# Patient Record
Sex: MALE | Race: WHITE | Employment: OTHER | URBAN - NONMETROPOLITAN AREA
[De-identification: names, ages, dates, MRNs, and addresses within clinical notes are randomized per-mention and may not be internally consistent; named-entity substitution may affect disease eponyms.]

---

## 2017-01-31 ENCOUNTER — OFFICE VISIT (OUTPATIENT)
Dept: FAMILY MEDICINE CLINIC | Age: 82
End: 2017-01-31

## 2017-01-31 VITALS
SYSTOLIC BLOOD PRESSURE: 128 MMHG | DIASTOLIC BLOOD PRESSURE: 66 MMHG | RESPIRATION RATE: 16 BRPM | BODY MASS INDEX: 22.01 KG/M2 | WEIGHT: 155.6 LBS | OXYGEN SATURATION: 97 % | HEART RATE: 84 BPM

## 2017-01-31 DIAGNOSIS — R60.0 BILATERAL EDEMA OF LOWER EXTREMITY: ICD-10-CM

## 2017-01-31 DIAGNOSIS — K59.03 DRUG-INDUCED CONSTIPATION: ICD-10-CM

## 2017-01-31 DIAGNOSIS — L85.3 DRY SKIN DERMATITIS: Primary | ICD-10-CM

## 2017-01-31 PROCEDURE — 1123F ACP DISCUSS/DSCN MKR DOCD: CPT | Performed by: FAMILY MEDICINE

## 2017-01-31 PROCEDURE — G8427 DOCREV CUR MEDS BY ELIG CLIN: HCPCS | Performed by: FAMILY MEDICINE

## 2017-01-31 PROCEDURE — G8484 FLU IMMUNIZE NO ADMIN: HCPCS | Performed by: FAMILY MEDICINE

## 2017-01-31 PROCEDURE — 99214 OFFICE O/P EST MOD 30 MIN: CPT | Performed by: FAMILY MEDICINE

## 2017-01-31 PROCEDURE — 1036F TOBACCO NON-USER: CPT | Performed by: FAMILY MEDICINE

## 2017-01-31 PROCEDURE — 4040F PNEUMOC VAC/ADMIN/RCVD: CPT | Performed by: FAMILY MEDICINE

## 2017-01-31 PROCEDURE — G8419 CALC BMI OUT NRM PARAM NOF/U: HCPCS | Performed by: FAMILY MEDICINE

## 2017-01-31 RX ORDER — DOCUSATE SODIUM 100 MG/1
100 CAPSULE, LIQUID FILLED ORAL DAILY PRN
Qty: 60 CAPSULE | Refills: 2 | Status: SHIPPED | OUTPATIENT
Start: 2017-01-31 | End: 2018-03-09 | Stop reason: CLARIF

## 2017-01-31 ASSESSMENT — ENCOUNTER SYMPTOMS
SHORTNESS OF BREATH: 0
NAUSEA: 0
COUGH: 0
WHEEZING: 0
ABDOMINAL PAIN: 0
SORE THROAT: 0
RHINORRHEA: 0
EYE DISCHARGE: 0
DIARRHEA: 0
CONSTIPATION: 1

## 2017-03-21 ENCOUNTER — OFFICE VISIT (OUTPATIENT)
Dept: FAMILY MEDICINE CLINIC | Age: 82
End: 2017-03-21

## 2017-03-21 VITALS
RESPIRATION RATE: 20 BRPM | BODY MASS INDEX: 21.56 KG/M2 | WEIGHT: 154 LBS | HEIGHT: 71 IN | HEART RATE: 100 BPM | OXYGEN SATURATION: 97 % | SYSTOLIC BLOOD PRESSURE: 130 MMHG | DIASTOLIC BLOOD PRESSURE: 72 MMHG

## 2017-03-21 DIAGNOSIS — G89.29 CHRONIC LEFT-SIDED LOW BACK PAIN WITH LEFT-SIDED SCIATICA: ICD-10-CM

## 2017-03-21 DIAGNOSIS — I10 ESSENTIAL HYPERTENSION: Primary | ICD-10-CM

## 2017-03-21 DIAGNOSIS — T40.2X5A CONSTIPATION DUE TO OPIOID THERAPY: ICD-10-CM

## 2017-03-21 DIAGNOSIS — M54.42 CHRONIC LEFT-SIDED LOW BACK PAIN WITH LEFT-SIDED SCIATICA: ICD-10-CM

## 2017-03-21 DIAGNOSIS — K59.03 CONSTIPATION DUE TO OPIOID THERAPY: ICD-10-CM

## 2017-03-21 DIAGNOSIS — I83.90 VARICOSE VEIN OF LEG: ICD-10-CM

## 2017-03-21 PROCEDURE — G8427 DOCREV CUR MEDS BY ELIG CLIN: HCPCS | Performed by: FAMILY MEDICINE

## 2017-03-21 PROCEDURE — 1036F TOBACCO NON-USER: CPT | Performed by: FAMILY MEDICINE

## 2017-03-21 PROCEDURE — G8484 FLU IMMUNIZE NO ADMIN: HCPCS | Performed by: FAMILY MEDICINE

## 2017-03-21 PROCEDURE — 4040F PNEUMOC VAC/ADMIN/RCVD: CPT | Performed by: FAMILY MEDICINE

## 2017-03-21 PROCEDURE — G8419 CALC BMI OUT NRM PARAM NOF/U: HCPCS | Performed by: FAMILY MEDICINE

## 2017-03-21 PROCEDURE — 99214 OFFICE O/P EST MOD 30 MIN: CPT | Performed by: FAMILY MEDICINE

## 2017-03-21 PROCEDURE — 1123F ACP DISCUSS/DSCN MKR DOCD: CPT | Performed by: FAMILY MEDICINE

## 2017-03-21 ASSESSMENT — ENCOUNTER SYMPTOMS
BACK PAIN: 1
COUGH: 0
DIARRHEA: 0
SHORTNESS OF BREATH: 0
CONSTIPATION: 1
EYE DISCHARGE: 0
WHEEZING: 0
NAUSEA: 0
ABDOMINAL PAIN: 0
RHINORRHEA: 0
SORE THROAT: 0

## 2017-03-21 ASSESSMENT — PATIENT HEALTH QUESTIONNAIRE - PHQ9
2. FEELING DOWN, DEPRESSED OR HOPELESS: 0
1. LITTLE INTEREST OR PLEASURE IN DOING THINGS: 0
SUM OF ALL RESPONSES TO PHQ QUESTIONS 1-9: 0
SUM OF ALL RESPONSES TO PHQ9 QUESTIONS 1 & 2: 0

## 2017-04-18 ENCOUNTER — OFFICE VISIT (OUTPATIENT)
Dept: UROLOGY | Age: 82
End: 2017-04-18

## 2017-04-18 VITALS — HEIGHT: 70 IN | BODY MASS INDEX: 21.47 KG/M2 | WEIGHT: 150 LBS

## 2017-04-18 DIAGNOSIS — C61 PROSTATE CANCER (HCC): Primary | ICD-10-CM

## 2017-04-18 LAB
BILIRUBIN, POC: NORMAL
BLOOD URINE, POC: NORMAL
CLARITY, POC: NORMAL
COLOR, POC: NORMAL
GLUCOSE URINE, POC: NORMAL
KETONES, POC: NORMAL
LEUKOCYTE EST, POC: NORMAL
NITRITE, POC: NORMAL
PH, POC: NORMAL
PROTEIN, POC: NORMAL
SPECIFIC GRAVITY, POC: NORMAL
UROBILINOGEN, POC: NORMAL

## 2017-04-18 PROCEDURE — G8419 CALC BMI OUT NRM PARAM NOF/U: HCPCS | Performed by: NURSE PRACTITIONER

## 2017-04-18 PROCEDURE — 99213 OFFICE O/P EST LOW 20 MIN: CPT | Performed by: NURSE PRACTITIONER

## 2017-04-18 PROCEDURE — 4040F PNEUMOC VAC/ADMIN/RCVD: CPT | Performed by: NURSE PRACTITIONER

## 2017-04-18 PROCEDURE — 1036F TOBACCO NON-USER: CPT | Performed by: NURSE PRACTITIONER

## 2017-04-18 PROCEDURE — G8427 DOCREV CUR MEDS BY ELIG CLIN: HCPCS | Performed by: NURSE PRACTITIONER

## 2017-04-18 PROCEDURE — 81002 URINALYSIS NONAUTO W/O SCOPE: CPT | Performed by: NURSE PRACTITIONER

## 2017-04-18 PROCEDURE — 1123F ACP DISCUSS/DSCN MKR DOCD: CPT | Performed by: NURSE PRACTITIONER

## 2017-05-13 LAB
CHOLESTEROL, TOTAL: 182 MG/DL
CHOLESTEROL/HDL RATIO: 2.84
HDLC SERPL-MCNC: 64 MG/DL (ref 35–70)
LDL CHOLESTEROL CALCULATED: 104 MG/DL (ref 0–160)
TRIGL SERPL-MCNC: 72 MG/DL
VLDLC SERPL CALC-MCNC: 14 MG/DL

## 2017-06-05 ENCOUNTER — CARE COORDINATION (OUTPATIENT)
Dept: FAMILY MEDICINE CLINIC | Age: 82
End: 2017-06-05

## 2017-07-20 ENCOUNTER — OFFICE VISIT (OUTPATIENT)
Dept: FAMILY MEDICINE CLINIC | Age: 82
End: 2017-07-20
Payer: MEDICARE

## 2017-07-20 VITALS
SYSTOLIC BLOOD PRESSURE: 124 MMHG | DIASTOLIC BLOOD PRESSURE: 80 MMHG | RESPIRATION RATE: 16 BRPM | WEIGHT: 149 LBS | BODY MASS INDEX: 21.08 KG/M2 | OXYGEN SATURATION: 98 % | HEART RATE: 83 BPM

## 2017-07-20 DIAGNOSIS — G89.29 CHRONIC BILATERAL LOW BACK PAIN WITH LEFT-SIDED SCIATICA: Primary | ICD-10-CM

## 2017-07-20 DIAGNOSIS — M54.42 CHRONIC BILATERAL LOW BACK PAIN WITH LEFT-SIDED SCIATICA: Primary | ICD-10-CM

## 2017-07-20 PROCEDURE — 4040F PNEUMOC VAC/ADMIN/RCVD: CPT | Performed by: NURSE PRACTITIONER

## 2017-07-20 PROCEDURE — G8427 DOCREV CUR MEDS BY ELIG CLIN: HCPCS | Performed by: NURSE PRACTITIONER

## 2017-07-20 PROCEDURE — 1123F ACP DISCUSS/DSCN MKR DOCD: CPT | Performed by: NURSE PRACTITIONER

## 2017-07-20 PROCEDURE — G8420 CALC BMI NORM PARAMETERS: HCPCS | Performed by: NURSE PRACTITIONER

## 2017-07-20 PROCEDURE — 99213 OFFICE O/P EST LOW 20 MIN: CPT | Performed by: NURSE PRACTITIONER

## 2017-07-20 PROCEDURE — 1036F TOBACCO NON-USER: CPT | Performed by: NURSE PRACTITIONER

## 2017-07-20 RX ORDER — HYDROCODONE BITARTRATE AND ACETAMINOPHEN 5; 325 MG/1; MG/1
1 TABLET ORAL EVERY 6 HOURS PRN
Status: ON HOLD | COMMUNITY
End: 2018-09-23 | Stop reason: HOSPADM

## 2017-07-22 ASSESSMENT — ENCOUNTER SYMPTOMS
ABDOMINAL PAIN: 0
SHORTNESS OF BREATH: 0
COLOR CHANGE: 0
BACK PAIN: 1
DIARRHEA: 0
VOMITING: 0
NAUSEA: 0

## 2017-08-04 ENCOUNTER — OFFICE VISIT (OUTPATIENT)
Dept: PHYSICAL MEDICINE AND REHAB | Age: 82
End: 2017-08-04
Payer: MEDICARE

## 2017-08-04 ENCOUNTER — HOSPITAL ENCOUNTER (OUTPATIENT)
Age: 82
Discharge: HOME | End: 2017-08-04
Payer: MEDICARE

## 2017-08-04 ENCOUNTER — HOSPITAL ENCOUNTER (OUTPATIENT)
Dept: GENERAL RADIOLOGY | Age: 82
Discharge: HOME | End: 2017-08-04
Payer: MEDICARE

## 2017-08-04 VITALS
WEIGHT: 150 LBS | HEIGHT: 70 IN | HEART RATE: 92 BPM | BODY MASS INDEX: 21.47 KG/M2 | DIASTOLIC BLOOD PRESSURE: 88 MMHG | SYSTOLIC BLOOD PRESSURE: 138 MMHG

## 2017-08-04 DIAGNOSIS — M47.816 SPONDYLOSIS OF LUMBAR REGION WITHOUT MYELOPATHY OR RADICULOPATHY: Primary | ICD-10-CM

## 2017-08-04 DIAGNOSIS — G89.4 CHRONIC PAIN SYNDROME: ICD-10-CM

## 2017-08-04 DIAGNOSIS — M16.12 PRIMARY OSTEOARTHRITIS OF LEFT HIP: ICD-10-CM

## 2017-08-04 DIAGNOSIS — M48.061 LUMBAR SPINAL STENOSIS: ICD-10-CM

## 2017-08-04 PROCEDURE — G8427 DOCREV CUR MEDS BY ELIG CLIN: HCPCS | Performed by: PAIN MEDICINE

## 2017-08-04 PROCEDURE — 1036F TOBACCO NON-USER: CPT | Performed by: PAIN MEDICINE

## 2017-08-04 PROCEDURE — G8420 CALC BMI NORM PARAMETERS: HCPCS | Performed by: PAIN MEDICINE

## 2017-08-04 PROCEDURE — 73502 X-RAY EXAM HIP UNI 2-3 VIEWS: CPT

## 2017-08-04 PROCEDURE — 99205 OFFICE O/P NEW HI 60 MIN: CPT | Performed by: PAIN MEDICINE

## 2017-08-04 PROCEDURE — 1123F ACP DISCUSS/DSCN MKR DOCD: CPT | Performed by: PAIN MEDICINE

## 2017-08-04 PROCEDURE — 4040F PNEUMOC VAC/ADMIN/RCVD: CPT | Performed by: PAIN MEDICINE

## 2017-08-04 ASSESSMENT — ENCOUNTER SYMPTOMS
DIARRHEA: 0
SORE THROAT: 0
PHOTOPHOBIA: 0
CHEST TIGHTNESS: 0
NAUSEA: 0
SINUS PRESSURE: 0
SHORTNESS OF BREATH: 0
VOMITING: 0
RHINORRHEA: 0
WHEEZING: 0
COUGH: 0
BOWEL INCONTINENCE: 0
BACK PAIN: 1
COLOR CHANGE: 0
CONSTIPATION: 0
ABDOMINAL PAIN: 0
EYE PAIN: 0

## 2017-09-05 ENCOUNTER — APPOINTMENT (OUTPATIENT)
Dept: GENERAL RADIOLOGY | Age: 82
End: 2017-09-05
Attending: PAIN MEDICINE
Payer: MEDICARE

## 2017-09-05 ENCOUNTER — ANESTHESIA EVENT (OUTPATIENT)
Dept: OPERATING ROOM | Age: 82
End: 2017-09-05
Payer: MEDICARE

## 2017-09-05 ENCOUNTER — ANESTHESIA (OUTPATIENT)
Dept: OPERATING ROOM | Age: 82
End: 2017-09-05
Payer: MEDICARE

## 2017-09-05 ENCOUNTER — HOSPITAL ENCOUNTER (OUTPATIENT)
Age: 82
Setting detail: OUTPATIENT SURGERY
Discharge: HOME | End: 2017-09-05
Attending: PAIN MEDICINE | Admitting: PAIN MEDICINE
Payer: MEDICARE

## 2017-09-05 VITALS
OXYGEN SATURATION: 97 % | HEART RATE: 96 BPM | RESPIRATION RATE: 16 BRPM | TEMPERATURE: 98.8 F | SYSTOLIC BLOOD PRESSURE: 126 MMHG | DIASTOLIC BLOOD PRESSURE: 74 MMHG | WEIGHT: 146.4 LBS | BODY MASS INDEX: 20.5 KG/M2 | HEIGHT: 71 IN

## 2017-09-05 VITALS — DIASTOLIC BLOOD PRESSURE: 77 MMHG | OXYGEN SATURATION: 98 % | SYSTOLIC BLOOD PRESSURE: 127 MMHG

## 2017-09-05 PROCEDURE — 7100000010 HC PHASE II RECOVERY - FIRST 15 MIN: Performed by: PAIN MEDICINE

## 2017-09-05 PROCEDURE — 3600000058 HC PAIN LEVEL 5 BASE: Performed by: PAIN MEDICINE

## 2017-09-05 PROCEDURE — 7100000011 HC PHASE II RECOVERY - ADDTL 15 MIN: Performed by: PAIN MEDICINE

## 2017-09-05 PROCEDURE — 2580000003 HC RX 258: Performed by: REGISTERED NURSE

## 2017-09-05 PROCEDURE — 64495 INJ PARAVERT F JNT L/S 3 LEV: CPT | Performed by: PAIN MEDICINE

## 2017-09-05 PROCEDURE — 6360000002 HC RX W HCPCS: Performed by: REGISTERED NURSE

## 2017-09-05 PROCEDURE — 64494 INJ PARAVERT F JNT L/S 2 LEV: CPT | Performed by: PAIN MEDICINE

## 2017-09-05 PROCEDURE — 64493 INJ PARAVERT F JNT L/S 1 LEV: CPT | Performed by: PAIN MEDICINE

## 2017-09-05 PROCEDURE — 2500000003 HC RX 250 WO HCPCS: Performed by: PAIN MEDICINE

## 2017-09-05 PROCEDURE — 3209999900 FLUORO FOR SURGICAL PROCEDURES

## 2017-09-05 PROCEDURE — 6360000002 HC RX W HCPCS: Performed by: PAIN MEDICINE

## 2017-09-05 PROCEDURE — 3700000000 HC ANESTHESIA ATTENDED CARE: Performed by: PAIN MEDICINE

## 2017-09-05 RX ORDER — SODIUM CHLORIDE 9 MG/ML
INJECTION, SOLUTION INTRAVENOUS CONTINUOUS PRN
Status: DISCONTINUED | OUTPATIENT
Start: 2017-09-05 | End: 2017-09-05 | Stop reason: SDUPTHER

## 2017-09-05 RX ORDER — LIDOCAINE HYDROCHLORIDE 10 MG/ML
INJECTION, SOLUTION INFILTRATION; PERINEURAL PRN
Status: DISCONTINUED | OUTPATIENT
Start: 2017-09-05 | End: 2017-09-05 | Stop reason: HOSPADM

## 2017-09-05 RX ORDER — BUPIVACAINE HYDROCHLORIDE 2.5 MG/ML
INJECTION, SOLUTION EPIDURAL; INFILTRATION; INTRACAUDAL PRN
Status: DISCONTINUED | OUTPATIENT
Start: 2017-09-05 | End: 2017-09-05 | Stop reason: HOSPADM

## 2017-09-05 RX ORDER — PROPOFOL 10 MG/ML
INJECTION, EMULSION INTRAVENOUS PRN
Status: DISCONTINUED | OUTPATIENT
Start: 2017-09-05 | End: 2017-09-05 | Stop reason: SDUPTHER

## 2017-09-05 RX ORDER — BETAMETHASONE SODIUM PHOSPHATE AND BETAMETHASONE ACETATE 3; 3 MG/ML; MG/ML
INJECTION, SUSPENSION INTRA-ARTICULAR; INTRALESIONAL; INTRAMUSCULAR; SOFT TISSUE PRN
Status: DISCONTINUED | OUTPATIENT
Start: 2017-09-05 | End: 2017-09-05 | Stop reason: HOSPADM

## 2017-09-05 RX ADMIN — PROPOFOL 50 MG: 10 INJECTION, EMULSION INTRAVENOUS at 09:58

## 2017-09-05 RX ADMIN — SODIUM CHLORIDE: 9 INJECTION, SOLUTION INTRAVENOUS at 09:53

## 2017-09-05 ASSESSMENT — PULMONARY FUNCTION TESTS
PIF_VALUE: 0

## 2017-09-05 ASSESSMENT — PAIN DESCRIPTION - DESCRIPTORS: DESCRIPTORS: CONSTANT;DULL;SHOOTING

## 2017-09-05 ASSESSMENT — PAIN SCALES - GENERAL: PAINLEVEL_OUTOF10: 0

## 2017-09-05 ASSESSMENT — PAIN - FUNCTIONAL ASSESSMENT: PAIN_FUNCTIONAL_ASSESSMENT: 0-10

## 2017-09-19 ENCOUNTER — OFFICE VISIT (OUTPATIENT)
Dept: FAMILY MEDICINE CLINIC | Age: 82
End: 2017-09-19
Payer: MEDICARE

## 2017-09-19 VITALS
SYSTOLIC BLOOD PRESSURE: 120 MMHG | TEMPERATURE: 97.7 F | BODY MASS INDEX: 21 KG/M2 | RESPIRATION RATE: 16 BRPM | HEART RATE: 97 BPM | OXYGEN SATURATION: 97 % | DIASTOLIC BLOOD PRESSURE: 72 MMHG | WEIGHT: 150 LBS | HEIGHT: 71 IN

## 2017-09-19 DIAGNOSIS — T40.2X5A CONSTIPATION DUE TO OPIOID THERAPY: Primary | ICD-10-CM

## 2017-09-19 DIAGNOSIS — K40.90 LEFT INGUINAL HERNIA: ICD-10-CM

## 2017-09-19 DIAGNOSIS — M81.0 AGE-RELATED OSTEOPOROSIS WITHOUT CURRENT PATHOLOGICAL FRACTURE: ICD-10-CM

## 2017-09-19 DIAGNOSIS — K59.03 CONSTIPATION DUE TO OPIOID THERAPY: Primary | ICD-10-CM

## 2017-09-19 PROCEDURE — 99214 OFFICE O/P EST MOD 30 MIN: CPT | Performed by: FAMILY MEDICINE

## 2017-09-19 PROCEDURE — G8420 CALC BMI NORM PARAMETERS: HCPCS | Performed by: FAMILY MEDICINE

## 2017-09-19 PROCEDURE — 1036F TOBACCO NON-USER: CPT | Performed by: FAMILY MEDICINE

## 2017-09-19 PROCEDURE — G8427 DOCREV CUR MEDS BY ELIG CLIN: HCPCS | Performed by: FAMILY MEDICINE

## 2017-09-19 PROCEDURE — 1123F ACP DISCUSS/DSCN MKR DOCD: CPT | Performed by: FAMILY MEDICINE

## 2017-09-19 PROCEDURE — 4040F PNEUMOC VAC/ADMIN/RCVD: CPT | Performed by: FAMILY MEDICINE

## 2017-09-19 PROCEDURE — 4005F PHARM THX FOR OP RXD: CPT | Performed by: FAMILY MEDICINE

## 2017-09-19 ASSESSMENT — ENCOUNTER SYMPTOMS
SORE THROAT: 0
NAUSEA: 0
BACK PAIN: 1
WHEEZING: 0
ABDOMINAL PAIN: 0
EYE DISCHARGE: 0
RHINORRHEA: 0
SHORTNESS OF BREATH: 0
CONSTIPATION: 1
DIARRHEA: 0
COUGH: 0

## 2017-09-20 ENCOUNTER — TELEPHONE (OUTPATIENT)
Dept: SURGERY | Age: 82
End: 2017-09-20

## 2017-09-26 ENCOUNTER — OFFICE VISIT (OUTPATIENT)
Dept: PHYSICAL MEDICINE AND REHAB | Age: 82
End: 2017-09-26
Payer: MEDICARE

## 2017-09-26 VITALS
HEART RATE: 86 BPM | BODY MASS INDEX: 20.99 KG/M2 | DIASTOLIC BLOOD PRESSURE: 82 MMHG | WEIGHT: 149.91 LBS | SYSTOLIC BLOOD PRESSURE: 142 MMHG | HEIGHT: 71 IN

## 2017-09-26 DIAGNOSIS — G89.4 CHRONIC PAIN SYNDROME: ICD-10-CM

## 2017-09-26 DIAGNOSIS — M46.1 SI (SACROILIAC) JOINT INFLAMMATION (HCC): Primary | ICD-10-CM

## 2017-09-26 DIAGNOSIS — M47.816 SPONDYLOSIS OF LUMBAR REGION WITHOUT MYELOPATHY OR RADICULOPATHY: ICD-10-CM

## 2017-09-26 DIAGNOSIS — M48.061 LUMBAR SPINAL STENOSIS: ICD-10-CM

## 2017-09-26 DIAGNOSIS — M16.12 PRIMARY OSTEOARTHRITIS OF LEFT HIP: ICD-10-CM

## 2017-09-26 PROCEDURE — 4040F PNEUMOC VAC/ADMIN/RCVD: CPT | Performed by: NURSE PRACTITIONER

## 2017-09-26 PROCEDURE — 1123F ACP DISCUSS/DSCN MKR DOCD: CPT | Performed by: NURSE PRACTITIONER

## 2017-09-26 PROCEDURE — G8420 CALC BMI NORM PARAMETERS: HCPCS | Performed by: NURSE PRACTITIONER

## 2017-09-26 PROCEDURE — G8427 DOCREV CUR MEDS BY ELIG CLIN: HCPCS | Performed by: NURSE PRACTITIONER

## 2017-09-26 PROCEDURE — 1036F TOBACCO NON-USER: CPT | Performed by: NURSE PRACTITIONER

## 2017-09-26 PROCEDURE — 99213 OFFICE O/P EST LOW 20 MIN: CPT | Performed by: NURSE PRACTITIONER

## 2017-09-26 ASSESSMENT — ENCOUNTER SYMPTOMS
GASTROINTESTINAL NEGATIVE: 1
BACK PAIN: 1
EYES NEGATIVE: 1
ALLERGIC/IMMUNOLOGIC NEGATIVE: 1
RESPIRATORY NEGATIVE: 1

## 2017-10-05 ENCOUNTER — HOSPITAL ENCOUNTER (OUTPATIENT)
Age: 82
Discharge: HOME | End: 2017-10-05
Payer: MEDICARE

## 2017-10-05 DIAGNOSIS — C61 PROSTATE CANCER (HCC): ICD-10-CM

## 2017-10-05 LAB — PROSTATE SPECIFIC ANTIGEN: < 0.02 NG/ML (ref 0–1)

## 2017-10-05 PROCEDURE — 84153 ASSAY OF PSA TOTAL: CPT

## 2017-10-05 PROCEDURE — 36415 COLL VENOUS BLD VENIPUNCTURE: CPT

## 2017-10-11 ENCOUNTER — OFFICE VISIT (OUTPATIENT)
Dept: SURGERY | Age: 82
End: 2017-10-11
Payer: MEDICARE

## 2017-10-11 VITALS
HEIGHT: 70 IN | OXYGEN SATURATION: 98 % | SYSTOLIC BLOOD PRESSURE: 136 MMHG | BODY MASS INDEX: 20.96 KG/M2 | TEMPERATURE: 96.5 F | DIASTOLIC BLOOD PRESSURE: 78 MMHG | HEART RATE: 115 BPM | RESPIRATION RATE: 18 BRPM | WEIGHT: 146.4 LBS

## 2017-10-11 DIAGNOSIS — Z01.818 PRE-OP TESTING: ICD-10-CM

## 2017-10-11 DIAGNOSIS — K40.91 RECURRENT LEFT INGUINAL HERNIA: Primary | ICD-10-CM

## 2017-10-11 DIAGNOSIS — I10 ESSENTIAL HYPERTENSION: ICD-10-CM

## 2017-10-11 PROCEDURE — 4040F PNEUMOC VAC/ADMIN/RCVD: CPT | Performed by: SURGERY

## 2017-10-11 PROCEDURE — G8484 FLU IMMUNIZE NO ADMIN: HCPCS | Performed by: SURGERY

## 2017-10-11 PROCEDURE — G8427 DOCREV CUR MEDS BY ELIG CLIN: HCPCS | Performed by: SURGERY

## 2017-10-11 PROCEDURE — G8420 CALC BMI NORM PARAMETERS: HCPCS | Performed by: SURGERY

## 2017-10-11 PROCEDURE — 1036F TOBACCO NON-USER: CPT | Performed by: SURGERY

## 2017-10-11 PROCEDURE — 99203 OFFICE O/P NEW LOW 30 MIN: CPT | Performed by: SURGERY

## 2017-10-11 ASSESSMENT — ENCOUNTER SYMPTOMS
CONSTIPATION: 1
EYE REDNESS: 0
RECTAL PAIN: 0
EYE ITCHING: 0
EYE DISCHARGE: 0
PHOTOPHOBIA: 0
CHEST TIGHTNESS: 0
VOICE CHANGE: 0
VOMITING: 0
TROUBLE SWALLOWING: 0
NAUSEA: 0
SHORTNESS OF BREATH: 0
CHOKING: 0
ABDOMINAL DISTENTION: 1
FACIAL SWELLING: 0
BLOOD IN STOOL: 0
EYE PAIN: 0
COLOR CHANGE: 0
SINUS PAIN: 0
COUGH: 0
WHEEZING: 0
SINUS PRESSURE: 0
ANAL BLEEDING: 0
RHINORRHEA: 0
APNEA: 0
ABDOMINAL PAIN: 0
BACK PAIN: 1
STRIDOR: 0
SORE THROAT: 0
DIARRHEA: 0

## 2017-10-11 NOTE — PROGRESS NOTES
Negative for abdominal pain, anal bleeding, blood in stool, diarrhea, nausea, rectal pain and vomiting. Endocrine: Negative for cold intolerance, heat intolerance, polydipsia, polyphagia and polyuria. Genitourinary: Negative for decreased urine volume, difficulty urinating, discharge, dysuria, enuresis, flank pain, frequency, genital sores, hematuria, penile pain, penile swelling, scrotal swelling, testicular pain and urgency. Musculoskeletal: Positive for back pain and myalgias. Negative for arthralgias, gait problem, joint swelling, neck pain and neck stiffness. Skin: Negative for color change, pallor, rash and wound. Allergic/Immunologic: Negative for environmental allergies, food allergies and immunocompromised state. Neurological: Positive for weakness. Negative for dizziness, tremors, seizures, syncope, facial asymmetry, speech difficulty, light-headedness, numbness and headaches. Hematological: Negative for adenopathy. Bruises/bleeds easily. Psychiatric/Behavioral: Positive for sleep disturbance. Negative for agitation, behavioral problems, confusion, decreased concentration, dysphoric mood, hallucinations, self-injury and suicidal ideas. The patient is not nervous/anxious and is not hyperactive. Past Medical History:   Diagnosis Date    Back pain     Hernia     Hyperlipidemia     Hypertension     Osteoarthritis     Prostate cancer ([facility] [address] 2015       Past Surgical History:   Procedure Laterality Date    APP[facility]  01/1961    [facility]    BACK SURGERY  [id number]    X stop    CATARACT REMOVAL Right 06/2001    Dr Rodriguez Ast Left 11/2014    Dr Yesenia Ferreira, 2009    Dr Fariba Fontenot  0787'M?     Dr Elvis Lawton of date    NERVE BLOCK LUMB FACET LEVEL 1 BILATERAL Bilateral 9/5/2017    LUMBAR FACET MBB L3-4, L4-5, L5-S1 BILATERAL performed by Ritesh Fleming MD at [address] [place] [address] no tenderness. Left breast exhibits no skin change and no tenderness. Breasts are symmetrical.   Abdominal: Soft. Bowel sounds are normal. He exhibits no distension, no fluid wave, no abdominal bruit, no pulsatile midline mass and no mass. There is no hepatosplenomegaly. There is no tenderness. There is no rigidity, no rebound and no guarding. A hernia is present. Hernia confirmed positive in the left inguinal area (recurrent). Hernia confirmed negative in the ventral area and confirmed negative in the right inguinal area. Genitourinary: Rectum normal, testes normal and penis normal. Rectal exam shows no fissure, no mass, no tenderness and anal tone normal.   Musculoskeletal: Normal range of motion. He exhibits no edema or tenderness. Lymphadenopathy:     He has no cervical adenopathy. He has no axillary adenopathy. Right: No inguinal and no supraclavicular adenopathy present. Left: No inguinal and no supraclavicular adenopathy present. Neurological: He is alert and oriented to person, place, and time. He has normal strength. No cranial nerve deficit or sensory deficit. Gait normal.   Skin: Skin is warm, dry and intact. No rash noted. He is not diaphoretic. No cyanosis or erythema. No pallor. Nails show no clubbing. Psychiatric: He has a normal mood and affect. His speech is normal and behavior is normal. Judgment and thought content normal. Cognition and memory are normal.   Vitals reviewed.     Lab Results   Component Value Date     01/31/2017    K 4.3 01/31/2017     01/31/2017    CO2 27 01/31/2017     Lab Results   Component Value Date    CREATININE 1.3 (H) 01/31/2017     Lab Results   Component Value Date    WBC 9.6 01/31/2017    HGB 14.6 01/31/2017    HCT 44.2 01/31/2017    MCV 95.8 (H) 01/31/2017     01/31/2017     Lab Results   Component Value Date    ALT 23 01/31/2017    AST 22 01/31/2017    ALKPHOS 58 01/31/2017    BILITOT 0.4 01/31/2017     Imaging - none    Patient Active Problem List   Diagnosis    Prostate cancer    Generalized anxiety disorder    Essential hypertension    Back pain    Hyperlipidemia with target LDL less than 130    Back pain, chronic s/p surgery    Prostate disorder    Hematuria    Ileus ([address]    Postoperative ileus ([address]    Malignant neoplasm of prostate ([address]    PE (pulmonary thromboembolism) ([address]    Pulmonary embolism without acute cor pulmonale ([address]    Osteoporosis    Spondylosis of lumbar region without myelopathy or radiculopathy     Assessment:      1. Recurrent left inguinal hernia      Plan:      1. Schedule Ashley Campos for Robotic repair recurrent left inguinal hernia with mesh. 2. He will undergo pre-operative clearance per anesthesia guidelines with risk factors listed under the past medical history diagnosis & problem list.  3. The risks, benefits and alternatives were discussed with Ashley Campos including non-operative management. The pros and cons of robotic, laparoscopic and open techniques were discussed. The pros and cons of mesh insertion were discussed. All questions answered. He understands and wishes to proceed with surgical intervention. 4. Restrictions discussed with Ashley Campos and he expresses understanding. 5. He is advised to call back directly if there are further questions/concerns, or if his symptoms worsen prior to surgery.

## 2017-10-12 ENCOUNTER — HOSPITAL ENCOUNTER (OUTPATIENT)
Age: 82
Discharge: HOME | End: 2017-10-12
Payer: MEDICARE

## 2017-10-12 LAB
ANION GAP SERPL CALCULATED.3IONS-SCNC: 11 MEQ/L (ref 8–16)
BUN BLDV-MCNC: 22 MG/DL (ref 7–22)
CALCIUM SERPL-MCNC: 9 MG/DL (ref 8.5–10.5)
CHLORIDE BLD-SCNC: 102 MEQ/L (ref 98–111)
CO2: 28 MEQ/L (ref 23–33)
CREAT SERPL-MCNC: 1 MG/DL (ref 0.4–1.2)
EKG ATRIAL RATE: 64 BPM
EKG Q-T INTERVAL: 356 MS
EKG QRS DURATION: 84 MS
EKG QTC CALCULATION (BAZETT): 420 MS
EKG R AXIS: 12 DEGREES
EKG T AXIS: 14 DEGREES
EKG VENTRICULAR RATE: 84 BPM
GFR SERPL CREATININE-BSD FRML MDRD: 71 ML/MIN/1.73M2
GLUCOSE BLD-MCNC: 102 MG/DL (ref 70–108)
HCT VFR BLD CALC: 46.4 % (ref 42–52)
HEMOGLOBIN: 15.4 GM/DL (ref 14–18)
POTASSIUM SERPL-SCNC: 3.8 MEQ/L (ref 3.5–5.2)
SODIUM BLD-SCNC: 141 MEQ/L (ref 135–145)

## 2017-10-12 PROCEDURE — 36415 COLL VENOUS BLD VENIPUNCTURE: CPT

## 2017-10-12 PROCEDURE — 85018 HEMOGLOBIN: CPT

## 2017-10-12 PROCEDURE — 93005 ELECTROCARDIOGRAM TRACING: CPT | Performed by: SURGERY

## 2017-10-12 PROCEDURE — 85014 HEMATOCRIT: CPT

## 2017-10-12 PROCEDURE — 80048 BASIC METABOLIC PNL TOTAL CA: CPT

## 2017-10-13 ENCOUNTER — TELEPHONE (OUTPATIENT)
Dept: SURGERY | Age: 82
End: 2017-10-13

## 2017-10-13 NOTE — TELEPHONE ENCOUNTER
[facility] able to schedule pt with Dr. Cash All Monday at 10 am. Pt notified, instructed to bring photo id, insurance information, and pill bottles to appointment. Explained to pt that if he has any chest pain, shortness of breath, or feels like his heart is racing he is to report directly to ED. [address] verbalized understanding. Also notified Dr. Kip Merlos office as he was to have procedure done Monday morning. They are cancelling that procedure pending cardiac clearance as well. Pt notified of this. No questions or concerns voiced at this time.

## 2017-10-13 NOTE — TELEPHONE ENCOUNTER
We would have to see pt, and i have no openings with any dr Carrie Pereira.  Will dr. Kirstie Pulliam office clear pt?

## 2017-10-16 ENCOUNTER — OFFICE VISIT (OUTPATIENT)
Dept: CARDIOLOGY CLINIC | Age: 82
End: 2017-10-16
Payer: MEDICARE

## 2017-10-16 VITALS
BODY MASS INDEX: 20.64 KG/M2 | SYSTOLIC BLOOD PRESSURE: 128 MMHG | DIASTOLIC BLOOD PRESSURE: 66 MMHG | WEIGHT: 147.4 LBS | HEART RATE: 62 BPM | HEIGHT: 71 IN

## 2017-10-16 DIAGNOSIS — R42 DIZZINESS: ICD-10-CM

## 2017-10-16 DIAGNOSIS — I10 ESSENTIAL HYPERTENSION: ICD-10-CM

## 2017-10-16 DIAGNOSIS — Z01.818 PRE-OP EVALUATION: Primary | ICD-10-CM

## 2017-10-16 DIAGNOSIS — I48.19 PERSISTENT ATRIAL FIBRILLATION (HCC): ICD-10-CM

## 2017-10-16 DIAGNOSIS — R06.02 SOB (SHORTNESS OF BREATH) ON EXERTION: ICD-10-CM

## 2017-10-16 DIAGNOSIS — R94.31 ABNORMAL EKG: ICD-10-CM

## 2017-10-16 PROCEDURE — G8420 CALC BMI NORM PARAMETERS: HCPCS | Performed by: INTERNAL MEDICINE

## 2017-10-16 PROCEDURE — 99204 OFFICE O/P NEW MOD 45 MIN: CPT | Performed by: INTERNAL MEDICINE

## 2017-10-16 PROCEDURE — G8427 DOCREV CUR MEDS BY ELIG CLIN: HCPCS | Performed by: INTERNAL MEDICINE

## 2017-10-16 PROCEDURE — 4040F PNEUMOC VAC/ADMIN/RCVD: CPT | Performed by: INTERNAL MEDICINE

## 2017-10-16 PROCEDURE — G8484 FLU IMMUNIZE NO ADMIN: HCPCS | Performed by: INTERNAL MEDICINE

## 2017-10-16 PROCEDURE — 1123F ACP DISCUSS/DSCN MKR DOCD: CPT | Performed by: INTERNAL MEDICINE

## 2017-10-16 PROCEDURE — 1036F TOBACCO NON-USER: CPT | Performed by: INTERNAL MEDICINE

## 2017-10-16 NOTE — PROGRESS NOTES
2003    TURP    PROSTATE SURGERY  2009    Biopsy -Hyperplasia    SHOULDER ARTHROPLASTY  2005    right       Allergies   Allergen Reactions    Nitrofuran Derivatives Nausea Only     Severe stomach cramps    Nsaids Nausea Only and Other (See Comments)     Pain in stomach        Family History   Problem Relation Age of Onset    Heart Disease Father     Prostate Cancer Brother     Cancer Brother     Prostate Cancer Brother     Cancer Brother     Stroke Mother     Cancer Sister     Prostate Cancer Brother     Prostate Cancer Brother         Social History     Social History    Marital status:      Spouse name: N/A    Number of children: N/A    Years of education: N/A     Occupational History    Not on file. Social History Main Topics    Smoking status: Former Smoker     Packs/day: 1.00     Years: 10.00     Types: Cigarettes     Quit date: 7/3/1965    Smokeless tobacco: Never Used    Alcohol use 0.0 oz/week      Comment: SOCIALLY wine a few times a year     Drug use: No    Sexual activity: Not Currently     Other Topics Concern    Not on file     Social History Narrative    No narrative on file       Current Outpatient Prescriptions   Medication Sig Dispense Refill    apixaban (ELIQUIS) 5 MG TABS tablet Take 1 tablet by mouth 2 times daily 180 tablet 2    Multiple Vitamins-Minerals (PRESERVISION/LUTEIN PO) Take 1 tablet by mouth 2 times daily       UNABLE TO FIND 1 tablet daily For kidney health      HYDROcodone-acetaminophen (NORCO) 5-325 MG per tablet Take 1 tablet by mouth every 6 hours as needed for Pain  .  docusate sodium (COLACE) 100 MG capsule Take 1 capsule by mouth daily as needed for Constipation 60 capsule 2    clotrimazole-betamethasone (LOTRISONE) 1-0.05 % cream Apply topically 2 times daily. (Patient taking differently: Apply topically 2 times daily.  PRN) 45 g 1    aspirin 81 MG tablet Take 81 mg by mouth daily      Calcium Carbonate-Vitamin D (CALTRATE 600+D) 600-400 MG-UNIT CHEW 1 tablet PO BID 60 tablet 0    amLODIPine (NORVASC) 5 MG tablet Take 10 mg by mouth daily       clonazePAM (KLONOPIN) 0.5 MG tablet Take 0.5 mg by mouth every evening. No current facility-administered medications for this visit. Review of Systems -     General ROS: negative  Psychological ROS: negative  Hematological and Lymphatic ROS: No history of blood clots or bleeding disorder. Respiratory ROS: no cough, shortness of breath, or wheezing  Cardiovascular ROS: no chest pain or dyspnea on exertion  Gastrointestinal ROS: negative  Genito-Urinary ROS: no dysuria, trouble voiding, or hematuria  Musculoskeletal ROS: negative  Neurological ROS: no TIA or stroke symptoms  Dermatological ROS: negative      Blood pressure 128/66, pulse 62, height 5' 10.51\" (1.791 m), weight 147 lb 6.4 oz (66.9 kg). Physical Examination:    General appearance - alert, well appearing, and in no distress  Mental status - alert, oriented to person, place, and time  Neck - supple, no significant adenopathy, no JVD, or carotid bruits  Chest - clear to auscultation, no wheezes, rales or rhonchi, symmetric air entry  Heart - normal rate, regular rhythm, normal S1, S2, no murmurs, rubs, clicks or gallops  Abdomen - soft, nontender, nondistended, no masses or organomegaly  Neurological - alert, oriented, normal speech, no focal findings or movement disorder noted  Musculoskeletal - no joint tenderness, deformity or swelling  Extremities - peripheral pulses normal, no pedal edema, no clubbing or cyanosis  Skin - normal coloration and turgor, no rashes, no suspicious skin lesions noted    Lab  No results for input(s): CKTOTAL, CKMB, CKMBINDEX, TROPONINI in the last 72 hours.   CBC:   Lab Results   Component Value Date    WBC 9.6 01/31/2017    RBC 4.61 01/31/2017     08/26/2011    HGB 15.4 10/12/2017    HCT 46.4 10/12/2017    MCV 95.8 01/31/2017    MCH 31.7 01/31/2017    MCHC 33.1 01/31/2017    RDW

## 2017-10-25 ENCOUNTER — TELEPHONE (OUTPATIENT)
Dept: UROLOGY | Age: 82
End: 2017-10-25

## 2017-10-26 ENCOUNTER — HOSPITAL ENCOUNTER (OUTPATIENT)
Dept: NON INVASIVE DIAGNOSTICS | Age: 82
Discharge: HOME | End: 2017-10-26
Payer: MEDICARE

## 2017-10-26 DIAGNOSIS — I10 ESSENTIAL HYPERTENSION: ICD-10-CM

## 2017-10-26 DIAGNOSIS — Z01.818 PRE-OP EVALUATION: ICD-10-CM

## 2017-10-26 DIAGNOSIS — R94.31 ABNORMAL EKG: ICD-10-CM

## 2017-10-26 DIAGNOSIS — R06.02 SOB (SHORTNESS OF BREATH) ON EXERTION: ICD-10-CM

## 2017-10-26 DIAGNOSIS — I48.19 PERSISTENT ATRIAL FIBRILLATION (HCC): ICD-10-CM

## 2017-10-26 DIAGNOSIS — R42 DIZZINESS: ICD-10-CM

## 2017-10-26 LAB
LV EF: 63 %
LVEF MODALITY: NORMAL

## 2017-10-26 PROCEDURE — 93306 TTE W/DOPPLER COMPLETE: CPT

## 2017-10-26 PROCEDURE — 78452 HT MUSCLE IMAGE SPECT MULT: CPT

## 2017-10-26 PROCEDURE — 3430000000 HC RX DIAGNOSTIC RADIOPHARMACEUTICAL: Performed by: INTERNAL MEDICINE

## 2017-10-26 PROCEDURE — 93017 CV STRESS TEST TRACING ONLY: CPT | Performed by: INTERNAL MEDICINE

## 2017-10-26 PROCEDURE — 93226 XTRNL ECG REC<48 HR SCAN A/R: CPT

## 2017-10-26 PROCEDURE — A9500 TC99M SESTAMIBI: HCPCS | Performed by: INTERNAL MEDICINE

## 2017-10-26 PROCEDURE — 93225 XTRNL ECG REC<48 HRS REC: CPT

## 2017-10-26 PROCEDURE — 6360000002 HC RX W HCPCS

## 2017-10-26 RX ADMIN — Medication 10.3 MILLICURIE: at 09:42

## 2017-10-26 RX ADMIN — Medication 34.3 MILLICURIE: at 10:50

## 2017-10-27 ENCOUNTER — TELEPHONE (OUTPATIENT)
Dept: SURGERY | Age: 82
End: 2017-10-27

## 2017-10-27 ENCOUNTER — TELEPHONE (OUTPATIENT)
Dept: PHYSICAL MEDICINE AND REHAB | Age: 82
End: 2017-10-27

## 2017-10-27 NOTE — TELEPHONE ENCOUNTER
Returned patients call. He is going to follow up with Dr. Adrienne Lock on 11.03 to check on clearance before we can proceed with his SI MBB. I cancelled his 11.06 appt with Jess which was to follow up after injection.

## 2017-11-01 ENCOUNTER — TELEPHONE (OUTPATIENT)
Dept: SURGERY | Age: 82
End: 2017-11-01

## 2017-11-01 NOTE — TELEPHONE ENCOUNTER
Pt now cleared for surgery. Scheduled for Nov. 21st. Arrive at 9am. Can pt stop eliquis 2-3 days prior?

## 2017-11-01 NOTE — TELEPHONE ENCOUNTER
I do not think pat started yet.  B/C he was informed not to till after surgery  But he started -yes he may stop for 3 days prior to surgery- no bridging needed

## 2017-11-02 ENCOUNTER — TELEPHONE (OUTPATIENT)
Dept: SURGERY | Age: 82
End: 2017-11-02

## 2017-11-02 NOTE — PROCEDURES
[address] [place] [address]                                HOLTER MONITOR    PATIENT NAME: Jaqueline Reyez               :             1934  MED REC NO:   [id number]                            ROOM:  ACCOUNT NO:   [de-identified]                            ADMISSION DATE:  10/26/2017  PROVIDER:     Rakan Sandoval. KIRSTIN Love STUDY:  10/26/2017    QUALITY:  Reasonable. INDICATION:  Cardiac arrhythmia. FINDINGS:  The patient is in a complete atrial fibrillation. Average  heart rate of 91 beats per minute ranging from 47 to 150 beats per  minute. Maximum R to R interval is 2.3 second at 5:30 a.m. There is  277 ventricular ectopic beat of which 251 isolated, 13 couplet and 10  ventricular bigeminy pattern. No supraventricular ectopic beats. Diary: This is a diary. No significant entry noted. CONCLUSION:  This is an complete atrial fibrillation with average  heart rate of 91 beats per minute ranging from 47 to 150 beats per  minute. No significant pause of more than 2.3 second noted. Rare  ventricular ectopic beat total of 277, predominantly isolated, few  couplets, few ventricular bigeminy. No supraventricular tachycardia. No other form of arrhythmia noted. The AFib seems to be rate well  controlled. The patient at times  gets AFib with rapid ventricular  response. Average heart rate is 91 beats per minute, so the patient  may benefit from increasing the dose of AV jason blocking agent. Thank you. Anand De La Fuente.  Denita Salazar M.D.    D: 2017 18:04:27       T: 2017 19:32:31     [email or web address]  Job#: [id number]     Doc#: [id number]

## 2017-11-03 ENCOUNTER — OFFICE VISIT (OUTPATIENT)
Dept: CARDIOLOGY CLINIC | Age: 82
End: 2017-11-03
Payer: MEDICARE

## 2017-11-03 VITALS
HEIGHT: 71 IN | DIASTOLIC BLOOD PRESSURE: 92 MMHG | SYSTOLIC BLOOD PRESSURE: 152 MMHG | HEART RATE: 86 BPM | WEIGHT: 150 LBS | BODY MASS INDEX: 21 KG/M2

## 2017-11-03 DIAGNOSIS — R06.02 SOB (SHORTNESS OF BREATH) ON EXERTION: ICD-10-CM

## 2017-11-03 DIAGNOSIS — I34.0 NON-RHEUMATIC MITRAL REGURGITATION: ICD-10-CM

## 2017-11-03 DIAGNOSIS — Z01.818 PRE-OP EVALUATION: Primary | ICD-10-CM

## 2017-11-03 DIAGNOSIS — I48.19 PERSISTENT ATRIAL FIBRILLATION (HCC): ICD-10-CM

## 2017-11-03 DIAGNOSIS — E78.5 HYPERLIPIDEMIA WITH TARGET LDL LESS THAN 130: ICD-10-CM

## 2017-11-03 DIAGNOSIS — I10 ESSENTIAL HYPERTENSION: ICD-10-CM

## 2017-11-03 DIAGNOSIS — R94.31 ABNORMAL EKG: ICD-10-CM

## 2017-11-03 DIAGNOSIS — I35.1 MODERATE AORTIC REGURGITATION: ICD-10-CM

## 2017-11-03 PROCEDURE — G8484 FLU IMMUNIZE NO ADMIN: HCPCS | Performed by: INTERNAL MEDICINE

## 2017-11-03 PROCEDURE — G8420 CALC BMI NORM PARAMETERS: HCPCS | Performed by: INTERNAL MEDICINE

## 2017-11-03 PROCEDURE — 1123F ACP DISCUSS/DSCN MKR DOCD: CPT | Performed by: INTERNAL MEDICINE

## 2017-11-03 PROCEDURE — 1036F TOBACCO NON-USER: CPT | Performed by: INTERNAL MEDICINE

## 2017-11-03 PROCEDURE — 99214 OFFICE O/P EST MOD 30 MIN: CPT | Performed by: INTERNAL MEDICINE

## 2017-11-03 PROCEDURE — G8427 DOCREV CUR MEDS BY ELIG CLIN: HCPCS | Performed by: INTERNAL MEDICINE

## 2017-11-03 PROCEDURE — 4040F PNEUMOC VAC/ADMIN/RCVD: CPT | Performed by: INTERNAL MEDICINE

## 2017-11-03 NOTE — PROGRESS NOTES
Bilateral 09/05/2017    lumbar facet block L3-S1    PROSTATE BIOPSY  7-    Dr Vaishnavi Mazariegos BIOPSY  8/21/2015    transrectal ultrasound with biopsy(+)    PROSTATE SURGERY  2003    TURP    PROSTATE SURGERY  2009    Biopsy -Hyperplasia    SHOULDER ARTHROPLASTY  2005    right       Allergies   Allergen Reactions    Nitrofuran Derivatives Nausea Only     Severe stomach cramps    Nsaids Nausea Only and Other (See Comments)     Pain in stomach        Family History   Problem Relation Age of Onset    Heart Disease Father     Prostate Cancer Brother     Cancer Brother     Prostate Cancer Brother     Cancer Brother     Stroke Mother     Cancer Sister     Prostate Cancer Brother     Prostate Cancer Brother         Social History     Social History    Marital status:      Spouse name: N/A    Number of children: N/A    Years of education: N/A     Occupational History    Not on file. Social History Main Topics    Smoking status: Former Smoker     Packs/day: 1.00     Years: 10.00     Types: Cigarettes     Quit date: 7/3/1965    Smokeless tobacco: Never Used    Alcohol use 0.0 oz/week      Comment: SOCIALLY wine a few times a year     Drug use: No    Sexual activity: Not Currently     Other Topics Concern    Not on file     Social History Narrative    No narrative on file       Current Outpatient Prescriptions   Medication Sig Dispense Refill    metoprolol tartrate (LOPRESSOR) 25 MG tablet Take 0.5 tablets by mouth daily 45 tablet 1    apixaban (ELIQUIS) 5 MG TABS tablet Take 1 tablet by mouth 2 times daily 56 tablet 0    Multiple Vitamins-Minerals (PRESERVISION/LUTEIN PO) Take 1 tablet by mouth 2 times daily       UNABLE TO FIND 1 tablet daily For kidney health      HYDROcodone-acetaminophen (NORCO) 5-325 MG per tablet Take 1 tablet by mouth every 6 hours as needed for Pain  .       docusate sodium (COLACE) 100 MG capsule Take 1 capsule by mouth daily as needed for Constipation 60 capsule 2    clotrimazole-betamethasone (LOTRISONE) 1-0.05 % cream Apply topically 2 times daily. (Patient taking differently: Apply topically 2 times daily. PRN) 45 g 1    aspirin 81 MG tablet Take 81 mg by mouth daily      Calcium Carbonate-Vitamin D (CALTRATE 600+D) 600-400 MG-UNIT CHEW 1 tablet PO BID 60 tablet 0    amLODIPine (NORVASC) 5 MG tablet Take 10 mg by mouth daily       clonazePAM (KLONOPIN) 0.5 MG tablet Take 0.5 mg by mouth every evening. No current facility-administered medications for this visit. Review of Systems -     General ROS: negative  Psychological ROS: negative  Hematological and Lymphatic ROS: No history of blood clots or bleeding disorder. Respiratory ROS: no cough, shortness of breath, or wheezing  Cardiovascular ROS: no chest pain or dyspnea on exertion  Gastrointestinal ROS: negative  Genito-Urinary ROS: no dysuria, trouble voiding, or hematuria  Musculoskeletal ROS: negative  Neurological ROS: no TIA or stroke symptoms  Dermatological ROS: negative      Blood pressure (!) 149/93, pulse 86, height 5' 10.5\" (1.791 m), weight 150 lb (68 kg).         Physical Examination:    General appearance - alert, well appearing, and in no distress  Mental status - alert, oriented to person, place, and time  Neck - supple, no significant adenopathy, no JVD, or carotid bruits  Chest - clear to auscultation, no wheezes, rales or rhonchi, symmetric air entry  Heart - normal rate, regular rhythm, normal S1, S2, , rubs, clicks or gallops but +ve murmurs  Abdomen - soft, nontender, nondistended, no masses or organomegaly  Neurological - alert, oriented, normal speech, no focal findings or movement disorder noted  Musculoskeletal - no joint tenderness, deformity or swelling  Extremities - peripheral pulses normal, no pedal edema, no clubbing or cyanosis  Skin - normal coloration and turgor, no rashes, no suspicious skin lesions noted    Lab  No results for input(s): CKTOTAL, CKMB, CKMBINDEX, TROPONINI in the last 72 hours. CBC:   Lab Results   Component Value Date    WBC 9.6 01/31/2017    RBC 4.61 01/31/2017     08/26/2011    HGB 15.4 10/12/2017    HCT 46.4 10/12/2017    MCV 95.8 01/31/2017    MCH 31.7 01/31/2017    MCHC 33.1 01/31/2017    RDW 13.9 01/31/2017     01/31/2017    MPV 11.1 01/31/2017     BMP:    Lab Results   Component Value Date     10/12/2017    K 3.8 10/12/2017     10/12/2017    CO2 28 10/12/2017    BUN 22 10/12/2017    LABALBU 4.1 01/31/2017    CREATININE 1.0 10/12/2017    CALCIUM 9.0 10/12/2017    LABGLOM 71 10/12/2017    GLUCOSE 102 10/12/2017    GLUCOSE 89 05/14/2016     Hepatic Function Panel:    Lab Results   Component Value Date    ALKPHOS 58 01/31/2017    ALT 23 01/31/2017    AST 22 01/31/2017    PROT 7.2 01/31/2017    BILITOT 0.4 01/31/2017    BILIDIR 0.2 05/14/2016    LABALBU 4.1 01/31/2017     Magnesium:  No results found for: MG  Warfarin PT/INR:  No components found for: PTPATWAR, PTINRWAR  HgBA1c:  No results found for: LABA1C  FLP:    Lab Results   Component Value Date    TRIG 72 05/13/2017    HDL 64 05/13/2017    LDLCALC 104 05/13/2017     TSH:  No results found for: TSH    EKG 10/12/17  Atrial fibrillation  Septal infarct , age undetermined  Abnormal ECG  When compared with ECG of 06-NOV-2015 22:29,  Atrial fibrillation has replaced Sinus rhythm  Nonspecific T wave abnormality now evident in Inferior leads  Confirmed by Goran Rader MD ([id number]) on 10/12/2017 8:03:16 PM      Conclusions      Summary   Normal left ventricle size and systolic function. Ejection fraction was   estimated at 60 to 65 %.  There were no regional left ventricular wall   motion abnormalities and wall thickness was within normal limits.   The left atrium is Moderately dilated.   Moderate to severly enlarged right atrium size.   Moderate-to-severe mitral regurgitation with centrally directed jet.   Moderate aortic regurgitation is noted.      Signature      ----------------------------------------------------------------   Electronically signed by Hyacinth Greene MD (Interpreting   physician) on 10/26/2017 at 12:55 PM   ----------------------------------------------------------------    Nuc  Stress neg      CONCLUSION:  This is an complete atrial fibrillation with average  heart rate of 91 beats per minute ranging from 47 to 150 beats per  minute. No significant pause of more than 2.3 second noted. Rare  ventricular ectopic beat total of 277, predominantly isolated, few  couplets, few ventricular bigeminy. No supraventricular tachycardia. No other form of arrhythmia noted. The AFib seems to be rate well  controlled. The patient at times  gets AFib with rapid ventricular  response. Average heart rate is 91 beats per minute, so the patient  may benefit from increasing the dose of AV jason blocking agent.     Thank you.           BENJAMIN Hagan M.D.     D: 11/02/2017 18:04:27           Assessment    1. Pre-op evaluation for abdominal hernia surgery     2. Persistent atrial fibrillation ([address]- newely DXed 10/12/17- CVR     3. Non-rheumatic mitral regurgitation- mod to severe     4. Moderate aortic regurgitation     5. Essential hypertension     6. Hyperlipidemia with target LDL less than 130     7. Abnormal EKG     8. SOB (shortness of breath) on exertion           Plan   Continue the current treatment and with constant vigilance to changes in symptoms and also any potential side effects. Return for care or seek medical attention immediately if symptoms got worse and/or develop new symptoms. Persistent atr FIB-CVR  chads vasc 3  Need OA  Holter 48 hrs   average heart rate of 91 beats per minute ranging from 47 to 150 beats per  Minute.   Need a little better rate control  Start lopresor 12.5 po qd am  Cont  apixaban 5 po bid  The risk and benefit of OA well explained including ICH and pat agreed to be on OA    Pre op eval  MET> 4  Sob on exertion and new atr fib with cvr  Need echo and lexiscan nuc result reviewed and d/w the pat  Brady Aceves may proceed with mod risk    Mod ro sever MR  Mor AR  Need f/u echo in 1 yrs    Hypertension, on medical treatment. Seems to be under good control. Patient is compliant with medical treatment.    .  D/w the pat at length    RTC in 4 months    [facility]

## 2017-11-07 ENCOUNTER — OFFICE VISIT (OUTPATIENT)
Dept: UROLOGY | Age: 82
End: 2017-11-07
Payer: MEDICARE

## 2017-11-07 VITALS — HEIGHT: 70 IN | WEIGHT: 150 LBS | BODY MASS INDEX: 21.47 KG/M2

## 2017-11-07 DIAGNOSIS — C61 MALIGNANT NEOPLASM OF PROSTATE (HCC): Primary | ICD-10-CM

## 2017-11-07 LAB
BILIRUBIN, POC: NORMAL
BLOOD URINE, POC: NORMAL
CLARITY, POC: CLEAR
COLOR, POC: YELLOW
GLUCOSE URINE, POC: NORMAL
KETONES, POC: NORMAL
LEUKOCYTE EST, POC: NORMAL
NITRITE, POC: NORMAL
PH, POC: NORMAL
PROTEIN, POC: NORMAL
SPECIFIC GRAVITY, POC: NORMAL
UROBILINOGEN, POC: NORMAL

## 2017-11-07 PROCEDURE — 4040F PNEUMOC VAC/ADMIN/RCVD: CPT | Performed by: NURSE PRACTITIONER

## 2017-11-07 PROCEDURE — G8420 CALC BMI NORM PARAMETERS: HCPCS | Performed by: NURSE PRACTITIONER

## 2017-11-07 PROCEDURE — 1123F ACP DISCUSS/DSCN MKR DOCD: CPT | Performed by: NURSE PRACTITIONER

## 2017-11-07 PROCEDURE — G8484 FLU IMMUNIZE NO ADMIN: HCPCS | Performed by: NURSE PRACTITIONER

## 2017-11-07 PROCEDURE — 99213 OFFICE O/P EST LOW 20 MIN: CPT | Performed by: NURSE PRACTITIONER

## 2017-11-07 PROCEDURE — 1036F TOBACCO NON-USER: CPT | Performed by: NURSE PRACTITIONER

## 2017-11-07 PROCEDURE — 81002 URINALYSIS NONAUTO W/O SCOPE: CPT | Performed by: NURSE PRACTITIONER

## 2017-11-07 PROCEDURE — G8427 DOCREV CUR MEDS BY ELIG CLIN: HCPCS | Performed by: NURSE PRACTITIONER

## 2017-11-07 NOTE — PROGRESS NOTES
SHOULDER ARTHROPLASTY  2005    right       Current Outpatient Prescriptions on File Prior to Visit   Medication Sig Dispense Refill    metoprolol tartrate (LOPRESSOR) 25 MG tablet Take 0.5 tablets by mouth daily 45 tablet 1    apixaban (ELIQUIS) 5 MG TABS tablet Take 1 tablet by mouth 2 times daily 56 tablet 0    Multiple Vitamins-Minerals (PRESERVISION/LUTEIN PO) Take 1 tablet by mouth 2 times daily       UNABLE TO FIND 1 tablet daily For kidney health      HYDROcodone-acetaminophen (NORCO) 5-325 MG per tablet Take 1 tablet by mouth every 6 hours as needed for Pain  .  docusate sodium (COLACE) 100 MG capsule Take 1 capsule by mouth daily as needed for Constipation 60 capsule 2    clotrimazole-betamethasone (LOTRISONE) 1-0.05 % cream Apply topically 2 times daily. (Patient taking differently: Apply topically 2 times daily. PRN) 45 g 1    aspirin 81 MG tablet Take 81 mg by mouth daily      Calcium Carbonate-Vitamin D (CALTRATE 600+D) 600-400 MG-UNIT CHEW 1 tablet PO BID 60 tablet 0    amLODIPine (NORVASC) 5 MG tablet Take 10 mg by mouth daily       clonazePAM (KLONOPIN) 0.5 MG tablet Take 0.5 mg by mouth every evening. No current facility-administered medications on file prior to visit.         Allergies   Allergen Reactions    Nitrofuran Derivatives Nausea Only     Severe stomach cramps    Nsaids Nausea Only and Other (See Comments)     Pain in stomach       Social History   Substance Use Topics    Smoking status: Former Smoker     Packs/day: 1.00     Years: 10.00     Types: Cigarettes     Quit date: 7/3/1965    Smokeless tobacco: Never Used    Alcohol use 0.0 oz/week      Comment: SOCIALLY wine a few times a year        Family History   Problem Relation Age of Onset    Heart Disease Father     Prostate Cancer Brother     Cancer Brother     Prostate Cancer Brother     Cancer Brother     Stroke Mother     Cancer Sister     Prostate Cancer Brother     Prostate Cancer Brother Value Ref Range    Color, UA Yellow     Clarity, UA Clear     Glucose, UA POC      Bilirubin, UA      Ketones, UA      Spec Grav, UA      Blood, UA POC neg     pH, UA      Protein, UA POC      Urobilinogen, UA      Leukocytes, UA small     Nitrite, UA neg        PSA  <0.02  10/2017  <0.02  4/2017  <0.02  10/2016  <0.02  7/2016  <0.02  4/2016  <0.1  12/2015    Assessment & Plan  Prostate Cancer  Urinary Incontinence-resolved  History of PE    He is doing well. He is showing excellent cancer control at this time with PSA being undetectable. Will continue to monitor PSA's closely.      Follow-up 12 months with PSA every 6 months

## 2017-11-13 ENCOUNTER — OFFICE VISIT (OUTPATIENT)
Dept: FAMILY MEDICINE CLINIC | Age: 82
End: 2017-11-13
Payer: MEDICARE

## 2017-11-13 VITALS
SYSTOLIC BLOOD PRESSURE: 148 MMHG | TEMPERATURE: 97.7 F | BODY MASS INDEX: 20.6 KG/M2 | WEIGHT: 143.6 LBS | OXYGEN SATURATION: 98 % | DIASTOLIC BLOOD PRESSURE: 80 MMHG | HEART RATE: 73 BPM | RESPIRATION RATE: 20 BRPM

## 2017-11-13 DIAGNOSIS — J01.10 ACUTE NON-RECURRENT FRONTAL SINUSITIS: Primary | ICD-10-CM

## 2017-11-13 PROCEDURE — 99213 OFFICE O/P EST LOW 20 MIN: CPT | Performed by: FAMILY MEDICINE

## 2017-11-13 PROCEDURE — G8427 DOCREV CUR MEDS BY ELIG CLIN: HCPCS | Performed by: FAMILY MEDICINE

## 2017-11-13 PROCEDURE — G8484 FLU IMMUNIZE NO ADMIN: HCPCS | Performed by: FAMILY MEDICINE

## 2017-11-13 PROCEDURE — G8420 CALC BMI NORM PARAMETERS: HCPCS | Performed by: FAMILY MEDICINE

## 2017-11-13 PROCEDURE — 1036F TOBACCO NON-USER: CPT | Performed by: FAMILY MEDICINE

## 2017-11-13 PROCEDURE — 4040F PNEUMOC VAC/ADMIN/RCVD: CPT | Performed by: FAMILY MEDICINE

## 2017-11-13 PROCEDURE — 1123F ACP DISCUSS/DSCN MKR DOCD: CPT | Performed by: FAMILY MEDICINE

## 2017-11-13 RX ORDER — AMOXICILLIN 500 MG/1
500 CAPSULE ORAL 2 TIMES DAILY
Qty: 20 CAPSULE | Refills: 0 | Status: SHIPPED | OUTPATIENT
Start: 2017-11-13 | End: 2017-11-23

## 2017-11-13 ASSESSMENT — ENCOUNTER SYMPTOMS
SORE THROAT: 1
SINUS PAIN: 1
WHEEZING: 0
RHINORRHEA: 0
SWOLLEN GLANDS: 1
BACK PAIN: 1
ABDOMINAL PAIN: 0
DIARRHEA: 0
COUGH: 1
SHORTNESS OF BREATH: 0
SINUS PRESSURE: 1
NAUSEA: 0
HOARSE VOICE: 1
CONSTIPATION: 0
EYE DISCHARGE: 0

## 2017-11-13 NOTE — PROGRESS NOTES
[place]   [address] [place] [address]  Dept: [phone]  Dept Fax: [phone]  Loc: [phone]    Maria D Martinez is a 80 y.o. male who presents today for his medical conditions/complaints as noted below. Chief Complaint   Patient presents with    Sinusitis     head congestion, headache, alot of drainage, gums even sore. no fever, some ear pain. x3 weeks and getting worse           HPI:     Sinusitis   This is a new problem. The current episode started 1 to 4 weeks ago. The problem has been gradually worsening since onset. There has been no fever. His pain is at a severity of 4/10. The pain is moderate. Associated symptoms include chills, congestion, coughing, ear pain, headaches, a hoarse voice, sinus pressure, a sore throat and swollen glands. Pertinent negatives include no shortness of breath or sneezing. Past treatments include nothing. The treatment provided no relief. Current Outpatient Prescriptions   Medication Sig Dispense Refill    amoxicillin (AMOXIL) 500 MG capsule Take 1 capsule by mouth 2 times daily for 10 days 20 capsule 0    metoprolol tartrate (LOPRESSOR) 25 MG tablet Take 0.5 tablets by mouth daily 45 tablet 1    apixaban (ELIQUIS) 5 MG TABS tablet Take 1 tablet by mouth 2 times daily 56 tablet 0    Multiple Vitamins-Minerals (PRESERVISION/LUTEIN PO) Take 1 tablet by mouth 2 times daily       UNABLE TO FIND 1 tablet daily For kidney health      HYDROcodone-acetaminophen (NORCO) 5-325 MG per tablet Take 1 tablet by mouth every 6 hours as needed for Pain  .  docusate sodium (COLACE) 100 MG capsule Take 1 capsule by mouth daily as needed for Constipation 60 capsule 2    clotrimazole-betamethasone (LOTRISONE) 1-0.05 % cream Apply topically 2 times daily. (Patient taking differently: Apply topically 2 times daily.  PRN) 45 g 1    aspirin 81 MG tablet Take 81 mg by mouth daily      Calcium Carbonate-Vitamin D (CALTRATE 600+D) 600-400 MG-UNIT CHEW 1 tablet PO BID 60 tablet 0    amLODIPine (NORVASC) 5 MG tablet Take 10 mg by mouth daily       clonazePAM (KLONOPIN) 0.5 MG tablet Take 0.5 mg by mouth every evening. No current facility-administered medications for this visit. Allergies   Allergen Reactions    Nitrofuran Derivatives Nausea Only     Severe stomach cramps    Nsaids Nausea Only and Other (See Comments)     Pain in stomach       Subjective:      Review of Systems   Constitutional: Positive for activity change and chills. Negative for fatigue and fever. HENT: Positive for congestion, dental problem, ear pain, hoarse voice, postnasal drip, sinus pain, sinus pressure and sore throat. Negative for rhinorrhea and sneezing. Eyes: Negative for discharge and visual disturbance. Respiratory: Positive for cough. Negative for shortness of breath and wheezing. Cardiovascular: Negative for chest pain and palpitations. Gastrointestinal: Negative for abdominal pain, constipation, diarrhea and nausea. Genitourinary: Negative for dysuria and hematuria. Musculoskeletal: Positive for arthralgias and back pain. Negative for myalgias. Neurological: Positive for headaches. Negative for dizziness. Psychiatric/Behavioral: Negative for sleep disturbance. The patient is not nervous/anxious. Objective:     BP (!) 148/80   Pulse 73   Temp 97.7 °F (36.5 °C) (Oral)   Resp 20   Wt 143 lb 9.6 oz (65.1 kg)   SpO2 98%   BMI 20.60 kg/m²     Physical Exam   Constitutional: He is oriented to person, place, and time. He appears well-developed and well-nourished. HENT:   Head: Normocephalic and atraumatic. Right Ear: Hearing, tympanic membrane, external ear and ear canal normal.   Left Ear: Hearing, tympanic membrane, external ear and ear canal normal.   Nose: Right sinus exhibits frontal sinus tenderness. Right sinus exhibits no maxillary sinus tenderness.  Left sinus exhibits frontal sinus tenderness. Left sinus exhibits no maxillary sinus tenderness. Mouth/Throat: Mucous membranes are normal. No posterior oropharyngeal edema or posterior oropharyngeal erythema. Postnasal drip   Eyes: Conjunctivae and EOM are normal. Right eye exhibits no discharge. Left eye exhibits no discharge. No scleral icterus. Neck: Normal range of motion. Cardiovascular: Normal rate, regular rhythm and normal heart sounds. Pulmonary/Chest: Effort normal and breath sounds normal. He has no wheezes. Abdominal: Soft. Bowel sounds are normal. He exhibits no distension. There is no tenderness. Musculoskeletal: He exhibits no edema or tenderness. Lymphadenopathy:     He has no cervical adenopathy. Neurological: He is alert and oriented to person, place, and time. Coordination normal.   Skin: Skin is warm and dry. Psychiatric: He has a normal mood and affect. His behavior is normal. Judgment and thought content normal.   Nursing note and vitals reviewed. Assessment:      1. Acute non-recurrent frontal sinusitis  amoxicillin (AMOXIL) 500 MG capsule       Plan:     amox for sinusitis. Follow up prn  Discussed use, benefit, and side effects of prescribed medications. Barriers to medication compliance addressed. All patient questions answered. Pt voiced understanding. No Follow-up on file. No orders of the defined types were placed in this encounter.     Orders Placed This Encounter   Medications    amoxicillin (AMOXIL) 500 MG capsule     Sig: Take 1 capsule by mouth 2 times daily for 10 days     Dispense:  20 capsule     Refill:  0           Electronically signed by Vani Eldridge MD on 11/13/2017 at 12:17 PM

## 2017-11-15 NOTE — PROGRESS NOTES
Call done 10/12 surgery was rescheduled. Updated medications and reviewed instructions.   NPO after midnight  [employer] and drivers license  Wear comfortable clean clothing  Do not bring jewelry   Shower night before and morning of surgery with a liquid antibacterial soap  Bring medications in original bottles  Follow all instructions given by your physician   needed at discharge

## 2017-11-22 ENCOUNTER — ANESTHESIA EVENT (OUTPATIENT)
Dept: OPERATING ROOM | Age: 82
End: 2017-11-22
Payer: MEDICARE

## 2017-11-22 ENCOUNTER — HOSPITAL ENCOUNTER (OUTPATIENT)
Age: 82
Setting detail: OUTPATIENT SURGERY
Discharge: HOME | End: 2017-11-22
Attending: SURGERY | Admitting: SURGERY
Payer: MEDICARE

## 2017-11-22 ENCOUNTER — ANESTHESIA (OUTPATIENT)
Dept: OPERATING ROOM | Age: 82
End: 2017-11-22
Payer: MEDICARE

## 2017-11-22 VITALS
SYSTOLIC BLOOD PRESSURE: 145 MMHG | OXYGEN SATURATION: 95 % | BODY MASS INDEX: 21.02 KG/M2 | HEIGHT: 71 IN | HEART RATE: 89 BPM | TEMPERATURE: 97.1 F | DIASTOLIC BLOOD PRESSURE: 82 MMHG | RESPIRATION RATE: 16 BRPM | WEIGHT: 150.13 LBS

## 2017-11-22 VITALS — DIASTOLIC BLOOD PRESSURE: 104 MMHG | TEMPERATURE: 98.6 F | SYSTOLIC BLOOD PRESSURE: 129 MMHG | OXYGEN SATURATION: 97 %

## 2017-11-22 LAB — POTASSIUM SERPL-SCNC: 4 MEQ/L (ref 3.5–5.2)

## 2017-11-22 PROCEDURE — 6360000002 HC RX W HCPCS

## 2017-11-22 PROCEDURE — 6360000002 HC RX W HCPCS: Performed by: NURSE ANESTHETIST, CERTIFIED REGISTERED

## 2017-11-22 PROCEDURE — A6446 CONFORM BAND S W>=3" <5"/YD: HCPCS | Performed by: SURGERY

## 2017-11-22 PROCEDURE — 7100000011 HC PHASE II RECOVERY - ADDTL 15 MIN: Performed by: SURGERY

## 2017-11-22 PROCEDURE — 7100000010 HC PHASE II RECOVERY - FIRST 15 MIN: Performed by: SURGERY

## 2017-11-22 PROCEDURE — 36415 COLL VENOUS BLD VENIPUNCTURE: CPT

## 2017-11-22 PROCEDURE — 2580000003 HC RX 258: Performed by: NURSE ANESTHETIST, CERTIFIED REGISTERED

## 2017-11-22 PROCEDURE — 3700000001 HC ADD 15 MINUTES (ANESTHESIA): Performed by: SURGERY

## 2017-11-22 PROCEDURE — 2500000003 HC RX 250 WO HCPCS: Performed by: NURSE ANESTHETIST, CERTIFIED REGISTERED

## 2017-11-22 PROCEDURE — 7100000001 HC PACU RECOVERY - ADDTL 15 MIN: Performed by: SURGERY

## 2017-11-22 PROCEDURE — 3700000000 HC ANESTHESIA ATTENDED CARE: Performed by: SURGERY

## 2017-11-22 PROCEDURE — 2580000003 HC RX 258

## 2017-11-22 PROCEDURE — C1781 MESH (IMPLANTABLE): HCPCS | Performed by: SURGERY

## 2017-11-22 PROCEDURE — 2500000003 HC RX 250 WO HCPCS: Performed by: SURGERY

## 2017-11-22 PROCEDURE — 49651 LAP ING HERNIA REPAIR RECUR: CPT | Performed by: SURGERY

## 2017-11-22 PROCEDURE — 7100000000 HC PACU RECOVERY - FIRST 15 MIN: Performed by: SURGERY

## 2017-11-22 PROCEDURE — 3600000009 HC SURGERY ROBOT BASE: Performed by: SURGERY

## 2017-11-22 PROCEDURE — 6370000000 HC RX 637 (ALT 250 FOR IP)

## 2017-11-22 PROCEDURE — S2900 ROBOTIC SURGICAL SYSTEM: HCPCS | Performed by: SURGERY

## 2017-11-22 PROCEDURE — A6413 ADHESIVE BANDAGE, FIRST-AID: HCPCS | Performed by: SURGERY

## 2017-11-22 PROCEDURE — 3600000019 HC SURGERY ROBOT ADDTL 15MIN: Performed by: SURGERY

## 2017-11-22 PROCEDURE — 84132 ASSAY OF SERUM POTASSIUM: CPT

## 2017-11-22 DEVICE — MESH HERN L W10.8XL16CM L INGUINAL WHT POLYPR MFIL: Type: IMPLANTABLE DEVICE | Status: FUNCTIONAL

## 2017-11-22 RX ORDER — MORPHINE SULFATE 2 MG/ML
4 INJECTION, SOLUTION INTRAMUSCULAR; INTRAVENOUS
Status: DISCONTINUED | OUTPATIENT
Start: 2017-11-22 | End: 2017-11-22 | Stop reason: HOSPADM

## 2017-11-22 RX ORDER — GLYCOPYRROLATE 0.2 MG/ML
INJECTION INTRAMUSCULAR; INTRAVENOUS PRN
Status: DISCONTINUED | OUTPATIENT
Start: 2017-11-22 | End: 2017-11-22 | Stop reason: SDUPTHER

## 2017-11-22 RX ORDER — NEOSTIGMINE METHYLSULFATE 1 MG/ML
INJECTION, SOLUTION INTRAVENOUS PRN
Status: DISCONTINUED | OUTPATIENT
Start: 2017-11-22 | End: 2017-11-22 | Stop reason: SDUPTHER

## 2017-11-22 RX ORDER — SODIUM CHLORIDE 0.9 % (FLUSH) 0.9 %
10 SYRINGE (ML) INJECTION PRN
Status: DISCONTINUED | OUTPATIENT
Start: 2017-11-22 | End: 2017-11-22 | Stop reason: HOSPADM

## 2017-11-22 RX ORDER — HYDROCODONE BITARTRATE AND ACETAMINOPHEN 5; 325 MG/1; MG/1
1 TABLET ORAL EVERY 4 HOURS PRN
Status: DISCONTINUED | OUTPATIENT
Start: 2017-11-22 | End: 2017-11-22 | Stop reason: HOSPADM

## 2017-11-22 RX ORDER — FENTANYL CITRATE 50 UG/ML
INJECTION, SOLUTION INTRAMUSCULAR; INTRAVENOUS PRN
Status: DISCONTINUED | OUTPATIENT
Start: 2017-11-22 | End: 2017-11-22 | Stop reason: SDUPTHER

## 2017-11-22 RX ORDER — SODIUM CHLORIDE 9 MG/ML
INJECTION, SOLUTION INTRAVENOUS CONTINUOUS PRN
Status: DISCONTINUED | OUTPATIENT
Start: 2017-11-22 | End: 2017-11-22 | Stop reason: SDUPTHER

## 2017-11-22 RX ORDER — LIDOCAINE HYDROCHLORIDE 20 MG/ML
INJECTION, SOLUTION INFILTRATION; PERINEURAL PRN
Status: DISCONTINUED | OUTPATIENT
Start: 2017-11-22 | End: 2017-11-22 | Stop reason: SDUPTHER

## 2017-11-22 RX ORDER — DEXAMETHASONE SODIUM PHOSPHATE 4 MG/ML
INJECTION, SOLUTION INTRA-ARTICULAR; INTRALESIONAL; INTRAMUSCULAR; INTRAVENOUS; SOFT TISSUE PRN
Status: DISCONTINUED | OUTPATIENT
Start: 2017-11-22 | End: 2017-11-22 | Stop reason: SDUPTHER

## 2017-11-22 RX ORDER — SODIUM CHLORIDE 0.9 % (FLUSH) 0.9 %
10 SYRINGE (ML) INJECTION EVERY 12 HOURS SCHEDULED
Status: DISCONTINUED | OUTPATIENT
Start: 2017-11-22 | End: 2017-11-22 | Stop reason: HOSPADM

## 2017-11-22 RX ORDER — PROPOFOL 10 MG/ML
INJECTION, EMULSION INTRAVENOUS PRN
Status: DISCONTINUED | OUTPATIENT
Start: 2017-11-22 | End: 2017-11-22 | Stop reason: SDUPTHER

## 2017-11-22 RX ORDER — SODIUM CHLORIDE 9 MG/ML
INJECTION, SOLUTION INTRAVENOUS CONTINUOUS
Status: DISCONTINUED | OUTPATIENT
Start: 2017-11-22 | End: 2017-11-22 | Stop reason: HOSPADM

## 2017-11-22 RX ORDER — HYDROCODONE BITARTRATE AND ACETAMINOPHEN 5; 325 MG/1; MG/1
2 TABLET ORAL EVERY 4 HOURS PRN
Status: DISCONTINUED | OUTPATIENT
Start: 2017-11-22 | End: 2017-11-22 | Stop reason: HOSPADM

## 2017-11-22 RX ORDER — HYDROCODONE BITARTRATE AND ACETAMINOPHEN 5; 325 MG/1; MG/1
TABLET ORAL
Status: COMPLETED
Start: 2017-11-22 | End: 2017-11-22

## 2017-11-22 RX ORDER — BUPIVACAINE HYDROCHLORIDE AND EPINEPHRINE 5; 5 MG/ML; UG/ML
INJECTION, SOLUTION EPIDURAL; INTRACAUDAL; PERINEURAL PRN
Status: DISCONTINUED | OUTPATIENT
Start: 2017-11-22 | End: 2017-11-22 | Stop reason: HOSPADM

## 2017-11-22 RX ORDER — MORPHINE SULFATE 2 MG/ML
2 INJECTION, SOLUTION INTRAMUSCULAR; INTRAVENOUS
Status: DISCONTINUED | OUTPATIENT
Start: 2017-11-22 | End: 2017-11-22 | Stop reason: HOSPADM

## 2017-11-22 RX ORDER — ONDANSETRON 2 MG/ML
INJECTION INTRAMUSCULAR; INTRAVENOUS PRN
Status: DISCONTINUED | OUTPATIENT
Start: 2017-11-22 | End: 2017-11-22 | Stop reason: SDUPTHER

## 2017-11-22 RX ORDER — ROCURONIUM BROMIDE 10 MG/ML
INJECTION, SOLUTION INTRAVENOUS PRN
Status: DISCONTINUED | OUTPATIENT
Start: 2017-11-22 | End: 2017-11-22 | Stop reason: SDUPTHER

## 2017-11-22 RX ORDER — ACETAMINOPHEN 325 MG/1
650 TABLET ORAL EVERY 4 HOURS PRN
Status: DISCONTINUED | OUTPATIENT
Start: 2017-11-22 | End: 2017-11-22 | Stop reason: HOSPADM

## 2017-11-22 RX ORDER — HYDROCODONE BITARTRATE AND ACETAMINOPHEN 5; 325 MG/1; MG/1
1 TABLET ORAL EVERY 4 HOURS PRN
Qty: 30 TABLET | Refills: 0 | Status: SHIPPED | OUTPATIENT
Start: 2017-11-22 | End: 2018-01-17 | Stop reason: DRUGHIGH

## 2017-11-22 RX ORDER — ONDANSETRON 2 MG/ML
4 INJECTION INTRAMUSCULAR; INTRAVENOUS EVERY 6 HOURS PRN
Status: DISCONTINUED | OUTPATIENT
Start: 2017-11-22 | End: 2017-11-22 | Stop reason: HOSPADM

## 2017-11-22 RX ADMIN — SODIUM CHLORIDE: 9 INJECTION, SOLUTION INTRAVENOUS at 07:01

## 2017-11-22 RX ADMIN — ONDANSETRON 4 MG: 2 INJECTION INTRAMUSCULAR; INTRAVENOUS at 08:40

## 2017-11-22 RX ADMIN — LIDOCAINE HYDROCHLORIDE 60 MG: 20 INJECTION, SOLUTION INFILTRATION; PERINEURAL at 07:45

## 2017-11-22 RX ADMIN — DEXAMETHASONE SODIUM PHOSPHATE 4 MG: 4 INJECTION, SOLUTION INTRAMUSCULAR; INTRAVENOUS at 07:50

## 2017-11-22 RX ADMIN — PROPOFOL 150 MG: 10 INJECTION, EMULSION INTRAVENOUS at 07:45

## 2017-11-22 RX ADMIN — WATER 1 G: 1 INJECTION INTRAMUSCULAR; INTRAVENOUS; SUBCUTANEOUS at 07:54

## 2017-11-22 RX ADMIN — FENTANYL CITRATE 100 MCG: 50 INJECTION INTRAMUSCULAR; INTRAVENOUS at 07:45

## 2017-11-22 RX ADMIN — FENTANYL CITRATE 50 MCG: 50 INJECTION INTRAMUSCULAR; INTRAVENOUS at 08:05

## 2017-11-22 RX ADMIN — FENTANYL CITRATE 25 MCG: 50 INJECTION INTRAMUSCULAR; INTRAVENOUS at 08:45

## 2017-11-22 RX ADMIN — NEOSTIGMINE METHYLSULFATE 3 MG: 1 INJECTION, SOLUTION INTRAVENOUS at 08:45

## 2017-11-22 RX ADMIN — SODIUM CHLORIDE: 9 INJECTION, SOLUTION INTRAVENOUS at 07:40

## 2017-11-22 RX ADMIN — Medication 40 MG: at 07:45

## 2017-11-22 RX ADMIN — GLYCOPYRROLATE 0.6 MG: 0.2 INJECTION, SOLUTION INTRAMUSCULAR; INTRAVENOUS at 08:45

## 2017-11-22 RX ADMIN — HYDROCODONE BITARTRATE AND ACETAMINOPHEN 1 TABLET: 5; 325 TABLET ORAL at 10:06

## 2017-11-22 ASSESSMENT — PULMONARY FUNCTION TESTS
PIF_VALUE: 25
PIF_VALUE: 23
PIF_VALUE: 25
PIF_VALUE: 11
PIF_VALUE: 25
PIF_VALUE: 25
PIF_VALUE: 12
PIF_VALUE: 1
PIF_VALUE: 22
PIF_VALUE: 19
PIF_VALUE: 20
PIF_VALUE: 2
PIF_VALUE: 24
PIF_VALUE: 24
PIF_VALUE: 6
PIF_VALUE: 25
PIF_VALUE: 18
PIF_VALUE: 2
PIF_VALUE: 25
PIF_VALUE: 0
PIF_VALUE: 15
PIF_VALUE: 11
PIF_VALUE: 25
PIF_VALUE: 22
PIF_VALUE: 1
PIF_VALUE: 24
PIF_VALUE: 12
PIF_VALUE: 12
PIF_VALUE: 23
PIF_VALUE: 25
PIF_VALUE: 11
PIF_VALUE: 25
PIF_VALUE: 3
PIF_VALUE: 1
PIF_VALUE: 25
PIF_VALUE: 12
PIF_VALUE: 23
PIF_VALUE: 1
PIF_VALUE: 13
PIF_VALUE: 25
PIF_VALUE: 25
PIF_VALUE: 12
PIF_VALUE: 11
PIF_VALUE: 25
PIF_VALUE: 25
PIF_VALUE: 12
PIF_VALUE: 19
PIF_VALUE: 11
PIF_VALUE: 22
PIF_VALUE: 25
PIF_VALUE: 25
PIF_VALUE: 11
PIF_VALUE: 3
PIF_VALUE: 25
PIF_VALUE: 23
PIF_VALUE: 26
PIF_VALUE: 23
PIF_VALUE: 12
PIF_VALUE: 25
PIF_VALUE: 25
PIF_VALUE: 10
PIF_VALUE: 23
PIF_VALUE: 20
PIF_VALUE: 23
PIF_VALUE: 11
PIF_VALUE: 11
PIF_VALUE: 25
PIF_VALUE: 12
PIF_VALUE: 19
PIF_VALUE: 2
PIF_VALUE: 25
PIF_VALUE: 25
PIF_VALUE: 17
PIF_VALUE: 23
PIF_VALUE: 4
PIF_VALUE: 25
PIF_VALUE: 20
PIF_VALUE: 0
PIF_VALUE: 13
PIF_VALUE: 11

## 2017-11-22 ASSESSMENT — PAIN SCALES - GENERAL
PAINLEVEL_OUTOF10: 4

## 2017-11-22 ASSESSMENT — ENCOUNTER SYMPTOMS: SHORTNESS OF BREATH: 1

## 2017-11-22 ASSESSMENT — PAIN DESCRIPTION - DESCRIPTORS: DESCRIPTORS: CONSTANT

## 2017-11-22 ASSESSMENT — PAIN - FUNCTIONAL ASSESSMENT: PAIN_FUNCTIONAL_ASSESSMENT: 0-10

## 2017-11-22 NOTE — PROGRESS NOTES
Pt asking for pain medication. No drainage from incision sites. Pt asking for coffee. Pt is eating and drinking without issues.

## 2017-11-22 NOTE — BRIEF OP NOTE
Brief Postoperative Note    Lauren Barroso  YOB: 1934  [id number]    Pre-operative Diagnosis: 1. Recurrent left inguinal hernia    Post-operative Diagnosis: Same    Procedure: 1. Robotic repair recurrent left indirect and direct inguinal hernia with mesh (large 3-D Max mesh)    Anesthesia: General and Local    Surgeons/Assistants:  Jules/Jorge    Estimated Blood Loss: 10 ml    Complications: None    Specimens: Was Not Obtained    Findings: as above    Electronically signed by Christine King MD on 11/22/2017 at 6:19 AM

## 2017-11-22 NOTE — H&P
Brother      Cancer Brother      Prostate Cancer Brother      Cancer Brother      Stroke Mother      Cancer Sister      Prostate Cancer Brother      Prostate Cancer Brother              Social History   Social History            Social History    Marital status:        Spouse name: N/A    Number of children: N/A    Years of education: N/A          Occupational History    Not on file.             Social History Main Topics    Smoking status: Former Smoker       Packs/day: 1.00       Years: 10.00       Types: Cigarettes       Quit date: 7/3/1965    Smokeless tobacco: Never Used    Alcohol use 0.0 oz/week         Comment: SOCIALLY    Drug use: No    Sexual activity: Not Currently           Other Topics Concern    Not on file          Social History Narrative    No narrative on file         /78 (Site: Left Arm, Position: Sitting, Cuff Size: Medium Adult)   Pulse 115   Temp 96.5 °F (35.8 °C) (Tympanic)   Resp 18   Ht 5' 10\" (1.778 m)   Wt 146 lb 6.4 oz (66.4 kg)   SpO2 98%   BMI 21.01 kg/m²      Objective:   Physical Exam   Constitutional: He is oriented to person, place, and time. He appears well-developed and well-nourished. No distress. HENT:   Head: Normocephalic and atraumatic. Right Ear: External ear normal.   Left Ear: External ear normal.   Nose: Nose normal.   Mouth/Throat: Oropharynx is clear and moist and mucous membranes are normal. No oropharyngeal exudate. Eyes: Conjunctivae and EOM are normal. Pupils are equal, round, and reactive to light. Right eye exhibits no discharge. Left eye exhibits no discharge. No scleral icterus. Neck: Trachea normal, normal range of motion, full passive range of motion without pain and phonation normal. Neck supple. No JVD present. No tracheal tenderness present. No tracheal deviation present. No thyroid mass and no thyromegaly present.    Cardiovascular: Normal rate, regular rhythm, S1 normal, S2 normal, normal heart sounds and intact distal pulses. Exam reveals no gallop. No murmur heard. Pulmonary/Chest: Effort normal and breath sounds normal. No stridor. No respiratory distress. He has no wheezes. He has no rhonchi. He has no rales. He exhibits no tenderness and no deformity. Right breast exhibits no skin change and no tenderness. Left breast exhibits no skin change and no tenderness. Breasts are symmetrical.   Abdominal: Soft. Bowel sounds are normal. He exhibits no distension, no fluid wave, no abdominal bruit, no pulsatile midline mass and no mass. There is no hepatosplenomegaly. There is no tenderness. There is no rigidity, no rebound and no guarding. A hernia is present. Hernia confirmed positive in the left inguinal area (recurrent). Hernia confirmed negative in the ventral area and confirmed negative in the right inguinal area. Genitourinary: Rectum normal, testes normal and penis normal. Rectal exam shows no fissure, no mass, no tenderness and anal tone normal.   Musculoskeletal: Normal range of motion. He exhibits no edema or tenderness. Lymphadenopathy:     He has no cervical adenopathy. He has no axillary adenopathy. Right: No inguinal and no supraclavicular adenopathy present. Left: No inguinal and no supraclavicular adenopathy present. Neurological: He is alert and oriented to person, place, and time. He has normal strength. No cranial nerve deficit or sensory deficit. Gait normal.   Skin: Skin is warm, dry and intact. No rash noted. He is not diaphoretic. No cyanosis or erythema. No pallor. Nails show no clubbing. Psychiatric: He has a normal mood and affect.  His speech is normal and behavior is normal. Judgment and thought content normal. Cognition and memory are normal.   Vitals reviewed.           Lab Results   Component Value Date      01/31/2017     K 4.3 01/31/2017      01/31/2017     CO2 27 01/31/2017            Lab Results   Component Value Date     CREATININE 1.3 (H) 01/31/2017            Lab Results   Component Value Date     WBC 9.6 01/31/2017     HGB 14.6 01/31/2017     HCT 44.2 01/31/2017     MCV 95.8 (H) 01/31/2017      01/31/2017            Lab Results   Component Value Date     ALT 23 01/31/2017     AST 22 01/31/2017     ALKPHOS 58 01/31/2017     BILITOT 0.4 01/31/2017      Imaging - none         Patient Active Problem List   Diagnosis    Prostate cancer    Generalized anxiety disorder    Essential hypertension    Back pain    Hyperlipidemia with target LDL less than 130    Back pain, chronic s/p surgery    Prostate disorder    Hematuria    Ileus ([address]    Postoperative ileus ([address]    Malignant neoplasm of prostate ([address]    PE (pulmonary thromboembolism) ([address]    Pulmonary embolism without acute cor pulmonale ([address]    Osteoporosis    Spondylosis of lumbar region without myelopathy or radiculopathy      Assessment:      1. Recurrent left inguinal hernia                Plan:      1. Schedule Mitzi Infante for Robotic repair recurrent left inguinal hernia with mesh. 2. He will undergo pre-operative clearance per anesthesia guidelines with risk factors listed under the past medical history diagnosis & problem list.  3. The risks, benefits and alternatives were discussed with Mitzi Infante including non-operative management. The pros and cons of robotic, laparoscopic and open techniques were discussed. The pros and cons of mesh insertion were discussed. All questions answered. He understands and wishes to proceed with surgical intervention. 4. Restrictions discussed with Mitzi Infatne and he expresses understanding.   5. He is advised to call back directly if there are further questions/concerns, or if his symptoms worsen prior to surgery.

## 2017-11-22 NOTE — ANESTHESIA PRE PROCEDURE
Department of Anesthesiology  Preprocedure Note       Name:  Valdo Burleson   Age:  80 y.o.  :  1934                                          MRN:  [id number]         Date:  2017      Surgeon: Thelma Jane):  Ashely Hartman MD    Procedure: Procedure(s):  ROBOT RECURRENT LEFT INGUINAL HERNIA REPAIR, POSS RIGHT, POSS OPEN    Medications prior to admission:   Prior to Admission medications    Medication Sig Start Date End Date Taking? Authorizing Provider   amoxicillin (AMOXIL) 500 MG capsule Take 1 capsule by mouth 2 times daily for 10 days 17 Yes Imer Gil MD   metoprolol tartrate (LOPRESSOR) 25 MG tablet Take 0.5 tablets by mouth daily 11/3/17  Yes Carlo Bazzi MD   apixaban (ELIQUIS) 5 MG TABS tablet Take 1 tablet by mouth 2 times daily 10/16/17  Yes Carlo Bazzi MD   Multiple Vitamins-Minerals (PRESERVISION/LUTEIN PO) Take 1 tablet by mouth 2 times daily    Yes Historical Provider, MD   UNABLE TO FIND 1 tablet daily For kidney health   Yes Historical Provider, MD   HYDROcodone-acetaminophen (NORCO) 5-325 MG per tablet Take 1 tablet by mouth every 6 hours as needed for Pain  . Yes Historical Provider, MD   docusate sodium (COLACE) 100 MG capsule Take 1 capsule by mouth daily as needed for Constipation 17  Yes Imer Gil MD   clotrimazole-betamethasone (LOTRISONE) 1-0.05 % cream Apply topically 2 times daily. Patient taking differently: Apply topically 2 times daily. PRN 16  Yes Imer Gil MD   aspirin 81 MG tablet Take 81 mg by mouth daily   Yes Historical Provider, MD   Calcium Carbonate-Vitamin D (CALTRATE 600+D) 600-400 MG-UNIT CHEW 1 tablet PO BID 16  Yes Imer Gil MD   amLODIPine (NORVASC) 5 MG tablet Take 10 mg by mouth daily    Yes Historical Provider, MD   clonazePAM (KLONOPIN) 0.5 MG tablet Take 0.5 mg by mouth every evening.    Yes Historical Provider, MD       Current medications:    Current Facility-Administered Medications HCT 46.4 10/12/2017    MCV 95.8 01/31/2017    RDW 13.9 01/31/2017     01/31/2017       CMP:   Lab Results   Component Value Date     10/12/2017    K 3.8 10/12/2017     10/12/2017    CO2 28 10/12/2017    BUN 22 10/12/2017    CREATININE 1.0 10/12/2017    AGRATIO 1.5 05/14/2016    LABGLOM 71 10/12/2017    GLUCOSE 102 10/12/2017    GLUCOSE 89 05/14/2016    PROT 7.2 01/31/2017    CALCIUM 9.0 10/12/2017    BILITOT 0.4 01/31/2017    ALKPHOS 58 01/31/2017    AST 22 01/31/2017    ALT 23 01/31/2017       POC Tests: No results for input(s): POCGLU, POCNA, POCK, POCCL, POCBUN, POCHEMO, POCHCT in the last 72 hours. Coags:   Lab Results   Component Value Date    INR 1.06 10/23/2015    APTT 82.3 10/23/2015       HCG (If Applicable): No results found for: PREGTESTUR, PREGSERUM, HCG, HCGQUANT     ABGs: No results found for: PHART, PO2ART, PDD4ESH, IQB1RCC, BEART, U6LUHBAP     Type & Screen (If Applicable):  Lab Results   Component Value Date    [address] POS 10/07/2015       Anesthesia Evaluation  Patient summary reviewed  Airway: Mallampati: I  TM distance: >3 FB   Neck ROM: limited  Mouth opening: > = 3 FB Dental:          Pulmonary: breath sounds clear to auscultation  (+) shortness of breath:                             Cardiovascular:  Exercise tolerance: poor (<4 METS),   (+) hypertension:, valvular problems/murmurs: MR, CABRAL:,       ECG reviewed  Rhythm: irregular    Echocardiogram reviewed  Stress test reviewed  Cleared by cardiology     Beta Blocker:  Dose within 24 Hrs         Neuro/Psych:   (+) psychiatric history:            GI/Hepatic/Renal:             Endo/Other:    (+) : arthritis:., .                 Abdominal:           Vascular:                                        Anesthesia Plan      general     ASA 3       Induction: intravenous. MIPS: Postoperative opioids intended and Prophylactic antiemetics administered.   Anesthetic plan and risks discussed with patient, spouse and

## 2017-11-22 NOTE — ANESTHESIA POSTPROCEDURE EVALUATION
Department of Anesthesiology  Postprocedure Note    Patient: Valdo Burleson  MRN: [id number]  YOB: 1934  Date of evaluation: 11/22/2017  Time:  10:31 AM     Procedure Summary     Date:  11/22/17 Room / Location:  [address] [place]    Anesthesia Start:  0740 Anesthesia Stop:  9180    Procedure:  ROBOT RECURRENT LEFT INGUINAL HERNIA REPAIR (Left Abdomen) Diagnosis:  (LEFT INGUINAL HERNIA)    Surgeon:  Ashely Hartman MD Responsible Provider:  Maddi Escamilla MD    Anesthesia Type:  general ASA Status:  3          Anesthesia Type: general    Kelsey Phase I: Kelsey Score: 10    Kelsey Phase II: Kelsey Score: 10    Last vitals: Reviewed and per EMR flowsheets.        Anesthesia Post Evaluation    Patient location during evaluation: PACU  Patient participation: complete - patient participated  Level of consciousness: awake  Airway patency: patent  Nausea & Vomiting: no vomiting and no nausea  Complications: no  Cardiovascular status: hemodynamically stable  Respiratory status: acceptable  Hydration status: stable

## 2017-11-22 NOTE — OP NOTE
[address] [id number]                                 OPERATIVE REPORT    PATIENT NAME: Shameka Galeana              :        1934  MED REC NO:   [id number]                           ROOM:  ACCOUNT NO:   [de-identified]                           ADMIT DATE: 2017  PROVIDER:     KIRSTIN Carolinaie:  2017    PREOPERATIVE DIAGNOSIS:  Recurrent left inguinal hernia. POSTOPERATIVE DIAGNOSIS:  Recurrent left inguinal hernia. OPERATION PERFORMED:  Robotic repair of recurrent left indirect and direct  inguinal hernia with mesh (large 3DMax mesh). SURGEON:  Barry Moore MD    ASSISTANT:  Karl Madrigal. LUCI Patel    ANESTHESIA:  General/local.    ESTIMATED BLOOD LOSS:  10 mL. DRAINS:  None. COMPLICATIONS:  None. DISPOSITION:  Stable to the recovery room. INDICATIONS:  The patient is an 42-year-old gentleman who I had seen in the  office secondary to a left groin bulge with discomfort. He was found to  have a recurrent left inguinal hernia. Both operative and nonoperative  intervention plans were discussed. He understood and wished to proceed  with surgical intervention. Risks of surgery were then further discussed. Some of the risks including but were not limited to bleeding, infection,  the need for reoperation, severe chronic postoperative pain or numbness,  major vascular nerve injury, cardiopulmonary complications, anesthetic  complications, seroma or hematoma formation, wound breakdown, trocar site  herniation, mesh infection requiring removal of the mesh, ischemic orchitis  resulting in loss of testicle, recurrence of the hernia, chronic groin  pain, and death. After all of the questions were answered in their  entirety and the patient was completely aware of the current situation, he  elected to proceed with the procedure.     DESCRIPTION OF PROCEDURE:  After informed consent was signed and placed on  the chart, the patient was taken back to the operating room, placed supine  on the operating room table. General anesthesia was induced. He tolerated  this well throughout the case. All pressure points were padded. He was on  preoperative antibiotics. Bilateral lower extremity sequential compression  devices were placed prior to incision. His abdomen and pelvis was prepped  and draped in the usual sterile standard fashion. A time-out occurred  prior to the operation, which not only identified the patient, but also the  planned procedure to be performed. At the end of the time-out, there were  no questions or concerns. I began the operation first by making a small  transverse supraumbilical skin incision with an 11-blade scalpel. Fascia  was elevated. Veress needle was attempted but not successfully able to be  inserted. At that point, a small skin nick was made at [facility]. Veress needle was placed without difficulty. Intraabdominal cavity was  insufflated to a pressure of approximately 15 mmHg of carbondioxide gas. The patient tolerated the insufflation well. A 8-mm trocar was then placed  at the supraumbilical incision site. Laparoscope inserted. Upon initial  evaluation there was no hollow viscus, solid organ, or major vascular  injury with the Veress needle insertion or the first trocar placement. A  11-mm trocar was then placed on the far left lateral abdominal region under  direct vision. An 8-mm was on the right side. The patient was placed in  Trendelenburg. Robot was brought in and docked. Once everything was  aligned and in order, I then unscrubbed and went back to the console. I  began the operation first by evaluating the groin region which the right  side overall looked pretty good. There is an old piece of mesh seen from a  previous hernia repair. On the left side, there was actually a direct and  an indirect hernia.   This was

## 2017-11-22 NOTE — INTERVAL H&P NOTE
[facility]  History and Physical Update    Pt Name: Oscar Camarillo  MRN: [id number]  YOB: 1934  Date of evaluation: 11/22/2017    [x] I have examined the patient and reviewed the H&P/Consult and there are no changes to the patient or plans.     [] I have examined the patient and reviewed the H&P/Consult and have noted the following changes:        Chata Romero  Electronically signed 11/22/2017 at 8:53 AM

## 2017-11-22 NOTE — PROGRESS NOTES
Admit to sds. Fall risk and allergy band applied to the patient. Wife, Rossy Latham and DAughter, Sue Ross and son, Braxton Oviedo with the patient.

## 2017-11-22 NOTE — PROGRESS NOTES
Pt in bed with family by side. Pt asking for muffing and coffee.   No drainage from incision sitesx 4

## 2017-11-27 ENCOUNTER — TELEPHONE (OUTPATIENT)
Dept: SURGERY | Age: 82
End: 2017-11-27

## 2017-11-27 RX ORDER — ONDANSETRON 4 MG/1
4 TABLET, ORALLY DISINTEGRATING ORAL EVERY 8 HOURS PRN
Qty: 30 TABLET | Refills: 0 | Status: SHIPPED | OUTPATIENT
Start: 2017-11-27 | End: 2018-02-20 | Stop reason: ALTCHOICE

## 2017-11-27 NOTE — TELEPHONE ENCOUNTER
Zofran sent to pharmacy. Continue to keep feet elevated as able and call if it increases or does not improve over the next few days. Continue to increase activity and ambulate, just no lifting over 10-15 lbs.

## 2017-12-06 ENCOUNTER — OFFICE VISIT (OUTPATIENT)
Dept: SURGERY | Age: 82
End: 2017-12-06

## 2017-12-06 VITALS
WEIGHT: 150.2 LBS | BODY MASS INDEX: 21.5 KG/M2 | OXYGEN SATURATION: 96 % | SYSTOLIC BLOOD PRESSURE: 134 MMHG | HEIGHT: 70 IN | TEMPERATURE: 96 F | HEART RATE: 90 BPM | DIASTOLIC BLOOD PRESSURE: 80 MMHG | RESPIRATION RATE: 20 BRPM

## 2017-12-06 DIAGNOSIS — Z87.19 S/P LEFT INGUINAL HERNIA REPAIR: Primary | ICD-10-CM

## 2017-12-06 DIAGNOSIS — Z98.890 S/P LEFT INGUINAL HERNIA REPAIR: Primary | ICD-10-CM

## 2017-12-06 PROCEDURE — 99024 POSTOP FOLLOW-UP VISIT: CPT | Performed by: NURSE PRACTITIONER

## 2017-12-06 ASSESSMENT — ENCOUNTER SYMPTOMS
WHEEZING: 0
VOMITING: 0
ABDOMINAL DISTENTION: 0
EYE ITCHING: 0
CHEST TIGHTNESS: 0
ANAL BLEEDING: 0
EYE REDNESS: 0
CHOKING: 0
EYE PAIN: 0
NAUSEA: 0
SORE THROAT: 0
PHOTOPHOBIA: 0
SINUS PRESSURE: 0
ABDOMINAL PAIN: 0
TROUBLE SWALLOWING: 0
BLOOD IN STOOL: 0
RECTAL PAIN: 0
VOICE CHANGE: 0
FACIAL SWELLING: 0
STRIDOR: 0
CONSTIPATION: 0
RHINORRHEA: 0
EYE DISCHARGE: 0
DIARRHEA: 0
COLOR CHANGE: 0
APNEA: 0
SHORTNESS OF BREATH: 0
COUGH: 0

## 2017-12-06 NOTE — PROGRESS NOTES
MD Melissa at [facility] OTHER SURGICAL HISTORY  10/7/2015    XI ROBOTIC PROSTATECTOMY    OTHER SURGICAL HISTORY Bilateral 09/05/2017    lumbar facet block L3-S1    PROSTATE BIOPSY  7-    Dr Vaishnavi Mazariegos BIOPSY  8/21/2015    transrectal ultrasound with biopsy(+)    PROSTATE SURGERY  2003    TURP    PROSTATE SURGERY  2009    Biopsy -Hyperplasia    SHOULDER ARTHROPLASTY  2005    right       Family History   Problem Relation Age of Onset    Heart Disease Father     Prostate Cancer Brother     Cancer Brother     Prostate Cancer Brother     Cancer Brother     Stroke Mother     Cancer Sister     Prostate Cancer Brother     Prostate Cancer Brother        Social History   Substance Use Topics    Smoking status: Former Smoker     Packs/day: 1.00     Years: 10.00     Types: Cigarettes     Quit date: 7/3/1965    Smokeless tobacco: Never Used    Alcohol use 0.0 oz/week      Comment: SOCIALLY wine a few times a year       Current Outpatient Prescriptions   Medication Sig Dispense Refill    ondansetron (ZOFRAN ODT) 4 MG disintegrating tablet Take 1 tablet by mouth every 8 hours as needed for Nausea or Vomiting 30 tablet 0    HYDROcodone-acetaminophen (NORCO) 5-325 MG per tablet Take 1 tablet by mouth every 4 hours as needed for Pain . 30 tablet 0    metoprolol tartrate (LOPRESSOR) 25 MG tablet Take 0.5 tablets by mouth daily 45 tablet 1    apixaban (ELIQUIS) 5 MG TABS tablet Take 1 tablet by mouth 2 times daily 56 tablet 0    Multiple Vitamins-Minerals (PRESERVISION/LUTEIN PO) Take 1 tablet by mouth 2 times daily       UNABLE TO FIND 1 tablet daily For kidney health      HYDROcodone-acetaminophen (NORCO) 5-325 MG per tablet Take 1 tablet by mouth every 6 hours as needed for Pain  .       docusate sodium (COLACE) 100 MG capsule Take 1 capsule by mouth daily as needed for Constipation 60 capsule 2    clotrimazole-betamethasone (LOTRISONE) 1-0.05 % cream Apply topically 2 and wound. Allergic/Immunologic: Negative for environmental allergies, food allergies and immunocompromised state. Neurological: Negative for dizziness, tremors, seizures, syncope, facial asymmetry, speech difficulty, weakness, light-headedness, numbness and headaches. Hematological: Negative for adenopathy. Does not bruise/bleed easily. Psychiatric/Behavioral: Negative for agitation, behavioral problems, confusion, decreased concentration, dysphoric mood, hallucinations, self-injury, sleep disturbance and suicidal ideas. The patient is not nervous/anxious and is not hyperactive. Objective:   /80 (Site: Right Arm, Position: Sitting, Cuff Size: Medium Adult)   Pulse 90   Temp 96 °F (35.6 °C) (Tympanic)   Resp 20   Ht 5' 10\" (1.778 m)   Wt 150 lb 3.2 oz (68.1 kg)   SpO2 96%   BMI 21.55 kg/m²     Physical Exam   Constitutional: He is oriented to person, place, and time. He appears well-developed and well-nourished. Non-toxic appearance. He does not have a sickly appearance. He does not appear ill. No distress. HENT:   Head: Normocephalic and atraumatic. Head is without abrasion, without contusion and without laceration. Right Ear: Hearing and external ear normal.   Left Ear: Hearing and external ear normal.   Nose: Nose normal.   Mouth/Throat: Oropharynx is clear and moist and mucous membranes are normal. Mucous membranes are not pale, not dry and not cyanotic. Eyes: Lids are normal.   Neck: Trachea normal, normal range of motion and phonation normal. Neck supple. Cardiovascular: Normal rate, regular rhythm, S1 normal, S2 normal, normal heart sounds, intact distal pulses and normal pulses. Exam reveals no S3, no S4, no distant heart sounds and no friction rub. Pulmonary/Chest: Effort normal and breath sounds normal. No accessory muscle usage. No apnea, no tachypnea and no bradypnea. No respiratory distress. He has no decreased breath sounds. He has no wheezes. He has no rales. induced coagulopathy from Eliquis    Plan:     1. Abdomen benign. Appetite returned. No nausea. 2. All incisions healing. No redness, swelling, or drainage. There is bruising noted - continue to monitor. Continue wound care as directed. 3. Weaned off narcotics. Continue tylenol and ibuprofen as needed. 4. Continue lifting restrictions for the full 4-6 weeks after surgery. Continue to increase activity as tolerated. 5. Bowel function returned. Continue stool softeners as needed. 6. Follow up as needed. Signs and symptoms reviewed with patient that would be concerning and need him to return to office for re-evaluation. Patient states he will call if he has questions or concerns.       Electronically signed by Roshan Graham CNP on 12/7/2017 at 1:47 PM

## 2017-12-07 ASSESSMENT — ENCOUNTER SYMPTOMS: BACK PAIN: 1

## 2018-01-05 ENCOUNTER — TELEPHONE (OUTPATIENT)
Dept: PHYSICAL MEDICINE AND REHAB | Age: 83
End: 2018-01-05

## 2018-01-11 ENCOUNTER — TELEPHONE (OUTPATIENT)
Dept: PHYSICAL MEDICINE AND REHAB | Age: 83
End: 2018-01-11

## 2018-01-17 ENCOUNTER — OFFICE VISIT (OUTPATIENT)
Dept: PHYSICAL MEDICINE AND REHAB | Age: 83
End: 2018-01-17
Payer: MEDICARE

## 2018-01-17 VITALS
DIASTOLIC BLOOD PRESSURE: 86 MMHG | HEIGHT: 67 IN | WEIGHT: 150 LBS | SYSTOLIC BLOOD PRESSURE: 163 MMHG | HEART RATE: 94 BPM | BODY MASS INDEX: 23.54 KG/M2

## 2018-01-17 DIAGNOSIS — M16.12 PRIMARY OSTEOARTHRITIS OF LEFT HIP: ICD-10-CM

## 2018-01-17 DIAGNOSIS — M47.816 SPONDYLOSIS OF LUMBAR REGION WITHOUT MYELOPATHY OR RADICULOPATHY: ICD-10-CM

## 2018-01-17 DIAGNOSIS — M48.061 SPINAL STENOSIS OF LUMBAR REGION WITHOUT NEUROGENIC CLAUDICATION: ICD-10-CM

## 2018-01-17 DIAGNOSIS — G89.4 CHRONIC PAIN SYNDROME: ICD-10-CM

## 2018-01-17 DIAGNOSIS — M46.1 SI (SACROILIAC) JOINT INFLAMMATION (HCC): Primary | ICD-10-CM

## 2018-01-17 PROCEDURE — G8484 FLU IMMUNIZE NO ADMIN: HCPCS | Performed by: NURSE PRACTITIONER

## 2018-01-17 PROCEDURE — 99214 OFFICE O/P EST MOD 30 MIN: CPT | Performed by: NURSE PRACTITIONER

## 2018-01-17 PROCEDURE — G8420 CALC BMI NORM PARAMETERS: HCPCS | Performed by: NURSE PRACTITIONER

## 2018-01-17 PROCEDURE — G8427 DOCREV CUR MEDS BY ELIG CLIN: HCPCS | Performed by: NURSE PRACTITIONER

## 2018-01-17 PROCEDURE — 4040F PNEUMOC VAC/ADMIN/RCVD: CPT | Performed by: NURSE PRACTITIONER

## 2018-01-17 PROCEDURE — 1123F ACP DISCUSS/DSCN MKR DOCD: CPT | Performed by: NURSE PRACTITIONER

## 2018-01-17 PROCEDURE — 1036F TOBACCO NON-USER: CPT | Performed by: NURSE PRACTITIONER

## 2018-01-17 ASSESSMENT — ENCOUNTER SYMPTOMS
NAUSEA: 0
ALLERGIC/IMMUNOLOGIC NEGATIVE: 1
BACK PAIN: 1
DIARRHEA: 0
COUGH: 0
GASTROINTESTINAL NEGATIVE: 1
RESPIRATORY NEGATIVE: 1
RHINORRHEA: 0
SHORTNESS OF BREATH: 0
PHOTOPHOBIA: 0
VOMITING: 0
WHEEZING: 0
SINUS PRESSURE: 0
CONSTIPATION: 0
EYE PAIN: 0
CHEST TIGHTNESS: 0
COLOR CHANGE: 0
EYES NEGATIVE: 1
SORE THROAT: 0
ABDOMINAL PAIN: 0

## 2018-01-17 NOTE — PROGRESS NOTES
6 hours as needed for Pain  ., Disp: , Rfl:     docusate sodium (COLACE) 100 MG capsule, Take 1 capsule by mouth daily as needed for Constipation, Disp: 60 capsule, Rfl: 2    clotrimazole-betamethasone (LOTRISONE) 1-0.05 % cream, Apply topically 2 times daily. (Patient taking differently: Apply topically 2 times daily. PRN), Disp: 45 g, Rfl: 1    aspirin 81 MG tablet, Take 81 mg by mouth daily, Disp: , Rfl:     Calcium Carbonate-Vitamin D (CALTRATE 600+D) 600-400 MG-UNIT CHEW, 1 tablet PO BID, Disp: 60 tablet, Rfl: 0    amLODIPine (NORVASC) 5 MG tablet, Take 10 mg by mouth daily , Disp: , Rfl:     clonazePAM (KLONOPIN) 0.5 MG tablet, Take 0.5 mg by mouth every evening., Disp: , Rfl:     ondansetron (ZOFRAN ODT) 4 MG disintegrating tablet, Take 1 tablet by mouth every 8 hours as needed for Nausea or Vomiting, Disp: 30 tablet, Rfl: 0    Subjective:      Review of Systems   Constitutional: Positive for activity change. Negative for appetite change, chills, diaphoresis, fatigue, fever and unexpected weight change. HENT: Negative. Negative for congestion, ear pain, hearing loss, mouth sores, nosebleeds, rhinorrhea, sinus pressure and sore throat. Eyes: Negative. Negative for photophobia, pain and visual disturbance. Respiratory: Negative. Negative for cough, chest tightness, shortness of breath and wheezing. Cardiovascular: Negative. Negative for chest pain and palpitations. Gastrointestinal: Negative. Negative for abdominal pain, constipation, diarrhea, nausea and vomiting. Endocrine: Negative. Negative for cold intolerance, heat intolerance, polydipsia, polyphagia and polyuria. Genitourinary: Negative. Negative for decreased urine volume, difficulty urinating, frequency and hematuria. Musculoskeletal: Positive for arthralgias, back pain, gait problem and myalgias. Negative for joint swelling, neck pain and neck stiffness. Skin: Negative. Negative for color change and rash. Allergic/Immunologic: Negative. Negative for food allergies and immunocompromised state. Neurological: Positive for weakness and numbness. Negative for dizziness, tremors, seizures, syncope, facial asymmetry, speech difficulty, light-headedness and headaches. Hematological: Negative. Does not bruise/bleed easily. Psychiatric/Behavioral: Negative. Negative for agitation, behavioral problems, confusion, decreased concentration, dysphoric mood, hallucinations, self-injury, sleep disturbance and suicidal ideas. The patient is not nervous/anxious and is not hyperactive. Objective:     Vitals:    01/17/18 1134 01/17/18 1138   BP: (!) 165/101 (!) 163/86   Site: Right Arm Left Arm   Position: Sitting Sitting   Cuff Size: Large Adult Large Adult   Pulse: 94    Weight: 150 lb (68 kg)    Height: 5' 6.93\" (1.7 m)        Physical Exam   Constitutional: He is oriented to person, place, and time. He appears well-developed and well-nourished. No distress. HENT:   Head: Normocephalic and atraumatic. Right Ear: External ear normal.   Left Ear: External ear normal.   Nose: Nose normal.   Mouth/Throat: Oropharynx is clear and moist. No oropharyngeal exudate. Eyes: Conjunctivae and EOM are normal. Pupils are equal, round, and reactive to light. Right eye exhibits no discharge. Left eye exhibits no discharge. No scleral icterus. Neck: Normal range of motion and full passive range of motion without pain. Neck supple. No muscular tenderness present. No neck rigidity. No edema, no erythema and normal range of motion present. No thyromegaly present. Cardiovascular: Normal rate, regular rhythm, normal heart sounds and intact distal pulses. Exam reveals no gallop and no friction rub. No murmur heard. Pulmonary/Chest: Effort normal and breath sounds normal. No respiratory distress. He has no wheezes. He has no rales. He exhibits no tenderness. Abdominal: Soft.  Bowel sounds are normal. He exhibits no

## 2018-01-24 ENCOUNTER — TELEPHONE (OUTPATIENT)
Dept: CARDIOLOGY CLINIC | Age: 83
End: 2018-01-24

## 2018-01-24 NOTE — TELEPHONE ENCOUNTER
Pre op Risk Assessment    Procedure Left SI MBB  Physician [address]  Date of surgery/procedure TBD    Last OV 11/3/2017  Last Stress  10/26/2017   Last Echo 10/26/2017  Last Cath Nothing in EPIC  Last Stent Nothing in EPIC  Is patient on blood thinners Eliquis  Hold Meds/how many days 5

## 2018-01-30 ENCOUNTER — ANESTHESIA EVENT (OUTPATIENT)
Dept: OPERATING ROOM | Age: 83
End: 2018-01-30
Payer: MEDICARE

## 2018-01-30 ENCOUNTER — ANESTHESIA (OUTPATIENT)
Dept: OPERATING ROOM | Age: 83
End: 2018-01-30
Payer: MEDICARE

## 2018-01-30 ENCOUNTER — APPOINTMENT (OUTPATIENT)
Dept: GENERAL RADIOLOGY | Age: 83
End: 2018-01-30
Attending: PAIN MEDICINE
Payer: MEDICARE

## 2018-01-30 ENCOUNTER — HOSPITAL ENCOUNTER (OUTPATIENT)
Age: 83
Setting detail: OUTPATIENT SURGERY
Discharge: HOME | End: 2018-01-30
Attending: PAIN MEDICINE | Admitting: PAIN MEDICINE
Payer: MEDICARE

## 2018-01-30 VITALS — SYSTOLIC BLOOD PRESSURE: 167 MMHG | OXYGEN SATURATION: 100 % | DIASTOLIC BLOOD PRESSURE: 93 MMHG

## 2018-01-30 VITALS
RESPIRATION RATE: 16 BRPM | WEIGHT: 148.2 LBS | BODY MASS INDEX: 21.22 KG/M2 | DIASTOLIC BLOOD PRESSURE: 93 MMHG | SYSTOLIC BLOOD PRESSURE: 142 MMHG | HEART RATE: 93 BPM | TEMPERATURE: 98.2 F | HEIGHT: 70 IN | OXYGEN SATURATION: 98 %

## 2018-01-30 PROCEDURE — 3600000054 HC PAIN LEVEL 3 BASE: Performed by: PAIN MEDICINE

## 2018-01-30 PROCEDURE — 6360000002 HC RX W HCPCS: Performed by: NURSE ANESTHETIST, CERTIFIED REGISTERED

## 2018-01-30 PROCEDURE — 7100000010 HC PHASE II RECOVERY - FIRST 15 MIN: Performed by: PAIN MEDICINE

## 2018-01-30 PROCEDURE — 3700000000 HC ANESTHESIA ATTENDED CARE: Performed by: PAIN MEDICINE

## 2018-01-30 PROCEDURE — 3209999900 FLUORO FOR SURGICAL PROCEDURES

## 2018-01-30 PROCEDURE — 6360000004 HC RX CONTRAST MEDICATION: Performed by: PAIN MEDICINE

## 2018-01-30 PROCEDURE — 27096 INJECT SACROILIAC JOINT: CPT | Performed by: PAIN MEDICINE

## 2018-01-30 PROCEDURE — 7100000011 HC PHASE II RECOVERY - ADDTL 15 MIN: Performed by: PAIN MEDICINE

## 2018-01-30 PROCEDURE — 6360000002 HC RX W HCPCS: Performed by: PAIN MEDICINE

## 2018-01-30 PROCEDURE — 2500000003 HC RX 250 WO HCPCS: Performed by: PAIN MEDICINE

## 2018-01-30 RX ORDER — FENTANYL CITRATE 50 UG/ML
INJECTION, SOLUTION INTRAMUSCULAR; INTRAVENOUS PRN
Status: DISCONTINUED | OUTPATIENT
Start: 2018-01-30 | End: 2018-01-30 | Stop reason: SDUPTHER

## 2018-01-30 RX ORDER — BUPIVACAINE HYDROCHLORIDE 2.5 MG/ML
INJECTION, SOLUTION EPIDURAL; INFILTRATION; INTRACAUDAL PRN
Status: DISCONTINUED | OUTPATIENT
Start: 2018-01-30 | End: 2018-01-30 | Stop reason: HOSPADM

## 2018-01-30 RX ORDER — METHYLPREDNISOLONE ACETATE 80 MG/ML
INJECTION, SUSPENSION INTRA-ARTICULAR; INTRALESIONAL; INTRAMUSCULAR; SOFT TISSUE PRN
Status: DISCONTINUED | OUTPATIENT
Start: 2018-01-30 | End: 2018-01-30 | Stop reason: HOSPADM

## 2018-01-30 RX ORDER — LIDOCAINE HYDROCHLORIDE 10 MG/ML
INJECTION, SOLUTION INFILTRATION; PERINEURAL PRN
Status: DISCONTINUED | OUTPATIENT
Start: 2018-01-30 | End: 2018-01-30 | Stop reason: HOSPADM

## 2018-01-30 RX ADMIN — FENTANYL CITRATE 500 MCG: 50 INJECTION INTRAMUSCULAR; INTRAVENOUS at 11:25

## 2018-01-30 RX ADMIN — FENTANYL CITRATE 50 MCG: 50 INJECTION INTRAMUSCULAR; INTRAVENOUS at 11:24

## 2018-01-30 ASSESSMENT — ENCOUNTER SYMPTOMS
DYSPNEA ACTIVITY LEVEL: AFTER AMBULATING 1 FLIGHT OF STAIRS
SHORTNESS OF BREATH: 1

## 2018-01-30 ASSESSMENT — PULMONARY FUNCTION TESTS
PIF_VALUE: 0

## 2018-01-30 ASSESSMENT — PAIN DESCRIPTION - LOCATION: LOCATION: BACK

## 2018-01-30 ASSESSMENT — PAIN SCALES - GENERAL: PAINLEVEL_OUTOF10: 2

## 2018-01-30 ASSESSMENT — PAIN DESCRIPTION - DESCRIPTORS: DESCRIPTORS: ACHING

## 2018-01-30 ASSESSMENT — PAIN - FUNCTIONAL ASSESSMENT: PAIN_FUNCTIONAL_ASSESSMENT: 0-10

## 2018-01-30 ASSESSMENT — PAIN DESCRIPTION - PAIN TYPE: TYPE: CHRONIC PAIN

## 2018-01-30 NOTE — PROGRESS NOTES
Resting quietly in recliner with feet elevated, call light in reach and family in room. Pt last dose of elequis 1-26-18. Progress note from Dr. Krishna Reasoner office in notes regarding holding of Blood thinner. ASA & vitamins held 5 days.

## 2018-01-30 NOTE — PROGRESS NOTES
1131: Patient to phase 2 recovery room via cart. Patient is awake and alert. Report received from surgical RN, Rosemarie Galeano. Patient's vitals obtained, see charting. 1132: Patient is denying nausea and stating pain is minimal.   1136: Offered snack and drink provided to the patient. Bed is in the lowest position, call light is within reach and patient's family is at the bedside. 1157: Discharge instructions given and explained to the patient and the patient's wife and son, all verbalized understanding. Patient's blood pressure rechecked, see charting. 1159: IV removed at this time, no complications. Patient is getting dressed at this time, wife remains at the bedside. 1218: Patient discharged home in stable condition with his wife and son.

## 2018-01-30 NOTE — H&P
Cancer in his brother, brother, brother, and brother; Stroke in his mother.     Social History  Efraín Grande  reports that he quit smoking about 52 years ago. His smoking use included Cigarettes. He has a 10.00 pack-year smoking history. He has never used smokeless tobacco. He reports that he drinks alcohol. He reports that he does not use drugs.     Medications    Current Medication      Current Outpatient Prescriptions:     metoprolol tartrate (LOPRESSOR) 25 MG tablet, Take 0.5 tablets by mouth daily, Disp: 45 tablet, Rfl: 1    apixaban (ELIQUIS) 5 MG TABS tablet, Take 1 tablet by mouth 2 times daily, Disp: 56 tablet, Rfl: 0    Multiple Vitamins-Minerals (PRESERVISION/LUTEIN PO), Take 1 tablet by mouth 2 times daily , Disp: , Rfl:     UNABLE TO FIND, 1 tablet daily For kidney health, Disp: , Rfl:     HYDROcodone-acetaminophen (NORCO) 5-325 MG per tablet, Take 1 tablet by mouth every 6 hours as needed for Pain  ., Disp: , Rfl:     docusate sodium (COLACE) 100 MG capsule, Take 1 capsule by mouth daily as needed for Constipation, Disp: 60 capsule, Rfl: 2    clotrimazole-betamethasone (LOTRISONE) 1-0.05 % cream, Apply topically 2 times daily. (Patient taking differently: Apply topically 2 times daily. PRN), Disp: 45 g, Rfl: 1    aspirin 81 MG tablet, Take 81 mg by mouth daily, Disp: , Rfl:     Calcium Carbonate-Vitamin D (CALTRATE 600+D) 600-400 MG-UNIT CHEW, 1 tablet PO BID, Disp: 60 tablet, Rfl: 0    amLODIPine (NORVASC) 5 MG tablet, Take 10 mg by mouth daily , Disp: , Rfl:     clonazePAM (KLONOPIN) 0.5 MG tablet, Take 0.5 mg by mouth every evening., Disp: , Rfl:     ondansetron (ZOFRAN ODT) 4 MG disintegrating tablet, Take 1 tablet by mouth every 8 hours as needed for Nausea or Vomiting, Disp: 30 tablet, Rfl: 0        Subjective:      Review of Systems   Constitutional: Positive for activity change. Negative for appetite change, chills, diaphoresis, fatigue, fever and unexpected weight change.    HENT: Ear: External ear normal.   Left Ear: External ear normal.   Nose: Nose normal.   Mouth/Throat: Oropharynx is clear and moist. No oropharyngeal exudate. Eyes: Conjunctivae and EOM are normal. Pupils are equal, round, and reactive to light. Right eye exhibits no discharge. Left eye exhibits no discharge. No scleral icterus. Neck: Normal range of motion and full passive range of motion without pain. Neck supple. No muscular tenderness present. No neck rigidity. No edema, no erythema and normal range of motion present. No thyromegaly present. Cardiovascular: Normal rate, regular rhythm, normal heart sounds and intact distal pulses. Exam reveals no gallop and no friction rub. No murmur heard. Pulmonary/Chest: Effort normal and breath sounds normal. No respiratory distress. He has no wheezes. He has no rales. He exhibits no tenderness. Abdominal: Soft. Bowel sounds are normal. He exhibits no distension. There is no tenderness. There is no rebound and no guarding. Musculoskeletal: He exhibits tenderness. Right shoulder: Normal.        Left shoulder: Normal.        Right hip: Normal.        Left hip: He exhibits decreased range of motion, decreased strength and tenderness. Right knee: Normal.        Left knee: Normal.        Cervical back: Normal.        Lumbar back: He exhibits tenderness and pain. Back:    Neurological: He is alert and oriented to person, place, and time. He has normal strength and normal reflexes. He is not disoriented. He displays no atrophy. No cranial nerve deficit or sensory deficit. He exhibits normal muscle tone. He displays a negative Romberg sign. Coordination and gait normal. He displays no Babinski's sign on the right side. SLR-  Motor 5/5 BUE BLE   Skin: Skin is warm. No rash noted. He is not diaphoretic. No erythema. No pallor. Psychiatric: He has a normal mood and affect.  His speech is normal and behavior is normal. Judgment and thought content

## 2018-01-30 NOTE — ANESTHESIA POSTPROCEDURE EVALUATION
Department of Anesthesiology  Postprocedure Note    Patient: Crispin Berry  MRN: [id number]  YOB: 1934  Date of evaluation: 1/30/2018  Time:  4:08 PM     Procedure Summary     Date:  01/30/18 Room / Location:  [facility] OR 03 / [address] [facility]    Anesthesia Start:  1124 Anesthesia Stop:  1128    Procedure:  LEFT SI MBB (Left ) Diagnosis:  (SI JOINT INFLAMMATION)    Surgeon:  Anitha Loya MD Responsible Provider:  Collin Loya DO    Anesthesia Type:  MAC ASA Status:  3          Anesthesia Type: MAC    Kelsey Phase I:      Kelsey Phase II: Kelsey Score: 10    Last vitals: Reviewed and per EMR flowsheets.        Anesthesia Post Evaluation    Patient location during evaluation: bedside  Patient participation: complete - patient participated  Level of consciousness: awake and alert  Pain score: 0  Airway patency: patent  Nausea & Vomiting: no nausea and no vomiting  Complications: no  Cardiovascular status: hemodynamically stable and blood pressure returned to baseline  Respiratory status: spontaneous ventilation, room air and acceptable  Hydration status: stable

## 2018-01-30 NOTE — ANESTHESIA PRE PROCEDURE
BILITOT 0.4 01/31/2017    ALKPHOS 58 01/31/2017    AST 22 01/31/2017    ALT 23 01/31/2017       POC Tests: No results for input(s): POCGLU, POCNA, POCK, POCCL, POCBUN, POCHEMO, POCHCT in the last 72 hours. Coags:   Lab Results   Component Value Date    INR 1.06 10/23/2015    APTT 82.3 10/23/2015       HCG (If Applicable): No results found for: PREGTESTUR, PREGSERUM, HCG, HCGQUANT     ABGs: No results found for: PHART, PO2ART, KDE6KRE, ARU2BSU, BEART, U2SCPAER     Type & Screen (If Applicable):  Lab Results   Component Value Date    [address] POS 10/07/2015       Anesthesia Evaluation  Patient summary reviewed and Nursing notes reviewed no history of anesthetic complications:   Airway: Mallampati: II  TM distance: >3 FB   Neck ROM: limited  Mouth opening: > = 3 FB Dental:          Pulmonary:normal exam  breath sounds clear to auscultation  (+) shortness of breath: chronic,                             Cardiovascular:  Exercise tolerance: poor (<4 METS),   (+) hypertension:, valvular problems/murmurs: AI, CABRAL: after ambulating 1 flight of stairs, murmur,       ECG reviewed  Rhythm: regular  Rate: normal      Cleared by cardiology              Neuro/Psych:   (+) psychiatric history:            GI/Hepatic/Renal: Neg GI/Hepatic/Renal ROS            Endo/Other:              Pt had no PAT visit        ROS comment: Prostate cancer Abdominal:           Vascular:   + PE. Anesthesia Plan      MAC     ASA 3       Induction: intravenous. Anesthetic plan and risks discussed with patient. Plan discussed with CRNA.                   Radha Powers DO   1/30/2018

## 2018-02-20 ENCOUNTER — OFFICE VISIT (OUTPATIENT)
Dept: PHYSICAL MEDICINE AND REHAB | Age: 83
End: 2018-02-20
Payer: MEDICARE

## 2018-02-20 ENCOUNTER — TELEPHONE (OUTPATIENT)
Dept: CARDIOLOGY CLINIC | Age: 83
End: 2018-02-20

## 2018-02-20 VITALS
SYSTOLIC BLOOD PRESSURE: 128 MMHG | HEART RATE: 93 BPM | WEIGHT: 151.2 LBS | HEIGHT: 70 IN | DIASTOLIC BLOOD PRESSURE: 72 MMHG | BODY MASS INDEX: 21.64 KG/M2

## 2018-02-20 DIAGNOSIS — M48.061 SPINAL STENOSIS OF LUMBAR REGION WITHOUT NEUROGENIC CLAUDICATION: ICD-10-CM

## 2018-02-20 DIAGNOSIS — M46.1 SI (SACROILIAC) JOINT INFLAMMATION (HCC): Primary | ICD-10-CM

## 2018-02-20 DIAGNOSIS — G89.4 CHRONIC PAIN SYNDROME: ICD-10-CM

## 2018-02-20 DIAGNOSIS — M47.816 SPONDYLOSIS OF LUMBAR REGION WITHOUT MYELOPATHY OR RADICULOPATHY: ICD-10-CM

## 2018-02-20 DIAGNOSIS — M16.12 PRIMARY OSTEOARTHRITIS OF LEFT HIP: ICD-10-CM

## 2018-02-20 PROCEDURE — 4040F PNEUMOC VAC/ADMIN/RCVD: CPT | Performed by: NURSE PRACTITIONER

## 2018-02-20 PROCEDURE — 1036F TOBACCO NON-USER: CPT | Performed by: NURSE PRACTITIONER

## 2018-02-20 PROCEDURE — 1123F ACP DISCUSS/DSCN MKR DOCD: CPT | Performed by: NURSE PRACTITIONER

## 2018-02-20 PROCEDURE — G8420 CALC BMI NORM PARAMETERS: HCPCS | Performed by: NURSE PRACTITIONER

## 2018-02-20 PROCEDURE — G8427 DOCREV CUR MEDS BY ELIG CLIN: HCPCS | Performed by: NURSE PRACTITIONER

## 2018-02-20 PROCEDURE — 99213 OFFICE O/P EST LOW 20 MIN: CPT | Performed by: NURSE PRACTITIONER

## 2018-02-20 PROCEDURE — G8484 FLU IMMUNIZE NO ADMIN: HCPCS | Performed by: NURSE PRACTITIONER

## 2018-02-20 ASSESSMENT — ENCOUNTER SYMPTOMS
RHINORRHEA: 0
RESPIRATORY NEGATIVE: 1
EYES NEGATIVE: 1
VOMITING: 0
BACK PAIN: 1
ALLERGIC/IMMUNOLOGIC NEGATIVE: 1
WHEEZING: 0
SINUS PRESSURE: 0
CHEST TIGHTNESS: 0
NAUSEA: 0
SHORTNESS OF BREATH: 0
COLOR CHANGE: 0
ABDOMINAL PAIN: 0
COUGH: 0
GASTROINTESTINAL NEGATIVE: 1
EYE PAIN: 0
DIARRHEA: 0
PHOTOPHOBIA: 0
CONSTIPATION: 0
SORE THROAT: 0

## 2018-02-20 NOTE — PROGRESS NOTES
nsaids. Past Medical History  Piedad Santos  has a past medical history of Atrial fibrillation ([facility] [address] Back pain; Hernia; Hx of blood clots; Hyperlipidemia; Hypertension; Osteoarthritis; Prostate cancer ([address] Pulmonary emboli ([facility] [address] and Wears glasses. Past Surgical History  The patient  has a past surgical history that includes back surgery (1397,0624); Total shoulder arthroplasty (2005); ostate surgery (2003); ostate surgery (2009); ostate Biopsy (7-); Cataract removal with implant (Left, 11/2014); ostate Biopsy (8/21/2015); hernia repair (1995, 2009); other surgical history (10/7/2015); other surgical history (Bilateral, 09/05/2017); Nerve Block Lumb Facet Level 1 Bilateral (Bilateral, 9/5/2017); hernia repair (9195'P?); Cataract removal (Right, 06/2001); Appendectomy (01/1961); hernia repair (Left, 11/22/2017); other surgical history (Right, 1980's); other surgical history (01/30/2018); and inject si joint arthrgrphy&/anes/steroid w/image (Left, 1/30/2018). Family History  This patient's family history includes Cancer in his brother, brother, and sister; Heart Disease in his father; Prostate Cancer in his brother, brother, brother, and brother; Stroke in his mother. Social History  Piedad Santos  reports that he quit smoking about 52 years ago. His smoking use included Cigarettes. He has a 10.00 pack-year smoking history. He has never used smokeless tobacco. He reports that he drinks alcohol. He reports that he does not use drugs.     Medications    Current Outpatient Prescriptions:     metoprolol tartrate (LOPRESSOR) 25 MG tablet, Take 0.5 tablets by mouth daily, Disp: 45 tablet, Rfl: 1    apixaban (ELIQUIS) 5 MG TABS tablet, Take 1 tablet by mouth 2 times daily, Disp: 56 tablet, Rfl: 0    Multiple Vitamins-Minerals (PRESERVISION/LUTEIN PO), Take 1 tablet by mouth 2 times daily , Disp: , Rfl:     HYDROcodone-acetaminophen (NORCO) 5-325 MG per tablet, Take 1 tablet by mouth every 6 hours as needed for for weakness and numbness. Negative for dizziness, tremors, seizures, syncope, facial asymmetry, speech difficulty, light-headedness and headaches. Hematological: Negative. Does not bruise/bleed easily. Psychiatric/Behavioral: Negative. Negative for agitation, behavioral problems, confusion, decreased concentration, dysphoric mood, hallucinations, self-injury, sleep disturbance and suicidal ideas. The patient is not nervous/anxious and is not hyperactive. Objective:     Vitals:    02/20/18 0905 02/20/18 0907   BP: (!) 141/80 128/72   Site: Left Arm Left Arm   Position: Sitting Sitting   Cuff Size: Small Adult Small Adult   Pulse: 93    Weight: 151 lb 3.2 oz (68.6 kg)    Height: 5' 10\" (1.778 m)        Physical Exam   Constitutional: He is oriented to person, place, and time. He appears well-developed and well-nourished. No distress. HENT:   Head: Normocephalic and atraumatic. Right Ear: External ear normal.   Left Ear: External ear normal.   Nose: Nose normal.   Mouth/Throat: Oropharynx is clear and moist. No oropharyngeal exudate. Eyes: Conjunctivae and EOM are normal. Pupils are equal, round, and reactive to light. Right eye exhibits no discharge. Left eye exhibits no discharge. No scleral icterus. Neck: Normal range of motion and full passive range of motion without pain. Neck supple. No muscular tenderness present. No neck rigidity. No edema, no erythema and normal range of motion present. No thyromegaly present. Cardiovascular: Normal rate, regular rhythm, normal heart sounds and intact distal pulses. Exam reveals no gallop and no friction rub. No murmur heard. Pulmonary/Chest: Effort normal and breath sounds normal. No respiratory distress. He has no wheezes. He has no rales. He exhibits no tenderness. Abdominal: Soft. Bowel sounds are normal. He exhibits no distension. There is no tenderness. There is no rebound and no guarding. Musculoskeletal: He exhibits tenderness. Right shoulder: Normal.        Left shoulder: Normal.        Right hip: Normal.        Left hip: He exhibits decreased range of motion, decreased strength and tenderness. Right knee: Normal.        Left knee: Normal.        Cervical back: Normal.        Lumbar back: He exhibits tenderness and pain. Back:    Neurological: He is alert and oriented to person, place, and time. He has normal strength and normal reflexes. He is not disoriented. He displays no atrophy. No cranial nerve deficit or sensory deficit. He exhibits normal muscle tone. He displays a negative Romberg sign. Coordination and gait normal. He displays no Babinski's sign on the right side. SLR-  Motor 5/5 BUE BLE   Skin: Skin is warm. No rash noted. He is not diaphoretic. No erythema. No pallor. Psychiatric: He has a normal mood and affect. His speech is normal and behavior is normal. Judgment and thought content normal. His mood appears not anxious. His affect is not angry, not blunt, not labile and not inappropriate. He is not agitated, not aggressive, not hyperactive, not slowed, not withdrawn, not actively hallucinating and not combative. Thought content is not paranoid and not delusional. Cognition and memory are normal. Cognition and memory are not impaired. He does not express impulsivity or inappropriate judgment. He does not exhibit a depressed mood. He expresses no homicidal and no suicidal ideation. He expresses no suicidal plans and no homicidal plans. He exhibits normal recent memory and normal remote memory. He is attentive. Nursing note and vitals reviewed. BALJINDER test: positive left  Yeomans test: positive left     Assessment:     1. SI (sacroiliac) joint inflammation (HCC)    2. Spondylosis of lumbar region without myelopathy or radiculopathy    3. Spinal stenosis of lumbar region without neurogenic claudication    4. Primary osteoarthritis of left hip    5.  Chronic pain syndrome            Plan:      · OARRS

## 2018-02-21 ENCOUNTER — TELEPHONE (OUTPATIENT)
Dept: PHYSICAL MEDICINE AND REHAB | Age: 83
End: 2018-02-21

## 2018-02-27 ENCOUNTER — ANESTHESIA EVENT (OUTPATIENT)
Dept: OPERATING ROOM | Age: 83
End: 2018-02-27
Payer: MEDICARE

## 2018-02-27 ENCOUNTER — ANESTHESIA (OUTPATIENT)
Dept: OPERATING ROOM | Age: 83
End: 2018-02-27
Payer: MEDICARE

## 2018-02-27 ENCOUNTER — HOSPITAL ENCOUNTER (OUTPATIENT)
Age: 83
Setting detail: OUTPATIENT SURGERY
Discharge: HOME | End: 2018-02-27
Attending: PAIN MEDICINE | Admitting: PAIN MEDICINE
Payer: MEDICARE

## 2018-02-27 ENCOUNTER — APPOINTMENT (OUTPATIENT)
Dept: GENERAL RADIOLOGY | Age: 83
End: 2018-02-27
Attending: PAIN MEDICINE
Payer: MEDICARE

## 2018-02-27 VITALS — DIASTOLIC BLOOD PRESSURE: 94 MMHG | OXYGEN SATURATION: 99 % | TEMPERATURE: 97.2 F | SYSTOLIC BLOOD PRESSURE: 158 MMHG

## 2018-02-27 VITALS
WEIGHT: 146 LBS | RESPIRATION RATE: 16 BRPM | HEART RATE: 81 BPM | DIASTOLIC BLOOD PRESSURE: 70 MMHG | TEMPERATURE: 97.9 F | OXYGEN SATURATION: 97 % | SYSTOLIC BLOOD PRESSURE: 120 MMHG | HEIGHT: 71 IN | BODY MASS INDEX: 20.44 KG/M2

## 2018-02-27 PROCEDURE — 3600000054 HC PAIN LEVEL 3 BASE: Performed by: PAIN MEDICINE

## 2018-02-27 PROCEDURE — 6360000002 HC RX W HCPCS: Performed by: NURSE ANESTHETIST, CERTIFIED REGISTERED

## 2018-02-27 PROCEDURE — 7100000010 HC PHASE II RECOVERY - FIRST 15 MIN: Performed by: PAIN MEDICINE

## 2018-02-27 PROCEDURE — 2500000003 HC RX 250 WO HCPCS: Performed by: PAIN MEDICINE

## 2018-02-27 PROCEDURE — 27096 INJECT SACROILIAC JOINT: CPT | Performed by: PAIN MEDICINE

## 2018-02-27 PROCEDURE — 3209999900 FLUORO FOR SURGICAL PROCEDURES

## 2018-02-27 PROCEDURE — 6360000004 HC RX CONTRAST MEDICATION: Performed by: PAIN MEDICINE

## 2018-02-27 PROCEDURE — 6360000002 HC RX W HCPCS: Performed by: PAIN MEDICINE

## 2018-02-27 PROCEDURE — 7100000011 HC PHASE II RECOVERY - ADDTL 15 MIN: Performed by: PAIN MEDICINE

## 2018-02-27 PROCEDURE — 3700000000 HC ANESTHESIA ATTENDED CARE: Performed by: PAIN MEDICINE

## 2018-02-27 RX ORDER — BUPIVACAINE HYDROCHLORIDE 2.5 MG/ML
INJECTION, SOLUTION EPIDURAL; INFILTRATION; INTRACAUDAL PRN
Status: DISCONTINUED | OUTPATIENT
Start: 2018-02-27 | End: 2018-02-27 | Stop reason: HOSPADM

## 2018-02-27 RX ORDER — PROPOFOL 10 MG/ML
INJECTION, EMULSION INTRAVENOUS PRN
Status: DISCONTINUED | OUTPATIENT
Start: 2018-02-27 | End: 2018-02-27 | Stop reason: SDUPTHER

## 2018-02-27 RX ORDER — LIDOCAINE HYDROCHLORIDE 10 MG/ML
INJECTION, SOLUTION INFILTRATION; PERINEURAL PRN
Status: DISCONTINUED | OUTPATIENT
Start: 2018-02-27 | End: 2018-02-27 | Stop reason: HOSPADM

## 2018-02-27 RX ORDER — METHYLPREDNISOLONE ACETATE 80 MG/ML
INJECTION, SUSPENSION INTRA-ARTICULAR; INTRALESIONAL; INTRAMUSCULAR; SOFT TISSUE PRN
Status: DISCONTINUED | OUTPATIENT
Start: 2018-02-27 | End: 2018-02-27 | Stop reason: HOSPADM

## 2018-02-27 RX ADMIN — PROPOFOL 30 MG: 10 INJECTION, EMULSION INTRAVENOUS at 08:23

## 2018-02-27 ASSESSMENT — PULMONARY FUNCTION TESTS
PIF_VALUE: 0

## 2018-02-27 ASSESSMENT — PAIN - FUNCTIONAL ASSESSMENT: PAIN_FUNCTIONAL_ASSESSMENT: 0-10

## 2018-02-27 NOTE — ANESTHESIA POSTPROCEDURE EVALUATION
Department of Anesthesiology  Postprocedure Note    Patient: Gerry Beltrán  MRN: [id number]  YOB: 1934  Date of evaluation: 2/27/2018  Time:  11:58 AM     Procedure Summary     Date:  02/27/18 Room / Location:  [facility] OR 03 / [address] [facility]    Anesthesia Start:  5959 Anesthesia Stop:  6642    Procedure:  LEFT SI MBB #2 (Left ) Diagnosis:  (SI JOINT INFLAMMATION)    Surgeon:  Murtaza Draper MD Responsible Provider:  Jameson Augustine MD    Anesthesia Type:  MAC ASA Status:  3          Anesthesia Type: MAC    Kelsey Phase I:      Kelsey Phase II: Kelsey Score: 10    Last vitals: Reviewed and per EMR flowsheets.        Anesthesia Post Evaluation    Patient location during evaluation: bedside  Patient participation: complete - patient participated  Level of consciousness: awake and alert  Airway patency: patent  Nausea & Vomiting: no vomiting and no nausea  Complications: no  Cardiovascular status: hemodynamically stable  Respiratory status: acceptable  Hydration status: stable

## 2018-02-27 NOTE — OP NOTE
Pre-Procedure Note    Patient Name: Tamie Barroso   YOB: 1934  Medical Record Number: [id number]  Date: 2/20/2018     Indication:  Left SI pain  Consent: On file. Vital Signs:   Vitals:    02/27/18 0723   BP: (!) 150/92   Pulse: 82   Resp: 16   Temp: 97.3 °F (36.3 °C)   SpO2: 97%       Past Medical History:   has a past medical history of Atrial fibrillation ([address] Back pain; Hernia; Hx of blood clots; Hyperlipidemia; Hypertension; Osteoarthritis; Prostate cancer ([address] Pulmonary emboli ([facility] [address] and Wears glasses. Past Surgical History:   has a past surgical history that includes back surgery (2505,6427); Total shoulder arthroplasty (2005); Prostate surgery (2003); Prostate surgery (2009); Prostate Biopsy (7-); Cataract removal with implant (Left, 11/2014); Prostate Biopsy (8/21/2015); hernia repair (1995, 2009); other surgical history (10/7/2015); other surgical history (Bilateral, 09/05/2017); Nerve Block Lumb Facet Level 1 Bilateral (Bilateral, 9/5/2017); hernia repair (7359'P?); Cataract removal (Right, 06/2001); Appendectomy (01/1961); hernia repair (Left, 11/22/2017); other surgical history (Right, 1980's); other surgical history (01/30/2018); and pr inject si joint arthrgrphy&/anes/steroid w/image (Left, 1/30/2018). Pre-Sedation Documentation and Exam:   Vital signs have been reviewed (see flow sheet for vitals). Sedation/ Anesthesia Plan: MAC    Patient is an appropriate candidate for plan of sedation: yes    Preoperative Diagnosis:  Left sacroiliitis. Postoperative Diagnosis: Left  Sacroiliitis. Procedure Performed:  Left sacroiliac joint injection under fluoroscopy guidance # 2. Indication for the Procedure: The patient failed conservative management  for pain in low back. The patient is tender over the Left SI joint. Wesley's test is positive on the Left side.   As patient is not responding to conservative management and pain is interfering with activities of daily living we decided to proceed with SI joint injection. The procedure and risks were discussed with the patient and an informed consent was obtained. Procedure:     A meaningful communication was kept up with the patient throughout the procedure. The patient is placed in prone position. Skin over the back was prepped and draped in sterile manner. Then using fluoroscopy the Left sacroiliac joint was identified. Then the angle of the fluoroscopy was adjusted such that the view of the caudal aspect of the joint space was optimized. Then skin and deep tissues over the caudal aspect of the joint were infiltrated with 2 ml of 1% lidocaine. The #22-gauge, 3-1/2 inch spinal needle was introduced through the skin wheal and directed such that the tip of the needle lies in the joint space. This was confirmed by injecting 0.5 ml of Omnipaque-180 through the needle and observing the spread of the contrast along the joint space. Then after negative aspiration a total of 80 mg of depomedrol  with 1 ml of  0.25% Marcaine was injected through the needle. The needle is removed and a Band-Aid was placed over the needle insertion site. The patient tolerated the procedure well and vital signs remained stable. The patient was discharged home in stable condition and will be followed in the pain clinic in the next few weeks or further planning.     Electronically signed by Richard Burt MD on 2/27/2018 at 8:27 AM

## 2018-03-09 ENCOUNTER — OFFICE VISIT (OUTPATIENT)
Dept: CARDIOLOGY CLINIC | Age: 83
End: 2018-03-09
Payer: MEDICARE

## 2018-03-09 VITALS
SYSTOLIC BLOOD PRESSURE: 144 MMHG | DIASTOLIC BLOOD PRESSURE: 92 MMHG | BODY MASS INDEX: 21.19 KG/M2 | HEIGHT: 70 IN | HEART RATE: 90 BPM | WEIGHT: 148 LBS

## 2018-03-09 DIAGNOSIS — Z01.818 PRE-OP EVALUATION: Primary | ICD-10-CM

## 2018-03-09 DIAGNOSIS — I10 ESSENTIAL HYPERTENSION: ICD-10-CM

## 2018-03-09 DIAGNOSIS — R06.02 SOB (SHORTNESS OF BREATH) ON EXERTION: ICD-10-CM

## 2018-03-09 DIAGNOSIS — I48.19 PERSISTENT ATRIAL FIBRILLATION (HCC): ICD-10-CM

## 2018-03-09 DIAGNOSIS — E78.5 HYPERLIPIDEMIA WITH TARGET LDL LESS THAN 130: ICD-10-CM

## 2018-03-09 DIAGNOSIS — R94.31 ABNORMAL EKG: ICD-10-CM

## 2018-03-09 DIAGNOSIS — I34.0 NON-RHEUMATIC MITRAL REGURGITATION: ICD-10-CM

## 2018-03-09 DIAGNOSIS — I35.1 MODERATE AORTIC REGURGITATION: ICD-10-CM

## 2018-03-09 PROCEDURE — 1036F TOBACCO NON-USER: CPT | Performed by: INTERNAL MEDICINE

## 2018-03-09 PROCEDURE — G8420 CALC BMI NORM PARAMETERS: HCPCS | Performed by: INTERNAL MEDICINE

## 2018-03-09 PROCEDURE — G8427 DOCREV CUR MEDS BY ELIG CLIN: HCPCS | Performed by: INTERNAL MEDICINE

## 2018-03-09 PROCEDURE — G8484 FLU IMMUNIZE NO ADMIN: HCPCS | Performed by: INTERNAL MEDICINE

## 2018-03-09 PROCEDURE — 4040F PNEUMOC VAC/ADMIN/RCVD: CPT | Performed by: INTERNAL MEDICINE

## 2018-03-09 PROCEDURE — 1123F ACP DISCUSS/DSCN MKR DOCD: CPT | Performed by: INTERNAL MEDICINE

## 2018-03-09 PROCEDURE — 99214 OFFICE O/P EST MOD 30 MIN: CPT | Performed by: INTERNAL MEDICINE

## 2018-03-09 NOTE — PROGRESS NOTES
Examination:    General appearance - alert, well appearing, and in no distress  Mental status - alert, oriented to person, place, and time  Neck - supple, no significant adenopathy, no JVD, or carotid bruits  Chest - clear to auscultation, no wheezes, rales or rhonchi, symmetric air entry  Heart - normal rate, regular rhythm, normal S1, S2, , rubs, clicks or gallops but +ve murmurs  Abdomen - soft, nontender, nondistended, no masses or organomegaly  Neurological - alert, oriented, normal speech, no focal findings or movement disorder noted  Musculoskeletal - no joint tenderness, deformity or swelling  Extremities - peripheral pulses normal, no pedal edema, no clubbing or cyanosis  Skin - normal coloration and turgor, no rashes, no suspicious skin lesions noted    Lab  No results for input(s): CKTOTAL, CKMB, CKMBINDEX, TROPONINI in the last 72 hours.   CBC:   Lab Results   Component Value Date    WBC 9.6 01/31/2017    RBC 4.61 01/31/2017     08/26/2011    HGB 15.4 10/12/2017    HCT 46.4 10/12/2017    MCV 95.8 01/31/2017    MCH 31.7 01/31/2017    MCHC 33.1 01/31/2017    RDW 13.9 01/31/2017     01/31/2017    MPV 11.1 01/31/2017     BMP:    Lab Results   Component Value Date     10/12/2017    K 4.0 11/22/2017     10/12/2017    CO2 28 10/12/2017    BUN 22 10/12/2017    LABALBU 4.1 01/31/2017    CREATININE 1.0 10/12/2017    CALCIUM 9.0 10/12/2017    LABGLOM 71 10/12/2017    GLUCOSE 102 10/12/2017    GLUCOSE 89 05/14/2016     Hepatic Function Panel:    Lab Results   Component Value Date    ALKPHOS 58 01/31/2017    ALT 23 01/31/2017    AST 22 01/31/2017    PROT 7.2 01/31/2017    BILITOT 0.4 01/31/2017    BILIDIR 0.2 05/14/2016    LABALBU 4.1 01/31/2017     Magnesium:  No results found for: MG  Warfarin PT/INR:  No components found for: PTPATWAR, PTINRWAR  HgBA1c:  No results found for: LABA1C  FLP:    Lab Results   Component Value Date    TRIG 72 05/13/2017    HDL 64 05/13/2017    LDLCALC 104

## 2018-03-19 ENCOUNTER — OFFICE VISIT (OUTPATIENT)
Dept: FAMILY MEDICINE CLINIC | Age: 83
End: 2018-03-19
Payer: MEDICARE

## 2018-03-19 VITALS
DIASTOLIC BLOOD PRESSURE: 64 MMHG | BODY MASS INDEX: 21.92 KG/M2 | WEIGHT: 152.8 LBS | HEART RATE: 80 BPM | SYSTOLIC BLOOD PRESSURE: 118 MMHG | RESPIRATION RATE: 20 BRPM

## 2018-03-19 DIAGNOSIS — R11.0 NAUSEA: ICD-10-CM

## 2018-03-19 DIAGNOSIS — I10 ESSENTIAL HYPERTENSION: Primary | ICD-10-CM

## 2018-03-19 DIAGNOSIS — M54.42 CHRONIC LEFT-SIDED LOW BACK PAIN WITH LEFT-SIDED SCIATICA: ICD-10-CM

## 2018-03-19 DIAGNOSIS — G89.29 CHRONIC LEFT-SIDED LOW BACK PAIN WITH LEFT-SIDED SCIATICA: ICD-10-CM

## 2018-03-19 DIAGNOSIS — I48.19 PERSISTENT ATRIAL FIBRILLATION (HCC): ICD-10-CM

## 2018-03-19 PROCEDURE — G8484 FLU IMMUNIZE NO ADMIN: HCPCS | Performed by: FAMILY MEDICINE

## 2018-03-19 PROCEDURE — 99214 OFFICE O/P EST MOD 30 MIN: CPT | Performed by: FAMILY MEDICINE

## 2018-03-19 PROCEDURE — G8427 DOCREV CUR MEDS BY ELIG CLIN: HCPCS | Performed by: FAMILY MEDICINE

## 2018-03-19 PROCEDURE — G8420 CALC BMI NORM PARAMETERS: HCPCS | Performed by: FAMILY MEDICINE

## 2018-03-19 PROCEDURE — 4040F PNEUMOC VAC/ADMIN/RCVD: CPT | Performed by: FAMILY MEDICINE

## 2018-03-19 PROCEDURE — 1123F ACP DISCUSS/DSCN MKR DOCD: CPT | Performed by: FAMILY MEDICINE

## 2018-03-19 PROCEDURE — 1036F TOBACCO NON-USER: CPT | Performed by: FAMILY MEDICINE

## 2018-03-19 RX ORDER — ONDANSETRON 4 MG/1
4 TABLET, FILM COATED ORAL EVERY 8 HOURS PRN
Qty: 30 TABLET | Refills: 1 | Status: SHIPPED | OUTPATIENT
Start: 2018-03-19 | End: 2018-06-30 | Stop reason: SDUPTHER

## 2018-03-19 RX ORDER — METOPROLOL SUCCINATE 25 MG/1
25 TABLET, EXTENDED RELEASE ORAL DAILY
COMMUNITY
End: 2019-04-22 | Stop reason: SDUPTHER

## 2018-03-19 ASSESSMENT — ENCOUNTER SYMPTOMS
DIARRHEA: 0
SORE THROAT: 0
RHINORRHEA: 0
BACK PAIN: 1
WHEEZING: 0
COUGH: 0
CONSTIPATION: 0
SHORTNESS OF BREATH: 0
ABDOMINAL PAIN: 0
EYE DISCHARGE: 0
NAUSEA: 1

## 2018-03-19 NOTE — PROGRESS NOTES
25 mg by mouth daily Patient is taking half a tab in the am and half a tab in the pm      ondansetron (ZOFRAN) 4 MG tablet Take 1 tablet by mouth every 8 hours as needed for Nausea or Vomiting 30 tablet 1    apixaban (ELIQUIS) 5 MG TABS tablet Take 1 tablet by mouth 2 times daily 56 tablet 0    UNABLE TO FIND 1 tablet daily For kidney health      HYDROcodone-acetaminophen (NORCO) 5-325 MG per tablet Take 1 tablet by mouth every 6 hours as needed for Pain  .  clotrimazole-betamethasone (LOTRISONE) 1-0.05 % cream Apply topically 2 times daily. (Patient taking differently: Apply topically 2 times daily. PRN) 45 g 1    aspirin 81 MG tablet Take 81 mg by mouth daily      Calcium Carbonate-Vitamin D (CALTRATE 600+D) 600-400 MG-UNIT CHEW 1 tablet PO BID 60 tablet 0    amLODIPine (NORVASC) 5 MG tablet Take 5 mg by mouth daily       clonazePAM (KLONOPIN) 0.5 MG tablet Take 0.5 mg by mouth every evening. No current facility-administered medications for this visit. Allergies   Allergen Reactions    Nitrofuran Derivatives Nausea Only     Severe stomach cramps    Nsaids Nausea Only and Other (See Comments)     Pain in stomach       Health Maintenance   Topic Date Due    DTaP/Tdap/Td vaccine (1 - Tdap) 03/26/1953    Shingles Vaccine (1 of 2 - 2 Dose Series) 03/26/1984    Pneumococcal low/med risk (2 of 2 - PPSV23) 06/30/2017    Flu vaccine (1) 09/01/2017    Creatinine monitoring  10/12/2018    Potassium monitoring  11/22/2018       Subjective:      Review of Systems   Constitutional: Negative for chills, fatigue and fever. HENT: Negative for congestion, rhinorrhea and sore throat. Eyes: Negative for discharge and visual disturbance. Respiratory: Negative for cough, shortness of breath and wheezing. Cardiovascular: Negative for chest pain and palpitations. Gastrointestinal: Positive for nausea. Negative for abdominal pain, constipation and diarrhea.    Genitourinary: Negative for dysuria

## 2018-03-20 ENCOUNTER — TELEPHONE (OUTPATIENT)
Dept: PHYSICAL MEDICINE AND REHAB | Age: 83
End: 2018-03-20

## 2018-03-20 ENCOUNTER — OFFICE VISIT (OUTPATIENT)
Dept: PHYSICAL MEDICINE AND REHAB | Age: 83
End: 2018-03-20
Payer: MEDICARE

## 2018-03-20 VITALS
SYSTOLIC BLOOD PRESSURE: 160 MMHG | HEART RATE: 86 BPM | WEIGHT: 150.4 LBS | DIASTOLIC BLOOD PRESSURE: 87 MMHG | HEIGHT: 70 IN | BODY MASS INDEX: 21.53 KG/M2

## 2018-03-20 DIAGNOSIS — M46.1 SI (SACROILIAC) JOINT INFLAMMATION (HCC): ICD-10-CM

## 2018-03-20 DIAGNOSIS — G89.4 CHRONIC PAIN SYNDROME: ICD-10-CM

## 2018-03-20 DIAGNOSIS — M70.62 TROCHANTERIC BURSITIS OF LEFT HIP: ICD-10-CM

## 2018-03-20 DIAGNOSIS — M48.061 SPINAL STENOSIS OF LUMBAR REGION WITHOUT NEUROGENIC CLAUDICATION: ICD-10-CM

## 2018-03-20 DIAGNOSIS — M16.12 PRIMARY OSTEOARTHRITIS OF LEFT HIP: Primary | ICD-10-CM

## 2018-03-20 DIAGNOSIS — M47.816 SPONDYLOSIS OF LUMBAR REGION WITHOUT MYELOPATHY OR RADICULOPATHY: ICD-10-CM

## 2018-03-20 PROCEDURE — 4040F PNEUMOC VAC/ADMIN/RCVD: CPT | Performed by: NURSE PRACTITIONER

## 2018-03-20 PROCEDURE — G8427 DOCREV CUR MEDS BY ELIG CLIN: HCPCS | Performed by: NURSE PRACTITIONER

## 2018-03-20 PROCEDURE — G8484 FLU IMMUNIZE NO ADMIN: HCPCS | Performed by: NURSE PRACTITIONER

## 2018-03-20 PROCEDURE — 99214 OFFICE O/P EST MOD 30 MIN: CPT | Performed by: NURSE PRACTITIONER

## 2018-03-20 PROCEDURE — 1123F ACP DISCUSS/DSCN MKR DOCD: CPT | Performed by: NURSE PRACTITIONER

## 2018-03-20 PROCEDURE — 1036F TOBACCO NON-USER: CPT | Performed by: NURSE PRACTITIONER

## 2018-03-20 PROCEDURE — G8420 CALC BMI NORM PARAMETERS: HCPCS | Performed by: NURSE PRACTITIONER

## 2018-03-20 ASSESSMENT — ENCOUNTER SYMPTOMS
SORE THROAT: 0
RESPIRATORY NEGATIVE: 1
CHEST TIGHTNESS: 0
ABDOMINAL PAIN: 0
VOMITING: 0
EYES NEGATIVE: 1
DIARRHEA: 0
RHINORRHEA: 0
EYE PAIN: 0
GASTROINTESTINAL NEGATIVE: 1
NAUSEA: 0
WHEEZING: 0
BACK PAIN: 1
COLOR CHANGE: 0
ALLERGIC/IMMUNOLOGIC NEGATIVE: 1
COUGH: 0
CONSTIPATION: 0
PHOTOPHOBIA: 0
SHORTNESS OF BREATH: 0
SINUS PRESSURE: 0

## 2018-03-20 NOTE — PROGRESS NOTES
[facility]  [address]  Dept: [phone]  Dept Fax: [phone]  Loc: [phone]    Visit Date: 3/20/2018    Functionality Assessment/Goals Worksheet     On a scale of 0 (Does not Interfere) to 10 (Completely Interferes)     1. Which number describes how during the past week pain has interfered with       the following:  A. General Activity:  7  B. Mood: 3  C. Walking Ability:  7  D. Normal Work (Includes both work outside the home and housework):  7  E. Relations with Other People:   2  F. Sleep:   7  G. Enjoyment of Life:   5    2. Patient Prefers to Take their Pain Medications:     []  On a regular basis   [x]  Only when necessary    []  Does not take pain medications    3. What are the Patient's Goals/Expectations for Visiting Pain Management? []  Learn about my pain    []  Receive Medication   []  Physical Therapy     []  Treat Depression   [x]  Receive Injections    []  Treat Sleep   []  Deal with Anxiety and Stress   []  Treat Opoid Dependence/Addiction   []  Other:      HPI:   Gerry Beltrán is a 80 y.o. male is here today for    Chief Complaint:  Low back pian left SI left hip and leg pain    HPI   F/U Left SI #2 MBB on 2/27/2018 states  received about 60% pain relief for 8 hrs only. He was up all night with pain and woke up back to baseline. He has had Lumbar Facet MBB in September with minimal relief. He states he has had LESI in the past without relief and doesn't want to try again. He continues to have low back pain left SI left hip and left leg pain that stops at the knee. His pain is mostly at night. He continues to take Norco per [place]. Pain scale with out pain medications is 9/10. Pain scale with pain medications is  3/10. Last dose of Norco  Was on yesterday  Drug screen reviewed from 2/20/2018 OK  Lumbar XR reviewed  1. Mild thoracolumbar dextro scoliosis. Moderate lumbar levoscoliosis.  Multiple differently: Apply topically 2 times daily. PRN), Disp: 45 g, Rfl: 1    aspirin 81 MG tablet, Take 81 mg by mouth daily, Disp: , Rfl:     Calcium Carbonate-Vitamin D (CALTRATE 600+D) 600-400 MG-UNIT CHEW, 1 tablet PO BID, Disp: 60 tablet, Rfl: 0    amLODIPine (NORVASC) 5 MG tablet, Take 5 mg by mouth daily , Disp: , Rfl:     clonazePAM (KLONOPIN) 0.5 MG tablet, Take 0.5 mg by mouth every evening., Disp: , Rfl:     Subjective:      Review of Systems   Constitutional: Positive for activity change. Negative for appetite change, chills, diaphoresis, fatigue, fever and unexpected weight change. HENT: Negative. Negative for congestion, ear pain, hearing loss, mouth sores, nosebleeds, rhinorrhea, sinus pressure and sore throat. Eyes: Negative. Negative for photophobia, pain and visual disturbance. Respiratory: Negative. Negative for cough, chest tightness, shortness of breath and wheezing. Cardiovascular: Negative. Negative for chest pain and palpitations. Gastrointestinal: Negative. Negative for abdominal pain, constipation, diarrhea, nausea and vomiting. Endocrine: Negative. Negative for cold intolerance, heat intolerance, polydipsia, polyphagia and polyuria. Genitourinary: Negative. Negative for decreased urine volume, difficulty urinating, frequency and hematuria. Musculoskeletal: Positive for arthralgias, back pain, gait problem and myalgias. Negative for joint swelling, neck pain and neck stiffness. Skin: Negative. Negative for color change and rash. Allergic/Immunologic: Negative. Negative for food allergies and immunocompromised state. Neurological: Positive for weakness and numbness. Negative for dizziness, tremors, seizures, syncope, facial asymmetry, speech difficulty, light-headedness and headaches. Hematological: Negative. Does not bruise/bleed easily. Psychiatric/Behavioral: Negative.   Negative for agitation, behavioral problems, confusion, decreased concentration, oriented to person, place, and time. He has normal strength and normal reflexes. He is not disoriented. He displays no atrophy. No cranial nerve deficit or sensory deficit. He exhibits normal muscle tone. He displays a negative Romberg sign. Coordination and gait normal. He displays no Babinski's sign on the right side. SLR-  Motor 5/5 BUE BLE   Skin: Skin is warm. No rash noted. He is not diaphoretic. No erythema. No pallor. Psychiatric: He has a normal mood and affect. His speech is normal and behavior is normal. Judgment and thought content normal. His mood appears not anxious. His affect is not angry, not blunt, not labile and not inappropriate. He is not agitated, not aggressive, not hyperactive, not slowed, not withdrawn, not actively hallucinating and not combative. Thought content is not paranoid and not delusional. Cognition and memory are normal. Cognition and memory are not impaired. He does not express impulsivity or inappropriate judgment. He does not exhibit a depressed mood. He expresses no homicidal and no suicidal ideation. He expresses no suicidal plans and no homicidal plans. He exhibits normal recent memory and normal remote memory. He is attentive. Nursing note and vitals reviewed. BALJINDER test: left SI positive  Yeomans test: left SI positive  Gaenslen test: left SI positive     Assessment:     1. Primary osteoarthritis of left hip    2. Trochanteric bursitis of left hip    3. SI (sacroiliac) joint inflammation (HCC)    4. Spondylosis of lumbar region without myelopathy or radiculopathy    5. Spinal stenosis of lumbar region without neurogenic claudication    6. Chronic pain syndrome            Plan:      · OARRS reviewed. · Discussed long term side effects of medications. · Discussed tolerance, dependency and addiction. · Previous UDS reviewed. · UDS preformed today for compliance. · Patient told can not receive any pain medications from any other source.   · Discussed with pt.may

## 2018-03-27 ENCOUNTER — OFFICE VISIT (OUTPATIENT)
Dept: FAMILY MEDICINE CLINIC | Age: 83
End: 2018-03-27
Payer: MEDICARE

## 2018-03-27 VITALS
TEMPERATURE: 98.7 F | DIASTOLIC BLOOD PRESSURE: 84 MMHG | SYSTOLIC BLOOD PRESSURE: 136 MMHG | OXYGEN SATURATION: 97 % | HEIGHT: 70 IN | WEIGHT: 152.2 LBS | BODY MASS INDEX: 21.79 KG/M2 | HEART RATE: 89 BPM | RESPIRATION RATE: 16 BRPM

## 2018-03-27 DIAGNOSIS — J01.00 ACUTE NON-RECURRENT MAXILLARY SINUSITIS: Primary | ICD-10-CM

## 2018-03-27 PROCEDURE — 99213 OFFICE O/P EST LOW 20 MIN: CPT | Performed by: FAMILY MEDICINE

## 2018-03-27 PROCEDURE — 4040F PNEUMOC VAC/ADMIN/RCVD: CPT | Performed by: FAMILY MEDICINE

## 2018-03-27 PROCEDURE — G8420 CALC BMI NORM PARAMETERS: HCPCS | Performed by: FAMILY MEDICINE

## 2018-03-27 PROCEDURE — G8484 FLU IMMUNIZE NO ADMIN: HCPCS | Performed by: FAMILY MEDICINE

## 2018-03-27 PROCEDURE — 1036F TOBACCO NON-USER: CPT | Performed by: FAMILY MEDICINE

## 2018-03-27 PROCEDURE — G8427 DOCREV CUR MEDS BY ELIG CLIN: HCPCS | Performed by: FAMILY MEDICINE

## 2018-03-27 PROCEDURE — 1123F ACP DISCUSS/DSCN MKR DOCD: CPT | Performed by: FAMILY MEDICINE

## 2018-03-27 RX ORDER — AZITHROMYCIN 250 MG/1
TABLET, FILM COATED ORAL
Qty: 6 TABLET | Refills: 0 | Status: SHIPPED | OUTPATIENT
Start: 2018-03-27 | End: 2018-04-02 | Stop reason: ALTCHOICE

## 2018-03-27 ASSESSMENT — ENCOUNTER SYMPTOMS
EYE DISCHARGE: 0
DIARRHEA: 0
ABDOMINAL PAIN: 0
SORE THROAT: 0
SINUS PAIN: 1
SHORTNESS OF BREATH: 0
NAUSEA: 0
BACK PAIN: 1
RHINORRHEA: 1
WHEEZING: 0
SINUS PRESSURE: 1
CONSTIPATION: 0
COUGH: 1

## 2018-03-27 ASSESSMENT — PATIENT HEALTH QUESTIONNAIRE - PHQ9
1. LITTLE INTEREST OR PLEASURE IN DOING THINGS: 0
SUM OF ALL RESPONSES TO PHQ9 QUESTIONS 1 & 2: 0
SUM OF ALL RESPONSES TO PHQ QUESTIONS 1-9: 0
2. FEELING DOWN, DEPRESSED OR HOPELESS: 0

## 2018-03-27 NOTE — PROGRESS NOTES
[facility]  [address] [place] [address]  Dept: [phone]  Dept Fax: [phone]  Loc: [phone]    Sona Allan is a 80 y.o. male who presents today for his medical conditions/complaints as noted below. Chief Complaint   Patient presents with    Headache     runny nose with clear drainage, post nasal drainage, and cough bringing up some mucus started about 1 week ago           HPI:     HPI     He is having headache, runny nose, and sinus congestion for the past 1 week. Symptoms have increased in severity in the last 2 days. He also has a cough that is worse at night. He feels constant drainage in the back of his throat. No ear pain. No sore throat. No shortness of breath. No fevers. He has tried sudafed and benadryl which has helped minimally with his symptoms. Past Medical History:   Diagnosis Date    Atrial fibrillation ([address]     Back pain     Hernia     Hx of blood clots     lungs after prostate surgery    Hyperlipidemia     Hypertension     Osteoarthritis     Prostate cancer ([address] 2015    Pulmonary emboli ([address] 11/2015    after prostate surgery    Wears glasses       Past Surgical History:   Procedure Laterality Date    [facility]  01/1961    [facility]    BACK SURGERY  5830,7735    X stop    CATARACT REMOVAL Right 06/2001    Dr Aliyah Perdomo Left 11/2014    Dr Adria Kinney, 2009    Dr Glenn Ocampo  [address]?     Dr William Everett of date    HERNIA REPAIR Left 11/22/2017    ROBOT RECURRENT LEFT INGUINAL HERNIA REPAIR performed by Shawnee Corona MD at [address] BILATERAL Bilateral 9/5/2017    LUMBAR FACET MBB L3-4, L4-5, L5-S1 BILATERAL performed by Autumn Ely MD at [facility]  10/7/2015    XI ROBOTIC PROSTATECTOMY    OTHER SURGICAL HISTORY Bilateral Constitutional: He is oriented to person, place, and time. He appears well-developed and well-nourished. No distress. HENT:   Head: Normocephalic and atraumatic. Right Ear: Hearing, tympanic membrane, external ear and ear canal normal.   Left Ear: Hearing, tympanic membrane, external ear and ear canal normal.   Nose: Right sinus exhibits maxillary sinus tenderness. Right sinus exhibits no frontal sinus tenderness. Left sinus exhibits maxillary sinus tenderness. Left sinus exhibits no frontal sinus tenderness. Mouth/Throat: Oropharynx is clear and moist and mucous membranes are normal. No oropharyngeal exudate, posterior oropharyngeal edema or posterior oropharyngeal erythema. Eyes: Conjunctivae and EOM are normal. Pupils are equal, round, and reactive to light. Right eye exhibits no discharge. Left eye exhibits no discharge. No scleral icterus. Neck: Normal range of motion. Neck supple. Cardiovascular: Normal rate, regular rhythm, normal heart sounds and intact distal pulses. Pulmonary/Chest: Effort normal. No respiratory distress. He has no wheezes. He has rhonchi in the right lower field. Abdominal: Soft. Bowel sounds are normal. He exhibits no distension. There is no tenderness. Musculoskeletal: Normal range of motion. He exhibits no edema or tenderness. Lymphadenopathy:     He has cervical adenopathy. Neurological: He is alert and oriented to person, place, and time. He exhibits normal muscle tone. Coordination normal.   Skin: Skin is warm and dry. No rash noted. Psychiatric: He has a normal mood and affect. His behavior is normal. Judgment and thought content normal.   Nursing note and vitals reviewed. /84 (Site: Left Arm, Position: Sitting, Cuff Size: Medium Adult)   Pulse 89   Temp 98.7 °F (37.1 °C) (Oral)   Resp 16   Ht 5' 10\" (1.778 m)   Wt 152 lb 3.2 oz (69 kg)   SpO2 97%   BMI 21.84 kg/m²     Assessment:      1.  Acute non-recurrent maxillary sinusitis

## 2018-04-02 ENCOUNTER — TELEPHONE (OUTPATIENT)
Dept: FAMILY MEDICINE CLINIC | Age: 83
End: 2018-04-02

## 2018-04-02 ENCOUNTER — HOSPITAL ENCOUNTER (EMERGENCY)
Age: 83
Discharge: HOME | End: 2018-04-03
Attending: EMERGENCY MEDICINE
Payer: MEDICARE

## 2018-04-02 DIAGNOSIS — Z79.01 ANTICOAGULANT LONG-TERM USE: ICD-10-CM

## 2018-04-02 DIAGNOSIS — S01.512A TONGUE LACERATION, INITIAL ENCOUNTER: Primary | ICD-10-CM

## 2018-04-02 PROCEDURE — 6370000000 HC RX 637 (ALT 250 FOR IP): Performed by: EMERGENCY MEDICINE

## 2018-04-02 PROCEDURE — 2500000003 HC RX 250 WO HCPCS

## 2018-04-02 PROCEDURE — 99282 EMERGENCY DEPT VISIT SF MDM: CPT

## 2018-04-02 PROCEDURE — 41250 REPAIR TONGUE LACERATION: CPT

## 2018-04-02 RX ORDER — AMOXICILLIN 250 MG/1
500 CAPSULE ORAL ONCE
Status: COMPLETED | OUTPATIENT
Start: 2018-04-03 | End: 2018-04-03

## 2018-04-02 RX ORDER — LIDOCAINE HYDROCHLORIDE 10 MG/ML
INJECTION, SOLUTION INFILTRATION; PERINEURAL
Status: COMPLETED
Start: 2018-04-02 | End: 2018-04-02

## 2018-04-02 RX ORDER — LIDOCAINE HYDROCHLORIDE 10 MG/ML
5 INJECTION, SOLUTION INFILTRATION; PERINEURAL ONCE
Status: COMPLETED | OUTPATIENT
Start: 2018-04-02 | End: 2018-04-02

## 2018-04-02 RX ADMIN — LIDOCAINE HYDROCHLORIDE 5 ML: 10 INJECTION, SOLUTION INFILTRATION; PERINEURAL at 23:31

## 2018-04-02 RX ADMIN — Medication 3 ML: at 23:29

## 2018-04-02 ASSESSMENT — PAIN DESCRIPTION - LOCATION: LOCATION: BACK;HIP

## 2018-04-02 ASSESSMENT — PAIN SCALES - GENERAL
PAINLEVEL_OUTOF10: 6
PAINLEVEL_OUTOF10: 5

## 2018-04-03 VITALS
DIASTOLIC BLOOD PRESSURE: 87 MMHG | OXYGEN SATURATION: 95 % | HEIGHT: 71 IN | SYSTOLIC BLOOD PRESSURE: 147 MMHG | HEART RATE: 100 BPM | RESPIRATION RATE: 18 BRPM | BODY MASS INDEX: 21 KG/M2 | TEMPERATURE: 97.8 F | WEIGHT: 150 LBS

## 2018-04-03 PROCEDURE — 6370000000 HC RX 637 (ALT 250 FOR IP): Performed by: EMERGENCY MEDICINE

## 2018-04-03 RX ORDER — AMOXICILLIN 500 MG/1
500 CAPSULE ORAL 3 TIMES DAILY
Qty: 15 CAPSULE | Refills: 0 | Status: SHIPPED | OUTPATIENT
Start: 2018-04-03 | End: 2018-04-08

## 2018-04-03 RX ADMIN — AMOXICILLIN 500 MG: 250 CAPSULE ORAL at 00:29

## 2018-04-03 ASSESSMENT — ENCOUNTER SYMPTOMS
ABDOMINAL PAIN: 0
SHORTNESS OF BREATH: 0
WHEEZING: 0
VOMITING: 0
COUGH: 0

## 2018-04-03 ASSESSMENT — PAIN SCALES - GENERAL: PAINLEVEL_OUTOF10: 3

## 2018-04-05 ENCOUNTER — OFFICE VISIT (OUTPATIENT)
Dept: FAMILY MEDICINE CLINIC | Age: 83
End: 2018-04-05
Payer: MEDICARE

## 2018-04-05 VITALS
WEIGHT: 145 LBS | OXYGEN SATURATION: 98 % | BODY MASS INDEX: 20.76 KG/M2 | HEART RATE: 92 BPM | HEIGHT: 70 IN | DIASTOLIC BLOOD PRESSURE: 80 MMHG | RESPIRATION RATE: 20 BRPM | SYSTOLIC BLOOD PRESSURE: 132 MMHG

## 2018-04-05 DIAGNOSIS — S01.512A LACERATION OF TONGUE, INITIAL ENCOUNTER: Primary | ICD-10-CM

## 2018-04-05 PROCEDURE — G8427 DOCREV CUR MEDS BY ELIG CLIN: HCPCS | Performed by: NURSE PRACTITIONER

## 2018-04-05 PROCEDURE — 1123F ACP DISCUSS/DSCN MKR DOCD: CPT | Performed by: NURSE PRACTITIONER

## 2018-04-05 PROCEDURE — 99213 OFFICE O/P EST LOW 20 MIN: CPT | Performed by: NURSE PRACTITIONER

## 2018-04-05 PROCEDURE — 4040F PNEUMOC VAC/ADMIN/RCVD: CPT | Performed by: NURSE PRACTITIONER

## 2018-04-05 PROCEDURE — 1036F TOBACCO NON-USER: CPT | Performed by: NURSE PRACTITIONER

## 2018-04-05 PROCEDURE — G8420 CALC BMI NORM PARAMETERS: HCPCS | Performed by: NURSE PRACTITIONER

## 2018-04-05 ASSESSMENT — ENCOUNTER SYMPTOMS
DIARRHEA: 0
NAUSEA: 0
SHORTNESS OF BREATH: 0
COUGH: 0
WHEEZING: 0
COLOR CHANGE: 0
VOMITING: 0
ABDOMINAL PAIN: 0

## 2018-04-09 ENCOUNTER — HOSPITAL ENCOUNTER (OUTPATIENT)
Age: 83
Setting detail: OUTPATIENT SURGERY
Discharge: HOME | End: 2018-04-09
Attending: PAIN MEDICINE | Admitting: PAIN MEDICINE
Payer: MEDICARE

## 2018-04-09 ENCOUNTER — APPOINTMENT (OUTPATIENT)
Dept: GENERAL RADIOLOGY | Age: 83
End: 2018-04-09
Attending: PAIN MEDICINE
Payer: MEDICARE

## 2018-04-09 ENCOUNTER — ANESTHESIA EVENT (OUTPATIENT)
Dept: OPERATING ROOM | Age: 83
End: 2018-04-09
Payer: MEDICARE

## 2018-04-09 ENCOUNTER — ANESTHESIA (OUTPATIENT)
Dept: OPERATING ROOM | Age: 83
End: 2018-04-09
Payer: MEDICARE

## 2018-04-09 VITALS
DIASTOLIC BLOOD PRESSURE: 65 MMHG | OXYGEN SATURATION: 100 % | SYSTOLIC BLOOD PRESSURE: 108 MMHG | RESPIRATION RATE: 15 BRPM

## 2018-04-09 VITALS
WEIGHT: 143.6 LBS | BODY MASS INDEX: 20.56 KG/M2 | OXYGEN SATURATION: 100 % | TEMPERATURE: 97.2 F | SYSTOLIC BLOOD PRESSURE: 96 MMHG | RESPIRATION RATE: 18 BRPM | HEIGHT: 70 IN | HEART RATE: 76 BPM | DIASTOLIC BLOOD PRESSURE: 57 MMHG

## 2018-04-09 PROCEDURE — 6360000002 HC RX W HCPCS: Performed by: NURSE ANESTHETIST, CERTIFIED REGISTERED

## 2018-04-09 PROCEDURE — 3209999900 FLUORO FOR SURGICAL PROCEDURES

## 2018-04-09 PROCEDURE — 2500000003 HC RX 250 WO HCPCS: Performed by: PAIN MEDICINE

## 2018-04-09 PROCEDURE — 7100000011 HC PHASE II RECOVERY - ADDTL 15 MIN: Performed by: PAIN MEDICINE

## 2018-04-09 PROCEDURE — 7100000010 HC PHASE II RECOVERY - FIRST 15 MIN: Performed by: PAIN MEDICINE

## 2018-04-09 PROCEDURE — 20610 DRAIN/INJ JOINT/BURSA W/O US: CPT | Performed by: PAIN MEDICINE

## 2018-04-09 PROCEDURE — 77002 NEEDLE LOCALIZATION BY XRAY: CPT | Performed by: PAIN MEDICINE

## 2018-04-09 PROCEDURE — 3600000054 HC PAIN LEVEL 3 BASE: Performed by: PAIN MEDICINE

## 2018-04-09 PROCEDURE — 3700000000 HC ANESTHESIA ATTENDED CARE: Performed by: PAIN MEDICINE

## 2018-04-09 PROCEDURE — 6360000002 HC RX W HCPCS: Performed by: PAIN MEDICINE

## 2018-04-09 RX ORDER — LIDOCAINE HYDROCHLORIDE 10 MG/ML
INJECTION, SOLUTION INFILTRATION; PERINEURAL PRN
Status: DISCONTINUED | OUTPATIENT
Start: 2018-04-09 | End: 2018-04-09 | Stop reason: HOSPADM

## 2018-04-09 RX ORDER — METHYLPREDNISOLONE ACETATE 80 MG/ML
INJECTION, SUSPENSION INTRA-ARTICULAR; INTRALESIONAL; INTRAMUSCULAR; SOFT TISSUE PRN
Status: DISCONTINUED | OUTPATIENT
Start: 2018-04-09 | End: 2018-04-09 | Stop reason: HOSPADM

## 2018-04-09 RX ORDER — BUPIVACAINE HYDROCHLORIDE 2.5 MG/ML
INJECTION, SOLUTION EPIDURAL; INFILTRATION; INTRACAUDAL PRN
Status: DISCONTINUED | OUTPATIENT
Start: 2018-04-09 | End: 2018-04-09 | Stop reason: HOSPADM

## 2018-04-09 RX ADMIN — PROPOFOL 30 MG: 10 INJECTION, EMULSION INTRAVENOUS at 10:06

## 2018-04-09 ASSESSMENT — ENCOUNTER SYMPTOMS: SHORTNESS OF BREATH: 1

## 2018-04-09 ASSESSMENT — PULMONARY FUNCTION TESTS
PIF_VALUE: 0

## 2018-04-09 ASSESSMENT — PAIN DESCRIPTION - DESCRIPTORS: DESCRIPTORS: ACHING;SHOOTING;STABBING

## 2018-04-09 ASSESSMENT — PAIN SCALES - GENERAL: PAINLEVEL_OUTOF10: 0

## 2018-04-09 ASSESSMENT — PAIN - FUNCTIONAL ASSESSMENT: PAIN_FUNCTIONAL_ASSESSMENT: 0-10

## 2018-04-11 PROBLEM — Z01.818 PRE-OP EVALUATION: Status: RESOLVED | Noted: 2018-03-09 | Resolved: 2018-04-11

## 2018-04-12 PROBLEM — Z01.818 PRE-OP EVALUATION: Status: RESOLVED | Noted: 2017-10-16 | Resolved: 2018-04-12

## 2018-04-30 ENCOUNTER — OFFICE VISIT (OUTPATIENT)
Dept: PHYSICAL MEDICINE AND REHAB | Age: 83
End: 2018-04-30
Payer: MEDICARE

## 2018-04-30 VITALS
BODY MASS INDEX: 20.47 KG/M2 | WEIGHT: 143 LBS | DIASTOLIC BLOOD PRESSURE: 79 MMHG | SYSTOLIC BLOOD PRESSURE: 131 MMHG | HEIGHT: 70 IN | HEART RATE: 74 BPM

## 2018-04-30 DIAGNOSIS — M47.816 SPONDYLOSIS OF LUMBAR REGION WITHOUT MYELOPATHY OR RADICULOPATHY: ICD-10-CM

## 2018-04-30 DIAGNOSIS — G89.4 CHRONIC PAIN SYNDROME: ICD-10-CM

## 2018-04-30 DIAGNOSIS — M70.62 TROCHANTERIC BURSITIS OF LEFT HIP: ICD-10-CM

## 2018-04-30 DIAGNOSIS — M48.061 SPINAL STENOSIS OF LUMBAR REGION WITHOUT NEUROGENIC CLAUDICATION: ICD-10-CM

## 2018-04-30 DIAGNOSIS — M46.1 SI (SACROILIAC) JOINT INFLAMMATION (HCC): ICD-10-CM

## 2018-04-30 DIAGNOSIS — M16.12 PRIMARY OSTEOARTHRITIS OF LEFT HIP: Primary | ICD-10-CM

## 2018-04-30 PROCEDURE — 1036F TOBACCO NON-USER: CPT | Performed by: NURSE PRACTITIONER

## 2018-04-30 PROCEDURE — 99213 OFFICE O/P EST LOW 20 MIN: CPT | Performed by: NURSE PRACTITIONER

## 2018-04-30 PROCEDURE — 1123F ACP DISCUSS/DSCN MKR DOCD: CPT | Performed by: NURSE PRACTITIONER

## 2018-04-30 PROCEDURE — G8420 CALC BMI NORM PARAMETERS: HCPCS | Performed by: NURSE PRACTITIONER

## 2018-04-30 PROCEDURE — 4040F PNEUMOC VAC/ADMIN/RCVD: CPT | Performed by: NURSE PRACTITIONER

## 2018-04-30 PROCEDURE — G8427 DOCREV CUR MEDS BY ELIG CLIN: HCPCS | Performed by: NURSE PRACTITIONER

## 2018-04-30 ASSESSMENT — ENCOUNTER SYMPTOMS
COUGH: 0
DIARRHEA: 0
COLOR CHANGE: 0
CHEST TIGHTNESS: 0
NAUSEA: 0
ALLERGIC/IMMUNOLOGIC NEGATIVE: 1
EYES NEGATIVE: 1
SHORTNESS OF BREATH: 0
VOMITING: 0
WHEEZING: 0
SORE THROAT: 0
EYE PAIN: 0
ABDOMINAL PAIN: 0
SINUS PRESSURE: 0
GASTROINTESTINAL NEGATIVE: 1
CONSTIPATION: 0
RHINORRHEA: 0
RESPIRATORY NEGATIVE: 1
BACK PAIN: 1
PHOTOPHOBIA: 0

## 2018-05-10 ENCOUNTER — TELEPHONE (OUTPATIENT)
Dept: PHYSICAL MEDICINE AND REHAB | Age: 83
End: 2018-05-10

## 2018-05-10 DIAGNOSIS — M48.061 SPINAL STENOSIS OF LUMBAR REGION WITHOUT NEUROGENIC CLAUDICATION: ICD-10-CM

## 2018-05-10 DIAGNOSIS — M47.816 LUMBAR SPONDYLOSIS: Primary | ICD-10-CM

## 2018-05-10 DIAGNOSIS — M54.16 LUMBAR RADICULITIS: ICD-10-CM

## 2018-05-12 LAB
CHOLESTEROL, TOTAL: 177 MG/DL
CHOLESTEROL/HDL RATIO: 1.6
HDLC SERPL-MCNC: 65 MG/DL (ref 35–70)
LDL CHOLESTEROL CALCULATED: 101 MG/DL (ref 0–160)
TRIGL SERPL-MCNC: 54 MG/DL
VLDLC SERPL CALC-MCNC: 11 MG/DL

## 2018-06-22 ENCOUNTER — INITIAL CONSULT (OUTPATIENT)
Dept: PSYCHOLOGY | Age: 83
End: 2018-06-22
Payer: MEDICARE

## 2018-06-22 DIAGNOSIS — F43.22 ADJUSTMENT DISORDER WITH ANXIETY: Primary | ICD-10-CM

## 2018-06-22 PROCEDURE — 90791 PSYCH DIAGNOSTIC EVALUATION: CPT | Performed by: PSYCHOLOGIST

## 2018-06-30 ENCOUNTER — HOSPITAL ENCOUNTER (EMERGENCY)
Age: 83
Discharge: HOME | End: 2018-06-30
Attending: EMERGENCY MEDICINE
Payer: MEDICARE

## 2018-06-30 VITALS
RESPIRATION RATE: 16 BRPM | SYSTOLIC BLOOD PRESSURE: 168 MMHG | TEMPERATURE: 97.8 F | HEIGHT: 70 IN | WEIGHT: 145 LBS | HEART RATE: 73 BPM | DIASTOLIC BLOOD PRESSURE: 74 MMHG | OXYGEN SATURATION: 96 % | BODY MASS INDEX: 20.76 KG/M2

## 2018-06-30 DIAGNOSIS — R10.13 ABDOMINAL PAIN, EPIGASTRIC: Primary | ICD-10-CM

## 2018-06-30 LAB
ALBUMIN SERPL-MCNC: 4 GM/DL (ref 3.4–5)
ALP BLD-CCNC: 65 U/L (ref 46–116)
ALT SERPL-CCNC: 36 U/L (ref 14–63)
ANION GAP: 8 MEQ/L (ref 8–16)
APTT: 33.2 SECONDS (ref 22–38)
AST SERPL-CCNC: 28 U/L (ref 15–37)
BASOPHILS # BLD: 0.4 % (ref 0–3)
BILIRUB SERPL-MCNC: 0.7 MG/DL (ref 0.2–1)
BUN BLDV-MCNC: 21 MG/DL (ref 7–18)
CHLORIDE BLD-SCNC: 102 MEQ/L (ref 98–107)
CO2: 29 MEQ/L (ref 21–32)
CREAT SERPL-MCNC: 1.2 MG/DL (ref 0.6–1.3)
EOSINOPHILS RELATIVE PERCENT: 2.6 % (ref 0–4)
GFR, ESTIMATED: 61 ML/MIN/1.73M2
GLUCOSE BLD-MCNC: 132 MG/DL (ref 74–106)
HCT VFR BLD CALC: 48.2 % (ref 42–52)
HEMOCCULT STL QL: NEGATIVE
HEMOGLOBIN: 16 GM/DL (ref 14–18)
INR BLD: 1.07 (ref 0.85–1.13)
LYMPHOCYTES # BLD: 18.5 % (ref 15–47)
MCH RBC QN AUTO: 33.1 PG (ref 27–31)
MCHC RBC AUTO-ENTMCNC: 33.2 GM/DL (ref 33–37)
MCV RBC AUTO: 99.6 FL (ref 80–94)
MONOCYTES: 6.1 % (ref 0–12)
PDW BLD-RTO: 12.3 % (ref 11.5–14.5)
PLATELET # BLD: 127 THOU/MM3 (ref 130–400)
PMV BLD AUTO: 8.8 FL (ref 7.4–10.4)
POC CALCIUM: 9.3 MG/DL (ref 8.5–10.1)
POTASSIUM SERPL-SCNC: 4 MEQ/L (ref 3.5–5.1)
RBC # BLD: 4.84 MILL/MM3 (ref 4.7–6.1)
SEGS: 72.4 % (ref 43–75)
SODIUM BLD-SCNC: 139 MEQ/L (ref 136–145)
TOTAL PROTEIN: 7.6 GM/DL (ref 6.4–8.2)
WBC # BLD: 7.9 THOU/MM3 (ref 4.8–10.8)

## 2018-06-30 PROCEDURE — 82272 OCCULT BLD FECES 1-3 TESTS: CPT

## 2018-06-30 PROCEDURE — 85610 PROTHROMBIN TIME: CPT

## 2018-06-30 PROCEDURE — 36415 COLL VENOUS BLD VENIPUNCTURE: CPT

## 2018-06-30 PROCEDURE — 85730 THROMBOPLASTIN TIME PARTIAL: CPT

## 2018-06-30 PROCEDURE — 80053 COMPREHEN METABOLIC PANEL: CPT

## 2018-06-30 PROCEDURE — 85025 COMPLETE CBC W/AUTO DIFF WBC: CPT

## 2018-06-30 PROCEDURE — 99284 EMERGENCY DEPT VISIT MOD MDM: CPT

## 2018-06-30 RX ORDER — ONDANSETRON 4 MG/1
4 TABLET, FILM COATED ORAL EVERY 8 HOURS PRN
Qty: 12 TABLET | Refills: 0 | Status: SHIPPED | OUTPATIENT
Start: 2018-06-30 | End: 2018-07-04

## 2018-06-30 RX ORDER — PANTOPRAZOLE SODIUM 40 MG/1
40 TABLET, DELAYED RELEASE ORAL DAILY
Qty: 30 TABLET | Refills: 0 | Status: SHIPPED | OUTPATIENT
Start: 2018-06-30 | End: 2018-07-10 | Stop reason: ALTCHOICE

## 2018-06-30 ASSESSMENT — ENCOUNTER SYMPTOMS
SHORTNESS OF BREATH: 0
VOMITING: 0
WHEEZING: 0
DIARRHEA: 0
ABDOMINAL PAIN: 1
NAUSEA: 1
CONSTIPATION: 0
SORE THROAT: 0
COUGH: 0

## 2018-06-30 ASSESSMENT — PAIN DESCRIPTION - PAIN TYPE: TYPE: ACUTE PAIN

## 2018-06-30 ASSESSMENT — PAIN DESCRIPTION - LOCATION: LOCATION: ABDOMEN

## 2018-06-30 ASSESSMENT — PAIN SCALES - GENERAL: PAINLEVEL_OUTOF10: 4

## 2018-06-30 NOTE — ED NOTES
Pt presents with complaints of abd pain that started a few days ago. Pt denies fever, complains of nausea but denies vomiting. Pt states his stools have been darker than normal. Pt also complains of feeling weak this week. Pt alert, resp even and unlabored, skin pale, warm and dry, abd soft with hyperactive bowel sounds.       Alma Delia Whitfield RN  06/30/18 9719

## 2018-06-30 NOTE — ED PROVIDER NOTES
has a past surgical history that includes back surgery (9423,9518); Total shoulder arthroplasty (2005); Prostate surgery (2003); Prostate surgery (2009); Prostate Biopsy (7-); Cataract removal with implant (Left, 11/2014); Prostate Biopsy (8/21/2015); hernia repair (1995, 2009); other surgical history (10/7/2015); other surgical history (Bilateral, 09/05/2017); Nerve Block Lumb Facet Level 1 Bilateral (Bilateral, 9/5/2017); hernia repair (9124'W?); Cataract removal (Right, 06/2001); Appendectomy (01/1961); hernia repair (Left, 11/22/2017); other surgical history (Right, 1980's); other surgical history (01/30/2018); pr inject si joint arthrgrphy&/anes/steroid w/image (Left, 1/30/2018); other surgical history (Left, 02/27/2018); pr inject si joint arthrgrphy&/anes/steroid w/image (Left, 2/27/2018); Nerve Surgery (Left, 04/09/2018); and pr office/outpt visit,procedure only (Left, 4/9/2018). CURRENT MEDICATIONS    Previous Medications    AMLODIPINE (NORVASC) 5 MG TABLET    Take 5 mg by mouth daily     APIXABAN (ELIQUIS) 5 MG TABS TABLET    Take 1 tablet by mouth 2 times daily    ASPIRIN 81 MG TABLET    Take 81 mg by mouth daily    CALCIUM CARBONATE-VITAMIN D (CALTRATE 600+D) 600-400 MG-UNIT CHEW    1 tablet PO BID    CLONAZEPAM (KLONOPIN) 0.5 MG TABLET    Take 0.5 mg by mouth every evening. CLOTRIMAZOLE-BETAMETHASONE (LOTRISONE) 1-0.05 % CREAM    Apply topically 2 times daily. HYDROCODONE-ACETAMINOPHEN (NORCO) 5-325 MG PER TABLET    Take 1 tablet by mouth every 6 hours as needed for Pain  . METOPROLOL SUCCINATE (TOPROL XL) 25 MG EXTENDED RELEASE TABLET    Take 25 mg by mouth daily Patient is taking half a tab in the am and half a tab in the pm    NONFORMULARY        ONDANSETRON (ZOFRAN) 4 MG TABLET    Take 1 tablet by mouth every 8 hours as needed for Nausea or Vomiting    UNABLE TO FIND    1 tablet daily For kidney health       ALLERGIES    is allergic to nitrofuran derivatives and nsaids.     FAMILY HISTORY    indicated that his mother is . He indicated that his father is . He indicated that only one of his two sisters is alive. He indicated that only one of his four brothers is alive. family history includes Cancer in his brother, brother, and sister; Heart Disease in his father; Prostate Cancer in his brother, brother, brother, and brother; Stroke in his mother. SOCIAL HISTORY     reports that he quit smoking about 53 years ago. His smoking use included Cigarettes. He has a 10.00 pack-year smoking history. He has never used smokeless tobacco. He reports that he drinks alcohol. He reports that he does not use drugs. PHYSICAL EXAM       INITIAL VITALS: BP (!) 189/80   Pulse 64   Temp 97.8 °F (36.6 °C) (Oral)   Resp 16   Ht 5' 10\" (1.778 m)   Wt 145 lb (65.8 kg)   SpO2 98%   BMI 20.81 kg/m²      Physical Exam   Constitutional: He is oriented to person, place, and time. He appears well-developed and well-nourished. No distress. HENT:   Nose: Nose normal.   Mouth/Throat: Oropharynx is clear and moist.   Eyes: Conjunctivae are normal. Pupils are equal, round, and reactive to light. Neck: Neck supple. No JVD present. Cardiovascular: Normal rate, regular rhythm and intact distal pulses. No murmur heard. Pulmonary/Chest: Effort normal and breath sounds normal. No respiratory distress. He has no wheezes. Abdominal: Soft. Bowel sounds are normal. There is tenderness (epigastric, no rebound ,guarding, mass. ). Genitourinary: Rectal exam shows guaiac negative stool (rectal exam shows smear dark brown stool, no blood or mass noted. ). Musculoskeletal: He exhibits no edema or tenderness. Lymphadenopathy:     He has no cervical adenopathy. Neurological: He is alert and oriented to person, place, and time. He exhibits normal muscle tone. Coordination normal.   Skin: Skin is warm and dry. No rash noted. He is not diaphoretic. No erythema.    Psychiatric: His behavior is normal.   Nursing note and vitals reviewed. LABS:     Labs Reviewed   CBC WITH AUTO DIFFERENTIAL - Abnormal; Notable for the following:        Result Value    MCV 99.6 (*)     MCH 33.1 (*)     Platelets 146 (*)     All other components within normal limits   COMPREHENSIVE METABOLIC PANEL - Abnormal; Notable for the following:     Glucose 132 (*)     BUN 21 (*)     All other components within normal limits   GLOMERULAR FILTRATION RATE, ESTIMATED - Abnormal; Notable for the following:     GFR, Estimated 61 (*)     All other components within normal limits   BLOOD OCCULT STOOL SCREEN #1   ANION GAP   PROTIME-INR   APTT       Vitals:    Vitals:    06/30/18 0947 06/30/18 1112   BP: (!) 189/80 (!) 168/74   Pulse: 64 73   Resp: 16 16   Temp: 97.8 °F (36.6 °C)    TempSrc: Oral    SpO2: 98% 96%   Weight: 145 lb (65.8 kg)    Height: 5' 10\" (1.778 m)        EMERGENCY DEPARTMENT COURSE:    No orthostatic BP drop or abnormal HR increase. Test results and plan of care discussed. He was reassured that the blood count was normal, and no signs of blood in the stool. He wants to stop the ASA, and that should be OK, but he will discuss with the cardiologist. I will start a PPI and Zofran as needed for nausea, and he will follow up with his doctor. FINAL IMPRESSION      1. Abdominal pain, epigastric        DISPOSITION/PLAN    DISPOSITION        PATIENT REFERRED TO:    Jose Deras MD  [address] [place]  [phone]    Schedule an appointment as soon as possible for a visit         DISCHARGE MEDICATIONS:    Discharge Medication List as of 6/30/2018 11:31 AM      START taking these medications    Details   pantoprazole (PROTONIX) 40 MG tablet Take 1 tablet by mouth daily, Disp-30 tablet, R-0Print         Zofran 4 mg as needed.         (Please note that portions of this note were completed with a voice recognition program.  Efforts were made to edit the dictations but occasionally words are mis-transcribed.)      Florence Anna MD  06/30/18 0714

## 2018-06-30 NOTE — ED NOTES
Discharge instructions given, pt voices understanding regarding new medications.        Kary Watson RN  06/30/18 5091

## 2018-07-10 ENCOUNTER — OFFICE VISIT (OUTPATIENT)
Dept: CARDIOLOGY CLINIC | Age: 83
End: 2018-07-10
Payer: MEDICARE

## 2018-07-10 ENCOUNTER — OFFICE VISIT (OUTPATIENT)
Dept: PHYSICAL MEDICINE AND REHAB | Age: 83
End: 2018-07-10
Payer: MEDICARE

## 2018-07-10 VITALS
SYSTOLIC BLOOD PRESSURE: 156 MMHG | HEIGHT: 71 IN | DIASTOLIC BLOOD PRESSURE: 70 MMHG | BODY MASS INDEX: 19.8 KG/M2 | WEIGHT: 141.4 LBS | HEART RATE: 59 BPM

## 2018-07-10 VITALS
DIASTOLIC BLOOD PRESSURE: 74 MMHG | SYSTOLIC BLOOD PRESSURE: 138 MMHG | BODY MASS INDEX: 19.78 KG/M2 | WEIGHT: 141.31 LBS | HEART RATE: 75 BPM | HEIGHT: 71 IN

## 2018-07-10 DIAGNOSIS — M54.16 LUMBAR RADICULITIS: ICD-10-CM

## 2018-07-10 DIAGNOSIS — I10 ESSENTIAL HYPERTENSION: ICD-10-CM

## 2018-07-10 DIAGNOSIS — I48.19 PERSISTENT ATRIAL FIBRILLATION (HCC): Primary | ICD-10-CM

## 2018-07-10 DIAGNOSIS — I34.0 NON-RHEUMATIC MITRAL REGURGITATION: ICD-10-CM

## 2018-07-10 DIAGNOSIS — I35.1 MODERATE AORTIC REGURGITATION: ICD-10-CM

## 2018-07-10 DIAGNOSIS — M47.816 LUMBAR SPONDYLOSIS: ICD-10-CM

## 2018-07-10 DIAGNOSIS — G89.4 CHRONIC PAIN SYNDROME: ICD-10-CM

## 2018-07-10 DIAGNOSIS — M48.061 SPINAL STENOSIS OF LUMBAR REGION WITHOUT NEUROGENIC CLAUDICATION: Primary | ICD-10-CM

## 2018-07-10 PROCEDURE — 4040F PNEUMOC VAC/ADMIN/RCVD: CPT | Performed by: INTERNAL MEDICINE

## 2018-07-10 PROCEDURE — G8420 CALC BMI NORM PARAMETERS: HCPCS | Performed by: INTERNAL MEDICINE

## 2018-07-10 PROCEDURE — 99213 OFFICE O/P EST LOW 20 MIN: CPT | Performed by: INTERNAL MEDICINE

## 2018-07-10 PROCEDURE — G8427 DOCREV CUR MEDS BY ELIG CLIN: HCPCS | Performed by: INTERNAL MEDICINE

## 2018-07-10 PROCEDURE — 1036F TOBACCO NON-USER: CPT | Performed by: INTERNAL MEDICINE

## 2018-07-10 PROCEDURE — 1123F ACP DISCUSS/DSCN MKR DOCD: CPT | Performed by: INTERNAL MEDICINE

## 2018-07-10 PROCEDURE — 99213 OFFICE O/P EST LOW 20 MIN: CPT | Performed by: NURSE PRACTITIONER

## 2018-07-10 ASSESSMENT — ENCOUNTER SYMPTOMS
NAUSEA: 0
DIARRHEA: 0
CHEST TIGHTNESS: 0
SHORTNESS OF BREATH: 0
COLOR CHANGE: 0
CONSTIPATION: 1
BACK PAIN: 1
VOMITING: 0
ABDOMINAL PAIN: 0

## 2018-07-10 NOTE — PROGRESS NOTES
brother, brother, and brother; Stroke in his mother. Social History  Isidro Grande  reports that he quit smoking about 53 years ago. His smoking use included Cigarettes. He has a 10.00 pack-year smoking history. He has never used smokeless tobacco. He reports that he drinks alcohol. He reports that he does not use drugs. Medications    Current Outpatient Prescriptions:     NONFORMULARY, , Disp: , Rfl:     metoprolol succinate (TOPROL XL) 25 MG extended release tablet, Take 25 mg by mouth daily Patient is taking half a tab in the am and half a tab in the pm, Disp: , Rfl:     apixaban (ELIQUIS) 5 MG TABS tablet, Take 1 tablet by mouth 2 times daily, Disp: 56 tablet, Rfl: 0    UNABLE TO FIND, 1 tablet daily For kidney health, Disp: , Rfl:     HYDROcodone-acetaminophen (NORCO) 5-325 MG per tablet, Take 1 tablet by mouth every 6 hours as needed for Pain  ., Disp: , Rfl:     clotrimazole-betamethasone (LOTRISONE) 1-0.05 % cream, Apply topically 2 times daily. (Patient taking differently: Apply topically 2 times daily. PRN), Disp: 45 g, Rfl: 1    aspirin 81 MG tablet, Take 81 mg by mouth daily, Disp: , Rfl:     Calcium Carbonate-Vitamin D (CALTRATE 600+D) 600-400 MG-UNIT CHEW, 1 tablet PO BID, Disp: 60 tablet, Rfl: 0    amLODIPine (NORVASC) 5 MG tablet, Take 5 mg by mouth daily , Disp: , Rfl:     clonazePAM (KLONOPIN) 0.5 MG tablet, Take 0.5 mg by mouth every evening., Disp: , Rfl:     Subjective:      Review of Systems   Constitutional: Positive for activity change. Negative for chills, diaphoresis, fatigue and fever. Respiratory: Negative for chest tightness and shortness of breath. Cardiovascular: Negative for chest pain and palpitations. Gastrointestinal: Positive for constipation. Negative for abdominal pain, diarrhea, nausea and vomiting. Genitourinary: Negative for difficulty urinating and dysuria. Musculoskeletal: Positive for arthralgias, back pain and gait problem.    Skin: Negative for color tolerance, dependency and addiction. · Previous UDS reviewed  · UDS preformed today for compliance. · Patient told can not receive any pain medications from any other source. · Discussed with patient that they may not be pain free. · No evidence of abuse, diversion or aberrant behavior. · Medications and/or procedures to improve function and quality of life- patient understanding with this and that may not be pain free  · Testing: none further  · Procedures: Plan SCS Trial for lumbar spine  · Discussed with patient about risks with procedure including infection, reaction to medication, increased pain, or bleeding. · Medications: receives [facility] from [facility]. Prescribed:   No orders of the defined types were placed in this encounter. Return for Plan SCS Trial for lumbar spine, follow up after procedure.          Electronically signed by SONIDO Randolph CNP on 7/10/2018 at 12:17 PM

## 2018-07-10 NOTE — PROGRESS NOTES
supple, no significant adenopathy, no JVD, or carotid bruits  Chest - clear to auscultation, no wheezes, rales or rhonchi, symmetric air entry  Heart - normal rate, regular rhythm, normal S1, S2, , rubs, clicks or gallops but +ve murmurs  Abdomen - soft, nontender, nondistended, no masses or organomegaly  Neurological - alert, oriented, normal speech, no focal findings or movement disorder noted  Musculoskeletal - no joint tenderness, deformity or swelling  Extremities - peripheral pulses normal, no pedal edema, no clubbing or cyanosis  Skin - normal coloration and turgor, no rashes, no suspicious skin lesions noted    Lab  No results for input(s): CKTOTAL, CKMB, CKMBINDEX, TROPONINI in the last 72 hours.   CBC:   Lab Results   Component Value Date    WBC 7.9 06/30/2018    RBC 4.84 06/30/2018     08/26/2011    HGB 16.0 06/30/2018    HCT 48.2 06/30/2018    MCV 99.6 06/30/2018    MCH 33.1 06/30/2018    MCHC 33.2 06/30/2018    RDW 12.3 06/30/2018     06/30/2018    MPV 8.8 06/30/2018     BMP:    Lab Results   Component Value Date     06/30/2018    K 4.0 06/30/2018     06/30/2018    CO2 29 06/30/2018    BUN 21 06/30/2018    LABALBU 4.0 06/30/2018    CREATININE 1.2 06/30/2018    CALCIUM 9.0 10/12/2017    LABGLOM 71 10/12/2017    GLUCOSE 132 06/30/2018    GLUCOSE 89 05/14/2016     Hepatic Function Panel:    Lab Results   Component Value Date    ALKPHOS 65 06/30/2018    ALT 36 06/30/2018    AST 28 06/30/2018    PROT 7.6 06/30/2018    BILITOT 0.7 06/30/2018    BILIDIR 0.2 05/14/2016    LABALBU 4.0 06/30/2018     Magnesium:  No results found for: MG  Warfarin PT/INR:  No components found for: PTPATWAR, PTINRWAR  HgBA1c:  No results found for: LABA1C  FLP:    Lab Results   Component Value Date    TRIG 54 05/12/2018    HDL 65 05/12/2018    LDLCALC 101 05/12/2018     TSH:  No results found for: TSH    EKG 10/12/17  Atrial fibrillation  Septal infarct , age undetermined  Abnormal ECG  When compared with ECG of 06-NOV-2015 22:29,  Atrial fibrillation has replaced Sinus rhythm  Nonspecific T wave abnormality now evident in Inferior leads  Confirmed by Allyn Fiore MD, Darrian Toro ([id number]) on 10/12/2017 8:03:16 PM      Conclusions      Summary   Normal left ventricle size and systolic function. Ejection fraction was   estimated at 60 to 65 %. There were no regional left ventricular wall   motion abnormalities and wall thickness was within normal limits.   The left atrium is Moderately dilated.   Moderate to severly enlarged right atrium size.   Moderate-to-severe mitral regurgitation with centrally directed jet.   Moderate aortic regurgitation is noted.      Signature      ----------------------------------------------------------------   Electronically signed by Shyanne Tomlinson MD (Interpreting   physician) on 10/26/2017 at 12:55 PM   ----------------------------------------------------------------    Nuc  Stress neg      CONCLUSION:  This is an complete atrial fibrillation with average  heart rate of 91 beats per minute ranging from 47 to 150 beats per  minute. No significant pause of more than 2.3 second noted. Rare  ventricular ectopic beat total of 277, predominantly isolated, few  couplets, few ventricular bigeminy. No supraventricular tachycardia. No other form of arrhythmia noted. The AFib seems to be rate well  controlled. The patient at times  gets AFib with rapid ventricular  response. Average heart rate is 91 beats per minute, so the patient  may benefit from increasing the dose of AV jason blocking agent.     Thank you.           BENJAMIN Fiore M.D.     D: 11/02/2017 18:04:27            Conclusions      Summary   Normal left ventricle size and systolic function. Ejection fraction was   estimated at 60 to 65 %.  There were no regional left ventricular wall   motion abnormalities and wall thickness was within normal limits.   The left atrium is Moderately dilated.   Moderate to severly enlarged right atrium size.   Moderate-to-severe mitral regurgitation with centrally directed jet.   Moderate aortic regurgitation is noted.      Signature      ----------------------------------------------------------------   Electronically signed by Ana Paula Welch MD (Interpreting   physician) on 10/26/2017 at 12:55 PM     Assessment     Diagnosis Orders   1. Persistent atrial fibrillation ([address]- newely DXed 10/12/17- CVR     2. Essential hypertension     3. Moderate aortic regurgitation     4. Non-rheumatic mitral regurgitation- mod to severe           Plan   The current meds and labs reviewed    Continue the current treatment and with constant vigilance to changes in symptoms and also any potential side effects. Return for care or seek medical attention immediately if symptoms got worse and/or develop new symptoms. Persistent atr FIB-CVR  chads vasc 3  Need OA  Holter 48 hrs-Average heart rate of 91 beats per minute ranging from 47 to 150 beats per  Minute. Cont  lopresor 12.5 po qd am  Cont  apixaban 5 po bid  The risk and benefit of OA well explained including ICH and pat agreed to be on OA    Sob on exertion and new atr fib with cvr  The  echo and lexiscan nuc result reviewed and d/w the pat  No more leovnox due to intolerance    Mod ro sever MR  Shilo AR  Need f/u echo in 1 yrs    Hypertension, on medical treatment. Seems to be under good control. Patient is compliant with medical treatment. Home  mmhg  . D/w the pat at length    Discussed use, benefit, and side effects of prescribed medications. All patient questions answered. Pt voiced understanding. Instructed to continue current medications, diet and exercise. Continue risk factor modification and medical management. Patient agreed with treatment plan. Follow up as directed.       RTC in 6 months      [facility]

## 2018-07-11 ENCOUNTER — TELEPHONE (OUTPATIENT)
Dept: CARDIOLOGY CLINIC | Age: 83
End: 2018-07-11

## 2018-07-24 ENCOUNTER — OFFICE VISIT (OUTPATIENT)
Dept: FAMILY MEDICINE CLINIC | Age: 83
End: 2018-07-24
Payer: MEDICARE

## 2018-07-24 ENCOUNTER — HOSPITAL ENCOUNTER (OUTPATIENT)
Age: 83
Discharge: HOME | End: 2018-07-24
Payer: MEDICARE

## 2018-07-24 VITALS
WEIGHT: 141 LBS | HEIGHT: 70 IN | SYSTOLIC BLOOD PRESSURE: 120 MMHG | HEART RATE: 66 BPM | DIASTOLIC BLOOD PRESSURE: 68 MMHG | BODY MASS INDEX: 20.19 KG/M2 | RESPIRATION RATE: 20 BRPM | OXYGEN SATURATION: 96 %

## 2018-07-24 DIAGNOSIS — R53.83 FATIGUE, UNSPECIFIED TYPE: ICD-10-CM

## 2018-07-24 DIAGNOSIS — R63.4 WEIGHT LOSS: ICD-10-CM

## 2018-07-24 DIAGNOSIS — R42 VERTIGO: Primary | ICD-10-CM

## 2018-07-24 LAB
BASOPHILS # BLD: 0.6 %
BASOPHILS ABSOLUTE: 0.1 THOU/MM3 (ref 0–0.1)
EOSINOPHIL # BLD: 2.6 %
EOSINOPHILS ABSOLUTE: 0.2 THOU/MM3 (ref 0–0.4)
ERYTHROCYTE [DISTWIDTH] IN BLOOD BY AUTOMATED COUNT: 12.7 % (ref 11.5–14.5)
ERYTHROCYTE [DISTWIDTH] IN BLOOD BY AUTOMATED COUNT: 46.8 FL (ref 35–45)
HCT VFR BLD CALC: 44.8 % (ref 42–52)
HEMOGLOBIN: 14.6 GM/DL (ref 14–18)
IMMATURE GRANS (ABS): 0.02 THOU/MM3 (ref 0–0.07)
IMMATURE GRANULOCYTES: 0.2 %
LYMPHOCYTES # BLD: 17.7 %
LYMPHOCYTES ABSOLUTE: 1.5 THOU/MM3 (ref 1–4.8)
MCH RBC QN AUTO: 32.6 PG (ref 26–33)
MCHC RBC AUTO-ENTMCNC: 32.6 GM/DL (ref 32.2–35.5)
MCV RBC AUTO: 100 FL (ref 80–94)
MONOCYTES # BLD: 8.3 %
MONOCYTES ABSOLUTE: 0.7 THOU/MM3 (ref 0.4–1.3)
NUCLEATED RED BLOOD CELLS: 0 /100 WBC
PLATELET # BLD: 132 THOU/MM3 (ref 130–400)
PMV BLD AUTO: 12.6 FL (ref 9.4–12.4)
RBC # BLD: 4.48 MILL/MM3 (ref 4.7–6.1)
SEG NEUTROPHILS: 70.6 %
SEGMENTED NEUTROPHILS ABSOLUTE COUNT: 6.1 THOU/MM3 (ref 1.8–7.7)
TSH SERPL DL<=0.05 MIU/L-ACNC: 3.38 UIU/ML (ref 0.4–4.2)
WBC # BLD: 8.7 THOU/MM3 (ref 4.8–10.8)

## 2018-07-24 PROCEDURE — 99214 OFFICE O/P EST MOD 30 MIN: CPT | Performed by: FAMILY MEDICINE

## 2018-07-24 PROCEDURE — 1123F ACP DISCUSS/DSCN MKR DOCD: CPT | Performed by: FAMILY MEDICINE

## 2018-07-24 PROCEDURE — 36415 COLL VENOUS BLD VENIPUNCTURE: CPT

## 2018-07-24 PROCEDURE — 1036F TOBACCO NON-USER: CPT | Performed by: FAMILY MEDICINE

## 2018-07-24 PROCEDURE — 4040F PNEUMOC VAC/ADMIN/RCVD: CPT | Performed by: FAMILY MEDICINE

## 2018-07-24 PROCEDURE — 1101F PT FALLS ASSESS-DOCD LE1/YR: CPT | Performed by: FAMILY MEDICINE

## 2018-07-24 PROCEDURE — G8427 DOCREV CUR MEDS BY ELIG CLIN: HCPCS | Performed by: FAMILY MEDICINE

## 2018-07-24 PROCEDURE — 84443 ASSAY THYROID STIM HORMONE: CPT

## 2018-07-24 PROCEDURE — G8420 CALC BMI NORM PARAMETERS: HCPCS | Performed by: FAMILY MEDICINE

## 2018-07-24 PROCEDURE — 85025 COMPLETE CBC W/AUTO DIFF WBC: CPT

## 2018-07-24 RX ORDER — MECLIZINE HCL 12.5 MG/1
TABLET ORAL
Qty: 45 TABLET | Refills: 0 | Status: SHIPPED | OUTPATIENT
Start: 2018-07-24 | End: 2018-12-17 | Stop reason: SDUPTHER

## 2018-07-24 ASSESSMENT — ENCOUNTER SYMPTOMS
SHORTNESS OF BREATH: 0
RHINORRHEA: 0
EYE DISCHARGE: 0
NAUSEA: 0
WHEEZING: 0
SORE THROAT: 0
ABDOMINAL PAIN: 0
DIARRHEA: 0
COUGH: 0
CONSTIPATION: 0
BACK PAIN: 1

## 2018-07-25 ENCOUNTER — TELEPHONE (OUTPATIENT)
Dept: OPERATING ROOM | Age: 83
End: 2018-07-25

## 2018-08-15 NOTE — PROGRESS NOTES
NPO after midnight  [employer] and drivers license  Wear comfortable clean clothing  Do not bring jewelry  Shower night before and morning of surgery with a liquid antibacterial soap  Bring list of medications with dosage and how often taken  Follow all instructions given by your physician   needed at discharge  Call [phone] for any questions

## 2018-08-20 ENCOUNTER — TELEPHONE (OUTPATIENT)
Dept: PHYSICAL MEDICINE AND REHAB | Age: 83
End: 2018-08-20

## 2018-08-20 NOTE — TELEPHONE ENCOUNTER
FYI, Pt states that  Someone from this staff  asked for a copy of his living will and health care POA.  Both documents were scanned into the pt's chart

## 2018-08-21 ENCOUNTER — ANESTHESIA (OUTPATIENT)
Dept: OPERATING ROOM | Age: 83
End: 2018-08-21
Payer: MEDICARE

## 2018-08-21 ENCOUNTER — APPOINTMENT (OUTPATIENT)
Dept: GENERAL RADIOLOGY | Age: 83
End: 2018-08-21
Attending: PAIN MEDICINE
Payer: MEDICARE

## 2018-08-21 ENCOUNTER — ANESTHESIA EVENT (OUTPATIENT)
Dept: OPERATING ROOM | Age: 83
End: 2018-08-21
Payer: MEDICARE

## 2018-08-21 ENCOUNTER — HOSPITAL ENCOUNTER (OUTPATIENT)
Age: 83
Setting detail: OUTPATIENT SURGERY
Discharge: HOME | End: 2018-08-21
Attending: PAIN MEDICINE | Admitting: PAIN MEDICINE
Payer: MEDICARE

## 2018-08-21 VITALS
SYSTOLIC BLOOD PRESSURE: 161 MMHG | RESPIRATION RATE: 16 BRPM | WEIGHT: 139.2 LBS | HEIGHT: 70 IN | BODY MASS INDEX: 19.93 KG/M2 | OXYGEN SATURATION: 98 % | TEMPERATURE: 98.1 F | DIASTOLIC BLOOD PRESSURE: 77 MMHG | HEART RATE: 80 BPM

## 2018-08-21 VITALS
SYSTOLIC BLOOD PRESSURE: 151 MMHG | RESPIRATION RATE: 17 BRPM | DIASTOLIC BLOOD PRESSURE: 79 MMHG | OXYGEN SATURATION: 99 %

## 2018-08-21 PROCEDURE — 7100000011 HC PHASE II RECOVERY - ADDTL 15 MIN: Performed by: PAIN MEDICINE

## 2018-08-21 PROCEDURE — 3600000057 HC PAIN LEVEL 4 ADDL 15 MIN: Performed by: PAIN MEDICINE

## 2018-08-21 PROCEDURE — 2500000003 HC RX 250 WO HCPCS: Performed by: PAIN MEDICINE

## 2018-08-21 PROCEDURE — 63650 IMPLANT NEUROELECTRODES: CPT | Performed by: PAIN MEDICINE

## 2018-08-21 PROCEDURE — 7100000010 HC PHASE II RECOVERY - FIRST 15 MIN: Performed by: PAIN MEDICINE

## 2018-08-21 PROCEDURE — 3600000056 HC PAIN LEVEL 4 BASE: Performed by: PAIN MEDICINE

## 2018-08-21 PROCEDURE — 2720000010 HC SURG SUPPLY STERILE: Performed by: PAIN MEDICINE

## 2018-08-21 PROCEDURE — 6360000004 HC RX CONTRAST MEDICATION: Performed by: PAIN MEDICINE

## 2018-08-21 PROCEDURE — 2580000003 HC RX 258: Performed by: NURSE ANESTHETIST, CERTIFIED REGISTERED

## 2018-08-21 PROCEDURE — C1778 LEAD, NEUROSTIMULATOR: HCPCS | Performed by: PAIN MEDICINE

## 2018-08-21 PROCEDURE — 6360000002 HC RX W HCPCS: Performed by: NURSE ANESTHETIST, CERTIFIED REGISTERED

## 2018-08-21 PROCEDURE — 3700000001 HC ADD 15 MINUTES (ANESTHESIA): Performed by: PAIN MEDICINE

## 2018-08-21 PROCEDURE — 3700000000 HC ANESTHESIA ATTENDED CARE: Performed by: PAIN MEDICINE

## 2018-08-21 PROCEDURE — 95972 ALYS CPLX SP/PN NPGT W/PRGRM: CPT | Performed by: PAIN MEDICINE

## 2018-08-21 PROCEDURE — 3209999900 FLUORO FOR SURGICAL PROCEDURES

## 2018-08-21 PROCEDURE — 2709999900 HC NON-CHARGEABLE SUPPLY: Performed by: PAIN MEDICINE

## 2018-08-21 DEVICE — 50CM 16 CONTACT TRIAL LEAD KIT
Type: IMPLANTABLE DEVICE | Status: FUNCTIONAL
Brand: INFINION™  16

## 2018-08-21 RX ORDER — PROPOFOL 10 MG/ML
INJECTION, EMULSION INTRAVENOUS PRN
Status: DISCONTINUED | OUTPATIENT
Start: 2018-08-21 | End: 2018-08-21 | Stop reason: SDUPTHER

## 2018-08-21 RX ORDER — FENTANYL CITRATE 50 UG/ML
INJECTION, SOLUTION INTRAMUSCULAR; INTRAVENOUS PRN
Status: DISCONTINUED | OUTPATIENT
Start: 2018-08-21 | End: 2018-08-21 | Stop reason: SDUPTHER

## 2018-08-21 RX ORDER — SODIUM CHLORIDE 9 MG/ML
INJECTION, SOLUTION INTRAVENOUS CONTINUOUS PRN
Status: DISCONTINUED | OUTPATIENT
Start: 2018-08-21 | End: 2018-08-21 | Stop reason: SDUPTHER

## 2018-08-21 RX ORDER — CEFAZOLIN SODIUM 1 G/3ML
INJECTION, POWDER, FOR SOLUTION INTRAMUSCULAR; INTRAVENOUS PRN
Status: DISCONTINUED | OUTPATIENT
Start: 2018-08-21 | End: 2018-08-21 | Stop reason: SDUPTHER

## 2018-08-21 RX ORDER — LIDOCAINE HYDROCHLORIDE 10 MG/ML
INJECTION, SOLUTION INFILTRATION; PERINEURAL PRN
Status: DISCONTINUED | OUTPATIENT
Start: 2018-08-21 | End: 2018-08-21 | Stop reason: HOSPADM

## 2018-08-21 RX ADMIN — PROPOFOL 30 MG: 10 INJECTION, EMULSION INTRAVENOUS at 13:07

## 2018-08-21 RX ADMIN — FENTANYL CITRATE 25 MCG: 50 INJECTION INTRAMUSCULAR; INTRAVENOUS at 12:40

## 2018-08-21 RX ADMIN — PROPOFOL 25 MG: 10 INJECTION, EMULSION INTRAVENOUS at 12:50

## 2018-08-21 RX ADMIN — FENTANYL CITRATE 25 MCG: 50 INJECTION INTRAMUSCULAR; INTRAVENOUS at 12:56

## 2018-08-21 RX ADMIN — PROPOFOL 20 MG: 10 INJECTION, EMULSION INTRAVENOUS at 12:55

## 2018-08-21 RX ADMIN — CEFAZOLIN 1000 MG: 1 INJECTION, POWDER, FOR SOLUTION INTRAMUSCULAR; INTRAVENOUS; PARENTERAL at 12:41

## 2018-08-21 RX ADMIN — PROPOFOL 30 MG: 10 INJECTION, EMULSION INTRAVENOUS at 13:05

## 2018-08-21 RX ADMIN — SODIUM CHLORIDE: 9 INJECTION, SOLUTION INTRAVENOUS at 12:37

## 2018-08-21 ASSESSMENT — PULMONARY FUNCTION TESTS
PIF_VALUE: 0

## 2018-08-21 ASSESSMENT — ENCOUNTER SYMPTOMS: SHORTNESS OF BREATH: 1

## 2018-08-21 ASSESSMENT — PAIN SCALES - GENERAL: PAINLEVEL_OUTOF10: 0

## 2018-08-21 ASSESSMENT — PAIN - FUNCTIONAL ASSESSMENT: PAIN_FUNCTIONAL_ASSESSMENT: 0-10

## 2018-08-21 NOTE — ANESTHESIA PRE PROCEDURE
Code    Prostate cancer C61    Generalized anxiety disorder F41.1    Essential hypertension I10    Back pain M54.9    Hyperlipidemia with target LDL less than 130 E78.5    Back pain, chronic s/p surgery M54.9, G89.29    Prostate disorder N42.9    Hematuria R31.9    Ileus (HCC) K56.7    Postoperative ileus (HCC) K91.89, K56.7    Malignant neoplasm of prostate ([address] C61    PE (pulmonary thromboembolism) ([address] I26.99    Pulmonary embolism without acute cor pulmonale (HCC) I26.99    Osteoporosis M81.0    Spondylosis of lumbar region without myelopathy or radiculopathy M47.816    Abnormal EKG R94.31    SOB (shortness of breath) on exertion R06.02    Dizziness R42    Persistent atrial fibrillation ([address]- newely DXed 10/12/17- CVR I48.1    Non-rheumatic mitral regurgitation- mod to severe I34.0    Moderate aortic regurgitation I35.1    Recurrent left inguinal hernia K40.91    Sacroiliac inflammation (HCC) M46.1    Primary osteoarthritis of left hip M16.12       Past Medical History:        Diagnosis Date    Atrial fibrillation (HCC)     Back pain     Hernia     Hx of blood clots     lungs after prostate surgery    Hyperlipidemia     Hypertension     Osteoarthritis     Prostate cancer ([address] 2015    Pulmonary emboli ([address] 11/2015    after prostate surgery    Wears glasses        Past Surgical History:        Procedure Laterality Date    [facility]    BACK SURGERY  9044,2373    X stop    CATARACT REMOVAL Right 06/2001    Dr Oxana Blank Left 11/2014    Dr Melissa Butcher, 2009    Dr Howell Even  [address]?     Dr Darylene Burkitt of date    HERNIA REPAIR Left 11/22/2017    ROBOT RECURRENT LEFT INGUINAL HERNIA REPAIR performed by Edi Olmedo MD at [facility] FACET LEVEL 1 BILATERAL Bilateral 9/5/2017    LUMBAR FACET MBB L3-4, L4-5, L5-S1 BILATERAL performed by Hallie Phillips oz (64.1 kg)     There is no height or weight on file to calculate BMI.    CBC:   Lab Results   Component Value Date    WBC 8.7 07/24/2018    RBC 4.48 07/24/2018     08/26/2011    HGB 14.6 07/24/2018    HCT 44.8 07/24/2018    .0 07/24/2018    RDW 12.3 06/30/2018     07/24/2018       CMP:   Lab Results   Component Value Date     06/30/2018    K 4.0 06/30/2018     06/30/2018    CO2 29 06/30/2018    BUN 21 06/30/2018    CREATININE 1.2 06/30/2018    AGRATIO 1.5 05/14/2016    LABGLOM 71 10/12/2017    GLUCOSE 132 06/30/2018    GLUCOSE 89 05/14/2016    PROT 7.6 06/30/2018    CALCIUM 9.0 10/12/2017    BILITOT 0.7 06/30/2018    ALKPHOS 65 06/30/2018    AST 28 06/30/2018    ALT 36 06/30/2018       POC Tests: No results for input(s): POCGLU, POCNA, POCK, POCCL, POCBUN, POCHEMO, POCHCT in the last 72 hours.     Coags:   Lab Results   Component Value Date    INR 1.07 06/30/2018    APTT 33.2 06/30/2018       HCG (If Applicable): No results found for: PREGTESTUR, PREGSERUM, HCG, HCGQUANT     ABGs: No results found for: PHART, PO2ART, SWT1LKP, AMU3VDJ, BEART, X8DUCNBC     Type & Screen (If Applicable):  Lab Results   Component Value Date    [address] POS 10/07/2015       Anesthesia Evaluation  Patient summary reviewed and Nursing notes reviewed no history of anesthetic complications:   Airway: Mallampati: II  TM distance: >3 FB   Neck ROM: full  Mouth opening: > = 3 FB Dental: normal exam         Pulmonary: breath sounds clear to auscultation  (+) shortness of breath: chronic,                             Cardiovascular:    (+) hypertension: moderate, dysrhythmias: atrial fibrillation, CABRAL:,     (-) past MI, CAD,  angina and  CHF    ECG reviewed  Rhythm: regular  Rate: normal           Beta Blocker:  Dose within 24 Hrs         Neuro/Psych:   (+) psychiatric history: stable with treatment            GI/Hepatic/Renal: Neg GI/Hepatic/Renal ROS            Endo/Other: Negative Endo/Other ROS Abdominal:           Vascular:                                          Anesthesia Plan      MAC     ASA 3       Induction: intravenous. Anesthetic plan and risks discussed with patient. Plan discussed with CRNA.                   Hannah Trujillo MD   8/21/2018

## 2018-08-21 NOTE — ANESTHESIA POSTPROCEDURE EVALUATION
Department of Anesthesiology  Postprocedure Note    Patient: Mira Brower  MRN: [id number]  YOB: 1934  Date of evaluation: 8/21/2018  Time:  3:26 PM     Procedure Summary     Date:  08/21/18 Room / Location:  [facility] OR 03 / [address] [facility]    Anesthesia Start:  1237 Anesthesia Stop:  7989    Procedure:  SPINAL CORD STIMULATOR IMPLANT TRIAL (N/A Back) Diagnosis:  (spinal stenosis)    Surgeon:  Aime Rivas MD Responsible Provider:  Jennifer Bolanos MD    Anesthesia Type:  MAC ASA Status:  3          Anesthesia Type: MAC    Kelsey Phase I:      Kelsey Phase II: Kelsey Score: 9    Last vitals: Reviewed and per EMR flowsheets.        Anesthesia Post Evaluation    Patient location during evaluation: PACU  Patient participation: complete - patient participated  Level of consciousness: awake  Nausea & Vomiting: no nausea  Complications: no  Cardiovascular status: hemodynamically stable  Respiratory status: acceptable

## 2018-08-21 NOTE — OP NOTE
fluoroscopy guidance and interpretation  2- Placement of specialized epidural needle to the-T-12 interspace  3- Placement of  lead withSingle Chamber 16 electrodes to the T-6   4- Intra-operative  programming of lead taking 30 minutes. 5- Complex programming in recovery taking 30 minutes. Indication for the Procedure: The patient is an established patient with the above known diagnosis. The patient understands the reason for the procedure along with the risks, benefits and alternatives available. The patient has had the opportunity to ask questions prior to the procedure, and the patient has given written, informed, consent to proceed with the procedure. An informed consent was obtained from the patient for the procedure. Medical Necessity: This patient has chronic pain that has failed to respond to conservative measures as outlined in the original history and physical exam on the patients symptoms include low back pain and disability of a moderate to severe degree with intermittent leg pain. The goals of treatment are to 1) achieve optimal pain control, recognizing that a pain-free state may not be achievable; 2) minimize adverse outcomes; 3) enhance functional abilities, and physical and psychological well-being; and 4) enhance the quality of life for patients with chronic pain. Procedure:    After starting IV and applying monitors patient was given 1 g of Ancef IV for antibiotic prophylaxis. Patient's vital signs including blood pressure pulse oximetry and pulse rate were monitored throughout the procedure and a meaningful communication was kept up with the patient. The patient placed in a prone position. The back was prepped and draped in the usual sterile fashion and strict aseptic precautions were observed throughout the procedure. .  After confirmation of vertebral count through thorocolumbar films , the C-arm was used to help define the angle and access point for the epidural needle. Prior to needle placement, and after location of the access point, under fluoroscopic guidance T-12 area was identified, 5 mL of 1% xylocaine was used to anesthetize the skin . A 6 inch 14-gauge curved tip  needle was inserted through the skin under fluoroscopic guidance until it got to the epidural space with loss of resistance with air. General aspiration was performed and it was negative for CSF or any blood. This was confirmed with AP and lateral views of the fluoroscopy . After negative aspiration injected 1.5 cc of Omnipaque with adequate spread. An 16 Lead(Octrode) electrode Linear lead was channeled through the epidural needle and guided carefully so that the top electrode T6, on the midline. This placement gave appropriate stimulation. Various program settings were worked through within the operating room, to ensure appropriate coverage with the stimulation. Minor adjustments were made as needed to develop goodcoverage. Under fluoroscopic guidance the Epidural needle and the Stylus were pulled making sure that the electrodes had not moved. Lead was anchored using the tapered anchor Provided and the anchors were sutured to the skin using 2-0 silk. Again confirmed appropriate stimulation. .  The electrode and sit insertion sites were dressed  with 4 x 4 gauze and tegaderm. The patient was then taken from the procedure room via stretcher, and placed in a recovery room. Once in the recovery room, the fine tune adjustments were made to the stimulation pattern. This additional programming was needed to give the patient optimal coverage during the trial.  Post operatively the patient was monitored until stable. Following the procedure the patient's vital signs were stable. Patient was given adequate instructions regarding the use of the stimulator. Patient will be seen again in the pain clinic in about 5 days for follow-up and further planning.   Patient is asked to call us if

## 2018-08-27 ENCOUNTER — OFFICE VISIT (OUTPATIENT)
Dept: PHYSICAL MEDICINE AND REHAB | Age: 83
End: 2018-08-27

## 2018-08-27 VITALS
SYSTOLIC BLOOD PRESSURE: 178 MMHG | BODY MASS INDEX: 19.92 KG/M2 | HEART RATE: 63 BPM | DIASTOLIC BLOOD PRESSURE: 72 MMHG | HEIGHT: 70 IN | WEIGHT: 139.11 LBS

## 2018-08-27 DIAGNOSIS — M54.16 LUMBAR RADICULITIS: ICD-10-CM

## 2018-08-27 DIAGNOSIS — M48.061 SPINAL STENOSIS OF LUMBAR REGION WITHOUT NEUROGENIC CLAUDICATION: Primary | ICD-10-CM

## 2018-08-27 DIAGNOSIS — G89.4 CHRONIC PAIN SYNDROME: ICD-10-CM

## 2018-08-27 DIAGNOSIS — M47.816 LUMBAR SPONDYLOSIS: ICD-10-CM

## 2018-08-27 PROCEDURE — 99024 POSTOP FOLLOW-UP VISIT: CPT | Performed by: NURSE PRACTITIONER

## 2018-08-27 ASSESSMENT — ENCOUNTER SYMPTOMS
SHORTNESS OF BREATH: 0
DIARRHEA: 0
CHEST TIGHTNESS: 0
NAUSEA: 0
COLOR CHANGE: 0
BACK PAIN: 1
ABDOMINAL PAIN: 0
CONSTIPATION: 1
VOMITING: 0

## 2018-08-28 ENCOUNTER — TELEPHONE (OUTPATIENT)
Dept: PHYSICAL MEDICINE AND REHAB | Age: 83
End: 2018-08-28

## 2018-08-28 DIAGNOSIS — M54.16 LUMBAR RADICULITIS: Primary | ICD-10-CM

## 2018-08-28 DIAGNOSIS — M54.50 LUMBAR PAIN: ICD-10-CM

## 2018-08-28 DIAGNOSIS — M47.816 LUMBAR SPONDYLOSIS: ICD-10-CM

## 2018-08-28 DIAGNOSIS — M54.16 LUMBAR RADICULOPATHY: ICD-10-CM

## 2018-08-29 ENCOUNTER — TELEPHONE (OUTPATIENT)
Dept: PHYSICAL MEDICINE AND REHAB | Age: 83
End: 2018-08-29

## 2018-08-29 DIAGNOSIS — Z01.818 PRE-OP EVALUATION: Primary | ICD-10-CM

## 2018-08-30 DIAGNOSIS — F41.8 TEST ANXIETY: Primary | ICD-10-CM

## 2018-08-30 DIAGNOSIS — M54.16 LUMBAR RADICULOPATHY: ICD-10-CM

## 2018-08-30 DIAGNOSIS — M54.50 LUMBAR PAIN: ICD-10-CM

## 2018-08-30 RX ORDER — DIAZEPAM 2 MG/1
2 TABLET ORAL ONCE
Qty: 1 TABLET | Refills: 0 | Status: SHIPPED | OUTPATIENT
Start: 2018-08-30 | End: 2018-08-30

## 2018-08-30 NOTE — TELEPHONE ENCOUNTER
Patient will need a  for the MRI if medications given. Has patient had a certain medication in the past for this?  Thanks

## 2018-08-30 NOTE — TELEPHONE ENCOUNTER
He did take something a long time ago but does not remember what it was. He said he can have a . Please advise.

## 2018-09-05 ENCOUNTER — OFFICE VISIT (OUTPATIENT)
Dept: NEUROSURGERY | Age: 83
End: 2018-09-05
Payer: MEDICARE

## 2018-09-05 ENCOUNTER — TELEPHONE (OUTPATIENT)
Dept: CARDIOLOGY CLINIC | Age: 83
End: 2018-09-05

## 2018-09-05 VITALS
SYSTOLIC BLOOD PRESSURE: 166 MMHG | BODY MASS INDEX: 19.74 KG/M2 | DIASTOLIC BLOOD PRESSURE: 85 MMHG | WEIGHT: 141 LBS | HEIGHT: 71 IN

## 2018-09-05 DIAGNOSIS — Q76.49 SPINE DEFORMITY: ICD-10-CM

## 2018-09-05 DIAGNOSIS — M43.06 LUMBAR SPONDYLOLYSIS: ICD-10-CM

## 2018-09-05 DIAGNOSIS — G89.4 CHRONIC PAIN DISORDER: ICD-10-CM

## 2018-09-05 DIAGNOSIS — M54.5 CHRONIC LEFT-SIDED LOW BACK PAIN, WITH SCIATICA PRESENCE UNSPECIFIED: Primary | ICD-10-CM

## 2018-09-05 DIAGNOSIS — G89.29 CHRONIC LEFT-SIDED LOW BACK PAIN, WITH SCIATICA PRESENCE UNSPECIFIED: Primary | ICD-10-CM

## 2018-09-05 PROCEDURE — 99204 OFFICE O/P NEW MOD 45 MIN: CPT | Performed by: NEUROLOGICAL SURGERY

## 2018-09-05 PROCEDURE — G8420 CALC BMI NORM PARAMETERS: HCPCS | Performed by: NEUROLOGICAL SURGERY

## 2018-09-05 PROCEDURE — 1101F PT FALLS ASSESS-DOCD LE1/YR: CPT | Performed by: NEUROLOGICAL SURGERY

## 2018-09-05 PROCEDURE — G8427 DOCREV CUR MEDS BY ELIG CLIN: HCPCS | Performed by: NEUROLOGICAL SURGERY

## 2018-09-05 PROCEDURE — 4040F PNEUMOC VAC/ADMIN/RCVD: CPT | Performed by: NEUROLOGICAL SURGERY

## 2018-09-05 PROCEDURE — 1036F TOBACCO NON-USER: CPT | Performed by: NEUROLOGICAL SURGERY

## 2018-09-05 PROCEDURE — 1123F ACP DISCUSS/DSCN MKR DOCD: CPT | Performed by: NEUROLOGICAL SURGERY

## 2018-09-05 RX ORDER — CLONAZEPAM 0.5 MG/1
0.5 TABLET ORAL DAILY
COMMUNITY
End: 2018-09-06 | Stop reason: ALTCHOICE

## 2018-09-05 ASSESSMENT — ENCOUNTER SYMPTOMS
TROUBLE SWALLOWING: 0
ABDOMINAL PAIN: 0
SHORTNESS OF BREATH: 0
CHEST TIGHTNESS: 0
BACK PAIN: 1

## 2018-09-05 NOTE — LETTER
[address] [facility]  [facility]. [address] [facility]  Phone: [phone]  Fax: [phone]    Domi Riggs, *        September 5. 2018. Patient: Catalina Pacheco   MR Number: [id number]   YOB: 1934   Date of Visit: 9/5/2018       Dear Dr. Renae Harris: Thank you for the request for consultation for Catalina Pacheco to me for the evaluation of  his candidacy for permanent placement of spinal cord stimulator system. Below are the relevant portions of my assessment and plan of care. Assessment:     HPI     Patient presented today for evaluation of his candidacy for permanent placement of spinal cord stimulator system. Patient has had low back pain that shoots down the left lateral aspect of the leg to his ankle for at least the past 15 years. Describes the pain as stabbing and shooting in his left leg. He rated his pain as up to to 8-9/10. Pain worse when walking and standing  He has had 2 previous back surgeries (xstop x2), Last surgery was 8 years ago. Patient denied any significant numbness. HE stated that he feels mild weakness in left leg  He denied any significant issues in controlling his urine or bowel habits. Patient underwent  Spinal cord stimulator trail per Dr. Sulma Orosco. He had his SCS trial started on a Monday 8/21/18 and had it ended up the following Tuesday for a total of 6 days. He was happy with the results of the SCS trial, Feels he had about 60% relief in his pain   Had about 60-70% coverage of his pain, fells he was able to be more physically active. He stated that he wants to proceed with placement of permanent spinal cord stimulator cord system. Patient is on Eliquis      On physical exam:       Examination of carotid arteries (puls, amplitude, bruits) or Examination of peripheral vascular system  (swelling, varicosities and pulses, temperature, edema,tenderness) :  WNL  Patient is A/A/Ox3 : WNL

## 2018-09-05 NOTE — PROGRESS NOTES
[facility] PROFESSIONAL SERVS  [address] [place] [address] Road [address]  Dept: [phone]  Dept Fax: [phone]      Patient Name:  Ariella Barroso  Visit Date:  9/5/2018    HPI:     Mr. Jorge Cameron is a 80 y.o. male that presents today at [facility] for evaluation of the following:      Chief Complaint   Patient presents with   Elsa Pemberton Consultation     New pt referred by Dr. Dom Velarde for lumbar spinal stenosis        HPI     Patient presented today for evaluation of his candidacy for permanent placement of spinal cord stimulator system. Patient has had low back pain that shoots down the left lateral aspect of the leg to his ankle for at least the past 15 years. Describes the pain as stabbing and shooting in his left leg. He rated his pain as up to to 8-9/10. Pain worse when walking and standing  He has had 2 previous back surgeries (xstop x2), Last surgery was 8 years ago. Patient denied any significant numbness. HE stated that he feels mild weakness in left leg  He denied any significant issues in controlling his urine or bowel habits. Patient underwent  Spinal cord stimulator trail per Dr. Dom Velarde. He had his SCS trial started on a Monday 8/21/18 and had it ended up the following Tuesday for a total of 6 days. He was happy with the results of the SCS trial, Feels he had about 60% relief in his pain   Had about 60-70% coverage of his pain, fells he was able to be more physically active. He stated that he wants to proceed with placement of permanent spinal cord stimulator cord system. Patient is on Eliquis     Medications:    Current Outpatient Prescriptions:     aspirin 81 MG tablet, Take 81 mg by mouth daily, Disp: , Rfl:     clonazePAM (KLONOPIN) 0.5 MG tablet, Take 0.5 mg by mouth daily. ., Disp: , Rfl:     NONFORMULARY, , Disp: , Rfl:     metoprolol succinate (TOPROL XL) 25 MG extended release tablet, Take 25 mg by mouth daily Patient is taking half a tab in the am and half a tab in the pm, Disp: , Rfl:     apixaban (ELIQUIS) 5 MG TABS tablet, Take 1 tablet by mouth 2 times daily, Disp: 56 tablet, Rfl: 0    HYDROcodone-acetaminophen (NORCO) 5-325 MG per tablet, Take 1 tablet by mouth every 6 hours as needed for Pain  ., Disp: , Rfl:     Calcium Carbonate-Vitamin D (CALTRATE 600+D) 600-400 MG-UNIT CHEW, 1 tablet PO BID, Disp: 60 tablet, Rfl: 0    amLODIPine (NORVASC) 5 MG tablet, Take 5 mg by mouth daily , Disp: , Rfl:     meclizine (ANTIVERT) 12.5 MG tablet, 1-2 tabs PO q 8 hours PRN dizziness, Disp: 45 tablet, Rfl: 0    UNABLE TO FIND, 1 tablet daily For kidney health, Disp: , Rfl:     clotrimazole-betamethasone (LOTRISONE) 1-0.05 % cream, Apply topically 2 times daily. (Patient taking differently: Apply topically 2 times daily. PRN), Disp: 45 g, Rfl: 1    The patient is allergic to nitrofuran derivatives and nsaids. Past Medical History  Tasha Askew  has a past medical history of Atrial fibrillation ([address] Back pain; Hernia; Hx of blood clots; Hyperlipidemia; Hypertension; Osteoarthritis; Prostate cancer ([address] Pulmonary emboli ([address] and Wears glasses. Past Surgical History  The patient  has a past surgical history that includes back surgery (0917,5096); Total shoulder arthroplasty (2005); Prostate surgery (2003); Prostate surgery (2009); Prostate Biopsy (7-); Cataract removal with implant (Left, 11/2014); Prostate Biopsy (8/21/2015); hernia repair (1995, 2009); other surgical history (10/7/2015); other surgical history (Bilateral, 09/05/2017); Nerve Block Lumb Facet Level 1 Bilateral (Bilateral, 9/5/2017); hernia repair (1115'G?); Cataract removal (Right, 06/2001);  Appendectomy (01/1961); hernia repair (Left, 11/22/2017); other surgical history (Right, 1980's); other surgical history (01/30/2018); pr inject si joint arthrgrphy&/anes/steroid w/image (Left, 1/30/2018); other surgical history (Left, 02/27/2018); pr inject si joint arthrgrphy&/anes/steroid w/image (Left, 2/27/2018); Nerve Surgery (Left, 04/09/2018); pr office/outpt visit,procedure only (Left, 4/9/2018); and pr office/outpt visit,procedure only (N/A, 8/21/2018). Family History  This patient's family history includes Cancer in his brother, brother, and sister; Heart Disease in his father; Prostate Cancer in his brother, brother, brother, and brother; Stroke in his mother. Social History  Henny Kraus  reports that he quit smoking about 53 years ago. His smoking use included Cigarettes. He has a 10.00 pack-year smoking history. He has never used smokeless tobacco. He reports that he drinks alcohol. He reports that he does not use drugs. Subjective:      Review of Systems   Constitutional: Positive for activity change. Negative for fever. HENT: Negative for trouble swallowing. Eyes: Negative for visual disturbance. Respiratory: Negative for chest tightness and shortness of breath. Cardiovascular: Negative for chest pain and palpitations. Gastrointestinal: Negative for abdominal pain. Genitourinary: Negative for difficulty urinating. Musculoskeletal: Positive for arthralgias and back pain. Negative for neck pain. Neurological: Negative for dizziness, light-headedness and numbness. Psychiatric/Behavioral: The patient is not nervous/anxious. Objective:     BP (!) 166/85 (Site: Right Arm, Position: Sitting, Cuff Size: Large Adult)   Ht 5' 10.5\" (1.791 m)   Wt 141 lb (64 kg)   BMI 19.95 kg/m²      Examination of carotid arteries (puls, amplitude, bruits) or Examination of peripheral vascular system  (swelling, varicosities and pulses, temperature, edema,tenderness) : WNL  Patient is A/A/Ox3 : WNL  Recent and remote memory: WNL  Attention span and concentration: WNL  Language (naming objects;repeating phrases;spontaneous speech): WNL  Fund of knowledge: WNL  Cranial nerves: 2-12: grossly intact.   Muscle strength, tone in all 4 extremities: 5/5 through out. DTR in all 4 extremities: has hyperreflexia in his lower extremities. Babinski: down response. Gait: WNL  Cerebellar function:WNL  Sensation:Grossly intact. Straight leg raising test: Negative. Has limitation in range of motion in his L-spine. Has coronal and sagittal spinal imbalance. Reviewed MRI Type:  Film and Report    Lab Results   Component Value Date    WBC 8.7 07/24/2018    HGB 14.6 07/24/2018    HCT 44.8 07/24/2018     07/24/2018    CHOL 177 05/12/2018    TRIG 54 05/12/2018    HDL 65 05/12/2018    ALT 36 06/30/2018    AST 28 06/30/2018     06/30/2018    K 4.0 06/30/2018     06/30/2018    CREATININE 1.2 06/30/2018    BUN 21 (H) 06/30/2018    CO2 29 06/30/2018    TSH 3.380 07/24/2018    PSA <0.02 10/05/2017    INR 1.07 06/30/2018       Assessment and Plan      Diagnosis Orders   1. Chronic left-sided low back pain, with sciatica presence unspecified     2. Chronic pain disorder     3. Spine deformity       - Based on patient's pain symptoms, his clinical history and the type of his response to spinal cord stimulator trail. I believe patient is a good candidate for permanent placement of spinal cord stimulator system (paddle and battery). The spinal cord stimulator paddle is going to be paced at the same location of the leads during the trail. And as I told patient, the effect of permanent spinal cord stimulator is expected to be similar to the effect he experienced during the trail.   -I discussed this surgical treatment option with patient and his/her family in detail including the associated risks and benefits, as well as, the alternative treatment options. All questions were answered. Patient elected to proceed with this surgical treatment option ( the placement of permanent  of spinal cord stimulator paddle and battery). The plan now for Meaghan Basilio now is to schedule him  for surgery after obtaining pre op cardiac cleanness.  I told him that he needs to stop

## 2018-09-05 NOTE — TELEPHONE ENCOUNTER
Pre op Risk Assessment    Procedure Permanent Spinal Cord Stimulator  Physician Dr. Grant Pelaez  Date of surgery/procedure TBA    Last OV 7-  Last Stress 10-  Last Echo 10-  Last Cath None in Chart  Is patient on blood thinners Eliquis and ASA 81 mg  Hold Meds/how many days ? ? Can patient be cleared? Please send to Dr. J Luis Guerrero when he returns.

## 2018-09-06 ENCOUNTER — OFFICE VISIT (OUTPATIENT)
Dept: FAMILY MEDICINE CLINIC | Age: 83
End: 2018-09-06
Payer: MEDICARE

## 2018-09-06 VITALS
OXYGEN SATURATION: 98 % | HEIGHT: 71 IN | RESPIRATION RATE: 20 BRPM | SYSTOLIC BLOOD PRESSURE: 132 MMHG | DIASTOLIC BLOOD PRESSURE: 74 MMHG | WEIGHT: 142 LBS | HEART RATE: 74 BPM | BODY MASS INDEX: 19.88 KG/M2

## 2018-09-06 DIAGNOSIS — G89.29 CHRONIC LEFT-SIDED LOW BACK PAIN WITH LEFT-SIDED SCIATICA: ICD-10-CM

## 2018-09-06 DIAGNOSIS — M54.42 CHRONIC LEFT-SIDED LOW BACK PAIN WITH LEFT-SIDED SCIATICA: ICD-10-CM

## 2018-09-06 DIAGNOSIS — I10 ESSENTIAL HYPERTENSION: Primary | ICD-10-CM

## 2018-09-06 PROCEDURE — 99214 OFFICE O/P EST MOD 30 MIN: CPT | Performed by: FAMILY MEDICINE

## 2018-09-06 PROCEDURE — 1101F PT FALLS ASSESS-DOCD LE1/YR: CPT | Performed by: FAMILY MEDICINE

## 2018-09-06 PROCEDURE — 1036F TOBACCO NON-USER: CPT | Performed by: FAMILY MEDICINE

## 2018-09-06 PROCEDURE — 1123F ACP DISCUSS/DSCN MKR DOCD: CPT | Performed by: FAMILY MEDICINE

## 2018-09-06 PROCEDURE — G8420 CALC BMI NORM PARAMETERS: HCPCS | Performed by: FAMILY MEDICINE

## 2018-09-06 PROCEDURE — 4040F PNEUMOC VAC/ADMIN/RCVD: CPT | Performed by: FAMILY MEDICINE

## 2018-09-06 PROCEDURE — G8427 DOCREV CUR MEDS BY ELIG CLIN: HCPCS | Performed by: FAMILY MEDICINE

## 2018-09-06 RX ORDER — AMLODIPINE BESYLATE 5 MG/1
10 TABLET ORAL DAILY
Qty: 60 TABLET | Refills: 0 | Status: SHIPPED
Start: 2018-09-06 | End: 2018-09-25

## 2018-09-06 ASSESSMENT — ENCOUNTER SYMPTOMS
EYE DISCHARGE: 0
DIARRHEA: 0
ABDOMINAL PAIN: 0
COUGH: 0
BACK PAIN: 1
NAUSEA: 0
WHEEZING: 0
SORE THROAT: 0
CONSTIPATION: 0
RHINORRHEA: 0

## 2018-09-06 NOTE — PROGRESS NOTES
Har[facility] Busby  [address] [place] [address]  Dept: [phone]  Dept Fax: [phone]  Loc: [phone]    Robbie Lauren is a 80 y.o. male who presents today for his medical conditions/complaints as noted below. Chief Complaint   Patient presents with    Hypertension     here for BP check     Pre-op Exam     [facility]            HPI:     Patient presents for recheck of blood pressure. States that he's been checking home and typically gets between [phone] systolic. He was also elevated at his last doctor's appointment. Mentions that he does not have any chest pain or shortness of breath but he does occasionally get dizziness. Tolerates his metoprolol and amlodipine that he is taking currently. Does have a blood pressure cuff and can take it at home. Also has severe chronic low back pain. He is seeing pain management and did do a trial of the stimulator which she said gave him benefit. He is scheduled to have actual stimulator placed in the near future. In the meantime he is taking one and half to 2 Norco per day but takes them sparingly. Mentions that the pain is keeping him up at night and makes it difficult for him to do his daily activities. Looking for to stimuli replacement.         Past Medical History:   Diagnosis Date    Atrial fibrillation ([address]     Back pain     Hernia     Hx of blood clots     lungs after prostate surgery    Hyperlipidemia     Hypertension     Osteoarthritis     Prostate cancer ([address] 2015    Pulmonary emboli ([address] 11/2015    after prostate surgery    Wears glasses       Past Surgical History:   Procedure Laterality Date    APPENDECTOMY  01/1961    [facility]    BACK SURGERY  2056,4349    X stop    CATARACT REMOVAL Right 06/2001    Dr Lyudmila Quinn Left 11/2014    Dr Tyesha Sequeira Guevara Proctor, 2009    Dr Diogenes Doyle  [address]?     Dr Tong Schreiber of date    HERNIA REPAIR Left 11/22/2017    ROBOT RECURRENT LEFT INGUINAL HERNIA REPAIR performed by Shelley Stone MD at [address] BILATERAL Bilateral 9/5/2017    LUMBAR FACET MBB L3-4, L4-5, L5-S1 BILATERAL performed by Blanca Pavon MD at [place] [address] Left 04/09/2018    HIP INJECTION     OTHER SURGICAL HISTORY  10/7/2015    XI ROBOTIC PROSTATECTOMY    OTHER SURGICAL HISTORY Bilateral 09/05/2017    lumbar facet block L3-S1    OTHER SURGICAL HISTORY Right 1980's    nerve release right lower arm    OTHER SURGICAL HISTORY  01/30/2018    Sacroiliac joint MBB    OTHER SURGICAL HISTORY Left 02/27/2018    Sacroiliac joint medial branch block     [place] INJECT SI JOINT ARTHRGRPHY&/ANES/STEROID W/IMAGE Left 1/30/2018    LEFT SI MBB performed by Blanca Pavon MD at [facility] SI JOINT ARTHRGRPHY&/ANES/STEROID W/IMAGE Left 2/27/2018    LEFT SI MBB #2 performed by Blanca Pavon MD at [address] OFFICE/OUTPT VISIT,PROCEDURE ONLY Left 4/9/2018    LEFT HIP STEROID INJECTION WITH SEDATION performed by Blanca Pavon MD at [address] OFFICE/OUTPT VISIT,PROCEDURE ONLY N/A 8/21/2018    SPINAL CORD STIMULATOR IMPLANT TRIAL performed by Blanca Pavon MD at [address] [facility] BIOPSY  7-    Dr Catrachito Bernabe BIOPSY  8/21/2015    transrectal ultrasound with biopsy(+)    PROSTATE SURGERY  2003    TURP    PROSTATE SURGERY  2009    Biopsy -Hyperplasia    SHOULDER ARTHROPLASTY  2005    right       Family History   Problem Relation Age of Onset    Heart Disease Father     Prostate Cancer Brother     Cancer Brother     Prostate Cancer Brother     Cancer Brother     Stroke Mother     Cancer Sister     Prostate Cancer Brother     Prostate fever.   HENT: Negative for congestion, rhinorrhea and sore throat. Eyes: Negative for discharge and visual disturbance. Respiratory: Negative for cough and wheezing. Gastrointestinal: Negative for abdominal pain, constipation, diarrhea and nausea. Genitourinary: Negative for dysuria and hematuria. Musculoskeletal: Positive for arthralgias, back pain and gait problem. Neurological: Positive for dizziness. Psychiatric/Behavioral: Negative for sleep disturbance. The patient is not nervous/anxious. Objective:     Physical Exam   Constitutional: He is oriented to person, place, and time. He appears well-developed and well-nourished. HENT:   Head: Normocephalic and atraumatic. Eyes: Conjunctivae and EOM are normal. Right eye exhibits no discharge. Left eye exhibits no discharge. No scleral icterus. Neck: Normal range of motion. Cardiovascular: Normal rate, regular rhythm and normal heart sounds. Pulmonary/Chest: Effort normal and breath sounds normal. He has no wheezes. Abdominal: Soft. Bowel sounds are normal. He exhibits no distension. There is no tenderness. Musculoskeletal: He exhibits no edema or tenderness. Cervical back: He exhibits deformity (increased kyphosis). Lymphadenopathy:     He has no cervical adenopathy. Neurological: He is alert and oriented to person, place, and time. Coordination normal.   Skin: Skin is warm and dry. Psychiatric: He has a normal mood and affect. His behavior is normal. Judgment and thought content normal.   Nursing note and vitals reviewed. /74   Pulse 74   Resp 20   Ht 5' 10.5\" (1.791 m)   Wt 142 lb (64.4 kg)   SpO2 98%   BMI 20.09 kg/m²     Assessment:       Diagnosis Orders   1. Essential hypertension     2. Chronic left-sided low back pain with left-sided sciatica         Plan:     Blood pressure is slightly elevated today and has been elevated in the past will increase amlodipine to 10 mg daily.   Patient to start with 2 tablets of his 5 mg and keep blood pressure log for 2 weeks. Patient will drop off blood pressure log to evaluate. Patient follow-up with pain management for his back pain and proceed with stimuli or placement as scheduled. He is cleared for surgery from my standpoint. Jairon Lopez received counseling on the following healthy behaviors: medication adherence    Patient given educational materials on Hypertension    I have instructed Jairon Lopez to complete a self tracking handout on Blood Pressures  and instructed them to bring it with them to his next appointment. Discussed use, benefit, and side effects of prescribed medications. Barriers to medication compliance addressed. All patient questions answered. Pt voiced understanding. No Follow-up on file. No orders of the defined types were placed in this encounter. Orders Placed This Encounter   Medications    amLODIPine (NORVASC) 5 MG tablet     Sig: Take 2 tablets by mouth daily     Dispense:  60 tablet     Refill:  0        Reccommended tobacco cessation options including pharmacologic methods, counseled great than 3 minutes during this visit:  Yes  []  No  []     Patient given educational materials - see patient instructions. Discussed use, benefit, and side effects of prescribed medications. All patient questions answered. Pt voiced understanding. Reviewed health maintenance. Instructed to continue current medications, diet and exercise. Patient agreed with treatment plan. Follow up as directed.      Electronically signed by Otto Oneill MD on 9/6/2018 at 5:04 PM

## 2018-09-07 ENCOUNTER — HOSPITAL ENCOUNTER (OUTPATIENT)
Dept: MRI IMAGING | Age: 83
Discharge: HOME | End: 2018-09-07
Payer: MEDICARE

## 2018-09-07 DIAGNOSIS — M54.16 LUMBAR RADICULOPATHY: ICD-10-CM

## 2018-09-07 DIAGNOSIS — M47.816 LUMBAR SPONDYLOSIS: ICD-10-CM

## 2018-09-07 DIAGNOSIS — M54.16 LUMBAR RADICULITIS: ICD-10-CM

## 2018-09-07 DIAGNOSIS — M54.50 LUMBAR PAIN: ICD-10-CM

## 2018-09-07 LAB — POC CREATININE WHOLE BLOOD: 0.9 MG/DL (ref 0.5–1.2)

## 2018-09-07 PROCEDURE — 72158 MRI LUMBAR SPINE W/O & W/DYE: CPT

## 2018-09-07 PROCEDURE — A9579 GAD-BASE MR CONTRAST NOS,1ML: HCPCS | Performed by: NURSE PRACTITIONER

## 2018-09-07 PROCEDURE — 6360000004 HC RX CONTRAST MEDICATION: Performed by: NURSE PRACTITIONER

## 2018-09-07 PROCEDURE — 82565 ASSAY OF CREATININE: CPT

## 2018-09-07 RX ADMIN — GADOTERIDOL 10 ML: 279.3 INJECTION, SOLUTION INTRAVENOUS at 10:29

## 2018-09-09 NOTE — COMMUNICATION BODY
MRI Type:  Film and Report    Lab Results   Component Value Date    WBC 8.7 07/24/2018    HGB 14.6 07/24/2018    HCT 44.8 07/24/2018     07/24/2018    CHOL 177 05/12/2018    TRIG 54 05/12/2018    HDL 65 05/12/2018    ALT 36 06/30/2018    AST 28 06/30/2018     06/30/2018    K 4.0 06/30/2018     06/30/2018    CREATININE 1.2 06/30/2018    BUN 21 (H) 06/30/2018    CO2 29 06/30/2018    TSH 3.380 07/24/2018    PSA <0.02 10/05/2017    INR 1.07 06/30/2018       Assessment and Plan      Diagnosis Orders   1. Chronic left-sided low back pain, with sciatica presence unspecified     2. Chronic pain disorder     3. Spine deformity       - Based on patient's pain symptoms, his clinical history and the type of his response to spinal cord stimulator trail. I believe patient is a good candidate for permanent placement of spinal cord stimulator system (paddle and battery). The spinal cord stimulator paddle is going to be paced at the same location of the leads during the trail. And as I told patient, the effect of permanent spinal cord stimulator is expected to be similar to the effect he experienced during the trail.   -I discussed this surgical treatment option with patient and his/her family in detail including the associated risks and benefits, as well as, the alternative treatment options. All questions were answered. Patient elected to proceed with this surgical treatment option ( the placement of permanent  of spinal cord stimulator paddle and battery). The plan now for Jairon Lopez now is to schedule him  for surgery after obtaining pre op cardiac cleanness. I told him that he needs to stop all his anticoagulation medications at least 1 week before surgery.

## 2018-09-11 ENCOUNTER — TELEPHONE (OUTPATIENT)
Dept: OPERATING ROOM | Age: 83
End: 2018-09-11

## 2018-09-12 ENCOUNTER — HOSPITAL ENCOUNTER (OUTPATIENT)
Age: 83
Discharge: HOME | End: 2018-09-12
Payer: MEDICARE

## 2018-09-12 ENCOUNTER — TELEPHONE (OUTPATIENT)
Dept: NEUROSURGERY | Age: 83
End: 2018-09-12

## 2018-09-12 ENCOUNTER — HOSPITAL ENCOUNTER (OUTPATIENT)
Dept: GENERAL RADIOLOGY | Age: 83
Discharge: HOME | End: 2018-09-12
Payer: MEDICARE

## 2018-09-12 DIAGNOSIS — M54.42 CHRONIC LEFT-SIDED LOW BACK PAIN WITH LEFT-SIDED SCIATICA: ICD-10-CM

## 2018-09-12 DIAGNOSIS — Z01.818 PRE-OP TESTING: Primary | ICD-10-CM

## 2018-09-12 DIAGNOSIS — G89.29 CHRONIC LEFT-SIDED LOW BACK PAIN WITH LEFT-SIDED SCIATICA: ICD-10-CM

## 2018-09-12 DIAGNOSIS — Z01.818 PRE-OP TESTING: ICD-10-CM

## 2018-09-12 LAB
ALBUMIN SERPL-MCNC: 4.4 G/DL (ref 3.5–5.1)
ALP BLD-CCNC: 55 U/L (ref 38–126)
ALT SERPL-CCNC: 21 U/L (ref 11–66)
ANION GAP SERPL CALCULATED.3IONS-SCNC: 10 MEQ/L (ref 8–16)
APTT: 39.2 SECONDS (ref 22–38)
AST SERPL-CCNC: 21 U/L (ref 5–40)
BASOPHILS # BLD: 0.8 %
BASOPHILS ABSOLUTE: 0.1 THOU/MM3 (ref 0–0.1)
BILIRUB SERPL-MCNC: 0.4 MG/DL (ref 0.3–1.2)
BUN BLDV-MCNC: 30 MG/DL (ref 7–22)
CALCIUM SERPL-MCNC: 9.5 MG/DL (ref 8.5–10.5)
CHLORIDE BLD-SCNC: 104 MEQ/L (ref 98–111)
CO2: 26 MEQ/L (ref 23–33)
CREAT SERPL-MCNC: 1.2 MG/DL (ref 0.4–1.2)
EKG ATRIAL RATE: 73 BPM
EKG P AXIS: 85 DEGREES
EKG P-R INTERVAL: 200 MS
EKG Q-T INTERVAL: 376 MS
EKG QRS DURATION: 82 MS
EKG QTC CALCULATION (BAZETT): 414 MS
EKG R AXIS: 60 DEGREES
EKG T AXIS: 65 DEGREES
EKG VENTRICULAR RATE: 73 BPM
EOSINOPHIL # BLD: 4.8 %
EOSINOPHILS ABSOLUTE: 0.4 THOU/MM3 (ref 0–0.4)
ERYTHROCYTE [DISTWIDTH] IN BLOOD BY AUTOMATED COUNT: 13.2 % (ref 11.5–14.5)
ERYTHROCYTE [DISTWIDTH] IN BLOOD BY AUTOMATED COUNT: 49 FL (ref 35–45)
GFR SERPL CREATININE-BSD FRML MDRD: 58 ML/MIN/1.73M2
GLUCOSE BLD-MCNC: 110 MG/DL (ref 70–108)
HCT VFR BLD CALC: 44.1 % (ref 42–52)
HEMOGLOBIN: 14.2 GM/DL (ref 14–18)
IMMATURE GRANS (ABS): 0.03 THOU/MM3 (ref 0–0.07)
IMMATURE GRANULOCYTES: 0.4 %
INR BLD: 1.3 (ref 0.85–1.13)
LYMPHOCYTES # BLD: 19.7 %
LYMPHOCYTES ABSOLUTE: 1.7 THOU/MM3 (ref 1–4.8)
MCH RBC QN AUTO: 32.3 PG (ref 26–33)
MCHC RBC AUTO-ENTMCNC: 32.2 GM/DL (ref 32.2–35.5)
MCV RBC AUTO: 100.2 FL (ref 80–94)
MONOCYTES # BLD: 11.3 %
MONOCYTES ABSOLUTE: 0.9 THOU/MM3 (ref 0.4–1.3)
NUCLEATED RED BLOOD CELLS: 0 /100 WBC
PLATELET # BLD: 163 THOU/MM3 (ref 130–400)
PMV BLD AUTO: 12.6 FL (ref 9.4–12.4)
POTASSIUM SERPL-SCNC: 4.4 MEQ/L (ref 3.5–5.2)
RBC # BLD: 4.4 MILL/MM3 (ref 4.7–6.1)
SEG NEUTROPHILS: 63 %
SEGMENTED NEUTROPHILS ABSOLUTE COUNT: 5.3 THOU/MM3 (ref 1.8–7.7)
SODIUM BLD-SCNC: 140 MEQ/L (ref 135–145)
TOTAL PROTEIN: 7.3 G/DL (ref 6.1–8)
WBC # BLD: 8.4 THOU/MM3 (ref 4.8–10.8)

## 2018-09-12 PROCEDURE — 36415 COLL VENOUS BLD VENIPUNCTURE: CPT

## 2018-09-12 PROCEDURE — 85610 PROTHROMBIN TIME: CPT

## 2018-09-12 PROCEDURE — 71046 X-RAY EXAM CHEST 2 VIEWS: CPT

## 2018-09-12 PROCEDURE — 85730 THROMBOPLASTIN TIME PARTIAL: CPT

## 2018-09-12 PROCEDURE — 93005 ELECTROCARDIOGRAM TRACING: CPT | Performed by: NEUROLOGICAL SURGERY

## 2018-09-12 PROCEDURE — 85025 COMPLETE CBC W/AUTO DIFF WBC: CPT

## 2018-09-12 PROCEDURE — 80053 COMPREHEN METABOLIC PANEL: CPT

## 2018-09-12 PROCEDURE — 93010 ELECTROCARDIOGRAM REPORT: CPT | Performed by: INTERNAL MEDICINE

## 2018-09-12 NOTE — TELEPHONE ENCOUNTER
SURGERY 826  [facility] [address] [place], [address]      Phone *[phone] *[phone]   Surgical Scheduling Direct Line Phone *[phone] Fax *[phone]      Bud Schultz   1934   male    [address] [address]  [place] [address] [facility]              Home Phone: [phone]    Cell Phone:    Telephone Information:   Mobile [phone]          Surgeon: Rogelio Israel Surgery Date: 9/17/2017  Time: 11:00 am    Procedure: Thoracic laminectomy permanent spinal cord stimulator paddle plus battery placement. Diagnosis: Chronic left-sided lowe back pain, with sciatica     Important Medical History:       Special Inst/Equip: Position: Prone, Gómez table, microscope, laminectomy set, neuropsyology, high speed drill    Anesthesia:  Gen            Admission Type: Same Day       Case Location:      Main OR         Need Preop Cardiac Clearance:       Does Patient have Cardiologist/physician?          Surgery Confirmation #: __________________________________________________    : ________________________   Date: __________________________     Insurance Company Name: [employer]  CPT: [id number]

## 2018-09-17 ENCOUNTER — ANESTHESIA (OUTPATIENT)
Dept: OPERATING ROOM | Age: 83
End: 2018-09-17
Payer: MEDICARE

## 2018-09-17 ENCOUNTER — ANESTHESIA EVENT (OUTPATIENT)
Dept: OPERATING ROOM | Age: 83
End: 2018-09-17
Payer: MEDICARE

## 2018-09-17 ENCOUNTER — HOSPITAL ENCOUNTER (OUTPATIENT)
Age: 83
Discharge: HOME | End: 2018-09-18
Attending: NEUROLOGICAL SURGERY | Admitting: NEUROLOGICAL SURGERY
Payer: MEDICARE

## 2018-09-17 ENCOUNTER — PREP FOR PROCEDURE (OUTPATIENT)
Dept: NEUROSURGERY | Age: 83
End: 2018-09-17

## 2018-09-17 ENCOUNTER — APPOINTMENT (OUTPATIENT)
Dept: GENERAL RADIOLOGY | Age: 83
End: 2018-09-17
Attending: NEUROLOGICAL SURGERY
Payer: MEDICARE

## 2018-09-17 VITALS
DIASTOLIC BLOOD PRESSURE: 73 MMHG | SYSTOLIC BLOOD PRESSURE: 151 MMHG | OXYGEN SATURATION: 99 % | RESPIRATION RATE: 2 BRPM

## 2018-09-17 LAB
ABO: NORMAL
ANTIBODY SCREEN: NORMAL
RH FACTOR: NORMAL

## 2018-09-17 PROCEDURE — 2500000003 HC RX 250 WO HCPCS: Performed by: NURSE ANESTHETIST, CERTIFIED REGISTERED

## 2018-09-17 PROCEDURE — 6360000002 HC RX W HCPCS: Performed by: NURSE ANESTHETIST, CERTIFIED REGISTERED

## 2018-09-17 PROCEDURE — 63655 IMPLANT NEUROELECTRODES: CPT | Performed by: NEUROLOGICAL SURGERY

## 2018-09-17 PROCEDURE — 2500000003 HC RX 250 WO HCPCS: Performed by: NEUROLOGICAL SURGERY

## 2018-09-17 PROCEDURE — 2720000010 HC SURG SUPPLY STERILE: Performed by: NEUROLOGICAL SURGERY

## 2018-09-17 PROCEDURE — 2580000003 HC RX 258: Performed by: NEUROLOGICAL SURGERY

## 2018-09-17 PROCEDURE — C1787 PATIENT PROGR, NEUROSTIM: HCPCS | Performed by: NEUROLOGICAL SURGERY

## 2018-09-17 PROCEDURE — C1820 GENERATOR NEURO RECHG BAT SY: HCPCS | Performed by: NEUROLOGICAL SURGERY

## 2018-09-17 PROCEDURE — C1778 LEAD, NEUROSTIMULATOR: HCPCS | Performed by: NEUROLOGICAL SURGERY

## 2018-09-17 PROCEDURE — 63685 INS/RPLC SPI NPG/RCVR POCKET: CPT | Performed by: NEUROLOGICAL SURGERY

## 2018-09-17 PROCEDURE — 2720000001 HC MISC SURG SUPPLY STERILE $51-500: Performed by: NEUROLOGICAL SURGERY

## 2018-09-17 PROCEDURE — 6370000000 HC RX 637 (ALT 250 FOR IP): Performed by: NEUROLOGICAL SURGERY

## 2018-09-17 PROCEDURE — 94760 N-INVAS EAR/PLS OXIMETRY 1: CPT

## 2018-09-17 PROCEDURE — 3600000005 HC SURGERY LEVEL 5 BASE: Performed by: NEUROLOGICAL SURGERY

## 2018-09-17 PROCEDURE — 3700000001 HC ADD 15 MINUTES (ANESTHESIA): Performed by: NEUROLOGICAL SURGERY

## 2018-09-17 PROCEDURE — 72072 X-RAY EXAM THORAC SPINE 3VWS: CPT

## 2018-09-17 PROCEDURE — 6360000002 HC RX W HCPCS: Performed by: NEUROLOGICAL SURGERY

## 2018-09-17 PROCEDURE — 3209999900 FLUORO FOR SURGICAL PROCEDURES

## 2018-09-17 PROCEDURE — C1713 ANCHOR/SCREW BN/BN,TIS/BN: HCPCS | Performed by: NEUROLOGICAL SURGERY

## 2018-09-17 PROCEDURE — 7100000001 HC PACU RECOVERY - ADDTL 15 MIN: Performed by: NEUROLOGICAL SURGERY

## 2018-09-17 PROCEDURE — S0028 INJECTION, FAMOTIDINE, 20 MG: HCPCS | Performed by: NEUROLOGICAL SURGERY

## 2018-09-17 PROCEDURE — 86900 BLOOD TYPING SEROLOGIC ABO: CPT

## 2018-09-17 PROCEDURE — 95940 IONM IN OPERATNG ROOM 15 MIN: CPT | Performed by: NEUROLOGICAL SURGERY

## 2018-09-17 PROCEDURE — 3700000000 HC ANESTHESIA ATTENDED CARE: Performed by: NEUROLOGICAL SURGERY

## 2018-09-17 PROCEDURE — 86901 BLOOD TYPING SEROLOGIC RH(D): CPT

## 2018-09-17 PROCEDURE — 2780000010 HC IMPLANT OTHER: Performed by: NEUROLOGICAL SURGERY

## 2018-09-17 PROCEDURE — 2709999900 HC NON-CHARGEABLE SUPPLY: Performed by: NEUROLOGICAL SURGERY

## 2018-09-17 PROCEDURE — 3600000015 HC SURGERY LEVEL 5 ADDTL 15MIN: Performed by: NEUROLOGICAL SURGERY

## 2018-09-17 PROCEDURE — 7100000000 HC PACU RECOVERY - FIRST 15 MIN: Performed by: NEUROLOGICAL SURGERY

## 2018-09-17 PROCEDURE — 36415 COLL VENOUS BLD VENIPUNCTURE: CPT

## 2018-09-17 PROCEDURE — 6360000002 HC RX W HCPCS: Performed by: ANESTHESIOLOGY

## 2018-09-17 PROCEDURE — 86850 RBC ANTIBODY SCREEN: CPT

## 2018-09-17 DEVICE — FLOSEAL HEMOSTATIC MATRIX, 5 ML
Type: IMPLANTABLE DEVICE | Site: SPINE THORACIC | Status: FUNCTIONAL
Brand: FLOSEAL

## 2018-09-17 DEVICE — 50CM 4X8 SURGICAL LEAD KIT
Type: IMPLANTABLE DEVICE | Site: SPINE THORACIC | Status: FUNCTIONAL
Brand: COVEREDGE™ 32

## 2018-09-17 DEVICE — ANCHOR
Type: IMPLANTABLE DEVICE | Site: SPINE THORACIC | Status: FUNCTIONAL
Brand: CLICK™

## 2018-09-17 DEVICE — PADDLE BLANK FOR COVEREDGE ™ 32 SURGICAL LEAD: Type: IMPLANTABLE DEVICE | Site: SPINE THORACIC | Status: FUNCTIONAL

## 2018-09-17 DEVICE — IMPLANTABLE PULSE GENERATOR KIT
Type: IMPLANTABLE DEVICE | Site: SPINE THORACIC | Status: FUNCTIONAL
Brand: SPECTRA WAVEWRITER™

## 2018-09-17 DEVICE — Z DUP USE 2138772 BAND ANCHR FIX TISS ANULEX 201: Type: IMPLANTABLE DEVICE | Site: SPINE THORACIC | Status: FUNCTIONAL

## 2018-09-17 RX ORDER — LABETALOL HYDROCHLORIDE 5 MG/ML
5 INJECTION, SOLUTION INTRAVENOUS EVERY 10 MIN PRN
Status: DISCONTINUED | OUTPATIENT
Start: 2018-09-17 | End: 2018-09-17 | Stop reason: HOSPADM

## 2018-09-17 RX ORDER — FENTANYL CITRATE 50 UG/ML
50 INJECTION, SOLUTION INTRAMUSCULAR; INTRAVENOUS EVERY 5 MIN PRN
Status: DISCONTINUED | OUTPATIENT
Start: 2018-09-17 | End: 2018-09-17 | Stop reason: HOSPADM

## 2018-09-17 RX ORDER — MORPHINE SULFATE 4 MG/ML
4 INJECTION, SOLUTION INTRAMUSCULAR; INTRAVENOUS
Status: DISCONTINUED | OUTPATIENT
Start: 2018-09-17 | End: 2018-09-18 | Stop reason: HOSPADM

## 2018-09-17 RX ORDER — HYDROCODONE BITARTRATE AND ACETAMINOPHEN 5; 325 MG/1; MG/1
1 TABLET ORAL EVERY 6 HOURS PRN
Status: DISCONTINUED | OUTPATIENT
Start: 2018-09-17 | End: 2018-09-18 | Stop reason: HOSPADM

## 2018-09-17 RX ORDER — FENTANYL CITRATE 50 UG/ML
INJECTION, SOLUTION INTRAMUSCULAR; INTRAVENOUS PRN
Status: DISCONTINUED | OUTPATIENT
Start: 2018-09-17 | End: 2018-09-17 | Stop reason: SDUPTHER

## 2018-09-17 RX ORDER — SODIUM CHLORIDE 9 MG/ML
INJECTION, SOLUTION INTRAVENOUS CONTINUOUS
Status: DISCONTINUED | OUTPATIENT
Start: 2018-09-17 | End: 2018-09-17

## 2018-09-17 RX ORDER — GINSENG 100 MG
CAPSULE ORAL PRN
Status: DISCONTINUED | OUTPATIENT
Start: 2018-09-17 | End: 2018-09-17 | Stop reason: HOSPADM

## 2018-09-17 RX ORDER — MORPHINE SULFATE 2 MG/ML
2 INJECTION, SOLUTION INTRAMUSCULAR; INTRAVENOUS
Status: DISCONTINUED | OUTPATIENT
Start: 2018-09-17 | End: 2018-09-18 | Stop reason: HOSPADM

## 2018-09-17 RX ORDER — ROCURONIUM BROMIDE 10 MG/ML
INJECTION, SOLUTION INTRAVENOUS PRN
Status: DISCONTINUED | OUTPATIENT
Start: 2018-09-17 | End: 2018-09-17 | Stop reason: SDUPTHER

## 2018-09-17 RX ORDER — SODIUM CHLORIDE 0.9 % (FLUSH) 0.9 %
10 SYRINGE (ML) INJECTION EVERY 12 HOURS SCHEDULED
Status: DISCONTINUED | OUTPATIENT
Start: 2018-09-17 | End: 2018-09-18 | Stop reason: HOSPADM

## 2018-09-17 RX ORDER — ONDANSETRON 2 MG/ML
4 INJECTION INTRAMUSCULAR; INTRAVENOUS EVERY 6 HOURS PRN
Status: DISCONTINUED | OUTPATIENT
Start: 2018-09-17 | End: 2018-09-18 | Stop reason: HOSPADM

## 2018-09-17 RX ORDER — SODIUM CHLORIDE 0.9 % (FLUSH) 0.9 %
10 SYRINGE (ML) INJECTION PRN
Status: DISCONTINUED | OUTPATIENT
Start: 2018-09-17 | End: 2018-09-18 | Stop reason: HOSPADM

## 2018-09-17 RX ORDER — AMLODIPINE BESYLATE 10 MG/1
10 TABLET ORAL DAILY
Status: DISCONTINUED | OUTPATIENT
Start: 2018-09-18 | End: 2018-09-18 | Stop reason: HOSPADM

## 2018-09-17 RX ORDER — PROMETHAZINE HYDROCHLORIDE 25 MG/ML
6.25 INJECTION, SOLUTION INTRAMUSCULAR; INTRAVENOUS
Status: DISCONTINUED | OUTPATIENT
Start: 2018-09-17 | End: 2018-09-17 | Stop reason: HOSPADM

## 2018-09-17 RX ORDER — BACITRACIN 50000 [USP'U]/1
INJECTION, POWDER, LYOPHILIZED, FOR SOLUTION INTRAMUSCULAR PRN
Status: DISCONTINUED | OUTPATIENT
Start: 2018-09-17 | End: 2018-09-17 | Stop reason: HOSPADM

## 2018-09-17 RX ORDER — SODIUM CHLORIDE 9 MG/ML
INJECTION, SOLUTION INTRAVENOUS CONTINUOUS
Status: DISCONTINUED | OUTPATIENT
Start: 2018-09-17 | End: 2018-09-18 | Stop reason: HOSPADM

## 2018-09-17 RX ORDER — PROPOFOL 10 MG/ML
INJECTION, EMULSION INTRAVENOUS PRN
Status: DISCONTINUED | OUTPATIENT
Start: 2018-09-17 | End: 2018-09-17 | Stop reason: SDUPTHER

## 2018-09-17 RX ORDER — ACETAMINOPHEN 325 MG/1
650 TABLET ORAL EVERY 4 HOURS PRN
Status: DISCONTINUED | OUTPATIENT
Start: 2018-09-17 | End: 2018-09-18 | Stop reason: HOSPADM

## 2018-09-17 RX ORDER — FENTANYL CITRATE 50 UG/ML
25 INJECTION, SOLUTION INTRAMUSCULAR; INTRAVENOUS EVERY 5 MIN PRN
Status: DISCONTINUED | OUTPATIENT
Start: 2018-09-17 | End: 2018-09-17 | Stop reason: HOSPADM

## 2018-09-17 RX ORDER — ONDANSETRON 2 MG/ML
4 INJECTION INTRAMUSCULAR; INTRAVENOUS
Status: DISCONTINUED | OUTPATIENT
Start: 2018-09-17 | End: 2018-09-17 | Stop reason: HOSPADM

## 2018-09-17 RX ORDER — CYCLOBENZAPRINE HCL 10 MG
5 TABLET ORAL 3 TIMES DAILY PRN
Status: DISCONTINUED | OUTPATIENT
Start: 2018-09-17 | End: 2018-09-18 | Stop reason: HOSPADM

## 2018-09-17 RX ORDER — HYDRALAZINE HYDROCHLORIDE 20 MG/ML
5 INJECTION INTRAMUSCULAR; INTRAVENOUS EVERY 10 MIN PRN
Status: DISCONTINUED | OUTPATIENT
Start: 2018-09-17 | End: 2018-09-17 | Stop reason: HOSPADM

## 2018-09-17 RX ORDER — MIDAZOLAM HYDROCHLORIDE 1 MG/ML
INJECTION INTRAMUSCULAR; INTRAVENOUS PRN
Status: DISCONTINUED | OUTPATIENT
Start: 2018-09-17 | End: 2018-09-17 | Stop reason: SDUPTHER

## 2018-09-17 RX ORDER — METOPROLOL SUCCINATE 25 MG/1
25 TABLET, EXTENDED RELEASE ORAL DAILY
Status: DISCONTINUED | OUTPATIENT
Start: 2018-09-18 | End: 2018-09-18 | Stop reason: HOSPADM

## 2018-09-17 RX ADMIN — HYDROMORPHONE HYDROCHLORIDE 0.25 MG: 1 INJECTION, SOLUTION INTRAMUSCULAR; INTRAVENOUS; SUBCUTANEOUS at 14:05

## 2018-09-17 RX ADMIN — Medication 2 G: at 19:59

## 2018-09-17 RX ADMIN — HYDROCODONE BITARTRATE AND ACETAMINOPHEN 1 TABLET: 5; 325 TABLET ORAL at 15:53

## 2018-09-17 RX ADMIN — FAMOTIDINE 20 MG: 10 INJECTION, SOLUTION INTRAVENOUS at 19:59

## 2018-09-17 RX ADMIN — HYDROCODONE BITARTRATE AND ACETAMINOPHEN 1 TABLET: 5; 325 TABLET ORAL at 21:57

## 2018-09-17 RX ADMIN — Medication 2 G: at 11:13

## 2018-09-17 RX ADMIN — FENTANYL CITRATE 100 MCG: 50 INJECTION INTRAMUSCULAR; INTRAVENOUS at 11:15

## 2018-09-17 RX ADMIN — SODIUM CHLORIDE: 9 INJECTION, SOLUTION INTRAVENOUS at 11:13

## 2018-09-17 RX ADMIN — PROPOFOL 200 MG: 10 INJECTION, EMULSION INTRAVENOUS at 11:15

## 2018-09-17 RX ADMIN — SODIUM CHLORIDE: 9 INJECTION, SOLUTION INTRAVENOUS at 10:56

## 2018-09-17 RX ADMIN — SODIUM CHLORIDE: 9 INJECTION, SOLUTION INTRAVENOUS at 12:42

## 2018-09-17 RX ADMIN — HYDROMORPHONE HYDROCHLORIDE 0.25 MG: 1 INJECTION, SOLUTION INTRAMUSCULAR; INTRAVENOUS; SUBCUTANEOUS at 14:14

## 2018-09-17 RX ADMIN — FENTANYL CITRATE 100 MCG: 50 INJECTION INTRAMUSCULAR; INTRAVENOUS at 12:55

## 2018-09-17 RX ADMIN — MIDAZOLAM HYDROCHLORIDE 2 MG: 1 INJECTION, SOLUTION INTRAMUSCULAR; INTRAVENOUS at 11:15

## 2018-09-17 RX ADMIN — SODIUM CHLORIDE: 9 INJECTION, SOLUTION INTRAVENOUS at 23:50

## 2018-09-17 RX ADMIN — FENTANYL CITRATE 50 MCG: 50 INJECTION INTRAMUSCULAR; INTRAVENOUS at 13:15

## 2018-09-17 RX ADMIN — ROCURONIUM BROMIDE 50 MG: 10 INJECTION INTRAVENOUS at 11:15

## 2018-09-17 ASSESSMENT — PULMONARY FUNCTION TESTS
PIF_VALUE: 16
PIF_VALUE: 16
PIF_VALUE: 3
PIF_VALUE: 13
PIF_VALUE: 16
PIF_VALUE: 15
PIF_VALUE: 0
PIF_VALUE: 15
PIF_VALUE: 15
PIF_VALUE: 20
PIF_VALUE: 15
PIF_VALUE: 16
PIF_VALUE: 15
PIF_VALUE: 16
PIF_VALUE: 15
PIF_VALUE: 16
PIF_VALUE: 15
PIF_VALUE: 14
PIF_VALUE: 15
PIF_VALUE: 16
PIF_VALUE: 15
PIF_VALUE: 14
PIF_VALUE: 15
PIF_VALUE: 13
PIF_VALUE: 1
PIF_VALUE: 15
PIF_VALUE: 19
PIF_VALUE: 15
PIF_VALUE: 15
PIF_VALUE: 7
PIF_VALUE: 13
PIF_VALUE: 15
PIF_VALUE: 16
PIF_VALUE: 15
PIF_VALUE: 41
PIF_VALUE: 0
PIF_VALUE: 15
PIF_VALUE: 20
PIF_VALUE: 14
PIF_VALUE: 13
PIF_VALUE: 15
PIF_VALUE: 13
PIF_VALUE: 15
PIF_VALUE: 13
PIF_VALUE: 15
PIF_VALUE: 16
PIF_VALUE: 15
PIF_VALUE: 16
PIF_VALUE: 15
PIF_VALUE: 15
PIF_VALUE: 2
PIF_VALUE: 16
PIF_VALUE: 14
PIF_VALUE: 15
PIF_VALUE: 1
PIF_VALUE: 14
PIF_VALUE: 0
PIF_VALUE: 16
PIF_VALUE: 15
PIF_VALUE: 32
PIF_VALUE: 15
PIF_VALUE: 0
PIF_VALUE: 15
PIF_VALUE: 15
PIF_VALUE: 13
PIF_VALUE: 15
PIF_VALUE: 15
PIF_VALUE: 13
PIF_VALUE: 15
PIF_VALUE: 13
PIF_VALUE: 15
PIF_VALUE: 14
PIF_VALUE: 15
PIF_VALUE: 15
PIF_VALUE: 20
PIF_VALUE: 15
PIF_VALUE: 16
PIF_VALUE: 15
PIF_VALUE: 17
PIF_VALUE: 15
PIF_VALUE: 15
PIF_VALUE: 16
PIF_VALUE: 15
PIF_VALUE: 9
PIF_VALUE: 15
PIF_VALUE: 15

## 2018-09-17 ASSESSMENT — PAIN DESCRIPTION - DESCRIPTORS
DESCRIPTORS: ACHING
DESCRIPTORS: ACHING
DESCRIPTORS_2: PRESSURE
DESCRIPTORS: ACHING;DULL

## 2018-09-17 ASSESSMENT — PAIN DESCRIPTION - ORIENTATION
ORIENTATION_2: LOWER
ORIENTATION: LOWER

## 2018-09-17 ASSESSMENT — PAIN DESCRIPTION - PROGRESSION
CLINICAL_PROGRESSION: NOT CHANGED

## 2018-09-17 ASSESSMENT — PAIN DESCRIPTION - ONSET: ONSET: ON-GOING

## 2018-09-17 ASSESSMENT — PAIN - FUNCTIONAL ASSESSMENT: PAIN_FUNCTIONAL_ASSESSMENT: 0-10

## 2018-09-17 ASSESSMENT — PAIN DESCRIPTION - PAIN TYPE
TYPE_2: ACUTE PAIN
TYPE: SURGICAL PAIN
TYPE: ACUTE PAIN
TYPE: ACUTE PAIN

## 2018-09-17 ASSESSMENT — PAIN SCALES - GENERAL
PAINLEVEL_OUTOF10: 4
PAINLEVEL_OUTOF10: 3
PAINLEVEL_OUTOF10: 5
PAINLEVEL_OUTOF10: 4
PAINLEVEL_OUTOF10: 4
PAINLEVEL_OUTOF10: 5
PAINLEVEL_OUTOF10: 5

## 2018-09-17 ASSESSMENT — PAIN DESCRIPTION - LOCATION
LOCATION: BACK
LOCATION: ABDOMEN
LOCATION_2: ABDOMEN
LOCATION: BACK

## 2018-09-17 ASSESSMENT — PAIN DESCRIPTION - INTENSITY: RATING_2: 4

## 2018-09-17 ASSESSMENT — PAIN DESCRIPTION - FREQUENCY: FREQUENCY: CONTINUOUS

## 2018-09-17 NOTE — PROGRESS NOTES
1054:  Patient admitted to [facility] room 16. Family/spouse at bedside. Arm bands applied. Bilat. Socks, SCD's applied. Call light in reach. Blood consent, jewelry waiver signed. Wedding ring taped. 1102:  Consent signed. Lab here.

## 2018-09-17 NOTE — ANESTHESIA PRE PROCEDURE
HERNIA REPAIR  1960's?     Dr Kiah Elam of date    HERNIA REPAIR Left 11/22/2017    ROBOT RECURRENT LEFT INGUINAL HERNIA REPAIR performed by Renetta Corona MD at [address] BILATERAL Bilateral 9/5/2017    LUMBAR FACET MBB L3-4, L4-5, L5-S1 BILATERAL performed by Kofi Zhang MD at [place] [address] Left 04/09/2018    HIP INJECTION     OTHER SURGICAL HISTORY  10/7/2015    XI ROBOTIC PROSTATECTOMY    OTHER SURGICAL HISTORY Bilateral 09/05/2017    lumbar facet block L3-S1    OTHER SURGICAL HISTORY Right 1980's    nerve release right lower arm    OTHER SURGICAL HISTORY  01/30/2018    Sacroiliac joint MBB    OTHER SURGICAL HISTORY Left 02/27/2018    Sacroiliac joint medial branch block     [place] INJECT SI JOINT ARTHRGRPHY&/ANES/STEROID W/IMAGE Left 1/30/2018    LEFT SI MBB performed by Kofi Zhang MD at [address] INJECT SI JOINT ARTHRGRPHY&/ANES/STEROID W/IMAGE Left 2/27/2018    LEFT SI MBB #2 performed by Kofi Zhang MD at [address] OFFICE/OUTPT VISIT,PROCEDURE ONLY Left 4/9/2018    LEFT HIP STEROID INJECTION WITH SEDATION performed by Kofi Zhang MD at [address] OFFICE/OUTPT VISIT,PROCEDURE ONLY N/A 8/21/2018    SPINAL CORD STIMULATOR IMPLANT TRIAL performed by Kofi Zhang MD at [address] [facility] BIOPSY  7-    Dr Zoe Collins BIOPSY  8/21/2015    transrectal ultrasound with biopsy(+)    PROSTATE SURGERY  2003    TURP    PROSTATE SURGERY  2009    Biopsy -Hyperplasia    SHOULDER ARTHROPLASTY  2005    right       Social History:    Social History   Substance Use Topics    Smoking status: Former Smoker     Packs/day: 1.00     Years: 10.00     Types: Cigarettes     Quit date: 7/3/1965    Smokeless tobacco: Never Used    Alcohol use 0.0 oz/week      Comment: SOCIALLY wine a few times a year distance: >3 FB   Neck ROM: full  Mouth opening: > = 3 FB Dental:          Pulmonary:normal exam              Patient did not smoke on day of surgery. Cardiovascular:    (+) hypertension:, valvular problems/murmurs: MR, dysrhythmias: atrial fibrillation, CABRAL:,         Rhythm: irregular                      Neuro/Psych:   (+) psychiatric history:            GI/Hepatic/Renal: Neg GI/Hepatic/Renal ROS            Endo/Other:    (+) : arthritis:., .          Pt had no PAT visit       Abdominal:           Vascular: negative vascular ROS. Anesthesia Plan      general     ASA 3       Induction: intravenous. MIPS: Postoperative opioids intended and Prophylactic antiemetics administered. Anesthetic plan and risks discussed with patient. Plan discussed with CRNA.                   Akash Fan MD   9/17/2018

## 2018-09-17 NOTE — PROGRESS NOTES
1334- Pt to PACU  1355- Dr Mirella Douglas at bedside to assess pt, would like pt on back for 3-4 hrs head of bed may be raised. 1358- Pt complains of lower abdomen hurting slight redness noted abdomen no skin breakdown noted  1400- Pt given some ice chips. 1405- Pt medicated with 0.25 mg of dilaudid pt expiereincing soreness, abdomen, back of head is touchy states has happened before and lower back discomfort. [facility] at bedside starting programming  1415- Pt medicated with another 0.25 mg of dilaudid. 1420- Pt states he's doing well  1424- Report called to [facility]   [phone]- Family updated in waiting room. 1434- Pt meets discharge criteria.   Pt transported to floor via transport team on 02 3 L

## 2018-09-18 VITALS
RESPIRATION RATE: 16 BRPM | OXYGEN SATURATION: 92 % | HEIGHT: 70 IN | SYSTOLIC BLOOD PRESSURE: 147 MMHG | HEART RATE: 74 BPM | BODY MASS INDEX: 19.95 KG/M2 | DIASTOLIC BLOOD PRESSURE: 66 MMHG | TEMPERATURE: 98.3 F | WEIGHT: 139.33 LBS

## 2018-09-18 PROCEDURE — G8988 SELF CARE GOAL STATUS: HCPCS

## 2018-09-18 PROCEDURE — 6370000000 HC RX 637 (ALT 250 FOR IP): Performed by: NEUROLOGICAL SURGERY

## 2018-09-18 PROCEDURE — 97110 THERAPEUTIC EXERCISES: CPT

## 2018-09-18 PROCEDURE — G8987 SELF CARE CURRENT STATUS: HCPCS

## 2018-09-18 PROCEDURE — 96361 HYDRATE IV INFUSION ADD-ON: CPT

## 2018-09-18 PROCEDURE — 6360000002 HC RX W HCPCS: Performed by: NEUROLOGICAL SURGERY

## 2018-09-18 PROCEDURE — G8978 MOBILITY CURRENT STATUS: HCPCS

## 2018-09-18 PROCEDURE — 2500000003 HC RX 250 WO HCPCS: Performed by: NEUROLOGICAL SURGERY

## 2018-09-18 PROCEDURE — G8979 MOBILITY GOAL STATUS: HCPCS

## 2018-09-18 PROCEDURE — 96374 THER/PROPH/DIAG INJ IV PUSH: CPT

## 2018-09-18 PROCEDURE — 96365 THER/PROPH/DIAG IV INF INIT: CPT

## 2018-09-18 PROCEDURE — 99024 POSTOP FOLLOW-UP VISIT: CPT | Performed by: NEUROLOGICAL SURGERY

## 2018-09-18 PROCEDURE — 97166 OT EVAL MOD COMPLEX 45 MIN: CPT

## 2018-09-18 PROCEDURE — S0028 INJECTION, FAMOTIDINE, 20 MG: HCPCS | Performed by: NEUROLOGICAL SURGERY

## 2018-09-18 PROCEDURE — 96376 TX/PRO/DX INJ SAME DRUG ADON: CPT

## 2018-09-18 PROCEDURE — 97162 PT EVAL MOD COMPLEX 30 MIN: CPT

## 2018-09-18 PROCEDURE — 96368 THER/DIAG CONCURRENT INF: CPT

## 2018-09-18 PROCEDURE — 2580000003 HC RX 258: Performed by: NEUROLOGICAL SURGERY

## 2018-09-18 RX ORDER — CEPHALEXIN 500 MG/1
500 CAPSULE ORAL 2 TIMES DAILY
Qty: 21 CAPSULE | Refills: 0 | Status: SHIPPED | OUTPATIENT
Start: 2018-09-18 | End: 2018-09-21

## 2018-09-18 RX ORDER — POLYETHYLENE GLYCOL 3350 17 G/17G
17 POWDER, FOR SOLUTION ORAL DAILY
Status: DISCONTINUED | OUTPATIENT
Start: 2018-09-18 | End: 2018-09-18 | Stop reason: HOSPADM

## 2018-09-18 RX ORDER — CYCLOBENZAPRINE HCL 5 MG
5 TABLET ORAL 3 TIMES DAILY PRN
Qty: 15 TABLET | Refills: 0 | Status: SHIPPED | OUTPATIENT
Start: 2018-09-18 | End: 2018-09-23

## 2018-09-18 RX ADMIN — METOPROLOL SUCCINATE 25 MG: 25 TABLET, FILM COATED, EXTENDED RELEASE ORAL at 08:51

## 2018-09-18 RX ADMIN — HYDROCODONE BITARTRATE AND ACETAMINOPHEN 1 TABLET: 5; 325 TABLET ORAL at 04:08

## 2018-09-18 RX ADMIN — HYDROCODONE BITARTRATE AND ACETAMINOPHEN 1 TABLET: 5; 325 TABLET ORAL at 09:55

## 2018-09-18 RX ADMIN — FAMOTIDINE 20 MG: 10 INJECTION, SOLUTION INTRAVENOUS at 08:53

## 2018-09-18 RX ADMIN — POLYETHYLENE GLYCOL 3350 17 G: 17 POWDER, FOR SOLUTION ORAL at 09:53

## 2018-09-18 RX ADMIN — Medication 2 G: at 03:05

## 2018-09-18 RX ADMIN — Medication 2 G: at 11:28

## 2018-09-18 RX ADMIN — AMLODIPINE BESYLATE 10 MG: 10 TABLET ORAL at 08:52

## 2018-09-18 ASSESSMENT — PAIN DESCRIPTION - LOCATION
LOCATION: BACK
LOCATION: BACK
LOCATION: ABDOMEN
LOCATION: ABDOMEN
LOCATION_2: ABDOMEN
LOCATION: BACK
LOCATION_2: ABDOMEN

## 2018-09-18 ASSESSMENT — PAIN SCALES - GENERAL
PAINLEVEL_OUTOF10: 4
PAINLEVEL_OUTOF10: 4
PAINLEVEL_OUTOF10: 2
PAINLEVEL_OUTOF10: 4
PAINLEVEL_OUTOF10: 5
PAINLEVEL_OUTOF10: 3
PAINLEVEL_OUTOF10: 3

## 2018-09-18 ASSESSMENT — PAIN DESCRIPTION - PAIN TYPE
TYPE: SURGICAL PAIN;ACUTE PAIN
TYPE_2: ACUTE PAIN
TYPE: SURGICAL PAIN;ACUTE PAIN
TYPE: SURGICAL PAIN;ACUTE PAIN
TYPE: SURGICAL PAIN
TYPE_2: ACUTE PAIN
TYPE: SURGICAL PAIN

## 2018-09-18 ASSESSMENT — PAIN DESCRIPTION - DESCRIPTORS
DESCRIPTORS: ACHING
DESCRIPTORS_2: PRESSURE
DESCRIPTORS_2: PRESSURE
DESCRIPTORS: ACHING

## 2018-09-18 ASSESSMENT — PAIN DESCRIPTION - ORIENTATION
ORIENTATION: LOWER
ORIENTATION_2: LOWER
ORIENTATION: LOWER
ORIENTATION_2: LOWER

## 2018-09-18 ASSESSMENT — PAIN DESCRIPTION - INTENSITY
RATING_2: 3
RATING_2: 3

## 2018-09-18 NOTE — PROGRESS NOTES
Good    Clinical Presentation: Moderate - Evolving with Changing Characteristics: Pt with generalized weakness, limited endurance due to back pain. Pt needs PT while here to improve strength for safety with gait    Decision Making: Moderate Complexitybased on patient assessment and decision making process of determining plan of care and establishing reasonable expectations for measurable functional outcomes    REQUIRES PT FOLLOW UP: Yes  Discharge Recommendations: Continue to assess pending progress, Home with assist PRN    Patient Education:  Patient Education: POC,log roll, body mechniacs    Equipment Recommendations:  Equipment Needed: No  Other: will use RW he has at home    Safety:  Type of devices: Left in bed, Nurse notified, Gait belt, Bed alarm in place, All fall risk precautions in place  Restraints  Initially in place: No    Plan:  Times per week: 6 X O  Times per day: Daily  Specific instructions for Next Treatment: ther ex, mobility, gait, body mechanics  Current Treatment Recommendations: Strengthening, Gait Training, Stair training, Balance Training, Functional Mobility Training, Endurance Training, Transfer Training, Home Exercise Program    Goals:  Patient goals : Go home  Short term goals  Time Frame for Short term goals: 1 week  Short term goal 1: supine to sit and return with log roll Mod i to get in and out of bed   Short term goal 2: sit to stand with Mod I to get on and off various surfacse  Short term goal 3: ambulate with  feet with S to walk safely in the home  Short term goal 4: ascend/descend 2 steps with 2 HRs and SBA to enter home  Long term goals  Time Frame for Long term goals : NA due to short ELOS    Evaluation Complexity: Based on the findings of patient history, examination, clinical presentation, and decision making during this evaluation, the evaluation of Mandy Acosta  is of medium complexity.     PT G-Codes  Functional Limitation: Mobility: Walking and moving

## 2018-09-18 NOTE — PROGRESS NOTES
walker     Bathroom Shower/Tub: Walk-in shower  Bathroom Toilet: Handicap height  Bathroom Equipment: Grab bars in shower, Shower chair  Bathroom Accessibility: Accessible       ADL Assistance: Independent  Homemaking Assistance: Independent (wife does most cooking and cleaning, able to assist, assists with yardwork, has grandson and son that helps)       Ambulation Assistance: Independent  Transfer Assistance: Independent    Active : Yes     Additional Comments: Pt lives with wife with PD, occasionally assists her with ADLs and IADLs as needed, pt very IND at [facility] ambulated without device. Has several family members nearby who help as needed    Objective        Overall Cognitive Status: WFL         Sensation  Overall Sensation Status: WNL                           LUE AROM (degrees)  LUE AROM : WFL          RUE AROM (degrees)  RUE General AROM: shoulder flex limited to 15 degrees, abduction limited to 25 degrees, distally WFL       LUE Strength  LUE Strength Comment: NT d/t spinal precautions, anticipate generalized weakness                RUE Strength  RUE Strength Comment: NT d/t spinal precautions, anticipate gross generalized weakness, limited shoulder AROM 2/2 h/o total shoulder replacement                      ADL  LE Dressing:  Moderate assistance (anticipated)  Toileting: Stand by assistance (seated )     Bed mobility  Supine to Sit: Unable to assess (received EOB)  Sit to Supine: Unable to assess (to chair)  Scooting: Stand by assistance    Transfers  Sit to stand: Contact guard assistance  Stand to sit: Contact guard assistance  Transfer Comments: cues for hand placement and proper technique, from EOB to recliner  Toilet Transfers  Toilet - Technique: Ambulating  Equipment Used: Standard toilet  Toilet Transfer: Stand by assistance  Toilet Transfers Comments: cues for safe approach and descent    Balance  Sitting Balance: Supervision  Standing Balance: Contact guard assistance     Time: 1 Patient/Caregiver Education & Training, Safety Education & Training, Equipment Evaluation, Education, & procurement, Home Management Training, ROM, Strengthening    Goals:  Patient goals : To go home    Short term goals  Time Frame for Short term goals: two weeks  Short term goal 1: Pt to complete functional mobility at [facility] distances with no greater than Sup for inc ind with accessing environment  Short term goal 2: Pt to tolerate standing greater than 8 mins with 1-2 hand release at Sup in preparation for bathing tasks  Short term goal 3: Pt to complete various functional transfers at SUP with 0 cues for safety/technique for inc ind with toileting  Short term goal 4: Pt to complete LB ADLs using LHAE prn with no greater than SBA and 0 cues for back precautions for inc ind with self cares  Long term goals  Time Frame for Long term goals : NA d/t ELOS    Evaluation Complexity: Based on the findings of patient history, examination, clinical presentation, and decision making during this evaluation, this patient is of medium complexity. OT G-codes  Functional Assessment Tool Used: [facility]  Score: 18  Functional Limitation: Self care  Self Care Current Status (): At least 40 percent but less than 60 percent impaired, limited or restricted  Self Care Goal Status ():  At least 20 percent but less than 40 percent impaired, limited or restricted  AM-PAC Inpatient Daily Activity Raw Score: 18  AM-PAC Inpatient ADL T-Scale Score : 38.66  ADL Inpatient CMS 0-100% Score: 46.65  ADL Inpatient CMS G-Code Modifier : GEORGIE

## 2018-09-19 ENCOUNTER — TELEPHONE (OUTPATIENT)
Dept: NEUROSURGERY | Age: 83
End: 2018-09-19

## 2018-09-19 NOTE — TELEPHONE ENCOUNTER
Pt called in stating he is having some side effects from the Keflex. He is having extreme stomach cramps, upset stomach/nausea, and the outside of his skin is extremely \"touchy. \"  He states he had the same problem with Cipro before. Spoke with Dr. Zeinab Schaefer over the phone and informed him of pt's symptoms. He would like Bactrim -160mg 1 tablet bid for 7 days called into his pharmacy. Called Rx into [facility] AT [place] in [place] ([phone]). Spoke with the pharmacist, Williams Del Real. Informed pt and he verbalized understanding. Dr. Zeinab Schaefer, please sign the pended orders.   Thanks

## 2018-09-19 NOTE — OP NOTE
[facility]    Patient name: Magalis Payne  Medical Record Number: [id number]  Account Number: [de-identified]      Date of Procedure: 9/17/2018    Pre-operative Diagnosis:     1. Spine deformity   2. Spine spondylosis and generative disease. 3. Chronic pain disorder  4. Chronic low back pain  5. Post X-stop lumbar spine surgeries. Post-operative Diagnosis: The same. PROCEDURE:     - Thoracic spine laminectomy (T9)  -  Placement of permanent paddle of spinal cord stimulator at T8-T7 level   -  Placement of battery or spinal cord stimulator in left flank area. -  Using neurophysiology. Anesthesia: General endotracheal    Surgeons/Assistants: Jacqueline Us MD     Estimated Blood Loss: 20 ml    Drains: None    Blood Transfusions: None     Complications: none immediately appreciated    Specimens:  None. Indications for Procedure: This is a 80year old M who has a history of low back pain that shoots down the left lateral aspect of the leg to his ankle for at least the past 15 years. He Described  the pain as stabbing and shooting in his left leg. He rated his pain as up to to 8-9/10. Pain worse when walking and standing  He has had 2 previous back surgeries (xstop x2), Last surgery was 8 years ago. Patient denied any significant numbness. Patient underwent  Spinal cord stimulator trail per Dr. Jack Reynoso. He had his SCS trial started on a Monday 8/21/18 and had it ended up the following Tuesday for a total of 6 days. [facility] was happy with the results of the SCS trial, Feels he had about 60% relief in his pain   Had about 60-70% coverage of his pain, fells he was able to be more physically active. He stated that he wants to proceed with placement of permanent spinal cord stimulator cord system. I discussed this surgical treatment option  in detail including the associated risks and benefits, as well as, the alternative treatment options.  All questions were

## 2018-09-20 ENCOUNTER — TELEPHONE (OUTPATIENT)
Dept: NEUROSURGERY | Age: 83
End: 2018-09-20

## 2018-09-20 ASSESSMENT — ENCOUNTER SYMPTOMS
BACK PAIN: 1
COUGH: 0
EYE REDNESS: 0
VOMITING: 0
SORE THROAT: 0

## 2018-09-21 ENCOUNTER — APPOINTMENT (OUTPATIENT)
Dept: INTERVENTIONAL RADIOLOGY/VASCULAR | Age: 83
DRG: 200 | End: 2018-09-21
Payer: MEDICARE

## 2018-09-21 ENCOUNTER — HOSPITAL ENCOUNTER (INPATIENT)
Age: 83
LOS: 2 days | Discharge: HOME | DRG: 200 | End: 2018-09-23
Attending: INTERNAL MEDICINE | Admitting: INTERNAL MEDICINE
Payer: MEDICARE

## 2018-09-21 ENCOUNTER — APPOINTMENT (OUTPATIENT)
Dept: CT IMAGING | Age: 83
DRG: 200 | End: 2018-09-21
Payer: MEDICARE

## 2018-09-21 ENCOUNTER — APPOINTMENT (OUTPATIENT)
Dept: GENERAL RADIOLOGY | Age: 83
DRG: 200 | End: 2018-09-21
Payer: MEDICARE

## 2018-09-21 DIAGNOSIS — J93.9 PNEUMOTHORAX, UNSPECIFIED TYPE: Primary | ICD-10-CM

## 2018-09-21 DIAGNOSIS — L29.9 PRURITUS: ICD-10-CM

## 2018-09-21 DIAGNOSIS — R10.9 ABDOMINAL PAIN, UNSPECIFIED ABDOMINAL LOCATION: ICD-10-CM

## 2018-09-21 DIAGNOSIS — K59.00 CONSTIPATION, UNSPECIFIED CONSTIPATION TYPE: ICD-10-CM

## 2018-09-21 PROBLEM — R06.02 SOB (SHORTNESS OF BREATH) ON EXERTION: Status: RESOLVED | Noted: 2017-10-16 | Resolved: 2018-09-21

## 2018-09-21 PROBLEM — R42 DIZZINESS: Status: RESOLVED | Noted: 2017-10-16 | Resolved: 2018-09-21

## 2018-09-21 PROBLEM — R94.31 ABNORMAL EKG: Status: RESOLVED | Noted: 2017-10-16 | Resolved: 2018-09-21

## 2018-09-21 LAB
ALBUMIN SERPL-MCNC: 4.2 G/DL (ref 3.5–5.1)
ALP BLD-CCNC: 59 U/L (ref 38–126)
ALT SERPL-CCNC: 18 U/L (ref 11–66)
ANION GAP SERPL CALCULATED.3IONS-SCNC: 14 MEQ/L (ref 8–16)
APTT: 35.3 SECONDS (ref 22–38)
AST SERPL-CCNC: 30 U/L (ref 5–40)
BASOPHILS # BLD: 0.3 %
BASOPHILS ABSOLUTE: 0 THOU/MM3 (ref 0–0.1)
BILIRUB SERPL-MCNC: 0.8 MG/DL (ref 0.3–1.2)
BILIRUBIN DIRECT: < 0.2 MG/DL (ref 0–0.3)
BILIRUBIN URINE: NEGATIVE
BLOOD, URINE: NEGATIVE
BUN BLDV-MCNC: 19 MG/DL (ref 7–22)
CALCIUM SERPL-MCNC: 9.3 MG/DL (ref 8.5–10.5)
CHARACTER, URINE: CLEAR
CHLORIDE BLD-SCNC: 97 MEQ/L (ref 98–111)
CO2: 24 MEQ/L (ref 23–33)
COLOR: YELLOW
CREAT SERPL-MCNC: 1 MG/DL (ref 0.4–1.2)
EKG ATRIAL RATE: 73 BPM
EKG P AXIS: 85 DEGREES
EKG P-R INTERVAL: 180 MS
EKG Q-T INTERVAL: 380 MS
EKG QRS DURATION: 84 MS
EKG QTC CALCULATION (BAZETT): 418 MS
EKG R AXIS: 60 DEGREES
EKG T AXIS: 72 DEGREES
EKG VENTRICULAR RATE: 73 BPM
EOSINOPHIL # BLD: 2.7 %
EOSINOPHILS ABSOLUTE: 0.4 THOU/MM3 (ref 0–0.4)
ERYTHROCYTE [DISTWIDTH] IN BLOOD BY AUTOMATED COUNT: 12.8 % (ref 11.5–14.5)
ERYTHROCYTE [DISTWIDTH] IN BLOOD BY AUTOMATED COUNT: 45.7 FL (ref 35–45)
GFR SERPL CREATININE-BSD FRML MDRD: 71 ML/MIN/1.73M2
GLUCOSE BLD-MCNC: 120 MG/DL (ref 70–108)
GLUCOSE URINE: NEGATIVE MG/DL
HCT VFR BLD CALC: 40.5 % (ref 42–52)
HEMOGLOBIN: 13.9 GM/DL (ref 14–18)
IMMATURE GRANS (ABS): 0.07 THOU/MM3 (ref 0–0.07)
IMMATURE GRANULOCYTES: 0.5 %
KETONES, URINE: NEGATIVE
LACTIC ACID: 1.3 MMOL/L (ref 0.5–2.2)
LEUKOCYTE ESTERASE, URINE: NEGATIVE
LIPASE: 31.6 U/L (ref 5.6–51.3)
LYMPHOCYTES # BLD: 11.3 %
LYMPHOCYTES ABSOLUTE: 1.6 THOU/MM3 (ref 1–4.8)
MCH RBC QN AUTO: 33.4 PG (ref 26–33)
MCHC RBC AUTO-ENTMCNC: 34.3 GM/DL (ref 32.2–35.5)
MCV RBC AUTO: 97.4 FL (ref 80–94)
MONOCYTES # BLD: 8.6 %
MONOCYTES ABSOLUTE: 1.2 THOU/MM3 (ref 0.4–1.3)
MRSA SCREEN RT-PCR: NEGATIVE
NITRITE, URINE: NEGATIVE
NUCLEATED RED BLOOD CELLS: 0 /100 WBC
OSMOLALITY CALCULATION: 273.6 MOSMOL/KG (ref 275–300)
PH UA: 7
PLATELET # BLD: 170 THOU/MM3 (ref 130–400)
PMV BLD AUTO: 11.8 FL (ref 9.4–12.4)
POTASSIUM SERPL-SCNC: 4 MEQ/L (ref 3.5–5.2)
PRO-BNP: 1377 PG/ML (ref 0–1800)
PROCALCITONIN: 0.05 NG/ML (ref 0.01–0.09)
PROTEIN UA: NEGATIVE
RBC # BLD: 4.16 MILL/MM3 (ref 4.7–6.1)
SEG NEUTROPHILS: 76.6 %
SEGMENTED NEUTROPHILS ABSOLUTE COUNT: 11 THOU/MM3 (ref 1.8–7.7)
SODIUM BLD-SCNC: 135 MEQ/L (ref 135–145)
SPECIFIC GRAVITY, URINE: 1.01 (ref 1–1.03)
TOTAL PROTEIN: 7.1 G/DL (ref 6.1–8)
TROPONIN T: < 0.01 NG/ML
UROBILINOGEN, URINE: 1 EU/DL
WBC # BLD: 14.3 THOU/MM3 (ref 4.8–10.8)

## 2018-09-21 PROCEDURE — 2000000000 HC ICU R&B

## 2018-09-21 PROCEDURE — 93005 ELECTROCARDIOGRAM TRACING: CPT | Performed by: INTERNAL MEDICINE

## 2018-09-21 PROCEDURE — 93010 ELECTROCARDIOGRAM REPORT: CPT | Performed by: NUCLEAR MEDICINE

## 2018-09-21 PROCEDURE — 87147 CULTURE TYPE IMMUNOLOGIC: CPT

## 2018-09-21 PROCEDURE — 83690 ASSAY OF LIPASE: CPT

## 2018-09-21 PROCEDURE — 85730 THROMBOPLASTIN TIME PARTIAL: CPT

## 2018-09-21 PROCEDURE — 99285 EMERGENCY DEPT VISIT HI MDM: CPT

## 2018-09-21 PROCEDURE — 2580000003 HC RX 258: Performed by: NURSE PRACTITIONER

## 2018-09-21 PROCEDURE — 87801 DETECT AGNT MULT DNA AMPLI: CPT

## 2018-09-21 PROCEDURE — 96375 TX/PRO/DX INJ NEW DRUG ADDON: CPT

## 2018-09-21 PROCEDURE — 2709999900 HC NON-CHARGEABLE SUPPLY

## 2018-09-21 PROCEDURE — 84484 ASSAY OF TROPONIN QUANT: CPT

## 2018-09-21 PROCEDURE — 6370000000 HC RX 637 (ALT 250 FOR IP): Performed by: NURSE PRACTITIONER

## 2018-09-21 PROCEDURE — 74177 CT ABD & PELVIS W/CONTRAST: CPT

## 2018-09-21 PROCEDURE — 6360000002 HC RX W HCPCS: Performed by: INTERNAL MEDICINE

## 2018-09-21 PROCEDURE — 80053 COMPREHEN METABOLIC PANEL: CPT

## 2018-09-21 PROCEDURE — 94761 N-INVAS EAR/PLS OXIMETRY MLT: CPT

## 2018-09-21 PROCEDURE — 87040 BLOOD CULTURE FOR BACTERIA: CPT

## 2018-09-21 PROCEDURE — 85025 COMPLETE CBC W/AUTO DIFF WBC: CPT

## 2018-09-21 PROCEDURE — 82248 BILIRUBIN DIRECT: CPT

## 2018-09-21 PROCEDURE — 81003 URINALYSIS AUTO W/O SCOPE: CPT

## 2018-09-21 PROCEDURE — 96374 THER/PROPH/DIAG INJ IV PUSH: CPT

## 2018-09-21 PROCEDURE — 71045 X-RAY EXAM CHEST 1 VIEW: CPT

## 2018-09-21 PROCEDURE — 6360000002 HC RX W HCPCS: Performed by: NURSE PRACTITIONER

## 2018-09-21 PROCEDURE — 93970 EXTREMITY STUDY: CPT

## 2018-09-21 PROCEDURE — 83605 ASSAY OF LACTIC ACID: CPT

## 2018-09-21 PROCEDURE — 36415 COLL VENOUS BLD VENIPUNCTURE: CPT

## 2018-09-21 PROCEDURE — 87641 MR-STAPH DNA AMP PROBE: CPT

## 2018-09-21 PROCEDURE — 83880 ASSAY OF NATRIURETIC PEPTIDE: CPT

## 2018-09-21 PROCEDURE — 99223 1ST HOSP IP/OBS HIGH 75: CPT | Performed by: INTERNAL MEDICINE

## 2018-09-21 PROCEDURE — 84145 PROCALCITONIN (PCT): CPT

## 2018-09-21 PROCEDURE — 2580000003 HC RX 258: Performed by: INTERNAL MEDICINE

## 2018-09-21 PROCEDURE — 6360000004 HC RX CONTRAST MEDICATION: Performed by: INTERNAL MEDICINE

## 2018-09-21 PROCEDURE — 87081 CULTURE SCREEN ONLY: CPT

## 2018-09-21 RX ORDER — HYDROCODONE BITARTRATE AND ACETAMINOPHEN 5; 325 MG/1; MG/1
1 TABLET ORAL EVERY 6 HOURS PRN
Status: DISCONTINUED | OUTPATIENT
Start: 2018-09-21 | End: 2018-09-23 | Stop reason: HOSPADM

## 2018-09-21 RX ORDER — AMLODIPINE BESYLATE 10 MG/1
10 TABLET ORAL DAILY
Status: DISCONTINUED | OUTPATIENT
Start: 2018-09-22 | End: 2018-09-23 | Stop reason: HOSPADM

## 2018-09-21 RX ORDER — ONDANSETRON 2 MG/ML
4 INJECTION INTRAMUSCULAR; INTRAVENOUS EVERY 6 HOURS PRN
Status: DISCONTINUED | OUTPATIENT
Start: 2018-09-21 | End: 2018-09-23 | Stop reason: HOSPADM

## 2018-09-21 RX ORDER — OYSTER SHELL CALCIUM WITH VITAMIN D 500; 200 MG/1; [IU]/1
1 TABLET, FILM COATED ORAL DAILY
Status: DISCONTINUED | OUTPATIENT
Start: 2018-09-22 | End: 2018-09-23 | Stop reason: HOSPADM

## 2018-09-21 RX ORDER — METOPROLOL SUCCINATE 25 MG/1
25 TABLET, EXTENDED RELEASE ORAL DAILY
Status: DISCONTINUED | OUTPATIENT
Start: 2018-09-21 | End: 2018-09-23 | Stop reason: HOSPADM

## 2018-09-21 RX ORDER — SENNA PLUS 8.6 MG/1
1 TABLET ORAL NIGHTLY PRN
Status: DISCONTINUED | OUTPATIENT
Start: 2018-09-21 | End: 2018-09-23 | Stop reason: HOSPADM

## 2018-09-21 RX ORDER — FENTANYL CITRATE 50 UG/ML
25 INJECTION, SOLUTION INTRAMUSCULAR; INTRAVENOUS ONCE
Status: COMPLETED | OUTPATIENT
Start: 2018-09-21 | End: 2018-09-21

## 2018-09-21 RX ORDER — ONDANSETRON 2 MG/ML
4 INJECTION INTRAMUSCULAR; INTRAVENOUS ONCE
Status: COMPLETED | OUTPATIENT
Start: 2018-09-21 | End: 2018-09-21

## 2018-09-21 RX ORDER — ONDANSETRON 4 MG/1
4 TABLET, ORALLY DISINTEGRATING ORAL EVERY 8 HOURS PRN
COMMUNITY
End: 2019-08-07 | Stop reason: SDUPTHER

## 2018-09-21 RX ORDER — DOCUSATE SODIUM 100 MG/1
100 CAPSULE, LIQUID FILLED ORAL 2 TIMES DAILY PRN
Status: DISCONTINUED | OUTPATIENT
Start: 2018-09-21 | End: 2018-09-23 | Stop reason: HOSPADM

## 2018-09-21 RX ORDER — SODIUM CHLORIDE 0.9 % (FLUSH) 0.9 %
10 SYRINGE (ML) INJECTION EVERY 12 HOURS SCHEDULED
Status: DISCONTINUED | OUTPATIENT
Start: 2018-09-21 | End: 2018-09-23 | Stop reason: HOSPADM

## 2018-09-21 RX ORDER — BISACODYL 10 MG
10 SUPPOSITORY, RECTAL RECTAL DAILY PRN
Status: DISCONTINUED | OUTPATIENT
Start: 2018-09-21 | End: 2018-09-23 | Stop reason: HOSPADM

## 2018-09-21 RX ORDER — 0.9 % SODIUM CHLORIDE 0.9 %
500 INTRAVENOUS SOLUTION INTRAVENOUS ONCE
Status: COMPLETED | OUTPATIENT
Start: 2018-09-21 | End: 2018-09-21

## 2018-09-21 RX ORDER — SODIUM CHLORIDE 0.9 % (FLUSH) 0.9 %
10 SYRINGE (ML) INJECTION PRN
Status: DISCONTINUED | OUTPATIENT
Start: 2018-09-21 | End: 2018-09-23 | Stop reason: HOSPADM

## 2018-09-21 RX ORDER — ASPIRIN 81 MG/1
81 TABLET, CHEWABLE ORAL DAILY
Status: DISCONTINUED | OUTPATIENT
Start: 2018-09-22 | End: 2018-09-23 | Stop reason: HOSPADM

## 2018-09-21 RX ADMIN — HYDROCODONE BITARTRATE AND ACETAMINOPHEN 1 TABLET: 5; 325 TABLET ORAL at 18:07

## 2018-09-21 RX ADMIN — SODIUM CHLORIDE 500 ML: 9 INJECTION, SOLUTION INTRAVENOUS at 10:35

## 2018-09-21 RX ADMIN — SODIUM CHLORIDE 1.5 G: 900 INJECTION INTRAVENOUS at 23:35

## 2018-09-21 RX ADMIN — SODIUM CHLORIDE 1.5 G: 900 INJECTION INTRAVENOUS at 16:45

## 2018-09-21 RX ADMIN — IOPAMIDOL 80 ML: 755 INJECTION, SOLUTION INTRAVENOUS at 11:34

## 2018-09-21 RX ADMIN — DOCUSATE SODIUM 100 MG: 100 CAPSULE, LIQUID FILLED ORAL at 16:16

## 2018-09-21 RX ADMIN — APIXABAN 5 MG: 5 TABLET, FILM COATED ORAL at 20:12

## 2018-09-21 RX ADMIN — ONDANSETRON HYDROCHLORIDE 4 MG: 4 INJECTION, SOLUTION INTRAMUSCULAR; INTRAVENOUS at 10:35

## 2018-09-21 RX ADMIN — FENTANYL CITRATE 25 MCG: 50 INJECTION INTRAMUSCULAR; INTRAVENOUS at 10:35

## 2018-09-21 RX ADMIN — METOPROLOL SUCCINATE 25 MG: 25 TABLET, FILM COATED, EXTENDED RELEASE ORAL at 16:12

## 2018-09-21 ASSESSMENT — PAIN SCALES - GENERAL
PAINLEVEL_OUTOF10: 4
PAINLEVEL_OUTOF10: 5
PAINLEVEL_OUTOF10: 5
PAINLEVEL_OUTOF10: 4
PAINLEVEL_OUTOF10: 6

## 2018-09-21 ASSESSMENT — ENCOUNTER SYMPTOMS
ABDOMINAL PAIN: 0
COUGH: 0
EYE REDNESS: 0
WHEEZING: 0
CONSTIPATION: 1
DIARRHEA: 0
EYE DISCHARGE: 0
BACK PAIN: 0
ABDOMINAL DISTENTION: 1
VOMITING: 0
SORE THROAT: 0
SHORTNESS OF BREATH: 0
NAUSEA: 1
RHINORRHEA: 0

## 2018-09-21 ASSESSMENT — PAIN DESCRIPTION - PAIN TYPE: TYPE: ACUTE PAIN

## 2018-09-21 ASSESSMENT — PAIN DESCRIPTION - DESCRIPTORS: DESCRIPTORS: ACHING

## 2018-09-21 ASSESSMENT — PAIN DESCRIPTION - LOCATION: LOCATION: ABDOMEN

## 2018-09-21 NOTE — ED PROVIDER NOTES
CHEW    1 tablet PO BID    CLOTRIMAZOLE-BETAMETHASONE (LOTRISONE) 1-0.05 % CREAM    Apply topically 2 times daily. CYCLOBENZAPRINE (FLEXERIL) 5 MG TABLET    Take 1 tablet by mouth 3 times daily as needed for Muscle spasms    HYDROCODONE-ACETAMINOPHEN (NORCO) 5-325 MG PER TABLET    Take 1 tablet by mouth every 6 hours as needed for Pain  . MECLIZINE (ANTIVERT) 12.5 MG TABLET    1-2 tabs PO q 8 hours PRN dizziness    METOPROLOL SUCCINATE (TOPROL XL) 25 MG EXTENDED RELEASE TABLET    Take 25 mg by mouth daily Patient is taking half a tab in the am and half a tab in the pm    NONFORMULARY        UNABLE TO FIND    1 tablet daily For kidney health       ALLERGIES     is allergic to nitrofuran derivatives and nsaids. FAMILY HISTORY     indicated that his mother is . He indicated that his father is . He indicated that only one of his two sisters is alive. He indicated that only one of his four brothers is alive. family history includes Cancer in his brother, brother, and sister; Heart Disease in his father; Prostate Cancer in his brother, brother, brother, and brother; Stroke in his mother. SOCIAL HISTORY      reports that he quit smoking about 53 years ago. His smoking use included Cigarettes. He has a 10.00 pack-year smoking history. He has never used smokeless tobacco. He reports that he drinks alcohol. He reports that he does not use drugs. PHYSICAL EXAM     INITIAL VITALS:  oral temperature is 97.7 °F (36.5 °C). His blood pressure is 161/72 (abnormal) and his pulse is 73. His respiration is 14 and oxygen saturation is 95%. Physical Exam   Constitutional: He is oriented to person, place, and time. He appears well-developed and well-nourished. Non-toxic appearance. HENT:   Head: Normocephalic and atraumatic.    Right Ear: Tympanic membrane and external ear normal.   Left Ear: Tympanic membrane and external ear normal.   Nose: Nose normal.   Mouth/Throat: Oropharynx is clear and moist and mucous membranes are normal. Mucous membranes are not dry. No oropharyngeal exudate, posterior oropharyngeal edema or posterior oropharyngeal erythema. Eyes: Conjunctivae and EOM are normal.   Neck: Normal range of motion. Neck supple. No JVD present. Cardiovascular: Normal rate, regular rhythm, normal heart sounds, intact distal pulses and normal pulses. Exam reveals no gallop and no friction rub. No murmur heard. Pulmonary/Chest: Effort normal and breath sounds normal. No respiratory distress. He has no decreased breath sounds. He has no wheezes. He has no rhonchi. He has no rales. Abdominal: Soft. Bowel sounds are normal. He exhibits no distension. There is tenderness in the periumbilical area. There is no rebound, no guarding and no CVA tenderness. Musculoskeletal: Normal range of motion. He exhibits no edema. Neurological: He is alert and oriented to person, place, and time. He exhibits normal muscle tone. Coordination normal.   Skin: Skin is warm and dry. No rash noted. He is not diaphoretic. Nursing note and vitals reviewed. DIAGNOSTIC RESULTS     EKG: All EKG's are interpreted by the Emergency Department Physician who either signs or Co-signs this chart in the absence of a cardiologist.  EKG interpreted by Gertrude Shabazz MD:    Vent. Rate: 73 bpm  TX interval: 180 ms  QRS duration: 84 ms  QTc: 418 ms  P-R-T axes: 85, 60, 72  Normal sinus rhythm. No STEMI      RADIOLOGY: non-plain film images(s) such as CT, Ultrasound and MRI are read by the radiologist.    Xr Chest Standard (2 Vw)    Result Date: 9/13/2018  PROCEDURE: XR CHEST (2 VW) CLINICAL INFORMATION: Pre-op testing, Chronic left-sided low back pain with left-sided sciatica, Chronic left-sided low back pain with left-sided sciatica. COMPARISON: November 6, 2015 TECHNIQUE: PA and lateral views the chest. FINDINGS: Changes of COPD are present with hyperexpanded appearance of the lungs noted.  There is no focal infiltrate effusion

## 2018-09-21 NOTE — H&P
Assessment and Plan:        1. Left basilar pneumothorax: CT of ABD/PELVIS identified no free intraperitoneal air. Will monitor at this time. 10% likely secondary to Placement of Spinal Cord Stimulator. Repeat Chest Xray in the AM.   2. Acute on chronic constipation: CT of ABD/PELVIS-no obstruction. Patient has a history of constipation secondary to chronic opioid use. Senna, Colace and Ducolax will be offered as needed. 3. Right lower leg edema: Patient has been off Eliquis for the past 7 days. Noted history of PE and recent surgery. Venous Doppler is pending. 4. Bibasilar subsegmental atelectasis: Cough, nasal drainage and leukocytosis noted. Patient had been Keflex post operatively this was stopped 3 days prior to admission. Family tells me that they reached out to surgeon and this was changed to Bactrim 9/19/2018. PCT is pending. No fever or ill contacts. 5. Chronic Left sided Back Pain with Sciatica: Under the care of Dr. Joni Velarde patient did undergo 9/17/2018 Thoracic Laminectomy Permanent Spinal Cord Stimulator Paddle Battery Placement, Patient continues Elinore Mask as well prn.   6. Essential HPTN: stable, Toprol continues with parameter to hold. 7. Atrial Fibrillation, chronic : Eliquis  8. PE: history  9. Prostate Cancer: s/p Prostatectomy, established patient of Dr. Joseph Latif. CC:  Constipation, lower leg swelling and cough  HPI: Isaiah Class is a 80 y.o. male who presented to the Emergency Department for the evaluation of abdominal pain, nausea, and leg swelling. The patient reports that he had a spinal stimulator placed 5 days ago by Dr. Joni Velarde (neurosurgeon) for chronic back pain. He states that since the surgery he has had nausea, abdominal pain, dizziness, urinary frequency, and constipation. He rates his pain at a 5/10 in severity. He reports that he was given hydrocodone after the surgery and his last bowel movement was 4 days ago.  He denies any fever, chills, vomiting, dysuria, chest pain, or shortness of breath. Patient denies any cough, dyspnea, orthopnea or fall. Patient was discharged on Keflex which caused nausea and vomiting, this was switched to Bactrim. ROS: Positive for weight loss 8# over the past month, fatigue, subjective complaint of fever, recent surgical procedure, sinus drainage, lower leg edema,chronic back pain, and constipation, poor appetite. PMH:  Per HPI  SHX:  Right Shoulder Arthroplasty, Turp, Prostate BX, Prostatectomy, Lumbar facet block, Hernia repair, Cataract removal, Back Surgery, Appendectomy  FHX: Mother  CVA, Father  CAD  Allergies: Nitofuran, NSAIDS  Medications:  See MAR  Vital Signs: T: 97.7 P: 73 RR: 14 B/P: 143/74: FiO2: 95: O2 Sat: Room Air  General:   Frail, thin  HEENT:  normocephalic and atraumatic. No scleral icterus. Neck: supple. No JVD. Lungs: clear to auscultation. No retractions. Posterior back dressing and left hip dressing dry and intact  Cardiac: RRR  Abdomen: soft. Nontender. Extremities:  No clubbing, cyanosis, or edema x 4. Vasculature: capillary refill < 3 seconds. Skin:  warm and dry. Psych:  Alert and oriented x3. Affect appropriate  Lymph:  No supraclavicular adenopathy. Neurologic:  No focal deficit. Data: (All radiographs, tracings, PFTs, and imaging are personally viewed and interpreted unless otherwise noted).  Labs reviewed   CT Scan and Chest Xray reviewed   Plan collaborated with Dr. Felix Barahona. Tom APRN-CNP, CCRN-CMC, DNP    Case discussed with Dr. Emile Gary. CXR: basilar atelectasis with small left apical pneumothorax. CT scan abdomen shows constipation but no free air under the diaphragm. Left pneumothorax noted. Left atelectasis and consolidation noted. BUN 19, creatinine one. White blood cell count 14.3, pro-calcitonin 0.05. Telemetry shows sinus rhythm. Treat left lower lobe infiltrate with Unasyn.   Suspect pneumothorax occurred from procedure upon the spine approximately 5 days ago. Suspect pneumothorax will resolve.     Electronically signed by Raine Ordonez M.D.

## 2018-09-21 NOTE — ED NOTES
TOBY Aguilar in room assessing pt and discussing admission process. Vitals remain stable.      Navin Marquez RN  09/21/18 5060

## 2018-09-22 ENCOUNTER — APPOINTMENT (OUTPATIENT)
Dept: GENERAL RADIOLOGY | Age: 83
DRG: 200 | End: 2018-09-22
Payer: MEDICARE

## 2018-09-22 LAB
ACINETOBACTER BAUMANNII FILM ARRAY: NOT DETECTED
BOTTLE TYPE: ABNORMAL
CANDIDA ALBICANS FILM ARRAY: NOT DETECTED
CANDIDA GLABRATA FILM ARRAY: NOT DETECTED
CANDIDA KRUSEI FILM ARRAY: NOT DETECTED
CANDIDA PARAPSILOSIS FILM ARRAY: NOT DETECTED
CANDIDA TROPICALIS FILM ARRAY: NOT DETECTED
CARBAPENEM RESITANT FILM ARRAY: NOT DETECTED
ENTERBACTER CLOACAE FILM ARRAY: NOT DETECTED
ENTERBACTERIACEAE FILM ARRAY: NOT DETECTED
ENTEROCOCCUS FILM ARRAY: NOT DETECTED
ESCHERICHIA COLI FILM ARRAY: NOT DETECTED
HAEMOPHILUS INFLUENZA FILM ARRAY: NOT DETECTED
KLEBSIELLA OXYTOCA FILM ARRAY: NOT DETECTED
KLEBSIELLA PNEUMONIAE FILM ARRAY: NOT DETECTED
LISTERIA MONOCYTOGENES FILM ARRAY: NOT DETECTED
METHICILLIN RESISTANT FILM ARRAY: DETECTED
NEISSERIA MENIGITIDIS FILM ARRAY: NOT DETECTED
PROCALCITONIN: 0.05 NG/ML (ref 0.01–0.09)
PROTEUS FILM ARRAY: NOT DETECTED
PSEUDOMONAS AERUGINOSA FILM ARRAY: NOT DETECTED
SERRATIA MARCESCENS FILM ARRAY: NOT DETECTED
SOURCE OF BLOOD CULTURE: ABNORMAL
STAPH AUREUS FILM ARRAY: NOT DETECTED
STAPHYLOCOCCUS FILM ARRAY: DETECTED
STREP AGALACTIAE FILM ARRAY: NOT DETECTED
STREP PNEUMONIAE FILM ARRAY: NOT DETECTED
STREP PYOCGENES FILM ARRAY: NOT DETECTED
STREPTOCOCCUS FILM ARRAY: NOT DETECTED
VANCOMYCIN RESISTANT FILM ARRAY: NOT DETECTED

## 2018-09-22 PROCEDURE — 6370000000 HC RX 637 (ALT 250 FOR IP): Performed by: NURSE PRACTITIONER

## 2018-09-22 PROCEDURE — 2580000003 HC RX 258: Performed by: NURSE PRACTITIONER

## 2018-09-22 PROCEDURE — 36415 COLL VENOUS BLD VENIPUNCTURE: CPT

## 2018-09-22 PROCEDURE — 6360000002 HC RX W HCPCS: Performed by: NURSE PRACTITIONER

## 2018-09-22 PROCEDURE — 2580000003 HC RX 258: Performed by: HOSPITALIST

## 2018-09-22 PROCEDURE — 6360000002 HC RX W HCPCS: Performed by: HOSPITALIST

## 2018-09-22 PROCEDURE — 2709999900 HC NON-CHARGEABLE SUPPLY

## 2018-09-22 PROCEDURE — 71045 X-RAY EXAM CHEST 1 VIEW: CPT

## 2018-09-22 PROCEDURE — 84145 PROCALCITONIN (PCT): CPT

## 2018-09-22 PROCEDURE — 1200000003 HC TELEMETRY R&B

## 2018-09-22 RX ADMIN — BISACODYL 10 MG: 10 SUPPOSITORY RECTAL at 07:56

## 2018-09-22 RX ADMIN — CALCIUM CARBONATE-VITAMIN D TAB 500 MG-200 UNIT 1 TABLET: 500-200 TAB at 11:57

## 2018-09-22 RX ADMIN — APIXABAN 5 MG: 5 TABLET, FILM COATED ORAL at 20:25

## 2018-09-22 RX ADMIN — METOPROLOL SUCCINATE 25 MG: 25 TABLET, FILM COATED, EXTENDED RELEASE ORAL at 08:11

## 2018-09-22 RX ADMIN — HYDROCODONE BITARTRATE AND ACETAMINOPHEN 1 TABLET: 5; 325 TABLET ORAL at 21:45

## 2018-09-22 RX ADMIN — Medication 10 ML: at 20:25

## 2018-09-22 RX ADMIN — SODIUM CHLORIDE 1.5 G: 900 INJECTION INTRAVENOUS at 04:49

## 2018-09-22 RX ADMIN — HYDROCODONE BITARTRATE AND ACETAMINOPHEN 1 TABLET: 5; 325 TABLET ORAL at 07:56

## 2018-09-22 RX ADMIN — ASPIRIN 81 MG CHEWABLE TABLET 81 MG: 81 TABLET CHEWABLE at 08:12

## 2018-09-22 RX ADMIN — Medication 10 ML: at 09:00

## 2018-09-22 RX ADMIN — VANCOMYCIN HYDROCHLORIDE 1000 MG: 1 INJECTION, POWDER, LYOPHILIZED, FOR SOLUTION INTRAVENOUS at 22:58

## 2018-09-22 RX ADMIN — SODIUM CHLORIDE 1.5 G: 900 INJECTION INTRAVENOUS at 09:00

## 2018-09-22 RX ADMIN — AMLODIPINE BESYLATE 10 MG: 10 TABLET ORAL at 08:11

## 2018-09-22 RX ADMIN — APIXABAN 5 MG: 5 TABLET, FILM COATED ORAL at 08:11

## 2018-09-22 ASSESSMENT — PAIN DESCRIPTION - PAIN TYPE
TYPE: CHRONIC PAIN
TYPE: CHRONIC PAIN
TYPE: ACUTE PAIN
TYPE: CHRONIC PAIN

## 2018-09-22 ASSESSMENT — PAIN DESCRIPTION - ONSET
ONSET: ON-GOING
ONSET: GRADUAL

## 2018-09-22 ASSESSMENT — PAIN DESCRIPTION - LOCATION
LOCATION: HEAD
LOCATION: BACK

## 2018-09-22 ASSESSMENT — PAIN SCALES - GENERAL
PAINLEVEL_OUTOF10: 6
PAINLEVEL_OUTOF10: 0
PAINLEVEL_OUTOF10: 4
PAINLEVEL_OUTOF10: 3
PAINLEVEL_OUTOF10: 2
PAINLEVEL_OUTOF10: 0

## 2018-09-22 ASSESSMENT — PAIN DESCRIPTION - PROGRESSION
CLINICAL_PROGRESSION: GRADUALLY WORSENING
CLINICAL_PROGRESSION: NOT CHANGED
CLINICAL_PROGRESSION: GRADUALLY IMPROVING
CLINICAL_PROGRESSION: GRADUALLY WORSENING

## 2018-09-22 ASSESSMENT — PAIN DESCRIPTION - ORIENTATION
ORIENTATION: LOWER
ORIENTATION: LEFT;POSTERIOR

## 2018-09-22 ASSESSMENT — PAIN DESCRIPTION - DESCRIPTORS
DESCRIPTORS: ACHING
DESCRIPTORS: ACHING
DESCRIPTORS: ACHING;HEADACHE
DESCRIPTORS: ACHING

## 2018-09-22 ASSESSMENT — PAIN DESCRIPTION - FREQUENCY
FREQUENCY: INTERMITTENT
FREQUENCY: CONTINUOUS
FREQUENCY: INTERMITTENT
FREQUENCY: INTERMITTENT

## 2018-09-22 NOTE — FLOWSHEET NOTE
09/22/18 1120   Encounter Summary   Services provided to: Patient   Referral/Consult From: [facility]   [employer] [facility]   Continue Visiting Yes  (9/22)   Complexity of Encounter Moderate   Length of Encounter 30 minutes   Spiritual/Bahai   Type Spiritual support   Assessment Calm; Approachable   Intervention Active listening;Explored feelings, thoughts, concerns;Prayer;Nurtured hope;Discussed meaning/purpose;Discussed relationship with God;Discussed belief system/Yarsanism practices/nathan;Discussed illness/injury and it's impact   Outcome Expressed gratitude;Comfort;Engaged in conversation;Expressed feelings/needs/concerns;Coping     Patient smiling, in good spirits, and stated that he was \"pleased\" that he may go home \"in the next day or so. \"  He also states: \"I was told my lungs are doing better. \"  States his nathan is very important part of his life. Offered a prayer for strength, blessing, and healing.

## 2018-09-22 NOTE — PLAN OF CARE
Problem: Risk for Impaired Skin Integrity  Goal: Tissue integrity - skin and mucous membranes  Structural intactness and normal physiological function of skin and  mucous membranes. Outcome: Ongoing  No new problem areas noted to skin. Repositions self throughout the day    Problem: Pain:  Goal: Pain level will decrease  Pain level will decrease   Outcome: Ongoing  Patient free from pain this shift. Pain rated on 0-10 pain rating scale. Will continue to reassess. Comments: Care plan reviewed with patient.  Will review and discuss care plan with family when available

## 2018-09-23 VITALS
HEART RATE: 68 BPM | SYSTOLIC BLOOD PRESSURE: 115 MMHG | BODY MASS INDEX: 19.47 KG/M2 | OXYGEN SATURATION: 95 % | HEIGHT: 70 IN | TEMPERATURE: 97.8 F | WEIGHT: 136 LBS | DIASTOLIC BLOOD PRESSURE: 63 MMHG | RESPIRATION RATE: 18 BRPM

## 2018-09-23 LAB
ANION GAP SERPL CALCULATED.3IONS-SCNC: 12 MEQ/L (ref 8–16)
BASOPHILS # BLD: 0.6 %
BASOPHILS ABSOLUTE: 0.1 THOU/MM3 (ref 0–0.1)
BUN BLDV-MCNC: 18 MG/DL (ref 7–22)
CALCIUM SERPL-MCNC: 9.1 MG/DL (ref 8.5–10.5)
CHLORIDE BLD-SCNC: 100 MEQ/L (ref 98–111)
CO2: 25 MEQ/L (ref 23–33)
CREAT SERPL-MCNC: 1.2 MG/DL (ref 0.4–1.2)
EOSINOPHIL # BLD: 6.6 %
EOSINOPHILS ABSOLUTE: 0.8 THOU/MM3 (ref 0–0.4)
ERYTHROCYTE [DISTWIDTH] IN BLOOD BY AUTOMATED COUNT: 12.7 % (ref 11.5–14.5)
ERYTHROCYTE [DISTWIDTH] IN BLOOD BY AUTOMATED COUNT: 45.8 FL (ref 35–45)
GFR SERPL CREATININE-BSD FRML MDRD: 58 ML/MIN/1.73M2
GLUCOSE BLD-MCNC: 101 MG/DL (ref 70–108)
HCT VFR BLD CALC: 40.2 % (ref 42–52)
HEMOGLOBIN: 13.8 GM/DL (ref 14–18)
IMMATURE GRANS (ABS): 0.08 THOU/MM3 (ref 0–0.07)
IMMATURE GRANULOCYTES: 0.7 %
LYMPHOCYTES # BLD: 16.2 %
LYMPHOCYTES ABSOLUTE: 1.9 THOU/MM3 (ref 1–4.8)
MCH RBC QN AUTO: 33.2 PG (ref 26–33)
MCHC RBC AUTO-ENTMCNC: 34.3 GM/DL (ref 32.2–35.5)
MCV RBC AUTO: 96.6 FL (ref 80–94)
MONOCYTES # BLD: 10.5 %
MONOCYTES ABSOLUTE: 1.2 THOU/MM3 (ref 0.4–1.3)
MRSA SCREEN: NORMAL
NUCLEATED RED BLOOD CELLS: 0 /100 WBC
PLATELET # BLD: 175 THOU/MM3 (ref 130–400)
PMV BLD AUTO: 12.6 FL (ref 9.4–12.4)
POTASSIUM SERPL-SCNC: 4.1 MEQ/L (ref 3.5–5.2)
RBC # BLD: 4.16 MILL/MM3 (ref 4.7–6.1)
SEG NEUTROPHILS: 65.4 %
SEGMENTED NEUTROPHILS ABSOLUTE COUNT: 7.7 THOU/MM3 (ref 1.8–7.7)
SODIUM BLD-SCNC: 137 MEQ/L (ref 135–145)
VRE CULTURE: NORMAL
WBC # BLD: 11.7 THOU/MM3 (ref 4.8–10.8)

## 2018-09-23 PROCEDURE — 51798 US URINE CAPACITY MEASURE: CPT

## 2018-09-23 PROCEDURE — 2580000003 HC RX 258: Performed by: NURSE PRACTITIONER

## 2018-09-23 PROCEDURE — 36415 COLL VENOUS BLD VENIPUNCTURE: CPT

## 2018-09-23 PROCEDURE — 85025 COMPLETE CBC W/AUTO DIFF WBC: CPT

## 2018-09-23 PROCEDURE — 99239 HOSP IP/OBS DSCHRG MGMT >30: CPT | Performed by: INTERNAL MEDICINE

## 2018-09-23 PROCEDURE — 80048 BASIC METABOLIC PNL TOTAL CA: CPT

## 2018-09-23 PROCEDURE — 6370000000 HC RX 637 (ALT 250 FOR IP): Performed by: NURSE PRACTITIONER

## 2018-09-23 RX ORDER — SENNA PLUS 8.6 MG/1
1 TABLET ORAL NIGHTLY PRN
COMMUNITY
Start: 2018-09-23 | End: 2018-10-23

## 2018-09-23 RX ORDER — CLOTRIMAZOLE AND BETAMETHASONE DIPROPIONATE 10; .64 MG/G; MG/G
CREAM TOPICAL
Qty: 45 G | Refills: 1
Start: 2018-09-23 | End: 2018-12-17 | Stop reason: SDUPTHER

## 2018-09-23 RX ORDER — POLYETHYLENE GLYCOL 3350 17 G/17G
17 POWDER, FOR SOLUTION ORAL DAILY
Qty: 510 G | Refills: 0 | COMMUNITY
Start: 2018-09-23 | End: 2018-10-23

## 2018-09-23 RX ADMIN — METOPROLOL SUCCINATE 25 MG: 25 TABLET, FILM COATED, EXTENDED RELEASE ORAL at 08:10

## 2018-09-23 RX ADMIN — ASPIRIN 81 MG CHEWABLE TABLET 81 MG: 81 TABLET CHEWABLE at 08:10

## 2018-09-23 RX ADMIN — Medication 10 ML: at 08:10

## 2018-09-23 RX ADMIN — CALCIUM CARBONATE-VITAMIN D TAB 500 MG-200 UNIT 1 TABLET: 500-200 TAB at 08:10

## 2018-09-23 RX ADMIN — APIXABAN 5 MG: 5 TABLET, FILM COATED ORAL at 08:10

## 2018-09-23 RX ADMIN — AMLODIPINE BESYLATE 10 MG: 10 TABLET ORAL at 08:10

## 2018-09-23 RX ADMIN — HYDROCODONE BITARTRATE AND ACETAMINOPHEN 1 TABLET: 5; 325 TABLET ORAL at 06:00

## 2018-09-23 ASSESSMENT — PAIN DESCRIPTION - DIRECTION: RADIATING_TOWARDS: BACK

## 2018-09-23 ASSESSMENT — PAIN DESCRIPTION - PROGRESSION
CLINICAL_PROGRESSION: GRADUALLY WORSENING
CLINICAL_PROGRESSION_2: NOT CHANGED
CLINICAL_PROGRESSION: GRADUALLY IMPROVING

## 2018-09-23 ASSESSMENT — PAIN DESCRIPTION - LOCATION
LOCATION: BACK
LOCATION_2: ABDOMEN
LOCATION: ABDOMEN;RIB CAGE;BACK

## 2018-09-23 ASSESSMENT — PAIN DESCRIPTION - FREQUENCY
FREQUENCY: CONTINUOUS
FREQUENCY: INTERMITTENT

## 2018-09-23 ASSESSMENT — PAIN DESCRIPTION - DESCRIPTORS
DESCRIPTORS: ACHING
DESCRIPTORS: ACHING
DESCRIPTORS_2: CRAMPING;ACHING

## 2018-09-23 ASSESSMENT — PAIN DESCRIPTION - ONSET
ONSET: AWAKENED FROM SLEEP
ONSET: AWAKENED FROM SLEEP
ONSET_2: SUDDEN

## 2018-09-23 ASSESSMENT — PAIN DESCRIPTION - PAIN TYPE
TYPE_2: ACUTE PAIN
TYPE: ACUTE PAIN
TYPE: CHRONIC PAIN

## 2018-09-23 ASSESSMENT — PAIN DESCRIPTION - ORIENTATION
ORIENTATION: MID
ORIENTATION: LOWER
ORIENTATION_2: MID

## 2018-09-23 ASSESSMENT — PAIN SCALES - GENERAL
PAINLEVEL_OUTOF10: 0
PAINLEVEL_OUTOF10: 4
PAINLEVEL_OUTOF10: 2
PAINLEVEL_OUTOF10: 0

## 2018-09-23 ASSESSMENT — PAIN DESCRIPTION - INTENSITY: RATING_2: 4

## 2018-09-23 ASSESSMENT — PAIN DESCRIPTION - DURATION: DURATION_2: CONTINUOUS

## 2018-09-23 NOTE — PROGRESS NOTES
Pt states vanc is making his abdomen hurt & feel crampy. Pt requested to not recieve this antibiotic again.

## 2018-09-23 NOTE — PROGRESS NOTES
Pharmacy Note  BioFire Result    Cinthya Arroyo is a 80 y.o. male, with a positive blood culture result    [employer] message received from Roberto, laboratory employee on 9/22/2018 at 9:39 PM    First Gram stain result: gram positive cocci in clusters    BioFire BCID result: Staphylococcus sp. (NOT aureus), MecA gene detected    BioFire BCID and gram stain congruent? Yes    Suspected source? Likely contamination    Patient chart reviewed for signs/symptoms of infection: Yes  BP (!) 143/68   Pulse 70   Temp 98.6 °F (37 °C) (Oral)   Resp 20   Ht 5' 10\" (1.778 m)   Wt 136 lb 3.9 oz (61.8 kg)   SpO2 95%   BMI 19.55 kg/m²   Lab Results   Component Value Date    WBC 14.3 (H) 09/21/2018       Allergies reviewed  Nitrofuran derivatives and Nsaids    Renal function reviewed  Estimated Creatinine Clearance: 48 mL/min (based on SCr of 1 mg/dL).     Current antibiotic regimen: None    Intervention needed: No    Individual contacted: Nurse Willard     Recommendations: None      Anne Sanchez  9/22/2018 9:39 PM

## 2018-09-23 NOTE — DISCHARGE SUMMARY
95% 95% 97% 95%   Weight:  136 lb (61.7 kg)     Height:         Weight: Weight: 136 lb (61.7 kg)     24 hour intake/output:  Intake/Output Summary (Last 24 hours) at 09/23/18 1434  Last data filed at 09/23/18 0327   Gross per 24 hour   Intake           986.02 ml   Output                1 ml   Net           985.02 ml         General appearance:  No apparent distress, appears stated age and cooperative. HEENT:  Normal cephalic, atraumatic without obvious deformity. Pupils equal, round, and reactive to light. Extra ocular muscles intact. Conjunctivae/corneas clear. Neck: Supple, with full range of motion. No jugular venous distention. Trachea midline. Respiratory:  Normal respiratory effort. Clear to auscultation, bilaterally without Rales/Wheezes/Rhonchi. Cardiovascular:  Regular rate and rhythm with normal S1/S2 without murmurs, rubs or gallops. Abdomen: Soft, non-tender, non-distended with normal bowel sounds. Musculoskeletal:  No clubbing, cyanosis or edema bilaterally. Full range of motion without deformity. Skin: Skin color, texture, turgor normal.  No rashes or lesions. Neurologic:  Neurovascularly intact without any focal sensory/motor deficits. Cranial nerves: II-XII intact, grossly non-focal.  Psychiatric:  Alert and oriented, thought content appropriate, normal insight        Labs:  For convenience and continuity at follow-up the following most recent labs are provided:      CBC:    Lab Results   Component Value Date    WBC 11.7 09/23/2018    HGB 13.8 09/23/2018    HCT 40.2 09/23/2018     09/23/2018       Renal:  Lab Results   Component Value Date     09/23/2018    K 4.1 09/23/2018     09/23/2018    CO2 25 09/23/2018    BUN 18 09/23/2018    CREATININE 1.2 09/23/2018    CALCIUM 9.1 09/23/2018    PHOS 3.2 05/14/2016         Significant Diagnostic Studies    Radiology:          Consults:     IP CONSULT TO PHARMACY    Disposition:    [x] Home       [] TCU       [] Rehab       []

## 2018-09-23 NOTE — FLOWSHEET NOTE
09/22/18 2139   Provider Notification   Reason for Communication Critical Value (comment)  (+ [facility])   Provider Name    Provider Notification Physician   Method of Communication Secure Message   Response See orders   Notification Time 2139   Shift Event Other (comment)  (+ BC. Vanc ordered. )     2139   Messaged about positive blood culture result.  stated he would look at chart & place an order. 2143 Vanc order placed. 2157 Spoke with Angelito Ferguson from pharmacy for reason for vanc order. Pharmacy to verify vanc.

## 2018-09-24 ENCOUNTER — CARE COORDINATION (OUTPATIENT)
Dept: CASE MANAGEMENT | Age: 83
End: 2018-09-24

## 2018-09-24 DIAGNOSIS — J93.9 PNEUMOTHORAX, UNSPECIFIED TYPE: Primary | ICD-10-CM

## 2018-09-24 LAB
BLOOD CULTURE, ROUTINE: ABNORMAL
BLOOD CULTURE, ROUTINE: ABNORMAL
ORGANISM: ABNORMAL

## 2018-09-24 NOTE — CARE COORDINATION
[facility] Transitions Initial Follow Up Call    Call within 2 business days of discharge: Yes    Patient: Ilia Hernandez Patient : [id number]   MRN: [id number]  Reason for Admission: There are no discharge diagnoses documented for the most recent discharge. Discharge Date: 18 RARS: Readmission Risk Score: 14 CMRS: 5.0     Spoke with: Ilia Hernandez (patient)    [facility]    Non-face-to-face services provided:  Scheduled appointment with PCP-CTC confirmed with patient he is scheudled for TCM/HFU visit with Dr. Diana Capellan (PCP) tomorrow on 18 at 2:30 pm.   Scheduled appointment with Specialist-CTC confirmed with patient he is scheudled to follow up with Dr. Mague Rodriguez (neuro sx) on 10/3/18 at 9:45 am.   Obtained and reviewed discharge summary and/or continuity of care documents    Care Transitions 24 Hour Call    Do you have any ongoing symptoms?:  No  Do you have a copy of your discharge instructions?:  Yes  Do you have all of your prescriptions and are they filled?:  No  Have you been contacted by a [facility] Pharmacist?:  No  Have you scheduled your follow up appointment?:  Yes  How are you going to get to your appointment?:  Car - family or friend to transport  Were you discharged with any Home Care or Post Acute Services:  No  Do you feel like you have everything you need to keep you well at home?:  Yes  Care Transitions Interventions  No Identified Needs       Spoke with patient today 18 for initial TCM/hospital discharge follow up. States he initially came to hospital with complaints or nausea and vomiting which has subsided. CT of abdomen/pelvis was obtained on admission and noted to show the following below:     1. Left basilar pneumothorax. The left lower lobe atelectasis and/or consolidation. 2. Marked, constipation     Patient states he is feeling better since discharge. Denies any shortness of breath, chest pain, abdominal pain, nausea, vomiting, chills or fever.  States he
Detail Level: Detailed
Detail Level: Simple

## 2018-09-25 ENCOUNTER — TELEPHONE (OUTPATIENT)
Dept: NEUROSURGERY | Age: 83
End: 2018-09-25

## 2018-09-25 ENCOUNTER — OFFICE VISIT (OUTPATIENT)
Dept: FAMILY MEDICINE CLINIC | Age: 83
End: 2018-09-25
Payer: MEDICARE

## 2018-09-25 VITALS
WEIGHT: 143 LBS | DIASTOLIC BLOOD PRESSURE: 76 MMHG | BODY MASS INDEX: 20.52 KG/M2 | SYSTOLIC BLOOD PRESSURE: 126 MMHG | OXYGEN SATURATION: 98 % | RESPIRATION RATE: 16 BRPM | HEART RATE: 74 BPM

## 2018-09-25 DIAGNOSIS — G89.29 CHRONIC LEFT-SIDED LOW BACK PAIN WITH LEFT-SIDED SCIATICA: ICD-10-CM

## 2018-09-25 DIAGNOSIS — M54.42 CHRONIC LEFT-SIDED LOW BACK PAIN WITH LEFT-SIDED SCIATICA: ICD-10-CM

## 2018-09-25 DIAGNOSIS — I10 ESSENTIAL HYPERTENSION: ICD-10-CM

## 2018-09-25 DIAGNOSIS — J95.811 POSTPROCEDURAL PNEUMOTHORAX: Primary | ICD-10-CM

## 2018-09-25 PROCEDURE — 99495 TRANSJ CARE MGMT MOD F2F 14D: CPT | Performed by: FAMILY MEDICINE

## 2018-09-25 PROCEDURE — 1111F DSCHRG MED/CURRENT MED MERGE: CPT | Performed by: FAMILY MEDICINE

## 2018-09-25 RX ORDER — AMLODIPINE BESYLATE 10 MG/1
10 TABLET ORAL DAILY
Qty: 30 TABLET | Refills: 3 | Status: SHIPPED | OUTPATIENT
Start: 2018-09-25 | End: 2019-03-18 | Stop reason: DRUGHIGH

## 2018-09-25 ASSESSMENT — ENCOUNTER SYMPTOMS
EYE DISCHARGE: 0
DIARRHEA: 0
RHINORRHEA: 0
SHORTNESS OF BREATH: 0
CONSTIPATION: 0
WHEEZING: 0
BACK PAIN: 1

## 2018-09-25 NOTE — PROGRESS NOTES
Respiratory: Negative for shortness of breath and wheezing. Cardiovascular: Negative for palpitations. Gastrointestinal: Negative for constipation (resolved) and diarrhea. Genitourinary: Negative for dysuria and hematuria. Musculoskeletal: Positive for back pain (present but improved). Skin: Positive for wound (surgical wounds of back). Neurological: Negative for dizziness. Psychiatric/Behavioral: Negative for sleep disturbance. The patient is not nervous/anxious. Vitals:    09/25/18 1425   BP: 126/76   Site: Left Upper Arm   Position: Sitting   Cuff Size: Medium Adult   Pulse: 74   Resp: 16   SpO2: 98%   Weight: 143 lb (64.9 kg)     Body mass index is 20.52 kg/m². Wt Readings from Last 3 Encounters:   09/25/18 143 lb (64.9 kg)   09/23/18 136 lb (61.7 kg)   09/17/18 139 lb 5.3 oz (63.2 kg)     BP Readings from Last 3 Encounters:   09/25/18 126/76   09/23/18 115/63   09/18/18 (!) 147/66       Physical Exam   Constitutional: He is oriented to person, place, and time. He appears well-developed and well-nourished. HENT:   Head: Normocephalic and atraumatic. Eyes: Conjunctivae and EOM are normal. Right eye exhibits no discharge. Left eye exhibits no discharge. No scleral icterus. Neck: Normal range of motion. Cardiovascular: Normal rate, regular rhythm and normal heart sounds. Pulmonary/Chest: Effort normal and breath sounds normal. He has no wheezes. Abdominal: Soft. Bowel sounds are normal. He exhibits no distension. There is no tenderness. Musculoskeletal: He exhibits no edema or tenderness. Lymphadenopathy:     He has no cervical adenopathy. Neurological: He is alert and oriented to person, place, and time. Coordination normal.   Skin: Skin is warm and dry. Psychiatric: He has a normal mood and affect. His behavior is normal. Judgment and thought content normal.   Nursing note and vitals reviewed. Assessment/Plan:  1.  Postprocedural pneumothorax  Improving from previous CXR, asymptomatic.  monitor  - [place] DISCHARGE MEDS RECONCILED W/ CURRENT OUTPATIENT MED LIST    2. Essential hypertension  Controlled on 10 mg, will cont meds. [facility]  - amLODIPine (NORVASC) 10 MG tablet; Take 1 tablet by mouth daily  Dispense: 30 tablet; Refill: 3  - [place] DISCHARGE MEDS RECONCILED W/ CURRENT OUTPATIENT MED LIST    3.  Chronic left-sided low back pain with left-sided sciatica  S/p spinal stimulator, pain improved  - [place] DISCHARGE MEDS RECONCILED W/ CURRENT OUTPATIENT MED LIST        Medical Decision Making: moderate complexity

## 2018-09-25 NOTE — PATIENT INSTRUCTIONS
when you cough or take deep breaths. This will support your chest and decrease your pain. · Take pain medicines exactly as directed. ¨ If the doctor gave you a prescription medicine for pain, take it as prescribed. ¨ If you are not taking a prescription pain medicine, ask your doctor if you can take an over-the-counter medicine. · If your doctor prescribed antibiotics, take them as directed. Do not stop taking them just because you feel better. You need to take the full course of antibiotics. · If you have a bandage over your chest tube, or the place where the chest tube was inserted, keep it clean and dry. Follow your doctor's instructions on bandage care. · If you go home with a tube in place, follow the doctor's directions. Do not adjust the tube in any way. This could break the seal or cause other problems. Keep the tube dry. · Avoid any movements that require your muscles, especially your chest muscles, to strain. Such movements include laughing hard, bearing down to have a bowel movement, and heavy lifting. Try not to cough. · Do not fly in an airplane until your doctor tells you it is okay. Avoid any situations where there is increased air pressure. · Do not smoke or allow others to smoke around you. If you need help quitting, talk to your doctor about stop-smoking programs and medicines. These can increase your chances of quitting for good. When should you call for help? Call 911 anytime you think you may need emergency care.  For example, call if:    · You have severe trouble breathing.     · You passed out (lost consciousness).    Call your doctor now or seek immediate medical care if:    · You have new or worse trouble breathing.     · You have new pain or your pain gets worse.     · You have a fever.     · You cough up blood.     · Your chest tube starts to come out or falls out.     · You are bleeding through the bandage where the tube was put in.    Watch closely for changes in your health,

## 2018-09-26 LAB — BLOOD CULTURE, ROUTINE: NORMAL

## 2018-09-26 RX ORDER — SULFAMETHOXAZOLE AND TRIMETHOPRIM 800; 160 MG/1; MG/1
1 TABLET ORAL 2 TIMES DAILY
Qty: 14 TABLET | Refills: 0 | OUTPATIENT
Start: 2018-09-26 | End: 2018-09-28 | Stop reason: ALTCHOICE

## 2018-09-28 ENCOUNTER — CARE COORDINATION (OUTPATIENT)
Dept: CASE MANAGEMENT | Age: 83
End: 2018-09-28

## 2018-09-28 ENCOUNTER — OFFICE VISIT (OUTPATIENT)
Dept: NEUROSURGERY | Age: 83
End: 2018-09-28

## 2018-09-28 VITALS
HEIGHT: 70 IN | HEART RATE: 66 BPM | WEIGHT: 143 LBS | DIASTOLIC BLOOD PRESSURE: 81 MMHG | BODY MASS INDEX: 20.47 KG/M2 | SYSTOLIC BLOOD PRESSURE: 152 MMHG

## 2018-09-28 DIAGNOSIS — G89.4 CHRONIC PAIN DISORDER: ICD-10-CM

## 2018-09-28 DIAGNOSIS — Q76.49 SPINE DEFORMITY: Primary | ICD-10-CM

## 2018-09-28 DIAGNOSIS — Z96.89 S/P INSERTION OF SPINAL CORD STIMULATOR: ICD-10-CM

## 2018-09-28 PROCEDURE — 99024 POSTOP FOLLOW-UP VISIT: CPT | Performed by: NEUROLOGICAL SURGERY

## 2018-09-28 RX ORDER — HYDROCODONE BITARTRATE AND ACETAMINOPHEN 5; 325 MG/1; MG/1
1 TABLET ORAL
COMMUNITY
End: 2022-06-08 | Stop reason: SDUPTHER

## 2018-09-28 NOTE — CARE COORDINATION
[facility] Transitions Follow Up Call    2018    Patient: Aliza Barroso  Patient : 1934   MRN: [id number]  Reason for Admission: There are no discharge diagnoses documented for the most recent discharge. Discharge Date: 18 RARS: Readmission Risk Score: 15       Spoke with: [facility] Transitions Subsequent and Final Call    Subsequent and Final Calls  Do you have any ongoing symptoms?:  No  Have your medications changed?:  Yes  Patient Reports:  finished the Bactrim, Norvasc increased to 10 mg day  Do you have any questions related to your medications?:  No  Do you currently have any active services?:  No  Do you have any needs or concerns that I can assist you with?:  No  Identified Barriers:  None  Care Transitions Interventions  No Identified Needs  Other Interventions:        Called pt for the sub transition call. Pt stated he was doing well. The stitches in his back were removed today. Pt stated he can drive now. Pt denied any abdominal pain. Pt stated he is having regular BM, \"back to the way it should be\"  Pt denied any needs or concerns. CTC will continue to follow.     Follow Up  Future Appointments  Date Time Provider Yonas Boyer   10/23/2018 10:45 AM MD Sendy Terrell [facility] [place]   2018 8:00 AM SONIDO Wiggins - TOBY Saavedra [facility] [place] [facility]   2018 8:45 AM Paula Christine MD Fam Med CG [facility] [facility]   1/10/2019 9:30 AM Julian Charlton MD [address] [facility] [place] Dobbins RN  Care Transition Coordinator  [phone]

## 2018-09-28 NOTE — PROGRESS NOTES
[address] [facility] [address]  [facility]  Dept: [phone]  Dept Fax: [phone]  Loc: [facility] 1161 Follow Visit  Visit Date: 9/28/2018      Yrn Abdalla  is a 80 y.o. male who is returning to the office today for a post-op follow-up visit. He had surgery on 9/17/2018 (placement of permanent spinal cord stimulator system, paddle and battery).      · Patient was evaluated today and is doing well overall. · Stated that when he turns his SCS device on he experices almost the same results that he got during his SCS trails. · Has the same effects during the trail. · On complained from some  tenderess over his battery pocket but there is no discharge or redness. Otherwise, there is no new complaints were voiced. · Patient  lives with their family  · Wound: wound healing as expected     Assessment/Plan:  · Status Post (placement of permanent spinal cord stimulator system, paddle and battery)  · Doing well overall  · Encouraged gradual increase in physical and mental activity. · Fall precaution and home safety education provided to patient.   · Follow-up with pain clinic and [employer] rep as scheduled  · Follow-up with outpatient neurosurgery clinic after 3-4 weeks       Electronically signed by Chapo Bello MD on 9/28/18 at 10:06 AM

## 2018-10-03 ENCOUNTER — CARE COORDINATION (OUTPATIENT)
Dept: CASE MANAGEMENT | Age: 83
End: 2018-10-03

## 2018-10-03 DIAGNOSIS — J93.9 PNEUMOTHORAX, UNSPECIFIED TYPE: ICD-10-CM

## 2018-10-03 DIAGNOSIS — J95.811 POSTPROCEDURAL PNEUMOTHORAX: Primary | ICD-10-CM

## 2018-10-11 ENCOUNTER — CARE COORDINATION (OUTPATIENT)
Dept: CASE MANAGEMENT | Age: 83
End: 2018-10-11

## 2018-10-23 ENCOUNTER — OFFICE VISIT (OUTPATIENT)
Dept: NEUROSURGERY | Age: 83
End: 2018-10-23

## 2018-10-23 VITALS
DIASTOLIC BLOOD PRESSURE: 69 MMHG | HEIGHT: 70 IN | BODY MASS INDEX: 20.7 KG/M2 | HEART RATE: 60 BPM | WEIGHT: 144.6 LBS | SYSTOLIC BLOOD PRESSURE: 133 MMHG

## 2018-10-23 DIAGNOSIS — Z96.89 S/P INSERTION OF SPINAL CORD STIMULATOR: Primary | ICD-10-CM

## 2018-10-23 PROCEDURE — 99024 POSTOP FOLLOW-UP VISIT: CPT | Performed by: NEUROLOGICAL SURGERY

## 2018-10-23 NOTE — PROGRESS NOTES
Juan Cohen presented today for a follow up visit. He  is a 80 y.o. male who  had surgery on 9/17/2018 (placement of permanent spinal cord stimulator system, paddle and battery).      · Patient was evaluated today and is doing well overall. · Stated that when he turns his SCS device on he still experices almost the same results that he got during his SCS trails. · He has almost the same effects during the trail. · Feeling better with good coverage   · Stated the he is happy with the results of his SCS and thinks that this device is helping him. · Still feeling slight  tenderess over his battery pocket but there is no discharge or redness and this feeling is getting better any way. · Otherwise, there is no new complaints were voiced. · Patient  lives with their family  · Wound: wound healing as expected     Assessment/Plan:  · Status Post (placement of permanent spinal cord stimulator system, paddle and battery)  · Doing well overall  · Encouraged gradual increase in physical and mental activity. · Fall precaution and home safety education provided to patient.   · Follow-up with pain clinic and [facility] scientific rep as scheduled and regular basis   · Follow-up with outpatient neurosurgery clinic as needed

## 2018-10-26 ENCOUNTER — HOSPITAL ENCOUNTER (OUTPATIENT)
Age: 83
Discharge: HOME | End: 2018-10-26
Payer: MEDICARE

## 2018-10-26 DIAGNOSIS — C61 MALIGNANT NEOPLASM OF PROSTATE (HCC): ICD-10-CM

## 2018-10-26 LAB — PROSTATE SPECIFIC ANTIGEN: < 0.02 NG/ML (ref 0–1)

## 2018-10-26 PROCEDURE — 36415 COLL VENOUS BLD VENIPUNCTURE: CPT

## 2018-10-26 PROCEDURE — 84153 ASSAY OF PSA TOTAL: CPT

## 2018-11-06 ENCOUNTER — OFFICE VISIT (OUTPATIENT)
Dept: UROLOGY | Age: 83
End: 2018-11-06
Payer: MEDICARE

## 2018-11-06 VITALS
DIASTOLIC BLOOD PRESSURE: 70 MMHG | WEIGHT: 146.6 LBS | HEIGHT: 71 IN | BODY MASS INDEX: 20.52 KG/M2 | SYSTOLIC BLOOD PRESSURE: 118 MMHG

## 2018-11-06 DIAGNOSIS — C61 PROSTATE CANCER (HCC): Primary | ICD-10-CM

## 2018-11-06 PROCEDURE — 1036F TOBACCO NON-USER: CPT | Performed by: NURSE PRACTITIONER

## 2018-11-06 PROCEDURE — 1101F PT FALLS ASSESS-DOCD LE1/YR: CPT | Performed by: NURSE PRACTITIONER

## 2018-11-06 PROCEDURE — 81002 URINALYSIS NONAUTO W/O SCOPE: CPT | Performed by: NURSE PRACTITIONER

## 2018-11-06 PROCEDURE — G8484 FLU IMMUNIZE NO ADMIN: HCPCS | Performed by: NURSE PRACTITIONER

## 2018-11-06 PROCEDURE — G8427 DOCREV CUR MEDS BY ELIG CLIN: HCPCS | Performed by: NURSE PRACTITIONER

## 2018-11-06 PROCEDURE — 99213 OFFICE O/P EST LOW 20 MIN: CPT | Performed by: NURSE PRACTITIONER

## 2018-11-06 PROCEDURE — 1123F ACP DISCUSS/DSCN MKR DOCD: CPT | Performed by: NURSE PRACTITIONER

## 2018-11-06 PROCEDURE — 4040F PNEUMOC VAC/ADMIN/RCVD: CPT | Performed by: NURSE PRACTITIONER

## 2018-11-06 PROCEDURE — G8420 CALC BMI NORM PARAMETERS: HCPCS | Performed by: NURSE PRACTITIONER

## 2018-11-06 NOTE — PROGRESS NOTES
Hypertension; Osteoarthritis; Prostate cancer ([facility] Pulmonary emboli ([facility] and Wears glasses. Past Surgical History  The patient  has a past surgical history that includes back surgery (2851,3551); Total shoulder arthroplasty (2005); Prostate surgery (2003); Prostate surgery (2009); Prostate Biopsy (7-); Cataract removal with implant (Left, 11/2014); Prostate Biopsy (8/21/2015); hernia repair (1995, 2009); other surgical history (10/7/2015); other surgical history (Bilateral, 09/05/2017); Nerve Block Lumb Facet Level 1 Bilateral (Bilateral, 9/5/2017); hernia repair (8994'H?); Cataract removal (Right, 06/2001); Appendectomy (01/1961); hernia repair (Left, 11/22/2017); other surgical history (Right, 1980's); other surgical history (01/30/2018); pr inject si joint arthrgrphy&/anes/steroid w/image (Left, 1/30/2018); other surgical history (Left, 02/27/2018); pr inject si joint arthrgrphy&/anes/steroid w/image (Left, 2/27/2018); Nerve Surgery (Left, 04/09/2018); pr office/outpt visit,procedure only (Left, 4/9/2018); pr office/outpt visit,procedure only (N/A, 8/21/2018); pr implant neurostim/ (N/A, 9/17/2018); and eye surgery. Family History  This patient's family history includes Cancer in his brother, brother, and sister; Heart Disease in his father; Prostate Cancer in his brother, brother, brother, and brother; Stroke in his mother. Social History  Rolando Bobby  reports that he quit smoking about 53 years ago. His smoking use included Cigarettes. He has a 10.00 pack-year smoking history. He has never used smokeless tobacco. He reports that he drinks alcohol. He reports that he does not use drugs. Subjective:     Review of Systems  No problems with ears, nose or throat. No problems with eyes. No chest pain, shortness of breath, abdominal pain, extremity pain or weakness, and no neurological deficits. No rashes.  symptoms per HPI.   The remainder of the review of symptoms is Maranda Crook f/u today for Prostate Cancer. He is s/p RALRP in 2015, Arlyn score 4+3=7, pT2c. His PSA has remained undetectable which is great news. Plan to follow with PSA in 1 year. Mild stress Incontinence- advised to continue kegal exercises, discussed briefly male urethral sling if bothersome    Return in about 1 year (around 11/6/2019) for Prostate Cancer .     Vladimir Meehan, APRN  Urology

## 2018-12-17 ENCOUNTER — TELEPHONE (OUTPATIENT)
Dept: ENT CLINIC | Age: 83
End: 2018-12-17

## 2018-12-17 ENCOUNTER — OFFICE VISIT (OUTPATIENT)
Dept: FAMILY MEDICINE CLINIC | Age: 83
End: 2018-12-17
Payer: MEDICARE

## 2018-12-17 VITALS
DIASTOLIC BLOOD PRESSURE: 68 MMHG | OXYGEN SATURATION: 97 % | BODY MASS INDEX: 20.43 KG/M2 | RESPIRATION RATE: 16 BRPM | WEIGHT: 144.4 LBS | SYSTOLIC BLOOD PRESSURE: 124 MMHG | HEART RATE: 80 BPM

## 2018-12-17 DIAGNOSIS — Z23 NEED FOR VACCINATION: ICD-10-CM

## 2018-12-17 DIAGNOSIS — K13.70 ORAL LESION: ICD-10-CM

## 2018-12-17 DIAGNOSIS — L29.9 PRURITUS: ICD-10-CM

## 2018-12-17 DIAGNOSIS — R42 VERTIGO: ICD-10-CM

## 2018-12-17 DIAGNOSIS — I10 ESSENTIAL HYPERTENSION: Primary | ICD-10-CM

## 2018-12-17 PROCEDURE — 1101F PT FALLS ASSESS-DOCD LE1/YR: CPT | Performed by: FAMILY MEDICINE

## 2018-12-17 PROCEDURE — G8484 FLU IMMUNIZE NO ADMIN: HCPCS | Performed by: FAMILY MEDICINE

## 2018-12-17 PROCEDURE — G8427 DOCREV CUR MEDS BY ELIG CLIN: HCPCS | Performed by: FAMILY MEDICINE

## 2018-12-17 PROCEDURE — G0009 ADMIN PNEUMOCOCCAL VACCINE: HCPCS | Performed by: FAMILY MEDICINE

## 2018-12-17 PROCEDURE — G8420 CALC BMI NORM PARAMETERS: HCPCS | Performed by: FAMILY MEDICINE

## 2018-12-17 PROCEDURE — 1036F TOBACCO NON-USER: CPT | Performed by: FAMILY MEDICINE

## 2018-12-17 PROCEDURE — 4040F PNEUMOC VAC/ADMIN/RCVD: CPT | Performed by: FAMILY MEDICINE

## 2018-12-17 PROCEDURE — 1123F ACP DISCUSS/DSCN MKR DOCD: CPT | Performed by: FAMILY MEDICINE

## 2018-12-17 PROCEDURE — 99214 OFFICE O/P EST MOD 30 MIN: CPT | Performed by: FAMILY MEDICINE

## 2018-12-17 PROCEDURE — 90732 PPSV23 VACC 2 YRS+ SUBQ/IM: CPT | Performed by: FAMILY MEDICINE

## 2018-12-17 RX ORDER — MECLIZINE HCL 12.5 MG/1
TABLET ORAL
Qty: 45 TABLET | Refills: 2 | Status: SHIPPED | OUTPATIENT
Start: 2018-12-17 | End: 2020-01-13 | Stop reason: ALTCHOICE

## 2018-12-17 RX ORDER — CLOTRIMAZOLE AND BETAMETHASONE DIPROPIONATE 10; .64 MG/G; MG/G
CREAM TOPICAL
Qty: 45 G | Refills: 1 | Status: SHIPPED | OUTPATIENT
Start: 2018-12-17 | End: 2019-12-26 | Stop reason: SDUPTHER

## 2018-12-17 ASSESSMENT — ENCOUNTER SYMPTOMS
SORE THROAT: 0
WHEEZING: 0
DIARRHEA: 0
EYE DISCHARGE: 0
RHINORRHEA: 0
BACK PAIN: 1
COUGH: 0
CONSTIPATION: 0
SHORTNESS OF BREATH: 0
NAUSEA: 0
ABDOMINAL PAIN: 0

## 2018-12-18 ENCOUNTER — TELEPHONE (OUTPATIENT)
Dept: ENT CLINIC | Age: 83
End: 2018-12-18

## 2018-12-18 NOTE — TELEPHONE ENCOUNTER
Pre-service called and stated that patient called to inform them that he just had a death in the family and the  is at the same time as his new patient appointment which is 18 @ 1:00. Pre-service did not know where else to put patient because the schedule is full and it seems like he is coming in to be seen for a more urgent matter. Jeanne Dumas from pre-service that I would make an encounter and ask [address] [place] [address] where I would put patient in.

## 2019-01-04 ENCOUNTER — OFFICE VISIT (OUTPATIENT)
Dept: ENT CLINIC | Age: 84
End: 2019-01-04
Payer: MEDICARE

## 2019-01-04 ENCOUNTER — OFFICE VISIT (OUTPATIENT)
Dept: CARDIOLOGY CLINIC | Age: 84
End: 2019-01-04
Payer: MEDICARE

## 2019-01-04 VITALS
DIASTOLIC BLOOD PRESSURE: 68 MMHG | BODY MASS INDEX: 20.3 KG/M2 | HEART RATE: 72 BPM | HEIGHT: 71 IN | WEIGHT: 145 LBS | SYSTOLIC BLOOD PRESSURE: 130 MMHG

## 2019-01-04 VITALS
HEIGHT: 71 IN | TEMPERATURE: 97.9 F | BODY MASS INDEX: 20.2 KG/M2 | SYSTOLIC BLOOD PRESSURE: 134 MMHG | DIASTOLIC BLOOD PRESSURE: 72 MMHG | HEART RATE: 74 BPM | WEIGHT: 144.3 LBS | RESPIRATION RATE: 16 BRPM

## 2019-01-04 DIAGNOSIS — I35.1 MODERATE AORTIC REGURGITATION: ICD-10-CM

## 2019-01-04 DIAGNOSIS — E78.5 HYPERLIPIDEMIA WITH TARGET LDL LESS THAN 130: ICD-10-CM

## 2019-01-04 DIAGNOSIS — K13.79 LESION OF HARD PALATE: Primary | ICD-10-CM

## 2019-01-04 DIAGNOSIS — I34.0 NON-RHEUMATIC MITRAL REGURGITATION: ICD-10-CM

## 2019-01-04 DIAGNOSIS — I48.19 PERSISTENT ATRIAL FIBRILLATION (HCC): Primary | ICD-10-CM

## 2019-01-04 DIAGNOSIS — I10 ESSENTIAL HYPERTENSION: ICD-10-CM

## 2019-01-04 PROCEDURE — G8484 FLU IMMUNIZE NO ADMIN: HCPCS | Performed by: OTOLARYNGOLOGY

## 2019-01-04 PROCEDURE — 1101F PT FALLS ASSESS-DOCD LE1/YR: CPT | Performed by: OTOLARYNGOLOGY

## 2019-01-04 PROCEDURE — 4040F PNEUMOC VAC/ADMIN/RCVD: CPT | Performed by: OTOLARYNGOLOGY

## 2019-01-04 PROCEDURE — G8427 DOCREV CUR MEDS BY ELIG CLIN: HCPCS | Performed by: OTOLARYNGOLOGY

## 2019-01-04 PROCEDURE — 1123F ACP DISCUSS/DSCN MKR DOCD: CPT | Performed by: OTOLARYNGOLOGY

## 2019-01-04 PROCEDURE — G8427 DOCREV CUR MEDS BY ELIG CLIN: HCPCS | Performed by: INTERNAL MEDICINE

## 2019-01-04 PROCEDURE — G8420 CALC BMI NORM PARAMETERS: HCPCS | Performed by: INTERNAL MEDICINE

## 2019-01-04 PROCEDURE — 1036F TOBACCO NON-USER: CPT | Performed by: INTERNAL MEDICINE

## 2019-01-04 PROCEDURE — 1123F ACP DISCUSS/DSCN MKR DOCD: CPT | Performed by: INTERNAL MEDICINE

## 2019-01-04 PROCEDURE — 1101F PT FALLS ASSESS-DOCD LE1/YR: CPT | Performed by: INTERNAL MEDICINE

## 2019-01-04 PROCEDURE — 4040F PNEUMOC VAC/ADMIN/RCVD: CPT | Performed by: INTERNAL MEDICINE

## 2019-01-04 PROCEDURE — 99203 OFFICE O/P NEW LOW 30 MIN: CPT | Performed by: OTOLARYNGOLOGY

## 2019-01-04 PROCEDURE — 1036F TOBACCO NON-USER: CPT | Performed by: OTOLARYNGOLOGY

## 2019-01-04 PROCEDURE — 99214 OFFICE O/P EST MOD 30 MIN: CPT | Performed by: INTERNAL MEDICINE

## 2019-01-04 PROCEDURE — G8420 CALC BMI NORM PARAMETERS: HCPCS | Performed by: OTOLARYNGOLOGY

## 2019-01-04 PROCEDURE — G8484 FLU IMMUNIZE NO ADMIN: HCPCS | Performed by: INTERNAL MEDICINE

## 2019-01-04 ASSESSMENT — ENCOUNTER SYMPTOMS
EYE DISCHARGE: 0
SINUS PRESSURE: 0
VOMITING: 0
CHOKING: 0
ABDOMINAL DISTENTION: 0
WHEEZING: 0
EYE PAIN: 0
CHEST TIGHTNESS: 0
COLOR CHANGE: 0
BACK PAIN: 0
ANAL BLEEDING: 0
FACIAL SWELLING: 0
COUGH: 0
RHINORRHEA: 0
EYE REDNESS: 0
CONSTIPATION: 0
STRIDOR: 0
ABDOMINAL PAIN: 0
BLOOD IN STOOL: 0
RECTAL PAIN: 0
TROUBLE SWALLOWING: 0
VOICE CHANGE: 0
SHORTNESS OF BREATH: 0
SORE THROAT: 0
DIARRHEA: 0
EYE ITCHING: 0
NAUSEA: 0
PHOTOPHOBIA: 0
APNEA: 0

## 2019-01-11 ENCOUNTER — HOSPITAL ENCOUNTER (OUTPATIENT)
Dept: NON INVASIVE DIAGNOSTICS | Age: 84
Discharge: HOME | End: 2019-01-11
Payer: MEDICARE

## 2019-01-11 DIAGNOSIS — I34.0 NON-RHEUMATIC MITRAL REGURGITATION: ICD-10-CM

## 2019-01-11 DIAGNOSIS — I35.1 MODERATE AORTIC REGURGITATION: ICD-10-CM

## 2019-01-11 LAB
LV EF: 65 %
LVEF MODALITY: NORMAL

## 2019-01-11 PROCEDURE — 93306 TTE W/DOPPLER COMPLETE: CPT

## 2019-01-15 ENCOUNTER — TELEPHONE (OUTPATIENT)
Dept: CARDIOLOGY CLINIC | Age: 84
End: 2019-01-15

## 2019-01-15 DIAGNOSIS — I35.1 MODERATE AORTIC REGURGITATION: ICD-10-CM

## 2019-01-15 DIAGNOSIS — I71.21 ASCENDING AORTIC ANEURYSM: ICD-10-CM

## 2019-01-15 DIAGNOSIS — I48.19 PERSISTENT ATRIAL FIBRILLATION (HCC): Primary | ICD-10-CM

## 2019-01-24 ENCOUNTER — HOSPITAL ENCOUNTER (OUTPATIENT)
Dept: GENERAL RADIOLOGY | Age: 84
Discharge: HOME | End: 2019-01-24
Payer: MEDICARE

## 2019-01-24 ENCOUNTER — OFFICE VISIT (OUTPATIENT)
Dept: FAMILY MEDICINE CLINIC | Age: 84
End: 2019-01-24
Payer: MEDICARE

## 2019-01-24 ENCOUNTER — HOSPITAL ENCOUNTER (OUTPATIENT)
Age: 84
Discharge: HOME | End: 2019-01-24
Payer: MEDICARE

## 2019-01-24 VITALS
OXYGEN SATURATION: 98 % | RESPIRATION RATE: 20 BRPM | HEIGHT: 71 IN | HEART RATE: 76 BPM | WEIGHT: 149 LBS | BODY MASS INDEX: 20.86 KG/M2 | DIASTOLIC BLOOD PRESSURE: 76 MMHG | SYSTOLIC BLOOD PRESSURE: 132 MMHG
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
## 2019-01-24 DIAGNOSIS — M54.50 LUMBAR PAIN: Primary | ICD-10-CM

## 2019-01-24 DIAGNOSIS — M54.50 LUMBAR PAIN: ICD-10-CM

## 2019-01-24 PROCEDURE — G8484 FLU IMMUNIZE NO ADMIN: HCPCS | Performed by: NURSE PRACTITIONER

## 2019-01-24 PROCEDURE — 4040F PNEUMOC VAC/ADMIN/RCVD: CPT | Performed by: NURSE PRACTITIONER

## 2019-01-24 PROCEDURE — 72100 X-RAY EXAM L-S SPINE 2/3 VWS: CPT

## 2019-01-24 PROCEDURE — 99213 OFFICE O/P EST LOW 20 MIN: CPT | Performed by: NURSE PRACTITIONER

## 2019-01-24 PROCEDURE — 1101F PT FALLS ASSESS-DOCD LE1/YR: CPT | Performed by: NURSE PRACTITIONER

## 2019-01-24 PROCEDURE — G8427 DOCREV CUR MEDS BY ELIG CLIN: HCPCS | Performed by: NURSE PRACTITIONER

## 2019-01-24 PROCEDURE — 1036F TOBACCO NON-USER: CPT | Performed by: NURSE PRACTITIONER

## 2019-01-24 PROCEDURE — 1123F ACP DISCUSS/DSCN MKR DOCD: CPT | Performed by: NURSE PRACTITIONER

## 2019-01-24 PROCEDURE — G8420 CALC BMI NORM PARAMETERS: HCPCS | Performed by: NURSE PRACTITIONER

## 2019-01-24 PROCEDURE — 72220 X-RAY EXAM SACRUM TAILBONE: CPT

## 2019-01-24 ASSESSMENT — ENCOUNTER SYMPTOMS
NAUSEA: 0
BOWEL INCONTINENCE: 0
ABDOMINAL DISTENTION: 0
WHEEZING: 0
COLOR CHANGE: 0
COUGH: 0
BACK PAIN: 1
DIARRHEA: 0
ABDOMINAL PAIN: 0
SHORTNESS OF BREATH: 0
VOMITING: 0

## 2019-01-25 ENCOUNTER — HOSPITAL ENCOUNTER (OUTPATIENT)
Dept: CT IMAGING | Age: 84
Discharge: HOME | End: 2019-01-25
Payer: MEDICARE

## 2019-01-25 ENCOUNTER — HOSPITAL ENCOUNTER (OUTPATIENT)
Age: 84
Discharge: HOME | End: 2019-01-25
Payer: MEDICARE

## 2019-01-25 DIAGNOSIS — I48.19 PERSISTENT ATRIAL FIBRILLATION (HCC): ICD-10-CM

## 2019-01-25 DIAGNOSIS — I71.21 ASCENDING AORTIC ANEURYSM: ICD-10-CM

## 2019-01-25 DIAGNOSIS — I35.1 MODERATE AORTIC REGURGITATION: ICD-10-CM

## 2019-01-25 LAB
CREATININE, WHOLE BLOOD: 1.1 MG/DL (ref 0.5–1.2)
ESTIMATED GFR, PCACC: 68 ML/MIN/1.73M2

## 2019-01-25 PROCEDURE — 82565 ASSAY OF CREATININE: CPT

## 2019-01-25 PROCEDURE — 6360000004 HC RX CONTRAST MEDICATION: Performed by: INTERNAL MEDICINE

## 2019-01-25 PROCEDURE — 71275 CT ANGIOGRAPHY CHEST: CPT

## 2019-01-25 RX ADMIN — IOPAMIDOL 75 ML: 755 INJECTION, SOLUTION INTRAVENOUS at 13:56

## 2019-01-30 ENCOUNTER — HOSPITAL ENCOUNTER (OUTPATIENT)
Dept: CT IMAGING | Age: 84
Discharge: HOME | End: 2019-01-30
Payer: MEDICARE

## 2019-01-30 ENCOUNTER — TELEPHONE (OUTPATIENT)
Dept: FAMILY MEDICINE CLINIC | Age: 84
End: 2019-01-30

## 2019-01-30 DIAGNOSIS — M54.41 ACUTE MIDLINE LOW BACK PAIN WITH RIGHT-SIDED SCIATICA: ICD-10-CM

## 2019-01-30 DIAGNOSIS — M54.41 ACUTE MIDLINE LOW BACK PAIN WITH RIGHT-SIDED SCIATICA: Primary | ICD-10-CM

## 2019-01-30 PROCEDURE — 72131 CT LUMBAR SPINE W/O DYE: CPT

## 2019-02-07 ENCOUNTER — TELEPHONE (OUTPATIENT)
Dept: FAMILY MEDICINE CLINIC | Age: 84
End: 2019-02-07

## 2019-02-21 ENCOUNTER — OFFICE VISIT (OUTPATIENT)
Dept: PHYSICAL MEDICINE AND REHAB | Age: 84
End: 2019-02-21
Payer: MEDICARE

## 2019-02-21 VITALS
DIASTOLIC BLOOD PRESSURE: 76 MMHG | BODY MASS INDEX: 20.86 KG/M2 | HEART RATE: 83 BPM | SYSTOLIC BLOOD PRESSURE: 134 MMHG | HEIGHT: 71 IN | WEIGHT: 149 LBS

## 2019-02-21 DIAGNOSIS — M54.16 LUMBAR RADICULOPATHY: ICD-10-CM

## 2019-02-21 DIAGNOSIS — G89.4 CHRONIC PAIN SYNDROME: ICD-10-CM

## 2019-02-21 DIAGNOSIS — M54.16 LUMBAR RADICULITIS: ICD-10-CM

## 2019-02-21 DIAGNOSIS — Z96.89 SPINAL CORD STIMULATOR STATUS: Primary | ICD-10-CM

## 2019-02-21 PROCEDURE — 99213 OFFICE O/P EST LOW 20 MIN: CPT | Performed by: NURSE PRACTITIONER

## 2019-02-21 ASSESSMENT — ENCOUNTER SYMPTOMS
CONSTIPATION: 1
COLOR CHANGE: 0
SHORTNESS OF BREATH: 0
CHEST TIGHTNESS: 0
BACK PAIN: 1
ABDOMINAL PAIN: 0
DIARRHEA: 0
VOMITING: 0
NAUSEA: 0

## 2019-02-25 ENCOUNTER — TELEPHONE (OUTPATIENT)
Dept: PHYSICAL MEDICINE AND REHAB | Age: 84
End: 2019-02-25

## 2019-03-18 DIAGNOSIS — I10 ESSENTIAL HYPERTENSION: ICD-10-CM

## 2019-03-18 RX ORDER — AMLODIPINE BESYLATE 10 MG/1
5 TABLET ORAL DAILY
Qty: 30 TABLET | Refills: 3 | Status: SHIPPED
Start: 2019-03-18 | End: 2019-07-08 | Stop reason: SDUPTHER

## 2019-03-18 RX ORDER — HYDROCHLOROTHIAZIDE 12.5 MG/1
12.5 TABLET ORAL DAILY
Qty: 30 TABLET | Refills: 3 | Status: SHIPPED | OUTPATIENT
Start: 2019-03-18 | End: 2019-07-08 | Stop reason: SDUPTHER

## 2019-03-21 ENCOUNTER — OFFICE VISIT (OUTPATIENT)
Dept: PHYSICAL MEDICINE AND REHAB | Age: 84
End: 2019-03-21
Payer: MEDICARE

## 2019-03-21 VITALS
HEIGHT: 71 IN | HEART RATE: 90 BPM | SYSTOLIC BLOOD PRESSURE: 123 MMHG | WEIGHT: 149 LBS | BODY MASS INDEX: 20.86 KG/M2 | DIASTOLIC BLOOD PRESSURE: 73 MMHG

## 2019-03-21 DIAGNOSIS — G89.4 CHRONIC PAIN SYNDROME: ICD-10-CM

## 2019-03-21 DIAGNOSIS — M54.16 LUMBAR RADICULOPATHY: ICD-10-CM

## 2019-03-21 DIAGNOSIS — M48.061 SPINAL STENOSIS OF LUMBAR REGION WITHOUT NEUROGENIC CLAUDICATION: ICD-10-CM

## 2019-03-21 DIAGNOSIS — M54.16 LUMBAR RADICULITIS: ICD-10-CM

## 2019-03-21 DIAGNOSIS — Z96.89 SPINAL CORD STIMULATOR STATUS: Primary | ICD-10-CM

## 2019-03-21 PROCEDURE — 99213 OFFICE O/P EST LOW 20 MIN: CPT | Performed by: NURSE PRACTITIONER

## 2019-03-21 ASSESSMENT — ENCOUNTER SYMPTOMS
ABDOMINAL PAIN: 0
VOMITING: 0
NAUSEA: 0
CHEST TIGHTNESS: 0
CONSTIPATION: 1
BACK PAIN: 1
DIARRHEA: 0
SHORTNESS OF BREATH: 0
COLOR CHANGE: 0

## 2019-04-02 ENCOUNTER — HOSPITAL ENCOUNTER (OUTPATIENT)
Dept: GENERAL RADIOLOGY | Age: 84
Discharge: HOME | End: 2019-04-02
Payer: MEDICARE

## 2019-04-02 ENCOUNTER — HOSPITAL ENCOUNTER (OUTPATIENT)
Age: 84
Discharge: HOME | End: 2019-04-02
Payer: MEDICARE

## 2019-04-02 ENCOUNTER — OFFICE VISIT (OUTPATIENT)
Dept: FAMILY MEDICINE CLINIC | Age: 84
End: 2019-04-02
Payer: MEDICARE

## 2019-04-02 VITALS
DIASTOLIC BLOOD PRESSURE: 82 MMHG | WEIGHT: 155.6 LBS | SYSTOLIC BLOOD PRESSURE: 136 MMHG | OXYGEN SATURATION: 96 % | RESPIRATION RATE: 12 BRPM | BODY MASS INDEX: 22.01 KG/M2 | HEART RATE: 66 BPM

## 2019-04-02 DIAGNOSIS — T14.90XA TRAUMA: ICD-10-CM

## 2019-04-02 DIAGNOSIS — M79.662 PAIN AND SWELLING OF LEFT LOWER LEG: ICD-10-CM

## 2019-04-02 DIAGNOSIS — M79.89 PAIN AND SWELLING OF LEFT LOWER LEG: ICD-10-CM

## 2019-04-02 DIAGNOSIS — M79.89 PAIN AND SWELLING OF LEFT LOWER LEG: Primary | ICD-10-CM

## 2019-04-02 DIAGNOSIS — L03.116 CELLULITIS OF LEFT LOWER EXTREMITY: ICD-10-CM

## 2019-04-02 DIAGNOSIS — M79.662 PAIN AND SWELLING OF LEFT LOWER LEG: Primary | ICD-10-CM

## 2019-04-02 PROCEDURE — 99213 OFFICE O/P EST LOW 20 MIN: CPT | Performed by: NURSE PRACTITIONER

## 2019-04-02 PROCEDURE — 73630 X-RAY EXAM OF FOOT: CPT

## 2019-04-02 PROCEDURE — G8420 CALC BMI NORM PARAMETERS: HCPCS | Performed by: NURSE PRACTITIONER

## 2019-04-02 PROCEDURE — 4040F PNEUMOC VAC/ADMIN/RCVD: CPT | Performed by: NURSE PRACTITIONER

## 2019-04-02 PROCEDURE — 73610 X-RAY EXAM OF ANKLE: CPT

## 2019-04-02 PROCEDURE — G8427 DOCREV CUR MEDS BY ELIG CLIN: HCPCS | Performed by: NURSE PRACTITIONER

## 2019-04-02 PROCEDURE — 1123F ACP DISCUSS/DSCN MKR DOCD: CPT | Performed by: NURSE PRACTITIONER

## 2019-04-02 PROCEDURE — 1036F TOBACCO NON-USER: CPT | Performed by: NURSE PRACTITIONER

## 2019-04-02 PROCEDURE — 73590 X-RAY EXAM OF LOWER LEG: CPT

## 2019-04-02 RX ORDER — CEPHALEXIN 500 MG/1
500 CAPSULE ORAL 3 TIMES DAILY
Qty: 30 CAPSULE | Refills: 0 | Status: SHIPPED | OUTPATIENT
Start: 2019-04-02 | End: 2019-04-08 | Stop reason: SDUPTHER

## 2019-04-02 ASSESSMENT — PATIENT HEALTH QUESTIONNAIRE - PHQ9
2. FEELING DOWN, DEPRESSED OR HOPELESS: 0
SUM OF ALL RESPONSES TO PHQ QUESTIONS 1-9: 0
SUM OF ALL RESPONSES TO PHQ9 QUESTIONS 1 & 2: 0
1. LITTLE INTEREST OR PLEASURE IN DOING THINGS: 0
SUM OF ALL RESPONSES TO PHQ QUESTIONS 1-9: 0

## 2019-04-02 ASSESSMENT — ENCOUNTER SYMPTOMS: SHORTNESS OF BREATH: 0

## 2019-04-02 NOTE — PROGRESS NOTES
L3-S1    OTHER SURGICAL HISTORY Right 1980's    nerve release right lower arm    OTHER SURGICAL HISTORY  2018    Sacroiliac joint MBB    OTHER SURGICAL HISTORY Left 2018    Sacroiliac joint medial branch block     MD IMPLANT NEUROSTIM/ N/A 2018    THORACIC LAMINECTOMY PERMANENT SPINAL CORD STIMULATOR PADDLE PLUS BATTERY PLACEMENT performed by Jamil Guerrero MD at [facility] ARTHRGRPHY&/ANES/STEROID W/IMAGE Left 2018    LEFT SI MBB performed by Roland Paige MD at [address] INJECT SI JOINT ARTHRGRPHY&/ANES/STEROID W/IMAGE Left 2018    LEFT SI MBB #2 performed by Roland Paige MD at [address] OFFICE/OUTPT VISIT,PROCEDURE ONLY Left 2018    LEFT HIP STEROID INJECTION WITH SEDATION performed by Roland Paige MD at [address] OFFICE/OUTPT VISIT,PROCEDURE ONLY N/A 2018    SPINAL CORD STIMULATOR IMPLANT TRIAL performed by Roland Paige MD at [address] [facility] BIOPSY  2012    [facility]  [facility]    transrectal ultrasound with biopsy(+)    PROSTATE SURGERY      TURP    PROSTATE SURGERY  2009    Biopsy -Hyperplasia    SHOULDER ARTHROPLASTY  2005    right       Family History   Problem Relation Age of Onset    Heart Disease Father     Prostate Cancer Brother     Cancer Brother     Prostate Cancer Brother     Cancer Brother     Stroke Mother     Cancer Sister     Prostate Cancer Brother     Prostate Cancer Brother        Social History     Tobacco Use    Smoking status: Former Smoker     Packs/day: 1.00     Years: 10.00     Pack years: 10.00     Types: Cigarettes     Last attempt to quit: 7/3/1965     Years since quittin.7    Smokeless tobacco: Never Used   Substance Use Topics    Alcohol use:  Yes     Alcohol/week: 0.0 oz     Comment: SOCIALLY wine a few times a year       Current Outpatient Medications Medication Sig Dispense Refill    cephALEXin (KEFLEX) 500 MG capsule Take 1 capsule by mouth 3 times daily for 10 days 30 capsule 0    amLODIPine (NORVASC) 10 MG tablet Take 0.5 tablets by mouth daily 30 tablet 3    hydrochlorothiazide (HYDRODIURIL) 12.5 MG tablet Take 1 tablet by mouth daily 30 tablet 3    aspirin 81 MG tablet Take 81 mg by mouth daily      NONFORMULARY Calcium citrate plus Vit D3 1 tab daily      meclizine (ANTIVERT) 12.5 MG tablet 1-2 tabs PO q 8 hours PRN dizziness 45 tablet 2    clotrimazole-betamethasone (LOTRISONE) 1-0.05 % cream Apply topically 2 times daily. PRN 45 g 1    HYDROcodone-acetaminophen (NORCO) 5-325 MG per tablet Take 1 tablet by mouth every 6 hours as needed for Pain. .      ondansetron (ZOFRAN-ODT) 4 MG disintegrating tablet Take 4 mg by mouth every 8 hours as needed for Nausea or Vomiting      Multiple Vitamins-Minerals (PRESERVISION AREDS 2 PO) Take 1 tablet by mouth 2 times daily      metoprolol succinate (TOPROL XL) 25 MG extended release tablet Take 25 mg by mouth daily Patient is taking half a tab in the am and half a tab in the pm      apixaban (ELIQUIS) 5 MG TABS tablet Take 1 tablet by mouth 2 times daily 56 tablet 0     No current facility-administered medications for this visit. Allergies   Allergen Reactions    Nitrofuran Derivatives Nausea Only     Severe stomach cramps    Nsaids Nausea Only and Other (See Comments)     Pain in stomach       Health Maintenance   Topic Date Due    DTaP/Tdap/Td vaccine (1 - Tdap) [birth year]/1953    Shingles Vaccine (1 of 2) 03/26/1984    Flu vaccine (Season Ended) 09/01/2019    Potassium monitoring  09/23/2019    Creatinine monitoring  01/25/2020    Pneumococcal 65+ years Vaccine  Completed       Subjective:      Review of Systems   Constitutional: Negative for chills, fatigue and fever. Respiratory: Negative for shortness of breath. Cardiovascular: Negative for chest pain.    Musculoskeletal: Positive for arthralgias, gait problem, joint swelling and myalgias. Skin: Positive for rash. Negative for pallor and wound. Neurological: Negative for dizziness, light-headedness and headaches. Objective:     Physical Exam   Constitutional: He is oriented to person, place, and time. He appears well-developed and well-nourished. HENT:   Head: Normocephalic. Neck: Normal range of motion. Cardiovascular: Normal rate and regular rhythm. Pulmonary/Chest: Effort normal and breath sounds normal.   Musculoskeletal: He exhibits edema and tenderness. He exhibits no deformity. Edema starting 1/3 way up lower leg down to toes. Some redness noted on medial lower area and foot. Warm to touch. Very painful to light touch. Pulses palp. Neurological: He is alert and oriented to person, place, and time. Skin: Skin is warm and dry. /82 (Site: Left Upper Arm, Position: Sitting, Cuff Size: Medium Adult)   Pulse 66   Resp 12   Wt 155 lb 9.6 oz (70.6 kg)   SpO2 96%   BMI 22.01 kg/m²     Assessment:       Diagnosis Orders   1. Pain and swelling of left lower leg  XR TIBIA FIBULA LEFT (2 VIEWS)    XR ANKLE LEFT (MIN 3 VIEWS)    XR FOOT LEFT (MIN 3 VIEWS)   2. Trauma  XR TIBIA FIBULA LEFT (2 VIEWS)    XR ANKLE LEFT (MIN 3 VIEWS)    XR FOOT LEFT (MIN 3 VIEWS)   3. Cellulitis of left lower extremity         Plan:     Keflex as prescribed  Obtain x-ray  Elevate left leg when able  Tylenol for discomfort  Will call with results and further plan of care  Return next Monday for recheck, sooner if worse. Discussed use, benefit, and side effects of prescribed medications. Barriers tomedication compliance addressed. All patient questions answered. Pt voiced understanding. No follow-ups on file.     Orders Placed This Encounter   Procedures    XR TIBIA FIBULA LEFT (2 VIEWS)     Standing Status:   Future     Standing Expiration Date:   4/2/2020     Order Specific Question:   Reason for exam:     Answer: injury    XR ANKLE LEFT (MIN 3 VIEWS)     Standing Status:   Future     Standing Expiration Date:   4/2/2020     Order Specific Question:   Reason for exam:     Answer:   injury    XR FOOT LEFT (MIN 3 VIEWS)     Standing Status:   Future     Standing Expiration Date:   4/2/2020     Order Specific Question:   Reason for exam:     Answer:   injury     Orders Placed This Encounter   Medications    cephALEXin (KEFLEX) 500 MG capsule     Sig: Take 1 capsule by mouth 3 times daily for 10 days     Dispense:  30 capsule     Refill:  0             Patient given educational materials - see patient instructions. Discussed use, benefit, and sideeffects of prescribed medications. All patient questions answered. Pt voiced understanding. Reviewed health maintenance. Instructed to continue current medications, diet and exercise. Patient agreed with treatment plan. Follow up as directed.      Electronically signed by SONIDO Avilez CNP on 4/2/2019 at 11:18 AM

## 2019-04-08 ENCOUNTER — OFFICE VISIT (OUTPATIENT)
Dept: FAMILY MEDICINE CLINIC | Age: 84
End: 2019-04-08
Payer: MEDICARE

## 2019-04-08 VITALS
DIASTOLIC BLOOD PRESSURE: 70 MMHG | BODY MASS INDEX: 21.9 KG/M2 | RESPIRATION RATE: 16 BRPM | HEART RATE: 65 BPM | SYSTOLIC BLOOD PRESSURE: 108 MMHG | OXYGEN SATURATION: 96 % | WEIGHT: 154.8 LBS

## 2019-04-08 DIAGNOSIS — L03.116 CELLULITIS OF LEFT LOWER EXTREMITY: Primary | ICD-10-CM

## 2019-04-08 PROCEDURE — 1123F ACP DISCUSS/DSCN MKR DOCD: CPT | Performed by: NURSE PRACTITIONER

## 2019-04-08 PROCEDURE — G8420 CALC BMI NORM PARAMETERS: HCPCS | Performed by: NURSE PRACTITIONER

## 2019-04-08 PROCEDURE — 1036F TOBACCO NON-USER: CPT | Performed by: NURSE PRACTITIONER

## 2019-04-08 PROCEDURE — G8427 DOCREV CUR MEDS BY ELIG CLIN: HCPCS | Performed by: NURSE PRACTITIONER

## 2019-04-08 PROCEDURE — 4040F PNEUMOC VAC/ADMIN/RCVD: CPT | Performed by: NURSE PRACTITIONER

## 2019-04-08 PROCEDURE — 99213 OFFICE O/P EST LOW 20 MIN: CPT | Performed by: NURSE PRACTITIONER

## 2019-04-08 RX ORDER — CEPHALEXIN 500 MG/1
500 CAPSULE ORAL 4 TIMES DAILY
Qty: 28 CAPSULE | Refills: 0 | Status: SHIPPED | OUTPATIENT
Start: 2019-04-08 | End: 2019-04-15

## 2019-04-08 ASSESSMENT — ENCOUNTER SYMPTOMS
VOMITING: 0
BACK PAIN: 0
ABDOMINAL PAIN: 0
DIARRHEA: 0
COLOR CHANGE: 0
SHORTNESS OF BREATH: 0
NAUSEA: 0

## 2019-04-08 NOTE — PROGRESS NOTES
Fredo Leigh  [address] [place] [id number]  Dept: [phone]  Dept Fax: [phone]  Loc: [phone]    Fredrick Lara is a 80 y.o. male who presents today for his medical conditions/complaints as noted below. Chief Complaint   Patient presents with    Other     recheck from fall. doing much better but still having pain in the ankle. ther redness and swelling are better but not gone. HPI:     HPI    This is a 26-year-old gentleman who presents today for a recheck from fall and cellulitis. Patient was seen last week and had cellulitis of left lower extremity. This was due to an injury that he had. We did give him Keflex at that time. The patient presents today for recheck. The patient states he is getting better. He still has a very little bit of pain in the left lower extremity. He only has 1 day of antibiotic left. He is walking around now without a cane. The redness is much better but he does still have a little bit. Swelling has improved. He is not taking anything for the pain. X-ray were negative. Overall states he is doing much better. Past Medical History:   Diagnosis Date    Atrial fibrillation ([address]     Back pain     Hernia     Hx of blood clots     lungs after prostate surgery    Hyperlipidemia     Hypertension     Osteoarthritis     Prostate cancer ([address] 2015    Pulmonary emboli ([address] 11/2015    after prostate surgery    Wears glasses       Past Surgical History:   Procedure Laterality Date    [facility]  01/1961    [facility]    BACK SURGERY  [id number]    X stop    CATARACT REMOVAL Right 06/2001    Dr Bailey Andrews Left 11/2014    Dr Lincoln Kanner, 2009    Dr Paresh Hercules  [address]?     Dr Ortega Fess of date    HERNIA REPAIR Left 11/22/2017    ROBOT RECURRENT LEFT INGUINAL Cancer Brother        Social History     Tobacco Use    Smoking status: Former Smoker     Packs/day: 1.00     Years: 10.00     Pack years: 10.00     Types: Cigarettes     Last attempt to quit: 7/3/1965     Years since quittin.8    Smokeless tobacco: Never Used   Substance Use Topics    Alcohol use: Yes     Alcohol/week: 0.0 oz     Comment: SOCIALLY wine a few times a year       Current Outpatient Medications   Medication Sig Dispense Refill    cephALEXin (KEFLEX) 500 MG capsule Take 1 capsule by mouth 4 times daily for 7 days 28 capsule 0    amLODIPine (NORVASC) 10 MG tablet Take 0.5 tablets by mouth daily 30 tablet 3    hydrochlorothiazide (HYDRODIURIL) 12.5 MG tablet Take 1 tablet by mouth daily 30 tablet 3    aspirin 81 MG tablet Take 81 mg by mouth daily      NONFORMULARY Calcium citrate plus Vit D3 1 tab daily      meclizine (ANTIVERT) 12.5 MG tablet 1-2 tabs PO q 8 hours PRN dizziness 45 tablet 2    clotrimazole-betamethasone (LOTRISONE) 1-0.05 % cream Apply topically 2 times daily. PRN 45 g 1    HYDROcodone-acetaminophen (NORCO) 5-325 MG per tablet Take 1 tablet by mouth every 6 hours as needed for Pain. .      ondansetron (ZOFRAN-ODT) 4 MG disintegrating tablet Take 4 mg by mouth every 8 hours as needed for Nausea or Vomiting      Multiple Vitamins-Minerals (PRESERVISION AREDS 2 PO) Take 1 tablet by mouth 2 times daily      metoprolol succinate (TOPROL XL) 25 MG extended release tablet Take 25 mg by mouth daily Patient is taking half a tab in the am and half a tab in the pm      apixaban (ELIQUIS) 5 MG TABS tablet Take 1 tablet by mouth 2 times daily 56 tablet 0     No current facility-administered medications for this visit.       Allergies   Allergen Reactions    Nitrofuran Derivatives Nausea Only     Severe stomach cramps    Nsaids Nausea Only and Other (See Comments)     Pain in stomach       Health Maintenance   Topic Date Due    DTaP/Tdap/Td vaccine (1 - Tdap) 1953    Shingles Vaccine (1 of 2) 03/26/1984    Flu vaccine (Season Ended) 09/01/2019    Potassium monitoring  09/23/2019    Creatinine monitoring  01/25/2020    Pneumococcal 65+ years Vaccine  Completed       Subjective:      Review of Systems   Constitutional: Negative for chills, fatigue and fever. Respiratory: Negative for shortness of breath. Cardiovascular: Negative for chest pain. Gastrointestinal: Negative for abdominal pain, diarrhea, nausea and vomiting. Genitourinary: Negative for difficulty urinating. Musculoskeletal: Negative for arthralgias, back pain, gait problem, myalgias and neck pain. Skin: Positive for rash. Negative for color change. Neurological: Negative for dizziness, light-headedness and headaches. Objective:     Physical Exam   Constitutional: He is oriented to person, place, and time. He appears well-developed and well-nourished. HENT:   Head: Normocephalic and atraumatic. Neck: Normal range of motion. Neck supple. Cardiovascular: Normal rate. Pulmonary/Chest: Effort normal.   Musculoskeletal: Normal range of motion. He exhibits no edema, tenderness or deformity. Neurological: He is alert and oriented to person, place, and time. Skin: Skin is warm and dry. No rash noted. There is erythema. /70 (Site: Left Upper Arm, Position: Sitting)   Pulse 65   Resp 16   Wt 154 lb 12.8 oz (70.2 kg)   SpO2 96%   BMI 21.90 kg/m²     Assessment:       Diagnosis Orders   1. Cellulitis of left lower extremity         Plan:       Discussed use, benefit, and side effects of prescribed medications. Barriers tomedication compliance addressed. All patient questions answered. Pt voiced understanding. Return if symptoms worsen or fail to improve. Continue keflex for 1 more week  Monitor symptoms  Tylenol for any discomfort    No orders of the defined types were placed in this encounter.     Orders Placed This Encounter   Medications    cephALEXin (Derrick Divine) 500 MG capsule     Sig: Take 1 capsule by mouth 4 times daily for 7 days     Dispense:  28 capsule     Refill:  0           Patient given educational materials - see patient instructions. Discussed use, benefit, and sideeffects of prescribed medications. All patient questions answered. Pt voiced understanding. Reviewed health maintenance. Instructed to continue current medications, diet and exercise. Patient agreed with treatment plan. Follow up as directed.      Electronically signed by SONIDO Knight CNP on 4/8/2019 at 10:28 AM

## 2019-04-15 ENCOUNTER — OFFICE VISIT (OUTPATIENT)
Dept: FAMILY MEDICINE CLINIC | Age: 84
End: 2019-04-15
Payer: MEDICARE

## 2019-04-15 VITALS
DIASTOLIC BLOOD PRESSURE: 80 MMHG | OXYGEN SATURATION: 96 % | HEART RATE: 80 BPM | RESPIRATION RATE: 16 BRPM | BODY MASS INDEX: 21.61 KG/M2 | WEIGHT: 152.8 LBS | SYSTOLIC BLOOD PRESSURE: 120 MMHG

## 2019-04-15 DIAGNOSIS — L03.116 CELLULITIS OF LEFT LOWER EXTREMITY: Primary | ICD-10-CM

## 2019-04-15 PROCEDURE — 1123F ACP DISCUSS/DSCN MKR DOCD: CPT | Performed by: NURSE PRACTITIONER

## 2019-04-15 PROCEDURE — 1036F TOBACCO NON-USER: CPT | Performed by: NURSE PRACTITIONER

## 2019-04-15 PROCEDURE — G8427 DOCREV CUR MEDS BY ELIG CLIN: HCPCS | Performed by: NURSE PRACTITIONER

## 2019-04-15 PROCEDURE — G8420 CALC BMI NORM PARAMETERS: HCPCS | Performed by: NURSE PRACTITIONER

## 2019-04-15 PROCEDURE — 99213 OFFICE O/P EST LOW 20 MIN: CPT | Performed by: NURSE PRACTITIONER

## 2019-04-15 PROCEDURE — 4040F PNEUMOC VAC/ADMIN/RCVD: CPT | Performed by: NURSE PRACTITIONER

## 2019-04-15 ASSESSMENT — ENCOUNTER SYMPTOMS
COLOR CHANGE: 0
SHORTNESS OF BREATH: 0
BACK PAIN: 0

## 2019-04-15 NOTE — PROGRESS NOTES
lower arm    OTHER SURGICAL HISTORY  2018    Sacroiliac joint MBB    OTHER SURGICAL HISTORY Left 2018    Sacroiliac joint medial branch block     UT IMPLANT NEUROSTIM/ N/A 2018    THORACIC LAMINECTOMY PERMANENT SPINAL CORD STIMULATOR PADDLE PLUS BATTERY PLACEMENT performed by Galindo Hurd MD at [facility] ARTHRGRPHY&/ANES/STEROID W/IMAGE Left 2018    LEFT SI MBB performed by Bayron Carlson MD at [address] INJECT SI JOINT ARTHRGRPHY&/ANES/STEROID W/IMAGE Left 2018    LEFT SI MBB #2 performed by Bayron Carlson MD at [address] OFFICE/OUTPT VISIT,PROCEDURE ONLY Left 2018    LEFT HIP STEROID INJECTION WITH SEDATION performed by Bayron Carlson MD at [address] OFFICE/OUTPT VISIT,PROCEDURE ONLY N/A 2018    SPINAL CORD STIMULATOR IMPLANT TRIAL performed by Bayron Carlson MD at [address] [facility] BIOPSY  2012    Dr Kirsty Lassiter BIOPSY  2015    transrectal ultrasound with biopsy(+)    PROSTATE SURGERY      TURP    PROSTATE SURGERY  2009    Biopsy -Hyperplasia    SHOULDER ARTHROPLASTY  2005    right       Family History   Problem Relation Age of Onset    Heart Disease Father     Prostate Cancer Brother     Cancer Brother     Prostate Cancer Brother     Cancer Brother     Stroke Mother     Cancer Sister     Prostate Cancer Brother     Prostate Cancer Brother        Social History     Tobacco Use    Smoking status: Former Smoker     Packs/day: 1.00     Years: 10.00     Pack years: 10.00     Types: Cigarettes     Last attempt to quit: 7/3/1965     Years since quittin.8    Smokeless tobacco: Never Used   Substance Use Topics    Alcohol use:  Yes     Alcohol/week: 0.0 oz     Comment: SOCIALLY wine a few times a year       Current Outpatient Medications   Medication Sig Dispense Refill    cephALEXin (KEFLEX) 500 MG capsule Take 1 capsule by mouth 4 times daily for 7 days 28 capsule 0    amLODIPine (NORVASC) 10 MG tablet Take 0.5 tablets by mouth daily 30 tablet 3    hydrochlorothiazide (HYDRODIURIL) 12.5 MG tablet Take 1 tablet by mouth daily 30 tablet 3    aspirin 81 MG tablet Take 81 mg by mouth daily      NONFORMULARY Calcium citrate plus Vit D3 1 tab daily      meclizine (ANTIVERT) 12.5 MG tablet 1-2 tabs PO q 8 hours PRN dizziness 45 tablet 2    clotrimazole-betamethasone (LOTRISONE) 1-0.05 % cream Apply topically 2 times daily. PRN 45 g 1    HYDROcodone-acetaminophen (NORCO) 5-325 MG per tablet Take 1 tablet by mouth every 6 hours as needed for Pain. .      ondansetron (ZOFRAN-ODT) 4 MG disintegrating tablet Take 4 mg by mouth every 8 hours as needed for Nausea or Vomiting      Multiple Vitamins-Minerals (PRESERVISION AREDS 2 PO) Take 1 tablet by mouth 2 times daily      metoprolol succinate (TOPROL XL) 25 MG extended release tablet Take 25 mg by mouth daily Patient is taking half a tab in the am and half a tab in the pm      apixaban (ELIQUIS) 5 MG TABS tablet Take 1 tablet by mouth 2 times daily 56 tablet 0     No current facility-administered medications for this visit. Allergies   Allergen Reactions    Nitrofuran Derivatives Nausea Only     Severe stomach cramps    Nsaids Nausea Only and Other (See Comments)     Pain in stomach       Health Maintenance   Topic Date Due    DTaP/Tdap/Td vaccine (1 - Tdap) 03/26/1953    Shingles Vaccine (1 of 2) 03/26/1984    Flu vaccine (Season Ended) 09/01/2019    Potassium monitoring  09/23/2019    Creatinine monitoring  01/25/2020    Pneumococcal 65+ years Vaccine  Completed       Subjective:      Review of Systems   Constitutional: Negative for chills, fatigue and fever. Respiratory: Negative for shortness of breath. Cardiovascular: Negative for chest pain. Musculoskeletal: Negative for arthralgias, back pain, gait problem, myalgias and neck pain.    Skin: Negative for color change, rash and wound. Neurological: Negative for dizziness, light-headedness and headaches. Objective:     Physical Exam   Constitutional: He is oriented to person, place, and time. He appears well-developed and well-nourished. HENT:   Head: Normocephalic. Neck: Normal range of motion. Neck supple. Cardiovascular: Normal rate. Pulmonary/Chest: Effort normal.   Lymphadenopathy:     He has no cervical adenopathy. Neurological: He is alert and oriented to person, place, and time. Skin: Skin is warm and dry. No rash noted. No erythema. /80 (Site: Right Upper Arm, Position: Sitting)   Pulse 80   Resp 16   Wt 152 lb 12.8 oz (69.3 kg)   SpO2 96%   BMI 21.61 kg/m²     Assessment:       Diagnosis Orders   1. Cellulitis of left lower extremity         Plan:     Continue to monitor  Finish keflex  Elevate leg when possible    Discussed use, benefit, and side effects of prescribed medications. Barriers tomedication compliance addressed. All patient questions answered. Pt voiced understanding. Return if symptoms worsen or fail to improve. No orders of the defined types were placed in this encounter. No orders of the defined types were placed in this encounter. Patient given educational materials - see patient instructions. Discussed use, benefit, and sideeffects of prescribed medications. All patient questions answered. Pt voiced understanding. Reviewed health maintenance. Instructed to continue current medications, diet and exercise. Patient agreed with treatment plan. Follow up as directed.      Electronically signed by SONIDO Oakley CNP on 4/15/2019 at 8:05 AM

## 2019-04-22 RX ORDER — METOPROLOL SUCCINATE 25 MG/1
25 TABLET, EXTENDED RELEASE ORAL DAILY
Qty: 30 TABLET | Refills: 5 | Status: SHIPPED | OUTPATIENT
Start: 2019-04-22 | End: 2019-04-24

## 2019-05-20 ENCOUNTER — OFFICE VISIT (OUTPATIENT)
Dept: FAMILY MEDICINE CLINIC | Age: 84
End: 2019-05-20
Payer: MEDICARE

## 2019-05-20 VITALS
OXYGEN SATURATION: 97 % | RESPIRATION RATE: 20 BRPM | DIASTOLIC BLOOD PRESSURE: 74 MMHG | WEIGHT: 152.8 LBS | SYSTOLIC BLOOD PRESSURE: 130 MMHG | BODY MASS INDEX: 21.61 KG/M2 | HEART RATE: 91 BPM

## 2019-05-20 DIAGNOSIS — J01.00 ACUTE NON-RECURRENT MAXILLARY SINUSITIS: Primary | ICD-10-CM

## 2019-05-20 PROCEDURE — G8420 CALC BMI NORM PARAMETERS: HCPCS | Performed by: NURSE PRACTITIONER

## 2019-05-20 PROCEDURE — 1036F TOBACCO NON-USER: CPT | Performed by: NURSE PRACTITIONER

## 2019-05-20 PROCEDURE — 4040F PNEUMOC VAC/ADMIN/RCVD: CPT | Performed by: NURSE PRACTITIONER

## 2019-05-20 PROCEDURE — G8427 DOCREV CUR MEDS BY ELIG CLIN: HCPCS | Performed by: NURSE PRACTITIONER

## 2019-05-20 PROCEDURE — 99213 OFFICE O/P EST LOW 20 MIN: CPT | Performed by: NURSE PRACTITIONER

## 2019-05-20 PROCEDURE — 1123F ACP DISCUSS/DSCN MKR DOCD: CPT | Performed by: NURSE PRACTITIONER

## 2019-05-20 RX ORDER — AMOXICILLIN 500 MG/1
500 CAPSULE ORAL 3 TIMES DAILY
Qty: 30 CAPSULE | Refills: 0 | Status: SHIPPED | OUTPATIENT
Start: 2019-05-20 | End: 2019-05-30

## 2019-05-20 RX ORDER — METHYLPREDNISOLONE 4 MG/1
TABLET ORAL
Qty: 1 KIT | Refills: 0 | Status: SHIPPED | OUTPATIENT
Start: 2019-05-20 | End: 2019-05-26

## 2019-05-20 ASSESSMENT — ENCOUNTER SYMPTOMS
SHORTNESS OF BREATH: 0
SORE THROAT: 0
COUGH: 1
ABDOMINAL PAIN: 0
NAUSEA: 0
COLOR CHANGE: 0
WHEEZING: 0
SINUS PRESSURE: 1
VOMITING: 0
DIARRHEA: 0

## 2019-05-20 NOTE — PROGRESS NOTES
Agustin Brizuela  [address] [place] [address]  Dept: [phone]  Dept Fax: [phone]  Loc: [phone]    Brooke Reynaga is a 80 y.o. male who presents today for his medical conditions/complaints as noted below. Chief Complaint   Patient presents with    Head Congestion     no voice, headaches, head congestion, scratcy throat, no fever, cough x 10 days           HPI:     Sinusitis   This is a new problem. Episode onset: 10 days ago. The problem has been waxing and waning since onset. There has been no fever. The pain is moderate. Associated symptoms include congestion, coughing, headaches and sinus pressure. Pertinent negatives include no chills, ear pain, shortness of breath or sore throat. Current Outpatient Medications   Medication Sig Dispense Refill    amoxicillin (AMOXIL) 500 MG capsule Take 1 capsule by mouth 3 times daily for 10 days 30 capsule 0    methylPREDNISolone (MEDROL, STEPHEN,) 4 MG tablet Take with food. 1 kit 0    metoprolol tartrate (LOPRESSOR) 25 MG tablet Take 12.5 mg by mouth 2 times daily      amLODIPine (NORVASC) 10 MG tablet Take 0.5 tablets by mouth daily 30 tablet 3    hydrochlorothiazide (HYDRODIURIL) 12.5 MG tablet Take 1 tablet by mouth daily 30 tablet 3    aspirin 81 MG tablet Take 81 mg by mouth daily      NONFORMULARY Calcium citrate plus Vit D3 1 tab daily      meclizine (ANTIVERT) 12.5 MG tablet 1-2 tabs PO q 8 hours PRN dizziness 45 tablet 2    clotrimazole-betamethasone (LOTRISONE) 1-0.05 % cream Apply topically 2 times daily. PRN 45 g 1    HYDROcodone-acetaminophen (NORCO) 5-325 MG per tablet Take 1 tablet by mouth every 6 hours as needed for Pain. .      ondansetron (ZOFRAN-ODT) 4 MG disintegrating tablet Take 4 mg by mouth every 8 hours as needed for Nausea or Vomiting      Multiple Vitamins-Minerals (PRESERVISION AREDS 2 PO) Take 1 tablet by mouth 2 times daily      apixaban (ELIQUIS) 5 MG TABS tablet Take 1 tablet by mouth 2 times daily 56 tablet 0     No current facility-administered medications for this visit. Allergies   Allergen Reactions    Nitrofuran Derivatives Nausea Only     Severe stomach cramps    Nsaids Nausea Only and Other (See Comments)     Pain in stomach       Subjective:      Review of Systems   Constitutional: Negative for chills, fatigue and fever. HENT: Positive for congestion, postnasal drip and sinus pressure. Negative for ear pain and sore throat. Respiratory: Positive for cough. Negative for shortness of breath and wheezing. Cardiovascular: Negative for chest pain. Gastrointestinal: Negative for abdominal pain, diarrhea, nausea and vomiting. Skin: Negative for color change and rash. Neurological: Positive for headaches. Negative for dizziness and light-headedness. Objective:     /74 (Site: Left Upper Arm, Position: Sitting)   Pulse 91   Resp 20   Wt 152 lb 12.8 oz (69.3 kg)   SpO2 97%   BMI 21.61 kg/m²     Physical Exam   Constitutional: He is oriented to person, place, and time. He appears well-developed and well-nourished. HENT:   Head: Normocephalic. Right Ear: Tympanic membrane and external ear normal.   Left Ear: Tympanic membrane and external ear normal.   Nose: Right sinus exhibits maxillary sinus tenderness. Right sinus exhibits no frontal sinus tenderness. Left sinus exhibits maxillary sinus tenderness. Left sinus exhibits no frontal sinus tenderness. Mouth/Throat: Uvula is midline, oropharynx is clear and moist and mucous membranes are normal.   Eyes: Pupils are equal, round, and reactive to light. Conjunctivae are normal.   Neck: Normal range of motion. Neck supple. Cardiovascular: Normal rate and regular rhythm. Pulmonary/Chest: Effort normal and breath sounds normal. No respiratory distress. Abdominal: Soft. He exhibits no distension. There is no tenderness.    Musculoskeletal: Normal range

## 2019-07-08 ENCOUNTER — OFFICE VISIT (OUTPATIENT)
Dept: CARDIOLOGY CLINIC | Age: 84
End: 2019-07-08
Payer: MEDICARE

## 2019-07-08 VITALS
HEIGHT: 71 IN | HEART RATE: 81 BPM | DIASTOLIC BLOOD PRESSURE: 75 MMHG | BODY MASS INDEX: 21.28 KG/M2 | SYSTOLIC BLOOD PRESSURE: 141 MMHG | WEIGHT: 152 LBS

## 2019-07-08 DIAGNOSIS — I48.19 PERSISTENT ATRIAL FIBRILLATION (HCC): Primary | ICD-10-CM

## 2019-07-08 DIAGNOSIS — E78.5 HYPERLIPIDEMIA WITH TARGET LDL LESS THAN 130: ICD-10-CM

## 2019-07-08 DIAGNOSIS — I10 ESSENTIAL HYPERTENSION: ICD-10-CM

## 2019-07-08 DIAGNOSIS — I35.1 MODERATE AORTIC REGURGITATION: ICD-10-CM

## 2019-07-08 DIAGNOSIS — I34.0 NON-RHEUMATIC MITRAL REGURGITATION: ICD-10-CM

## 2019-07-08 DIAGNOSIS — I71.21 ASCENDING AORTIC ANEURYSM: ICD-10-CM

## 2019-07-08 PROCEDURE — G8427 DOCREV CUR MEDS BY ELIG CLIN: HCPCS | Performed by: INTERNAL MEDICINE

## 2019-07-08 PROCEDURE — 1123F ACP DISCUSS/DSCN MKR DOCD: CPT | Performed by: INTERNAL MEDICINE

## 2019-07-08 PROCEDURE — 99214 OFFICE O/P EST MOD 30 MIN: CPT | Performed by: INTERNAL MEDICINE

## 2019-07-08 PROCEDURE — 4040F PNEUMOC VAC/ADMIN/RCVD: CPT | Performed by: INTERNAL MEDICINE

## 2019-07-08 PROCEDURE — 1036F TOBACCO NON-USER: CPT | Performed by: INTERNAL MEDICINE

## 2019-07-08 PROCEDURE — G8420 CALC BMI NORM PARAMETERS: HCPCS | Performed by: INTERNAL MEDICINE

## 2019-07-08 RX ORDER — AMLODIPINE BESYLATE 10 MG/1
5 TABLET ORAL DAILY
Qty: 30 TABLET | Refills: 2 | Status: SHIPPED | OUTPATIENT
Start: 2019-07-08 | End: 2021-10-07

## 2019-07-08 RX ORDER — HYDROCHLOROTHIAZIDE 12.5 MG/1
12.5 TABLET ORAL DAILY
Qty: 30 TABLET | Refills: 3 | Status: SHIPPED | OUTPATIENT
Start: 2019-07-08 | End: 2019-07-23 | Stop reason: SDUPTHER

## 2019-07-08 NOTE — PROGRESS NOTES
Food insecurity:     Worry: Not on file     Inability: Not on file    Transportation needs:     Medical: Not on file     Non-medical: Not on file   Tobacco Use    Smoking status: Former Smoker     Packs/day: 1.00     Years: 10.00     Pack years: 10.00     Types: Cigarettes     Last attempt to quit: 7/3/1965     Years since quittin.0    Smokeless tobacco: Never Used   Substance and Sexual Activity    Alcohol use: Yes     Alcohol/week: 0.0 oz     Comment: SOCIALLY wine a few times a year     Drug use: No    Sexual activity: Not Currently   Lifestyle    Physical activity:     Days per week: Not on file     Minutes per session: Not on file    Stress: Not on file   Relationships    Social connections:     Talks on phone: Not on file     Gets together: Not on file     Attends Sabianism service: Not on file     Active member of club or organization: Not on file     Attends meetings of clubs or organizations: Not on file     Relationship status: Not on file    Intimate partner violence:     Fear of current or ex partner: Not on file     Emotionally abused: Not on file     Physically abused: Not on file     Forced sexual activity: Not on file   Other Topics Concern    Not on file   Social History Narrative    Not on file       Current Outpatient Medications   Medication Sig Dispense Refill    metoprolol tartrate (LOPRESSOR) 25 MG tablet Take 12.5 mg by mouth 2 times daily      amLODIPine (NORVASC) 10 MG tablet Take 0.5 tablets by mouth daily 30 tablet 3    hydrochlorothiazide (HYDRODIURIL) 12.5 MG tablet Take 1 tablet by mouth daily 30 tablet 3    aspirin 81 MG tablet Take 81 mg by mouth daily      NONFORMULARY Calcium citrate plus Vit D3 1 tab daily      meclizine (ANTIVERT) 12.5 MG tablet 1-2 tabs PO q 8 hours PRN dizziness 45 tablet 2    clotrimazole-betamethasone (LOTRISONE) 1-0.05 % cream Apply topically 2 times daily.  PRN 45 g 1    HYDROcodone-acetaminophen (NORCO) 5-325 MG per tablet Take 1 tablet by mouth every 6 hours as needed for Pain. .      ondansetron (ZOFRAN-ODT) 4 MG disintegrating tablet Take 4 mg by mouth every 8 hours as needed for Nausea or Vomiting      Multiple Vitamins-Minerals (PRESERVISION AREDS 2 PO) Take 1 tablet by mouth 2 times daily      apixaban (ELIQUIS) 5 MG TABS tablet Take 1 tablet by mouth 2 times daily 56 tablet 0     No current facility-administered medications for this visit. Review of Systems -     General ROS: negative  Psychological ROS: negative  Hematological and Lymphatic ROS: No history of blood clots or bleeding disorder. Respiratory ROS: no cough, shortness of breath, or wheezing  Cardiovascular ROS: no chest pain or dyspnea on exertion  Gastrointestinal ROS: negative  Genito-Urinary ROS: no dysuria, trouble voiding, or hematuria  Musculoskeletal ROS: negative  Neurological ROS: no TIA or stroke symptoms  Dermatological ROS: negative      Blood pressure (!) 141/75, pulse 81, height 5' 10.5\" (1.791 m), weight 152 lb (68.9 kg). Physical Examination:    General appearance - alert, well appearing, and in no distress  Mental status - alert, oriented to person, place, and time  Neck - supple, no significant adenopathy, no JVD, or carotid bruits  Chest - clear to auscultation, no wheezes, rales or rhonchi, symmetric air entry  Heart - normal rate, regular rhythm, normal S1, S2, , rubs, clicks or gallops but +ve murmurs  Abdomen - soft, nontender, nondistended, no masses or organomegaly  Neurological - alert, oriented, normal speech, no focal findings or movement disorder noted  Musculoskeletal - no joint tenderness, deformity or swelling  Extremities - peripheral pulses normal, no pedal edema, no clubbing or cyanosis  Skin - normal coloration and turgor, no rashes, no suspicious skin lesions noted    Lab  No results for input(s): CKTOTAL, CKMB, CKMBINDEX, TROPONINI in the last 72 hours.   CBC:   Lab Results   Component Value Date    WBC 11.7 09/23/2018 RBC 4.16 09/23/2018     08/26/2011    HGB 13.8 09/23/2018    HCT 40.2 09/23/2018    MCV 96.6 09/23/2018    MCH 33.2 09/23/2018    MCHC 34.3 09/23/2018    RDW 12.3 06/30/2018     09/23/2018    MPV 12.6 09/23/2018     BMP:    Lab Results   Component Value Date     09/23/2018    K 4.1 09/23/2018     09/23/2018    CO2 25 09/23/2018    BUN 18 09/23/2018    LABALBU 4.2 09/21/2018    CREATININE 1.1 01/25/2019    CREATININE 1.2 09/23/2018    CALCIUM 9.1 09/23/2018    LABGLOM 58 09/23/2018    GLUCOSE 101 09/23/2018    GLUCOSE 89 05/14/2016     Hepatic Function Panel:    Lab Results   Component Value Date    ALKPHOS 59 09/21/2018    ALT 18 09/21/2018    AST 30 09/21/2018    PROT 7.1 09/21/2018    BILITOT 0.8 09/21/2018    BILIDIR <0.2 09/21/2018    LABALBU 4.2 09/21/2018     Magnesium:  No results found for: MG  Warfarin PT/INR:  No components found for: PTPATWAR, PTINRWAR  HgBA1c:  No results found for: LABA1C  FLP:    Lab Results   Component Value Date    TRIG 54 05/12/2018    HDL 65 05/12/2018    LDLCALC 101 05/12/2018     TSH:    Lab Results   Component Value Date    TSH 3.380 07/24/2018       EKG 10/12/17  Atrial fibrillation  Septal infarct , age undetermined  Abnormal ECG  When compared with ECG of 06-NOV-2015 22:29,  Atrial fibrillation has replaced Sinus rhythm  Nonspecific T wave abnormality now evident in Inferior leads  Confirmed by Brittnee Long MD, Jorgito Estrada ([id number]) on 10/12/2017 8:03:16 PM      Conclusions      Summary   Normal left ventricle size and systolic function. Ejection fraction was   estimated at 60 to 65 %.  There were no regional left ventricular wall   motion abnormalities and wall thickness was within normal limits.   The left atrium is Moderately dilated.   Moderate to severly enlarged right atrium size.   Moderate-to-severe mitral regurgitation with centrally directed jet.   Moderate aortic regurgitation is size.   Moderate aortic regurgitation is noted.   Mild to Moderate mitral regurgitation is present.   Aortic aneurysm noted in the ascending aorta .   Aortic aneurysm measures 4.5 CM .   CONSIDER CTA FOR BETTER EVALUATION OF THE ASCENDING AORTIC ANEURYSM      Signature      ----------------------------------------------------------------   Electronically signed by Cecil Babcock MD (Interpreting   physician) on 01/11/2019 at 09:20 PM      CTA Jan 2019     The aorta is minimally dilated measuring 4.1 cm within the ascending aorta. It is ectatic. Descending aorta measures 3.2 cm         Assessment     Diagnosis Orders   1. Persistent atrial fibrillation ([address]- newely DXed 10/12/17- CVR     2. Essential hypertension  amLODIPine (NORVASC) 10 MG tablet   3. Hyperlipidemia with target LDL less than 130     4. Ascending aortic aneurysm (HCC) 4.1 cm on CTA and 4.5 on echo     5. Moderate aortic regurgitation     6. Non-rheumatic mitral regurgitation- mod to severe           Plan   The  current meds and labs reviewed    Continue the current treatment and with constant vigilance to changes in symptoms and also any potential side effects. Return for care or seek medical attention immediately if symptoms got worse and/or develop new symptoms. Persistent atr FIB-CVR./PAF  chads vasc 3  Need OA  Holter 48 hrs-Average heart rate of 91 beats per minute ranging from 47 to 150 beats per  Minute. Cont toprol XL 25 po qd  Cont  apixaban 5 po bid  The risk and benefit of OA well explained including ICH and pat agreed to be on OA    Sob on exertion and new atr fib with cvr  The  echo and lexiscan nuc result reviewed and d/w the pat  No more leovnox due to intolerance    Mod MR  Shilo AR  Need f/u echo in 1 yrs    Hypertension, on medical treatment. Seems to be under good control. Patient is compliant with medical treatment.    Home  mmhg    Dizziness  On standing at times  Hydration    D/w the pat at length    Discussed use,

## 2019-07-19 ENCOUNTER — TELEPHONE (OUTPATIENT)
Dept: ENT CLINIC | Age: 84
End: 2019-07-19

## 2019-07-19 NOTE — TELEPHONE ENCOUNTER
Dr Junito Amaro saw patient in January and suggested a follow up in 6 months.   I called to set one up with Dr Dedra Pillai and he said he was doing fine,  Will call if he needs anything

## 2019-07-23 DIAGNOSIS — I10 ESSENTIAL HYPERTENSION: ICD-10-CM

## 2019-07-23 RX ORDER — HYDROCHLOROTHIAZIDE 12.5 MG/1
12.5 TABLET ORAL DAILY
Qty: 30 TABLET | Refills: 3 | Status: SHIPPED | OUTPATIENT
Start: 2019-07-23 | End: 2020-03-23 | Stop reason: SDUPTHER

## 2019-07-23 RX ORDER — HYDROCHLOROTHIAZIDE 12.5 MG/1
12.5 TABLET ORAL DAILY
Qty: 30 TABLET | Refills: 3 | Status: CANCELLED | OUTPATIENT
Start: 2019-07-23

## 2019-08-07 ENCOUNTER — HOSPITAL ENCOUNTER (OUTPATIENT)
Dept: GENERAL RADIOLOGY | Age: 84
Discharge: HOME | End: 2019-08-07
Payer: MEDICARE

## 2019-08-07 ENCOUNTER — HOSPITAL ENCOUNTER (OUTPATIENT)
Age: 84
Discharge: HOME | End: 2019-08-07
Payer: MEDICARE

## 2019-08-07 ENCOUNTER — OFFICE VISIT (OUTPATIENT)
Dept: FAMILY MEDICINE CLINIC | Age: 84
End: 2019-08-07
Payer: MEDICARE

## 2019-08-07 VITALS
SYSTOLIC BLOOD PRESSURE: 120 MMHG | HEIGHT: 71 IN | RESPIRATION RATE: 20 BRPM | DIASTOLIC BLOOD PRESSURE: 64 MMHG | BODY MASS INDEX: 21.14 KG/M2 | WEIGHT: 151 LBS | OXYGEN SATURATION: 97 % | HEART RATE: 83 BPM

## 2019-08-07 DIAGNOSIS — K59.09 CHRONIC CONSTIPATION: ICD-10-CM

## 2019-08-07 DIAGNOSIS — Z91.81 AT HIGH RISK FOR FALLS: ICD-10-CM

## 2019-08-07 DIAGNOSIS — R10.84 GENERALIZED ABDOMINAL PAIN: ICD-10-CM

## 2019-08-07 DIAGNOSIS — R10.84 GENERALIZED ABDOMINAL PAIN: Primary | ICD-10-CM

## 2019-08-07 PROCEDURE — 74018 RADEX ABDOMEN 1 VIEW: CPT

## 2019-08-07 PROCEDURE — 99214 OFFICE O/P EST MOD 30 MIN: CPT | Performed by: FAMILY MEDICINE

## 2019-08-07 PROCEDURE — G8420 CALC BMI NORM PARAMETERS: HCPCS | Performed by: FAMILY MEDICINE

## 2019-08-07 PROCEDURE — G8427 DOCREV CUR MEDS BY ELIG CLIN: HCPCS | Performed by: FAMILY MEDICINE

## 2019-08-07 PROCEDURE — 4040F PNEUMOC VAC/ADMIN/RCVD: CPT | Performed by: FAMILY MEDICINE

## 2019-08-07 PROCEDURE — 1036F TOBACCO NON-USER: CPT | Performed by: FAMILY MEDICINE

## 2019-08-07 PROCEDURE — 1123F ACP DISCUSS/DSCN MKR DOCD: CPT | Performed by: FAMILY MEDICINE

## 2019-08-07 RX ORDER — ONDANSETRON 4 MG/1
4 TABLET, ORALLY DISINTEGRATING ORAL EVERY 8 HOURS PRN
Qty: 45 TABLET | Refills: 3 | Status: SHIPPED | OUTPATIENT
Start: 2019-08-07 | End: 2020-01-13 | Stop reason: ALTCHOICE

## 2019-08-07 ASSESSMENT — ENCOUNTER SYMPTOMS
COUGH: 0
RHINORRHEA: 0
BELCHING: 1
COLOR CHANGE: 1
SHORTNESS OF BREATH: 0
EYE DISCHARGE: 0
DIARRHEA: 0
FLATUS: 1
SORE THROAT: 0
WHEEZING: 0
ABDOMINAL PAIN: 1
CONSTIPATION: 1
NAUSEA: 1
VOMITING: 0

## 2019-08-07 NOTE — PROGRESS NOTES
[address] [place] [address] DR. Benavides [place] [address]  Dept: [phone]  Dept Fax: [phone]  Loc: [phone]    Jose Massey is a 80 y.o. male who presents today for his medical conditions/complaints as noted below. Chief Complaint   Patient presents with    Abdominal Pain     has stomach pain with nausea that comes and goes started about the time he started Metoprolol has alot of gas also loss of appetite           HPI:     Abdominal Pain   This is a recurrent problem. The current episode started 1 to 4 weeks ago. The onset quality is gradual. The problem occurs intermittently. The problem has been waxing and waning. The pain is located in the generalized abdominal region. The pain is moderate. The quality of the pain is aching, dull and cramping. The abdominal pain does not radiate. Associated symptoms include anorexia, arthralgias, belching, constipation, flatus, myalgias and nausea. Pertinent negatives include no diarrhea, dysuria, fever, frequency, headaches, hematuria, melena or vomiting. The pain is aggravated by eating. The pain is relieved by bowel movements. He has tried nothing for the symptoms. The treatment provided no relief. There is no history of pancreatitis, PUD or ulcerative colitis.        Current Outpatient Medications   Medication Sig Dispense Refill    ondansetron (ZOFRAN-ODT) 4 MG disintegrating tablet Take 1 tablet by mouth every 8 hours as needed for Nausea or Vomiting 45 tablet 3    hydrochlorothiazide (HYDRODIURIL) 12.5 MG tablet Take 1 tablet by mouth daily 30 tablet 3    metoprolol tartrate (LOPRESSOR) 25 MG tablet Take 0.5 tablets by mouth 2 times daily 45 tablet 2    apixaban (ELIQUIS) 5 MG TABS tablet Take 1 tablet by mouth 2 times daily 180 tablet 2    amLODIPine (NORVASC) 10 MG tablet Take 0.5 tablets by mouth daily 30 tablet 2    aspirin 81 MG tablet Take 81 mg by mouth daily     

## 2019-10-12 LAB
CHOLESTEROL, TOTAL: 190 MG/DL
CHOLESTEROL/HDL RATIO: 1.7
HDLC SERPL-MCNC: 66 MG/DL (ref 35–70)
LDL CHOLESTEROL CALCULATED: 111 MG/DL (ref 0–160)
TRIGL SERPL-MCNC: 64 MG/DL
VLDLC SERPL CALC-MCNC: 13 MG/DL

## 2019-10-24 ENCOUNTER — NURSE ONLY (OUTPATIENT)
Dept: FAMILY MEDICINE CLINIC | Age: 84
End: 2019-10-24
Payer: MEDICARE

## 2019-10-24 DIAGNOSIS — C61 PROSTATE CANCER (HCC): ICD-10-CM

## 2019-10-24 PROCEDURE — 36415 COLL VENOUS BLD VENIPUNCTURE: CPT | Performed by: FAMILY MEDICINE

## 2019-10-25 LAB — PROSTATE SPECIFIC ANTIGEN: < 0.02 NG/ML (ref 0–1)

## 2019-11-05 ENCOUNTER — OFFICE VISIT (OUTPATIENT)
Dept: UROLOGY | Age: 84
End: 2019-11-05
Payer: MEDICARE

## 2019-11-05 VITALS
DIASTOLIC BLOOD PRESSURE: 64 MMHG | BODY MASS INDEX: 21 KG/M2 | SYSTOLIC BLOOD PRESSURE: 122 MMHG | WEIGHT: 150 LBS | HEIGHT: 71 IN

## 2019-11-05 DIAGNOSIS — C61 PROSTATE CANCER (HCC): Primary | ICD-10-CM

## 2019-11-05 PROCEDURE — 1123F ACP DISCUSS/DSCN MKR DOCD: CPT | Performed by: NURSE PRACTITIONER

## 2019-11-05 PROCEDURE — 81002 URINALYSIS NONAUTO W/O SCOPE: CPT | Performed by: NURSE PRACTITIONER

## 2019-11-05 PROCEDURE — G8427 DOCREV CUR MEDS BY ELIG CLIN: HCPCS | Performed by: NURSE PRACTITIONER

## 2019-11-05 PROCEDURE — G8420 CALC BMI NORM PARAMETERS: HCPCS | Performed by: NURSE PRACTITIONER

## 2019-11-05 PROCEDURE — G8484 FLU IMMUNIZE NO ADMIN: HCPCS | Performed by: NURSE PRACTITIONER

## 2019-11-05 PROCEDURE — 1036F TOBACCO NON-USER: CPT | Performed by: NURSE PRACTITIONER

## 2019-11-05 PROCEDURE — 4040F PNEUMOC VAC/ADMIN/RCVD: CPT | Performed by: NURSE PRACTITIONER

## 2019-11-05 PROCEDURE — 99213 OFFICE O/P EST LOW 20 MIN: CPT | Performed by: NURSE PRACTITIONER

## 2019-12-26 DIAGNOSIS — L29.9 PRURITUS: ICD-10-CM

## 2019-12-26 RX ORDER — CLOTRIMAZOLE AND BETAMETHASONE DIPROPIONATE 10; .64 MG/G; MG/G
CREAM TOPICAL
Qty: 45 G | Refills: 1 | Status: SHIPPED | OUTPATIENT
Start: 2019-12-26 | End: 2020-09-21 | Stop reason: SDUPTHER

## 2020-01-13 ENCOUNTER — OFFICE VISIT (OUTPATIENT)
Dept: CARDIOLOGY CLINIC | Age: 85
End: 2020-01-13
Payer: MEDICARE

## 2020-01-13 VITALS
HEIGHT: 71 IN | HEART RATE: 70 BPM | SYSTOLIC BLOOD PRESSURE: 114 MMHG | BODY MASS INDEX: 21.03 KG/M2 | WEIGHT: 150.2 LBS | DIASTOLIC BLOOD PRESSURE: 62 MMHG

## 2020-01-13 PROCEDURE — G8484 FLU IMMUNIZE NO ADMIN: HCPCS | Performed by: INTERNAL MEDICINE

## 2020-01-13 PROCEDURE — 1036F TOBACCO NON-USER: CPT | Performed by: INTERNAL MEDICINE

## 2020-01-13 PROCEDURE — 1123F ACP DISCUSS/DSCN MKR DOCD: CPT | Performed by: INTERNAL MEDICINE

## 2020-01-13 PROCEDURE — G8420 CALC BMI NORM PARAMETERS: HCPCS | Performed by: INTERNAL MEDICINE

## 2020-01-13 PROCEDURE — G8427 DOCREV CUR MEDS BY ELIG CLIN: HCPCS | Performed by: INTERNAL MEDICINE

## 2020-01-13 PROCEDURE — 99214 OFFICE O/P EST MOD 30 MIN: CPT | Performed by: INTERNAL MEDICINE

## 2020-01-13 PROCEDURE — 4040F PNEUMOC VAC/ADMIN/RCVD: CPT | Performed by: INTERNAL MEDICINE

## 2020-01-13 NOTE — PROGRESS NOTES
Chief Complaint   Patient presents with    6 Month Follow-Up    Atrial Fibrillation       Pt here for a 6 month f/u    EKG scanned under media tab    Denied cp,  Palpitations,    Some trace Leg edema B/l    Dizziness on standing on walk and standing quick    Sob on exertion    Walk 2 to 3 block METS> 4    Nonsmoker    FHX  Had pacer    Hx of PE in the lung post op and had been on OA for 6 month and off it over one back      Patient Active Problem List   Diagnosis    Prostate cancer    Generalized anxiety disorder    Essential hypertension    Hyperlipidemia with target LDL less than 130    Back pain, chronic s/p surgery    History of pulmonary embolus (PE)    Spondylosis of lumbar region without myelopathy or radiculopathy    Persistent atrial fibrillation ([address]- newely DXed 10/12/17- CVR    Non-rheumatic mitral regurgitation- mod to severe    Moderate aortic regurgitation    Failed back syndrome of lumbar spine    Chronic pain disorder    Pneumothorax    Constipation    Ascending aortic aneurysm (HCC) 4.1 cm on CTA and 4.5 on echo       Past Surgical History:   Procedure Laterality Date    [facility]  01/1961    [facility]    BACK SURGERY  [id number]    X stop    CATARACT REMOVAL Right 06/2001    Dr Lakshmi Nolanf Left 11/2014    Dr Lenora Arciniega, 2009    Dr Wheeler Pontiff  [address]?     Dr Shannan Tafoya of date    HERNIA REPAIR Left 11/22/2017    ROBOT RECURRENT LEFT INGUINAL HERNIA REPAIR performed by Sebas Spivey MD at [address] [place] [address] Place 1 BILATERAL Bilateral 9/5/2017    LUMBAR FACET MBB L3-4, L4-5, L5-S1 BILATERAL performed by Germaine Zendejas MD at [place] [address] Left 04/09/2018    HIP INJECTION     OTHER SURGICAL HISTORY  10/7/2015    XI ROBOTIC PROSTATECTOMY    OTHER SURGICAL HISTORY Bilateral 09/05/2017    lumbar facet block L3-S1    OTHER tablet Take 1 tablet by mouth every 6 hours as needed for Pain. Tennis Raja Multiple Vitamins-Minerals (PRESERVISION AREDS 2 PO) Take 1 tablet by mouth 2 times daily       No current facility-administered medications for this visit. Review of Systems -     General ROS: negative  Psychological ROS: negative  Hematological and Lymphatic ROS: No history of blood clots or bleeding disorder. Respiratory ROS: no cough, shortness of breath, or wheezing  Cardiovascular ROS: no chest pain or dyspnea on exertion  Gastrointestinal ROS: negative  Genito-Urinary ROS: no dysuria, trouble voiding, or hematuria  Musculoskeletal ROS: negative  Neurological ROS: no TIA or stroke symptoms  Dermatological ROS: negative      Blood pressure 114/62, pulse 70, height 5' 11\" (1.803 m), weight 150 lb 3.2 oz (68.1 kg). Physical Examination:    General appearance - alert, well appearing, and in no distress  Mental status - alert, oriented to person, place, and time  Neck - supple, no significant adenopathy, no JVD, or carotid bruits  Chest - clear to auscultation, no wheezes, rales or rhonchi, symmetric air entry  Heart - normal rate, regular rhythm, normal S1, S2, , rubs, clicks or gallops but +ve murmurs  Abdomen - soft, nontender, nondistended, no masses or organomegaly  Neurological - alert, oriented, normal speech, no focal findings or movement disorder noted  Musculoskeletal - no joint tenderness, deformity or swelling  Extremities - peripheral pulses normal, no pedal edema, no clubbing or cyanosis  Skin - normal coloration and turgor, no rashes, no suspicious skin lesions noted    Lab  No results for input(s): CKTOTAL, CKMB, CKMBINDEX, TROPONINI in the last 72 hours.   CBC:   Lab Results   Component Value Date    WBC 11.7 09/23/2018    RBC 4.16 09/23/2018     08/26/2011    HGB 13.8 09/23/2018    HCT 40.2 09/23/2018    MCV 96.6 09/23/2018    MCH 33.2 09/23/2018    MCHC 34.3 09/23/2018    RDW 12.3 06/30/2018     09/23/2018    MPV 12.6 09/23/2018     BMP:    Lab Results   Component Value Date     09/23/2018    K 4.1 09/23/2018     09/23/2018    CO2 25 09/23/2018    BUN 18 09/23/2018    LABALBU 4.2 09/21/2018    CREATININE 1.1 01/25/2019    CREATININE 1.2 09/23/2018    CALCIUM 9.1 09/23/2018    LABGLOM 58 09/23/2018    GLUCOSE 101 09/23/2018    GLUCOSE 89 05/14/2016     Hepatic Function Panel:    Lab Results   Component Value Date    ALKPHOS 59 09/21/2018    ALT 18 09/21/2018    AST 30 09/21/2018    PROT 7.1 09/21/2018    BILITOT 0.8 09/21/2018    BILIDIR <0.2 09/21/2018    LABALBU 4.2 09/21/2018     Magnesium:  No results found for: MG  Warfarin PT/INR:  No components found for: PTPATWAR, PTINRWAR  HgBA1c:  No results found for: LABA1C  FLP:    Lab Results   Component Value Date    TRIG 54 05/12/2018    HDL 65 05/12/2018    LDLCALC 101 05/12/2018     TSH:    Lab Results   Component Value Date    TSH 3.380 07/24/2018       EKG 10/12/17  Atrial fibrillation  Septal infarct , age undetermined  Abnormal ECG  When compared with ECG of 06-NOV-2015 22:29,  Atrial fibrillation has replaced Sinus rhythm  Nonspecific T wave abnormality now evident in Inferior leads  Confirmed by Emely Adams MD, [id number] on 10/12/2017 8:03:16 PM      Conclusions      Summary   Normal left ventricle size and systolic function. Ejection fraction was   estimated at 60 to 65 %.  There were no regional left ventricular wall   motion abnormalities and wall thickness was within normal limits.   The left atrium is Moderately dilated.   Moderate to severly enlarged right atrium size.   Moderate-to-severe mitral regurgitation with centrally directed jet.   Moderate aortic regurgitation is noted.      Signature      ----------------------------------------------------------------   Electronically signed by Shayla Macias MD (Interpreting   physician) on 10/26/2017 at 12:55 PM   ----------------------------------------------------------------    Nuc Stress neg      CONCLUSION:  This is an complete atrial fibrillation with average  heart rate of 91 beats per minute ranging from 47 to 150 beats per  minute. No significant pause of more than 2.3 second noted. Rare  ventricular ectopic beat total of 277, predominantly isolated, few  couplets, few ventricular bigeminy. No supraventricular tachycardia. No other form of arrhythmia noted. The AFib seems to be rate well  controlled. The patient at times  gets AFib with rapid ventricular  response. Average heart rate is 91 beats per minute, so the patient  may benefit from increasing the dose of AV jason blocking agent.     Thank you.           BENJAMIN Madrid M.D.     D: 11/02/2017 18:04:27            Conclusions      Summary   Normal left ventricle size and systolic function. Ejection fraction was   estimated at 60 to 65 %.  There were no regional left ventricular wall   motion abnormalities and wall thickness was within normal limits.   The left atrium is Moderately dilated.   Moderate to severly enlarged right atrium size.   Moderate-to-severe mitral regurgitation with centrally directed jet.   Moderate aortic regurgitation is noted.      Signature      ----------------------------------------------------------------   Electronically signed by Blas Bejarano MD (Interpreting   physician) on 10/26/2017 at 12:55 PM       Conclusions      Summary   Normal left ventricle size and systolic function.   Ejection fraction was estimated at 65 %.   There were no regional left ventricular wall motion abnormalities and wall   thickness was within normal limits.   The left atrium is Moderately to severely dilated.   MILD TO Moderately enlarged right atrium size.   Moderate aortic regurgitation is noted.   Mild to Moderate mitral regurgitation is present.   Aortic aneurysm noted in the ascending aorta .   Aortic aneurysm measures 4.5 CM .   CONSIDER CTA FOR BETTER EVALUATION OF THE ASCENDING AORTIC ANEURYSM      Signature      ----------------------------------------------------------------   Electronically signed by Irene Curtis MD (Interpreting   VIKASH) on 01/11/2019 at 09:20 PM      CTA Jan 2019     The aorta is minimally dilated measuring 4.1 cm within the ascending aorta. It is ectatic. Descending aorta measures 3.2 cm     ekg 1/13/20  atr flutter with CVR and variable av block    Assessment     Diagnosis Orders   1. Persistent atrial fibrillation ([address]- newely DXed 10/12/17- CVR  ECHO Complete 2D W Doppler W Color   2. Essential hypertension  ECHO Complete 2D W Doppler W Color   3. Moderate aortic regurgitation  ECHO Complete 2D W Doppler W Color   4. Non-rheumatic mitral regurgitation- mod to severe  ECHO Complete 2D W Doppler W Color   5. Ascending aortic aneurysm (HCC) 4.1 cm on CTA and 4.5 on echo  ECHO Complete 2D W Doppler W Color         Plan   The  current meds and labs reviewed    Continue the current treatment and with constant vigilance to changes in symptoms and also any potential side effects. Return for care or seek medical attention immediately if symptoms got worse and/or develop new symptoms. Persistent atr FIB-CVR./PAFlutter  chads vasc 3  Need OA  Holter 48 hrs-Average heart rate of 91 beats per minute ranging from 47 to 150 beats per  Minute. Cont toprol XL 25 po qd  [facility]   apixaban 2.5 po bid after blood test per pat  Need lab and record from Delwyn Killer to know the reason  The risk and benefit of OA well explained including ICH and pat agreed to be on OA    Sob on exertion and new atr fib with cvr  The  echo and lexiscan nuc result reviewed and d/w the pat  No more leovnox due to intolerance    Mod MR  Mor AR  Need f/u echo     Ascending aortic aneurysm 4.1 CM by CTA and 4.5 cm by echo  F/U echo    Hypertension, on medical treatment. Seems to be under good control. Patient is compliant with medical treatment.    Home  mmhg    Dizziness  On standing at

## 2020-01-15 ENCOUNTER — HOSPITAL ENCOUNTER (OUTPATIENT)
Dept: NON INVASIVE DIAGNOSTICS | Age: 85
Discharge: HOME | End: 2020-01-15
Payer: MEDICARE

## 2020-01-15 LAB
LV EF: 60 %
LVEF MODALITY: NORMAL

## 2020-01-15 PROCEDURE — 93306 TTE W/DOPPLER COMPLETE: CPT

## 2020-02-10 ENCOUNTER — OFFICE VISIT (OUTPATIENT)
Dept: FAMILY MEDICINE CLINIC | Age: 85
End: 2020-02-10
Payer: MEDICARE

## 2020-02-10 VITALS
HEIGHT: 69 IN | OXYGEN SATURATION: 98 % | HEART RATE: 87 BPM | RESPIRATION RATE: 14 BRPM | BODY MASS INDEX: 22.25 KG/M2 | DIASTOLIC BLOOD PRESSURE: 62 MMHG | WEIGHT: 150.2 LBS | SYSTOLIC BLOOD PRESSURE: 122 MMHG

## 2020-02-10 PROCEDURE — G0439 PPPS, SUBSEQ VISIT: HCPCS | Performed by: FAMILY MEDICINE

## 2020-02-10 PROCEDURE — 4040F PNEUMOC VAC/ADMIN/RCVD: CPT | Performed by: FAMILY MEDICINE

## 2020-02-10 PROCEDURE — G8484 FLU IMMUNIZE NO ADMIN: HCPCS | Performed by: FAMILY MEDICINE

## 2020-02-10 PROCEDURE — 1123F ACP DISCUSS/DSCN MKR DOCD: CPT | Performed by: FAMILY MEDICINE

## 2020-02-10 SDOH — ECONOMIC STABILITY: TRANSPORTATION INSECURITY
IN THE PAST 12 MONTHS, HAS LACK OF TRANSPORTATION KEPT YOU FROM MEETINGS, WORK, OR FROM GETTING THINGS NEEDED FOR DAILY LIVING?: NO

## 2020-02-10 SDOH — ECONOMIC STABILITY: FOOD INSECURITY: WITHIN THE PAST 12 MONTHS, THE FOOD YOU BOUGHT JUST DIDN'T LAST AND YOU DIDN'T HAVE MONEY TO GET MORE.: NEVER TRUE

## 2020-02-10 SDOH — ECONOMIC STABILITY: TRANSPORTATION INSECURITY
IN THE PAST 12 MONTHS, HAS THE LACK OF TRANSPORTATION KEPT YOU FROM MEDICAL APPOINTMENTS OR FROM GETTING MEDICATIONS?: NO

## 2020-02-10 SDOH — ECONOMIC STABILITY: INCOME INSECURITY: HOW HARD IS IT FOR YOU TO PAY FOR THE VERY BASICS LIKE FOOD, HOUSING, MEDICAL CARE, AND HEATING?: NOT HARD AT ALL

## 2020-02-10 SDOH — ECONOMIC STABILITY: FOOD INSECURITY: WITHIN THE PAST 12 MONTHS, YOU WORRIED THAT YOUR FOOD WOULD RUN OUT BEFORE YOU GOT MONEY TO BUY MORE.: NEVER TRUE

## 2020-02-10 ASSESSMENT — PATIENT HEALTH QUESTIONNAIRE - PHQ9
SUM OF ALL RESPONSES TO PHQ QUESTIONS 1-9: 0
SUM OF ALL RESPONSES TO PHQ QUESTIONS 1-9: 0
SUM OF ALL RESPONSES TO PHQ9 QUESTIONS 1 & 2: 0
1. LITTLE INTEREST OR PLEASURE IN DOING THINGS: 0
SUM OF ALL RESPONSES TO PHQ QUESTIONS 1-9: 0
SUM OF ALL RESPONSES TO PHQ QUESTIONS 1-9: 0
2. FEELING DOWN, DEPRESSED OR HOPELESS: 0

## 2020-02-10 ASSESSMENT — LIFESTYLE VARIABLES
HAS A RELATIVE, FRIEND, DOCTOR, OR ANOTHER HEALTH PROFESSIONAL EXPRESSED CONCERN ABOUT YOUR DRINKING OR SUGGESTED YOU CUT DOWN: 0
HOW OFTEN DO YOU HAVE SIX OR MORE DRINKS ON ONE OCCASION: 0
HOW OFTEN DURING THE LAST YEAR HAVE YOU FAILED TO DO WHAT WAS NORMALLY EXPECTED FROM YOU BECAUSE OF DRINKING: 0
HOW OFTEN DURING THE LAST YEAR HAVE YOU NEEDED AN ALCOHOLIC DRINK FIRST THING IN THE MORNING TO GET YOURSELF GOING AFTER A NIGHT OF HEAVY DRINKING: 0
AUDIT-C TOTAL SCORE: 1
AUDIT TOTAL SCORE: 1
HOW OFTEN DURING THE LAST YEAR HAVE YOU FOUND THAT YOU WERE NOT ABLE TO STOP DRINKING ONCE YOU HAD STARTED: 0
HOW MANY STANDARD DRINKS CONTAINING ALCOHOL DO YOU HAVE ON A TYPICAL DAY: 0
HOW OFTEN DURING THE LAST YEAR HAVE YOU BEEN UNABLE TO REMEMBER WHAT HAPPENED THE NIGHT BEFORE BECAUSE YOU HAD BEEN DRINKING: 0
HOW OFTEN DO YOU HAVE A DRINK CONTAINING ALCOHOL: 1
HAVE YOU OR SOMEONE ELSE BEEN INJURED AS A RESULT OF YOUR DRINKING: 0
HOW OFTEN DURING THE LAST YEAR HAVE YOU HAD A FEELING OF GUILT OR REMORSE AFTER DRINKING: 0

## 2020-02-10 NOTE — PATIENT INSTRUCTIONS
in the [facility] box to learn more about \"Learning About Living Chyna Vasquez. \"     If you do not have an account, please click on the \"Sign Up Now\" link. Current as of: April 1, 2019  Content Version: 12.3  © 8401-0515 Healthwise, Incorporated. Care instructions adapted under license by [facility] ([facility]). If you have questions about a medical condition or this instruction, always ask your healthcare professional. [address] any warranty or liability for your use of this information. Personalized Preventive Plan for [address] [employer] - 2/10/2020  Medicare offers a range of preventive health benefits. Some of the tests and screenings are paid in full while other may be subject to a deductible, co-insurance, and/or copay. Some of these benefits include a comprehensive review of your medical history including lifestyle, illnesses that may run in your family, and various assessments and screenings as appropriate. After reviewing your medical record and screening and assessments performed today your provider may have ordered immunizations, labs, imaging, and/or referrals for you. A list of these orders (if applicable) as well as your Preventive Care list are included within your After Visit Summary for your review. Other Preventive Recommendations:    · A preventive eye exam performed by an eye specialist is recommended every 1-2 years to screen for glaucoma; cataracts, macular degeneration, and other eye disorders. · A preventive dental visit is recommended every 6 months. · Try to get at least 150 minutes of exercise per week or 10,000 steps per day on a pedometer . · Order or download the FREE \"Exercise & Physical Activity: Your Everyday Guide\" from [employer]. Call 9-720.500.2640 or search [employer] online. · You need 7967-3108 mg of calcium and 1065-8280 IU of vitamin D per day.  It is possible to meet your calcium checkups and to have your teeth cleaned. Wear a seat belt in the car. Follow your doctor's advice about when to have certain tests. These tests can spot problems early. For men and women  Cholesterol. Your doctor will tell you how often to have this done based on your overall health and other things that can increase your risk for heart attack and stroke. Blood pressure. Have your blood pressure checked during a routine doctor visit. Your doctor will tell you how often to check your blood pressure based on your age, your blood pressure results, and other factors. Diabetes. Ask your doctor whether you should have tests for diabetes. Vision. Experts recommend that you have yearly exams for glaucoma and other age-related eye problems. Hearing. Tell your doctor if you notice any change in your hearing. You can have tests to find out how well you hear. Colon cancer tests. Keep having colon cancer tests as your doctor recommends. You can have one of several types of tests. Heart attack and stroke risk. At least every 4 to 6 years, you should have your risk for heart attack and stroke assessed. Your doctor uses factors such as your age, blood pressure, cholesterol, and whether you smoke or have diabetes to show what your risk for a heart attack or stroke is over the next 10 years. Osteoporosis. Talk to your doctor about whether you should have a bone density test to find out whether you have thinning bones. Also ask your doctor about whether you should take calcium and vitamin D supplements. For women  Pap test and pelvic exam. You may no longer need a Pap test. Talk with your doctor about whether to stop or continue to have Pap tests. Breast exam and mammogram. Ask how often you should have a mammogram, which is an X-ray of your breasts. A mammogram can spot breast cancer before it can be felt and when it is easiest to treat. Thyroid disease.  Talk to your doctor about whether to have your thyroid checked as part of a regular physical exam. Women have an increased chance of a thyroid problem. For men  Prostate exam. Talk to your doctor about whether you should have a blood test (called a PSA test) for prostate cancer. Experts recommend that you discuss the benefits and risks of the test with your doctor before you decide whether to have this test. Some experts say that men ages 79 and older no longer need testing. Abdominal aortic aneurysm. Ask your doctor whether you should have a test to check for an aneurysm. You may need a test if you ever smoked or if your parent, brother, sister, or child has had an aneurysm. When should you call for help? Watch closely for changes in your health, and be sure to contact your doctor if you have any problems or symptoms that concern you. Where can you learn more? Go to https://EARTHNET.[employer] Premium. org and sign in to your [email or web address] account. Enter G722 in the [employer] box to learn more about \"Well Visit, Over 65: Care Instructions. \"     If you do not have an account, please click on the \"Sign Up Now\" link. Current as of: August 21, 2019  Content Version: 12.3  © 6553-9253 Healthwise, Incorporated. Care instructions adapted under license by [facility] ([facility]). If you have questions about a medical condition or this instruction, always ask your healthcare professional. [address] any warranty or liability for your use of this information. Patient Education        Preventing Falls: Care Instructions  Your Care Instructions    Getting around your home safely can be a challenge if you have injuries or health problems that make it easy for you to fall. Loose rugs and furniture in walkways are among the dangers for many older people who have problems walking or who have poor eyesight. People who have conditions such as arthritis, osteoporosis, or dementia also have to be careful not to fall.   You can make your home safer with a few simple measures. Follow-up care is a key part of your treatment and safety. Be sure to make and go to all appointments, and call your doctor if you are having problems. It's also a good idea to know your test results and keep a list of the medicines you take. How can you care for yourself at home? Taking care of yourself  You may get dizzy if you do not drink enough water. To prevent dehydration, drink plenty of fluids, enough so that your urine is light yellow or clear like water. Choose water and other caffeine-free clear liquids. If you have kidney, heart, or liver disease and have to limit fluids, talk with your doctor before you increase the amount of fluids you drink. Exercise regularly to improve your strength, muscle tone, and balance. Walk if you can. Swimming may be a good choice if you cannot walk easily. Have your vision and hearing checked each year or any time you notice a change. If you have trouble seeing and hearing, you might not be able to avoid objects and could lose your balance. Know the side effects of the medicines you take. Ask your doctor or pharmacist whether the medicines you take can affect your balance. Sleeping pills or sedatives can affect your balance. Limit the amount of alcohol you drink. Alcohol can impair your balance and other senses. Ask your doctor whether calluses or corns on your feet need to be removed. If you wear loose-fitting shoes because of calluses or corns, you can lose your balance and fall. Talk to your doctor if you have numbness in your feet. Preventing falls at home  Remove raised doorway thresholds, throw rugs, and clutter. Repair loose carpet or raised areas in the floor. Move furniture and electrical cords to keep them out of walking paths. Use nonskid floor wax, and wipe up spills right away, especially on ceramic tile floors. If you use a walker or cane, put rubber tips on it.  If you use crutches, clean the bottoms of them regularly with an abrasive pad, such as steel wool. Keep your house well lit, especially stairways, porches, and outside walkways. Use night-lights in areas such as hallways and bathrooms. Add extra light switches or use remote switches (such as switches that go on or off when you clap your hands) to make it easier to turn lights on if you have to get up during the night. Install sturdy handrails on stairways. Move items in your cabinets so that the things you use a lot are on the lower shelves (about waist level). Keep a cordless phone and a flashlight with new batteries by your bed. If possible, put a phone in each of the main rooms of your house, or carry a cell phone in case you fall and cannot reach a phone. Or, you can wear a device around your neck or wrist. You push a button that sends a signal for help. Wear low-heeled shoes that fit well and give your feet good support. Use footwear with nonskid soles. Check the heels and soles of your shoes for wear. Repair or replace worn heels or soles. Do not wear socks without shoes on wood floors. Walk on the grass when the sidewalks are slippery. If you live in an area that gets snow and ice in the winter, sprinkle salt on slippery steps and sidewalks. Preventing falls in the bath  Install grab bars and nonskid mats inside and outside your shower or tub and near the toilet and sinks. Use shower chairs and bath benches. Use a hand-held shower head that will allow you to sit while showering. Get into a tub or shower by putting the weaker leg in first. Get out of a tub or shower with your strong side first.  Repair loose toilet seats and consider installing a raised toilet seat to make getting on and off the toilet easier. Keep your bathroom door unlocked while you are in the shower. Where can you learn more? Go to https://ntahaly.healthCheck I'm Here. org and sign in to your [employer] account.  Enter G117 in the [employer]ogi box to learn more about \"Preventing

## 2020-02-10 NOTE — PROGRESS NOTES
 Wears glasses        Past Surgical History:   Procedure Laterality Date    APPENDECTOMY  01/1961    [facility] BACK SURGERY  [id number]    X stop    CATARACT REMOVAL Right 06/2001    Dr Marisol Steel Left 11/2014    Dr Erika Oropeza, 2009    Dr Fabricio Isbell  [address]?     Dr Hayes Headings of date    HERNIA REPAIR Left 11/22/2017    ROBOT RECURRENT LEFT INGUINAL HERNIA REPAIR performed by Randee Lovell MD at [address] BILATERAL Bilateral 9/5/2017    LUMBAR FACET MBB L3-4, L4-5, L5-S1 BILATERAL performed by Henrietta Trammell MD at [place] [id number] Left 04/09/2018    HIP INJECTION     OTHER SURGICAL HISTORY  10/7/2015    XI ROBOTIC PROSTATECTOMY    OTHER SURGICAL HISTORY Bilateral 09/05/2017    lumbar facet block L3-S1    OTHER SURGICAL HISTORY Right 1980's    nerve release right lower arm    OTHER SURGICAL HISTORY  01/30/2018    Sacroiliac joint MBB    OTHER SURGICAL HISTORY Left 02/27/2018    Sacroiliac joint medial branch block     MI IMPLANT NEUROSTIM/ N/A 9/17/2018    THORACIC LAMINECTOMY PERMANENT SPINAL CORD STIMULATOR PADDLE PLUS BATTERY PLACEMENT performed by Kinza Rubio MD at [facility] 46 ARTHRGRPHY&/ANES/STEROID W/IMAGE Left 1/30/2018    LEFT SI MBB performed by Henrietta Trammell MD at [address] INJECT SI JOINT ARTHRGRPHY&/ANES/STEROID W/IMAGE Left 2/27/2018    LEFT SI MBB #2 performed by Henrietta Trammell MD at [address] OFFICE/OUTPT VISIT,PROCEDURE ONLY Left 4/9/2018    LEFT HIP STEROID INJECTION WITH SEDATION performed by Henrietta Trammell MD at [address] OFFICE/OUTPT VISIT,PROCEDURE ONLY N/A 8/21/2018    SPINAL CORD STIMULATOR IMPLANT TRIAL performed by Henrietta Trammell MD at [address] [facility]  7-

## 2020-03-23 RX ORDER — HYDROCHLOROTHIAZIDE 12.5 MG/1
12.5 TABLET ORAL DAILY
Qty: 30 TABLET | Refills: 3 | Status: SHIPPED | OUTPATIENT
Start: 2020-03-23 | End: 2020-07-06

## 2020-06-24 ENCOUNTER — OFFICE VISIT (OUTPATIENT)
Dept: FAMILY MEDICINE CLINIC | Age: 85
End: 2020-06-24
Payer: MEDICARE

## 2020-06-24 VITALS
OXYGEN SATURATION: 98 % | RESPIRATION RATE: 16 BRPM | DIASTOLIC BLOOD PRESSURE: 66 MMHG | SYSTOLIC BLOOD PRESSURE: 128 MMHG | HEART RATE: 100 BPM | TEMPERATURE: 98.1 F | WEIGHT: 143 LBS | BODY MASS INDEX: 21.12 KG/M2

## 2020-06-24 PROCEDURE — G8420 CALC BMI NORM PARAMETERS: HCPCS | Performed by: FAMILY MEDICINE

## 2020-06-24 PROCEDURE — G8427 DOCREV CUR MEDS BY ELIG CLIN: HCPCS | Performed by: FAMILY MEDICINE

## 2020-06-24 PROCEDURE — 99213 OFFICE O/P EST LOW 20 MIN: CPT | Performed by: FAMILY MEDICINE

## 2020-06-24 PROCEDURE — 1036F TOBACCO NON-USER: CPT | Performed by: FAMILY MEDICINE

## 2020-06-24 PROCEDURE — 4040F PNEUMOC VAC/ADMIN/RCVD: CPT | Performed by: FAMILY MEDICINE

## 2020-06-24 PROCEDURE — 1123F ACP DISCUSS/DSCN MKR DOCD: CPT | Performed by: FAMILY MEDICINE

## 2020-06-24 ASSESSMENT — ENCOUNTER SYMPTOMS
BACK PAIN: 1
SHORTNESS OF BREATH: 0
RHINORRHEA: 0
NAUSEA: 0
EYE DISCHARGE: 0
ABDOMINAL PAIN: 0
DIARRHEA: 0
SORE THROAT: 0
WHEEZING: 0
COUGH: 0
CONSTIPATION: 0

## 2020-06-24 NOTE — PROGRESS NOTES
[address] [facility]  [address] [place] [address]  Dept: [phone]  Dept Fax: [phone]  Loc: [phone]    Francesca Lorenzo is a 80 y.o. male who presents today for his medical conditions/complaints as noted below. Chief Complaint   Patient presents with    Arm Swelling     Right arm swelling and pain. Patient states he probably bumped it on something. He is helping take care of his wife who is on hospice. HPI:     Arm Pain    The incident occurred 12 to 24 hours ago. The incident occurred at home. The injury mechanism is unknown. The pain is present in the right forearm and right elbow. The quality of the pain is described as aching and cramping. The pain does not radiate. The pain is moderate. The pain has been constant since the incident. Pertinent negatives include no chest pain, muscle weakness, numbness or tingling. Associated symptoms comments: Swelling, bruising, felt a \"pop\". The symptoms are aggravated by movement and palpation. He has tried acetaminophen and elevation for the symptoms. The treatment provided mild relief. Current Outpatient Medications   Medication Sig Dispense Refill    hydrochlorothiazide (HYDRODIURIL) 12.5 MG tablet Take 1 tablet by mouth daily 30 tablet 3    metoprolol tartrate (LOPRESSOR) 25 MG tablet Take 0.5 tablets by mouth 2 times daily 45 tablet 3    clotrimazole-betamethasone (LOTRISONE) 1-0.05 % cream Apply topically 2 times daily. PRN 45 g 1    apixaban (ELIQUIS) 5 MG TABS tablet Take 1 tablet by mouth 2 times daily 180 tablet 2    amLODIPine (NORVASC) 10 MG tablet Take 0.5 tablets by mouth daily 30 tablet 2    aspirin 81 MG tablet Take 81 mg by mouth daily      HYDROcodone-acetaminophen (NORCO) 5-325 MG per tablet Take 1 tablet by mouth every 6 hours as needed for Pain. Wilton Medina Multiple Vitamins-Minerals (PRESERVISION AREDS 2 PO) Take 1 tablet by mouth 2 times

## 2020-06-25 ENCOUNTER — HOSPITAL ENCOUNTER (OUTPATIENT)
Dept: INTERVENTIONAL RADIOLOGY/VASCULAR | Age: 85
Discharge: HOME | End: 2020-06-25
Payer: MEDICARE

## 2020-06-25 PROCEDURE — 93971 EXTREMITY STUDY: CPT

## 2020-06-29 ENCOUNTER — TELEPHONE (OUTPATIENT)
Dept: FAMILY MEDICINE CLINIC | Age: 85
End: 2020-06-29

## 2020-06-29 NOTE — TELEPHONE ENCOUNTER
We reiceved a call from Kavita Zelaya with [facility] central scheduling stating that she needs to get clarification on the CT upper extremity right if needs to be with contrast or w/wo contrast. Please advise    Call Manjinder at [phone]

## 2020-07-01 NOTE — TELEPHONE ENCOUNTER
[facility] called back.  They stated if we are looking for a Bicep tendon rupture, the best thing to order would be an MRI humerus with out contrast.

## 2020-07-01 NOTE — TELEPHONE ENCOUNTER
Edson Ying at [facility] stated the order needs to be a Ct Humerus with Contrast.    Order pending, just needs signed.

## 2020-07-06 ENCOUNTER — TELEPHONE (OUTPATIENT)
Dept: FAMILY MEDICINE CLINIC | Age: 85
End: 2020-07-06

## 2020-07-06 RX ORDER — HYDROCHLOROTHIAZIDE 12.5 MG/1
TABLET ORAL
Qty: 30 TABLET | Refills: 0 | Status: SHIPPED | OUTPATIENT
Start: 2020-07-06 | End: 2020-09-21

## 2020-07-06 NOTE — TELEPHONE ENCOUNTER
Please order Creatinine on pt has CT tomorrow at [facility].   Thank you

## 2020-07-07 ENCOUNTER — HOSPITAL ENCOUNTER (OUTPATIENT)
Age: 85
Discharge: HOME | End: 2020-07-07
Payer: MEDICARE

## 2020-07-07 ENCOUNTER — HOSPITAL ENCOUNTER (OUTPATIENT)
Dept: CT IMAGING | Age: 85
Discharge: HOME | End: 2020-07-07
Payer: MEDICARE

## 2020-07-07 DIAGNOSIS — M79.601 PAIN AND SWELLING OF RIGHT UPPER EXTREMITY: ICD-10-CM

## 2020-07-07 DIAGNOSIS — M79.89 PAIN AND SWELLING OF RIGHT UPPER EXTREMITY: ICD-10-CM

## 2020-07-07 LAB
CREATININE, WHOLE BLOOD: 1.3 MG/DL (ref 0.5–1.2)
ESTIMATED GFR, PCACC: 56 ML/MIN/1.73M2

## 2020-07-07 PROCEDURE — 73201 CT UPPER EXTREMITY W/DYE: CPT

## 2020-07-07 PROCEDURE — 82565 ASSAY OF CREATININE: CPT

## 2020-07-07 PROCEDURE — 6360000004 HC RX CONTRAST MEDICATION: Performed by: FAMILY MEDICINE

## 2020-07-07 RX ADMIN — IOPAMIDOL 100 ML: 755 INJECTION, SOLUTION INTRAVENOUS at 08:06

## 2020-07-09 ENCOUNTER — OFFICE VISIT (OUTPATIENT)
Dept: FAMILY MEDICINE CLINIC | Age: 85
End: 2020-07-09
Payer: MEDICARE

## 2020-07-09 VITALS
OXYGEN SATURATION: 97 % | SYSTOLIC BLOOD PRESSURE: 138 MMHG | HEART RATE: 99 BPM | DIASTOLIC BLOOD PRESSURE: 84 MMHG | BODY MASS INDEX: 21.21 KG/M2 | TEMPERATURE: 98.2 F | WEIGHT: 143.6 LBS

## 2020-07-09 PROCEDURE — 1123F ACP DISCUSS/DSCN MKR DOCD: CPT | Performed by: NURSE PRACTITIONER

## 2020-07-09 PROCEDURE — 1036F TOBACCO NON-USER: CPT | Performed by: NURSE PRACTITIONER

## 2020-07-09 PROCEDURE — G8420 CALC BMI NORM PARAMETERS: HCPCS | Performed by: NURSE PRACTITIONER

## 2020-07-09 PROCEDURE — 4040F PNEUMOC VAC/ADMIN/RCVD: CPT | Performed by: NURSE PRACTITIONER

## 2020-07-09 PROCEDURE — 99213 OFFICE O/P EST LOW 20 MIN: CPT | Performed by: NURSE PRACTITIONER

## 2020-07-09 PROCEDURE — G8427 DOCREV CUR MEDS BY ELIG CLIN: HCPCS | Performed by: NURSE PRACTITIONER

## 2020-07-09 RX ORDER — LORAZEPAM 0.5 MG/1
0.5 TABLET ORAL EVERY 8 HOURS PRN
Qty: 30 TABLET | Refills: 0 | Status: SHIPPED | OUTPATIENT
Start: 2020-07-09 | End: 2020-08-08

## 2020-07-09 NOTE — PROGRESS NOTES
SURGERY Left 2018    HIP INJECTION     OTHER SURGICAL HISTORY  10/7/2015    XI ROBOTIC PROSTATECTOMY    OTHER SURGICAL HISTORY Bilateral 2017    lumbar facet block L3-S1    OTHER SURGICAL HISTORY Right 's    nerve release right lower arm    OTHER SURGICAL HISTORY  2018    Sacroiliac joint MBB    OTHER SURGICAL HISTORY Left 2018    Sacroiliac joint medial branch block     ME IMPLANT NEUROSTIM/ N/A 2018    THORACIC LAMINECTOMY PERMANENT SPINAL CORD STIMULATOR PADDLE PLUS BATTERY PLACEMENT performed by Tonya Fernandez MD at [facility] ARTHRGRPHY&/ANES/STEROID W/IMAGE Left 2018    LEFT SI MBB performed by Wali Apple MD at [address] INJECT SI JOINT ARTHRGRPHY&/ANES/STEROID W/IMAGE Left 2018    LEFT SI MBB #2 performed by Wali Apple MD at [address] OFFICE/OUTPT VISIT,PROCEDURE ONLY Left 2018    LEFT HIP STEROID INJECTION WITH SEDATION performed by Wali Apple MD at [address] OFFICE/OUTPT VISIT,PROCEDURE ONLY N/A 2018    SPINAL CORD STIMULATOR IMPLANT TRIAL performed by Wali Apple MD at [address] [facility] BIOPSY  2012    Dr Ketty Roberts BIOPSY  2015    transrectal ultrasound with biopsy(+)    PROSTATE SURGERY      TURP    PROSTATE SURGERY  2009    Biopsy -Hyperplasia    SHOULDER ARTHROPLASTY  2005    right       Family History   Problem Relation Age of Onset    Heart Disease Father     Prostate Cancer Brother     Cancer Brother     Prostate Cancer Brother     Cancer Brother     Stroke Mother     Cancer Sister     Prostate Cancer Brother     Prostate Cancer Brother        Social History     Tobacco Use    Smoking status: Former Smoker     Packs/day: 1.00     Years: 10.00     Pack years: 10.00     Types: Cigarettes     Last attempt to quit: 7/3/1965     Years since quittin.0    Smokeless tobacco: Never Used   Substance Use Topics    Alcohol use: Yes     Alcohol/week: 0.0 standard drinks     Comment: SOCIALLY wine a few times a year       Current Outpatient Medications   Medication Sig Dispense Refill    LORazepam (ATIVAN) 0.5 MG tablet Take 1 tablet by mouth every 8 hours as needed for Anxiety for up to 30 days. 30 tablet 0    hydrochlorothiazide (HYDRODIURIL) 12.5 MG tablet TAKE ONE TABLET BY MOUTH ONCE A DAY 30 tablet 0    metoprolol tartrate (LOPRESSOR) 25 MG tablet Take 0.5 tablets by mouth 2 times daily 45 tablet 3    clotrimazole-betamethasone (LOTRISONE) 1-0.05 % cream Apply topically 2 times daily. PRN 45 g 1    apixaban (ELIQUIS) 5 MG TABS tablet Take 1 tablet by mouth 2 times daily 180 tablet 2    amLODIPine (NORVASC) 10 MG tablet Take 0.5 tablets by mouth daily 30 tablet 2    aspirin 81 MG tablet Take 81 mg by mouth daily      HYDROcodone-acetaminophen (NORCO) 5-325 MG per tablet Take 1 tablet by mouth every 6 hours as needed for Pain. Diana Matta Multiple Vitamins-Minerals (PRESERVISION AREDS 2 PO) Take 1 tablet by mouth 2 times daily       No current facility-administered medications for this visit. Allergies   Allergen Reactions    Nitrofuran Derivatives Nausea Only     Severe stomach cramps    Nsaids Nausea Only and Other (See Comments)     Pain in stomach       Health Maintenance   Topic Date Due    DTaP/Tdap/Td vaccine (1 - Tdap) 03/26/1953    Shingles Vaccine (2 of 3) 07/19/2017    Potassium monitoring  09/23/2019    Flu vaccine (1) 09/01/2020    PSA counseling  10/24/2020    Annual Wellness Visit (AWV)  02/10/2021    Creatinine monitoring  07/07/2021    Pneumococcal 65+ years Vaccine  Completed    Hepatitis A vaccine  Aged Out    Hepatitis B vaccine  Aged Out    Hib vaccine  Aged Out    Meningococcal (ACWY) vaccine  Aged Out       Subjective:      Review of Systems   Constitutional: Negative for chills, fatigue and fever.    Respiratory: Negative for shortness of breath. Cardiovascular: Negative for chest pain. Gastrointestinal: Negative for abdominal pain, constipation, diarrhea, nausea and vomiting. Genitourinary: Negative for difficulty urinating. Musculoskeletal: Negative for arthralgias, back pain, gait problem, joint swelling, myalgias, neck pain and neck stiffness. Skin: Negative for color change and rash. Neurological: Negative for dizziness, light-headedness and headaches. Psychiatric/Behavioral: Positive for sleep disturbance. Negative for agitation, behavioral problems, confusion, decreased concentration, dysphoric mood and suicidal ideas. The patient is nervous/anxious. Objective:     Physical Exam  Vitals signs and nursing note reviewed. Constitutional:       Appearance: Normal appearance. He is well-developed. HENT:      Head: Normocephalic. Right Ear: Tympanic membrane and external ear normal.      Left Ear: Tympanic membrane and external ear normal.      Nose: Nose normal.      Right Sinus: No maxillary sinus tenderness. Left Sinus: No maxillary sinus tenderness. Mouth/Throat:      Mouth: Mucous membranes are dry. Pharynx: Oropharynx is clear. Uvula midline. Neck:      Musculoskeletal: Normal range of motion and neck supple. Thyroid: No thyromegaly. Vascular: No carotid bruit or JVD. Trachea: Trachea normal.   Cardiovascular:      Rate and Rhythm: Normal rate and regular rhythm. Heart sounds: Normal heart sounds. No murmur. Pulmonary:      Effort: Pulmonary effort is normal. No respiratory distress. Breath sounds: Normal breath sounds. No decreased breath sounds, wheezing, rhonchi or rales. Chest:      Chest wall: No tenderness. Abdominal:      General: There is no distension. Palpations: Abdomen is soft. There is no mass. Tenderness: There is no abdominal tenderness. Musculoskeletal: Normal range of motion. General: No tenderness or deformity. Comments: Right arm pain and swelling resolved. No physical findings of abscess. Lymphadenopathy:      Cervical: No cervical adenopathy. Skin:     General: Skin is warm and dry. Findings: No erythema or rash. Neurological:      Mental Status: He is alert and oriented to person, place, and time. Cranial Nerves: No cranial nerve deficit. Sensory: No sensory deficit. Motor: No abnormal muscle tone. Coordination: Coordination normal.      Gait: Gait normal.   Psychiatric:         Mood and Affect: Mood is anxious. Mood is not depressed. Speech: Speech normal.         Behavior: Behavior normal.         Thought Content: Thought content normal. Thought content does not include suicidal ideation. Thought content does not include suicidal plan. Cognition and Memory: Cognition and memory normal.         Judgment: Judgment normal.       /84 (Site: Left Upper Arm, Position: Sitting, Cuff Size: Medium Adult)   Pulse 99   Temp 98.2 °F (36.8 °C) (Temporal)   Wt 143 lb 9.6 oz (65.1 kg)   SpO2 97%   BMI 21.21 kg/m²     Assessment:       Diagnosis Orders   1. Anxiety  LORazepam (ATIVAN) 0.5 MG tablet       Plan:     Ativan as needed for anxiety  May cause drowsiness   Return for any depression  Ok to not see ortho do to symptoms resolved without problems. Discussed use, benefit, and side effects of prescribed medications. Barriers tomedication compliance addressed. All patient questions answered. Pt voiced understanding. Return in about 1 month (around 8/9/2020), or if symptoms worsen or fail to improve. No orders of the defined types were placed in this encounter. Orders Placed This Encounter   Medications    LORazepam (ATIVAN) 0.5 MG tablet     Sig: Take 1 tablet by mouth every 8 hours as needed for Anxiety for up to 30 days.      Dispense:  30 tablet     Refill:  0        Reccommended tobacco cessation options including pharmacologicmethods, counseled great than 3 minutes during this visit:  Yes  []  No  []     Patient given educational materials - see patient instructions. Discussed use, benefit, and sideeffects of prescribed medications. All patient questions answered. Pt voiced understanding. Reviewed health maintenance. Instructed to continue current medications, diet and exercise. Patient agreed with treatment plan. Follow up as directed.      Electronically signed by SONIDO Hunter CNP on 7/14/2020 at 7:44 AM

## 2020-07-13 ENCOUNTER — OFFICE VISIT (OUTPATIENT)
Dept: CARDIOLOGY CLINIC | Age: 85
End: 2020-07-13
Payer: MEDICARE

## 2020-07-13 VITALS
DIASTOLIC BLOOD PRESSURE: 72 MMHG | SYSTOLIC BLOOD PRESSURE: 134 MMHG | WEIGHT: 140.6 LBS | HEART RATE: 78 BPM | HEIGHT: 71 IN | BODY MASS INDEX: 19.69 KG/M2

## 2020-07-13 PROCEDURE — 1123F ACP DISCUSS/DSCN MKR DOCD: CPT | Performed by: INTERNAL MEDICINE

## 2020-07-13 PROCEDURE — G8420 CALC BMI NORM PARAMETERS: HCPCS | Performed by: INTERNAL MEDICINE

## 2020-07-13 PROCEDURE — 1036F TOBACCO NON-USER: CPT | Performed by: INTERNAL MEDICINE

## 2020-07-13 PROCEDURE — 99214 OFFICE O/P EST MOD 30 MIN: CPT | Performed by: INTERNAL MEDICINE

## 2020-07-13 PROCEDURE — 4040F PNEUMOC VAC/ADMIN/RCVD: CPT | Performed by: INTERNAL MEDICINE

## 2020-07-13 PROCEDURE — G8427 DOCREV CUR MEDS BY ELIG CLIN: HCPCS | Performed by: INTERNAL MEDICINE

## 2020-07-13 NOTE — PROGRESS NOTES
Chief Complaint   Patient presents with    6 Month Follow-Up    Atrial Fibrillation       Pt here for a 6 month f/u    EKG done 1-    Denied cp,  Palpitations, or edema    Dizziness on standing on walk and standing quick    Sob on exertion- stable    Walk 2 to 3 block METS> 4    Nonsmoker    FHX  Had pacer    Hx of PE in the lung post op and had been on OA for 6 month and off it over one back      Patient Active Problem List   Diagnosis    Prostate cancer    Generalized anxiety disorder    Essential hypertension    Hyperlipidemia with target LDL less than 130    Back pain, chronic s/p surgery    History of pulmonary embolus (PE)    Spondylosis of lumbar region without myelopathy or radiculopathy    Persistent atrial fibrillation ([address]- newely DXed 10/12/17- CVR    Non-rheumatic mitral regurgitation- mod to severe    Moderate aortic regurgitation    Failed back syndrome of lumbar spine    Chronic pain disorder    Pneumothorax    Constipation    Ascending aortic aneurysm (HCC) 4.1 cm on CTA and 4.5 on echo       Past Surgical History:   Procedure Laterality Date    APP[facility]  01/1961    [facility]    BACK SURGERY  [id number]    X stop    CATARACT REMOVAL Right 06/2001    Dr Hunter Cody Left 11/2014    Dr Nichol Barillas, 2009    Dr Ha Leblanc  [id number]?     Dr Louise Yuen of date    HERNIA REPAIR Left 11/22/2017    ROBOT RECURRENT LEFT INGUINAL HERNIA REPAIR performed by Shadi Johnson MD at [address] [place] [address] Place 1 BILATERAL Bilateral 9/5/2017    LUMBAR FACET MBB L3-4, L4-5, L5-S1 BILATERAL performed by Jagdeep Latham MD at [place] [address] Left 04/09/2018    HIP INJECTION     OTHER SURGICAL HISTORY  10/7/2015    XI ROBOTIC PROSTATECTOMY    OTHER SURGICAL HISTORY Bilateral 09/05/2017    lumbar facet block L3-S1    OTHER SURGICAL HISTORY Right 1980's    nerve release right lower arm    OTHER SURGICAL HISTORY  01/30/2018    Sacroiliac joint MBB    OTHER SURGICAL HISTORY Left 02/27/2018    Sacroiliac joint medial branch block     GA IMPLANT NEUROSTIM/ N/A 9/17/2018    THORACIC LAMINECTOMY PERMANENT SPINAL CORD STIMULATOR PADDLE PLUS BATTERY PLACEMENT performed by Blanco Johnson MD at [facility] [address] ARTHRGRPHY&/ANES/STEROID W/IMAGE Left 1/30/2018    LEFT SI MBB performed by Ferdinand Fernandez MD at [address] INJECT SI JOINT ARTHRGRPHY&/ANES/STEROID W/IMAGE Left 2/27/2018    LEFT SI MBB #2 performed by Ferdinand Fernandez MD at [address] OFFICE/OUTPT VISIT,PROCEDURE ONLY Left 4/9/2018    LEFT HIP STEROID INJECTION WITH SEDATION performed by Ferdinand Fernandez MD at [address] OFFICE/OUTPT [facility] N/A 8/21/2018    SPINAL CORD STIMULATOR IMPLANT TRIAL performed by Ferdinand Fernandez MD at [address] [facility] BIOPSY  7-    Dr Amilcar Perez BIOPSY  8/21/2015    transrectal ultrasound with biopsy(+)    PROSTATE SURGERY  2003    TURP    PROSTATE SURGERY  2009    Biopsy -Hyperplasia    SHOULDER ARTHROPLASTY  2005    right       Allergies   Allergen Reactions    Nitrofuran Derivatives Nausea Only     Severe stomach cramps    Nsaids Nausea Only and Other (See Comments)     Pain in stomach        Family History   Problem Relation Age of Onset    Heart Disease Father     Prostate Cancer Brother     Cancer Brother     Prostate Cancer Brother     Cancer Brother     Stroke Mother     Cancer Sister     Prostate Cancer Brother     Prostate Cancer Brother         Social History     Socioeconomic History    Marital status:       Spouse name: Kathie Mccallum Number of children: 3    Years of education: Not on file    Highest education level: Not on file   Occupational History    Not on file   Social Needs    Financial resource strain: Not hard at all    Food insecurity     Worry: Never true     Inability: Never true    Transportation needs     Medical: No     Non-medical: No   Tobacco Use    Smoking status: Former Smoker     Packs/day: 1.00     Years: 10.00     Pack years: 10.00     Types: Cigarettes     Last attempt to quit: 7/3/1965     Years since quittin.0    Smokeless tobacco: Never Used   Substance and Sexual Activity    Alcohol use: Yes     Alcohol/week: 0.0 standard drinks     Comment: SOCIALLY wine a few times a year     Drug use: No    Sexual activity: Not Currently   Lifestyle    Physical activity     Days per week: Not on file     Minutes per session: Not on file    Stress: Not on file   Relationships    Social connections     Talks on phone: Not on file     Gets together: Not on file     Attends Hoahaoism service: Not on file     Active member of club or organization: Not on file     Attends meetings of clubs or organizations: Not on file     Relationship status: Not on file    Intimate partner violence     Fear of current or ex partner: Not on file     Emotionally abused: Not on file     Physically abused: Not on file     Forced sexual activity: Not on file   Other Topics Concern    Not on file   Social History Narrative    Not on file       Current Outpatient Medications   Medication Sig Dispense Refill    LORazepam (ATIVAN) 0.5 MG tablet Take 1 tablet by mouth every 8 hours as needed for Anxiety for up to 30 days. 30 tablet 0    hydrochlorothiazide (HYDRODIURIL) 12.5 MG tablet TAKE ONE TABLET BY MOUTH ONCE A DAY 30 tablet 0    metoprolol tartrate (LOPRESSOR) 25 MG tablet Take 0.5 tablets by mouth 2 times daily 45 tablet 3    clotrimazole-betamethasone (LOTRISONE) 1-0.05 % cream Apply topically 2 times daily.  PRN 45 g 1    apixaban (ELIQUIS) 5 MG TABS tablet Take 1 tablet by mouth 2 times daily 180 tablet 2    amLODIPine (NORVASC) 10 MG tablet Take 0.5 tablets by mouth daily 30 tablet 2    aspirin 81 MG tablet Take 81 mg by mouth daily      HYDROcodone-acetaminophen (NORCO) 5-325 MG per tablet Take 1 tablet by mouth every 6 hours as needed for Pain. Diana Matta Multiple Vitamins-Minerals (PRESERVISION AREDS 2 PO) Take 1 tablet by mouth 2 times daily       No current facility-administered medications for this visit. Review of Systems -     General ROS: negative  Psychological ROS: negative  Hematological and Lymphatic ROS: No history of blood clots or bleeding disorder. Respiratory ROS: no cough, shortness of breath, or wheezing  Cardiovascular ROS: no chest pain or dyspnea on exertion  Gastrointestinal ROS: negative  Genito-Urinary ROS: no dysuria, trouble voiding, or hematuria  Musculoskeletal ROS: negative  Neurological ROS: no TIA or stroke symptoms  Dermatological ROS: negative      Blood pressure 134/72, pulse 78, height 5' 11\" (1.803 m), weight 140 lb 9.6 oz (63.8 kg). Physical Examination:    General appearance - alert, well appearing, and in no distress  Mental status - alert, oriented to person, place, and time  Neck - supple, no significant adenopathy, no JVD, or carotid bruits  Chest - clear to auscultation, no wheezes, rales or rhonchi, symmetric air entry  Heart - normal rate, regular rhythm, normal S1, S2, , rubs, clicks or gallops but +ve murmurs  Abdomen - soft, nontender, nondistended, no masses or organomegaly  Neurological - alert, oriented, normal speech, no focal findings or movement disorder noted  Musculoskeletal - no joint tenderness, deformity or swelling  Extremities - peripheral pulses normal, no pedal edema, no clubbing or cyanosis  Skin - normal coloration and turgor, no rashes, no suspicious skin lesions noted    Lab  No results for input(s): CKTOTAL, CKMB, CKMBINDEX, TROPONINI in the last 72 hours.   CBC:   Lab Results   Component Value Date    WBC 11.7 09/23/2018    RBC 4.16 09/23/2018     08/26/2011    HGB 13.8 09/23/2018    HCT 40.2 09/23/2018    MCV 96.6 09/23/2018    MCH 33.2 09/23/2018    MCHC 34.3 09/23/2018    RDW 12.3 06/30/2018     09/23/2018    MPV 12.6 09/23/2018     BMP:    Lab Results   Component Value Date     09/23/2018    K 4.1 09/23/2018     09/23/2018    CO2 25 09/23/2018    BUN 18 09/23/2018    LABALBU 4.2 09/21/2018    CREATININE 1.3 07/07/2020    CREATININE 1.2 09/23/2018    CALCIUM 9.1 09/23/2018    LABGLOM 58 09/23/2018    GLUCOSE 101 09/23/2018    GLUCOSE 89 05/14/2016     Hepatic Function Panel:    Lab Results   Component Value Date    ALKPHOS 59 09/21/2018    ALT 18 09/21/2018    AST 30 09/21/2018    PROT 7.1 09/21/2018    BILITOT 0.8 09/21/2018    BILIDIR <0.2 09/21/2018    LABALBU 4.2 09/21/2018     Magnesium:  No results found for: MG  Warfarin PT/INR:  No components found for: PTPATWAR, PTINRWAR  HgBA1c:  No results found for: LABA1C  FLP:    Lab Results   Component Value Date    TRIG 64 10/12/2019    HDL 66 10/12/2019    LDLCALC 111 10/12/2019     TSH:    Lab Results   Component Value Date    TSH 3.380 07/24/2018       EKG 10/12/17  Atrial fibrillation  Septal infarct , age undetermined  Abnormal ECG  When compared with ECG of 06-NOV-2015 22:29,  Atrial fibrillation has replaced Sinus rhythm  Nonspecific T wave abnormality now evident in Inferior leads  Confirmed by Chay Araujo MD, Monisha Allred ([id number]) on 10/12/2017 8:03:16 PM      Conclusions      Summary   Normal left ventricle size and systolic function. Ejection fraction was   estimated at 60 to 65 %.  There were no regional left ventricular wall   motion abnormalities and wall thickness was within normal limits.   The left atrium is Moderately dilated.   Moderate to severly enlarged right atrium size.   Moderate-to-severe mitral regurgitation with centrally directed jet.   Moderate aortic regurgitation is noted.      Signature      ----------------------------------------------------------------   Electronically signed by Cira Toro MD (Interpreting   physician) on 10/26/2017 at 12:55 PM   ----------------------------------------------------------------    Nuc  Stress neg      CONCLUSION:  This is an complete atrial fibrillation with average  heart rate of 91 beats per minute ranging from 47 to 150 beats per  minute. No significant pause of more than 2.3 second noted. Rare  ventricular ectopic beat total of 277, predominantly isolated, few  couplets, few ventricular bigeminy. No supraventricular tachycardia. No other form of arrhythmia noted. The AFib seems to be rate well  controlled. The patient at times  gets AFib with rapid ventricular  response. Average heart rate is 91 beats per minute, so the patient  may benefit from increasing the dose of AV jason blocking agent.     Thank you.           BENJAMIN Fairchild M.D.     D: 11/02/2017 18:04:27            Conclusions      Summary   Normal left ventricle size and systolic function. Ejection fraction was   estimated at 60 to 65 %.  There were no regional left ventricular wall   motion abnormalities and wall thickness was within normal limits.   The left atrium is Moderately dilated.   Moderate to severly enlarged right atrium size.   Moderate-to-severe mitral regurgitation with centrally directed jet.   Moderate aortic regurgitation is noted.      Signature      ----------------------------------------------------------------   Electronically signed by Radha Mann MD (Interpreting   physician) on 10/26/2017 at 12:55 PM       Conclusions      Summary   Normal left ventricle size and systolic function.   Ejection fraction was estimated at 65 %.   There were no regional left ventricular wall motion abnormalities and wall   thickness was within normal limits.   The left atrium is Moderately to severely dilated.   MILD TO Moderately enlarged right atrium size.   Moderate aortic regurgitation is noted.   Mild to Moderate mitral regurgitation is present.   Aortic aneurysm noted in the ascending aorta .   Aortic aneurysm measures 4.5 CM Relleliecer Patricia  CONSIDER CTA FOR BETTER EVALUATION OF THE ASCENDING AORTIC ANEURYSM      Signature      ----------------------------------------------------------------   Electronically signed by Byron Bennett MD (Interpreting   ADYMVCREU) on 01/11/2019 at 09:20 PM      CTA Jan 2019     The aorta is minimally dilated measuring 4.1 cm within the ascending aorta. It is ectatic. Descending aorta measures 3.2 cm     ekg 1/13/20  atr flutter with CVR and variable av block    Assessment     Diagnosis Orders   1. Persistent atrial fibrillation ([address]- newely DXed 10/12/17- CVR     2. Moderate aortic regurgitation     3. Hyperlipidemia with target LDL less than 130     4. Essential hypertension     5. Ascending aortic aneurysm (HCC) 4.1 cm on CTA and 4.5 on echo           Plan   The most  current meds and labs reviewed    Continue the current treatment and with constant vigilance to changes in symptoms and also any potential side effects. Return for care or seek medical attention immediately if symptoms got worse and/or develop new symptoms. Persistent atr FIB-CVR./PAFlutter  chads vasc 3  Need OA  Holter 48 hrs-Average heart rate of 91 beats per minute ranging from 47 to 150 beats per  Minute. Cont toprol XL 25 po qd  [facility]ose [facility] by [facility]   apixaban 2.5 po bid after blood test per pat  CR 1.3 wt 63 lb  The risk and benefit of OA well explained including ICH and pat agreed to be on OA    Sob on exertion and new atr fib with cvr  The  echo and lexiscan nuc result reviewed and d/w the pat  No more leovnox due to intolerance    Mod MR- and Mos AR  The F/u echo 2020- mild to Mod AR and MR    Ascending aortic aneurysm 4.1 CM by CTA jan 2019 and 4.5 cm by echo  F/u CTA in  6 months    Hypertension, on medical treatment. Seems to be under good control. Patient is compliant with medical treatment.    Home  mmhg    Dizziness  On standing at times  Hydration    D/w the pat at length the plan of care    Discussed use, benefit, and side

## 2020-07-14 ASSESSMENT — ENCOUNTER SYMPTOMS
VOMITING: 0
ABDOMINAL PAIN: 0
CONSTIPATION: 0
NAUSEA: 0
BACK PAIN: 0
DIARRHEA: 0
COLOR CHANGE: 0
SHORTNESS OF BREATH: 0

## 2020-08-04 ENCOUNTER — OFFICE VISIT (OUTPATIENT)
Dept: FAMILY MEDICINE CLINIC | Age: 85
End: 2020-08-04
Payer: MEDICARE

## 2020-08-04 VITALS
HEART RATE: 84 BPM | RESPIRATION RATE: 12 BRPM | OXYGEN SATURATION: 98 % | DIASTOLIC BLOOD PRESSURE: 68 MMHG | SYSTOLIC BLOOD PRESSURE: 134 MMHG | WEIGHT: 144.6 LBS | BODY MASS INDEX: 20.17 KG/M2 | TEMPERATURE: 97.4 F

## 2020-08-04 PROCEDURE — G8420 CALC BMI NORM PARAMETERS: HCPCS | Performed by: FAMILY MEDICINE

## 2020-08-04 PROCEDURE — 4040F PNEUMOC VAC/ADMIN/RCVD: CPT | Performed by: FAMILY MEDICINE

## 2020-08-04 PROCEDURE — 99214 OFFICE O/P EST MOD 30 MIN: CPT | Performed by: FAMILY MEDICINE

## 2020-08-04 PROCEDURE — 1036F TOBACCO NON-USER: CPT | Performed by: FAMILY MEDICINE

## 2020-08-04 PROCEDURE — 1123F ACP DISCUSS/DSCN MKR DOCD: CPT | Performed by: FAMILY MEDICINE

## 2020-08-04 PROCEDURE — G8427 DOCREV CUR MEDS BY ELIG CLIN: HCPCS | Performed by: FAMILY MEDICINE

## 2020-08-04 RX ORDER — LIDOCAINE 50 MG/G
3 PATCH TOPICAL EVERY 24 HOURS
Qty: 90 PATCH | Refills: 0 | Status: SHIPPED | OUTPATIENT
Start: 2020-08-04 | End: 2021-04-13 | Stop reason: ALTCHOICE

## 2020-08-04 ASSESSMENT — ENCOUNTER SYMPTOMS
BACK PAIN: 0
ABDOMINAL PAIN: 0
SORE THROAT: 0
SHORTNESS OF BREATH: 0
COUGH: 0
BLOOD IN STOOL: 0
VOMITING: 0
DIARRHEA: 0
COLOR CHANGE: 0
WHEEZING: 0
APNEA: 0
NAUSEA: 0
SINUS PRESSURE: 0
CONSTIPATION: 0
RHINORRHEA: 0

## 2020-08-04 NOTE — PROGRESS NOTES
[address] [place] [address]  Dept: [phone]  Dept Fax: [phone]  Loc: [phone]    Cristel Fields is a 80 y.o. male who presents today for his medical conditions/complaints as noted below. Chief Complaint   Patient presents with    6 Month Follow-Up    Follow-up     Arm pain and swelling            HPI:     Patient presents for six-month follow-up of chronic medical conditions. States that his blood pressure has been well controlled to his knowledge and he tolerates his medications well. He is due for fasting blood work this fall which she agrees to have done. His biggest complaint today is his chronic back pain. He did have pain stimulator placed which took away some of the pain but he mentions that he still aches. He does take Norco occasionally but states that this is not something he wants to get hooked on so he tries to avoid it if possible. He has seen Ortho in the past and has had multiple procedures but the last visit was nearly 8 years ago. He does request a referral for second opinion. He states that the pain is achy and makes him feel off balance. He has difficulty sleeping at night. He does have known scoliosis but he wonders if it is getting worse because he feels tilted when he walks. He does have a cane that he occasionally uses but states he has not fallen. States the pain occasionally runs down the back of both of his legs. Unfortunately, patient's wife recently passed away, and he admits he isnt sleeping the best, but he does hae a good support system and he says he is overall doing well. Finally, patient had swelling of his right upper extremity and CT scan had showed possible abscess. Patient states that the swelling and bruising has significantly improved and he states the area is no longer tender. He does wonder if this was a likely hematoma.   States he did not follow-up with Ortho and he does not think he needs to at this point as a symptoms have essentially resolved. Past Medical History:   Diagnosis Date    Atrial fibrillation ([address]     Back pain     Hernia     Hx of blood clots     lungs after prostate surgery    Hyperlipidemia     Hypertension     Osteoarthritis     Prostate cancer ([address] 2015    Prostate cancer ([address] 1/1/2015    Pulmonary emboli ([address] 11/2015    after prostate surgery    Wears glasses       Past Surgical History:   Procedure Laterality Date    [facility]  01/1961    [facility]    BACK SURGERY  [id number]    X stop    CATARACT REMOVAL Right 06/2001    Dr Kwesi Plaza Left 11/2014    Dr Irene Oglesby, 2009    Dr Michelle Paige  [id number]?     Dr Malia Newman of date    HERNIA REPAIR Left 11/22/2017    ROBOT RECURRENT LEFT INGUINAL HERNIA REPAIR performed by Ximena Hart MD at [address] [facility] [address] 1 BILATERAL Bilateral 9/5/2017    LUMBAR FACET MBB L3-4, L4-5, L5-S1 BILATERAL performed by Alecia Mason MD at [address] [facility] Left 04/09/2018    HIP INJECTION     OTHER SURGICAL HISTORY  10/7/2015    XI ROBOTIC PROSTATECTOMY    OTHER SURGICAL HISTORY Bilateral 09/05/2017    lumbar facet block L3-S1    OTHER SURGICAL HISTORY Right 1980's    nerve release right lower arm    OTHER SURGICAL HISTORY  01/30/2018    Sacroiliac joint MBB    OTHER SURGICAL HISTORY Left 02/27/2018    Sacroiliac joint medial branch block     [place] IMPLANT NEUROSTIM/ N/A 9/17/2018    THORACIC LAMINECTOMY PERMANENT SPINAL CORD STIMULATOR PADDLE PLUS BATTERY PLACEMENT performed by Jaylen Lancaster MD at [place] [address] ARTHRGRPHY&/ANES/STEROID W/IMAGE Left 1/30/2018    LEFT SI MBB performed by Alecia Mason MD at [address] [facility] [place] INJECT SI JOINT ARTHRGRPHY&/ANES/STEROID W/IMAGE Left 2018    LEFT SI MBB #2 performed by Johnie Hollingsworth MD at [address] OFFICE/OUTPT VISIT,PROCEDURE ONLY Left 2018    LEFT HIP STEROID INJECTION WITH SEDATION performed by Johnie Hollingsworth MD at [address] OFFICE/OUTPT VISIT,PROCEDURE ONLY N/A 2018    SPINAL CORD STIMULATOR IMPLANT TRIAL performed by Johnie Hollingsworth MD at [address] [facility] BIOPSY  2012    Dr Mariama Danielle BIOPSY  2015    transrectal ultrasound with biopsy(+)    PROSTATE SURGERY      TURP    PROSTATE SURGERY  2009    Biopsy -Hyperplasia    SHOULDER ARTHROPLASTY  2005    right       Family History   Problem Relation Age of Onset    Heart Disease Father     Prostate Cancer Brother     Cancer Brother     Prostate Cancer Brother     Cancer Brother     Stroke Mother     Cancer Sister     Prostate Cancer Brother     Prostate Cancer Brother        Social History     Tobacco Use    Smoking status: Former Smoker     Packs/day: 1.00     Years: 10.00     Pack years: 10.00     Types: Cigarettes     Last attempt to quit: 7/3/1965     Years since quittin.1    Smokeless tobacco: Never Used   Substance Use Topics    Alcohol use: Yes     Alcohol/week: 0.0 standard drinks     Comment: SOCIALLY wine a few times a year       Current Outpatient Medications   Medication Sig Dispense Refill    lidocaine (LIDODERM) 5 % Place 3 patches onto the skin every 24 hours 12 hours on, 12 hours off. 90 patch 0    LORazepam (ATIVAN) 0.5 MG tablet Take 1 tablet by mouth every 8 hours as needed for Anxiety for up to 30 days. 30 tablet 0    hydrochlorothiazide (HYDRODIURIL) 12.5 MG tablet TAKE ONE TABLET BY MOUTH ONCE A DAY 30 tablet 0    metoprolol tartrate (LOPRESSOR) 25 MG tablet Take 0.5 tablets by mouth 2 times daily 45 tablet 3    clotrimazole-betamethasone (LOTRISONE) 1-0.05 % cream Apply topically 2 times daily.  PRN 45 g 1    apixaban (ELIQUIS) 5 MG TABS tablet Take 1 tablet by mouth 2 times daily 180 tablet 2    amLODIPine (NORVASC) 10 MG tablet Take 0.5 tablets by mouth daily 30 tablet 2    aspirin 81 MG tablet Take 81 mg by mouth daily      HYDROcodone-acetaminophen (NORCO) 5-325 MG per tablet Take 1 tablet by mouth every 6 hours as needed for Pain. Jonathan Monroy Multiple Vitamins-Minerals (PRESERVISION AREDS 2 PO) Take 1 tablet by mouth 2 times daily       No current facility-administered medications for this visit. Allergies   Allergen Reactions    Nitrofuran Derivatives Nausea Only     Severe stomach cramps    Nsaids Nausea Only and Other (See Comments)     Pain in stomach       Health Maintenance   Topic Date Due    DTaP/Tdap/Td vaccine (1 - Tdap) 03/26/1953    Shingles Vaccine (2 of 3) 07/19/2017    Potassium monitoring  09/23/2019    Flu vaccine (1) 09/01/2020    PSA counseling  10/24/2020    Annual Wellness Visit (AWV)  02/10/2021    Creatinine monitoring  07/07/2021    Pneumococcal 65+ years Vaccine  Completed    Hepatitis A vaccine  Aged Out    Hepatitis B vaccine  Aged Out    Hib vaccine  Aged Out    Meningococcal (ACWY) vaccine  Aged Out       Subjective:      Review of Systems   Constitutional: Positive for activity change and fatigue. Negative for appetite change, chills and fever. HENT: Negative for congestion, hearing loss, postnasal drip, rhinorrhea, sinus pressure, sore throat and tinnitus. Respiratory: Negative for apnea, cough, shortness of breath and wheezing. Cardiovascular: Negative for chest pain and palpitations. Gastrointestinal: Negative for abdominal pain, blood in stool, constipation, diarrhea, nausea and vomiting. Endocrine: Negative for polyuria. Genitourinary: Positive for frequency (at night, but does drink a lot of water throughout the day). Negative for difficulty urinating, dysuria, hematuria and urgency. Musculoskeletal: Positive for arthralgias, gait problem and myalgias.  Negative for back pain, joint swelling and neck stiffness. Skin: Negative for color change and rash. Neurological: Negative for dizziness, light-headedness and headaches. Hematological: Bruises/bleeds easily. Psychiatric/Behavioral: Positive for sleep disturbance. Negative for decreased concentration. The patient is not nervous/anxious. Objective:     Physical Exam  Vitals signs and nursing note reviewed. Constitutional:       Appearance: Normal appearance. He is well-developed. HENT:      Head: Normocephalic and atraumatic. Eyes:      General: No scleral icterus. Right eye: No discharge. Left eye: No discharge. Conjunctiva/sclera: Conjunctivae normal.   Neck:      Musculoskeletal: Normal range of motion. Cardiovascular:      Rate and Rhythm: Normal rate and regular rhythm. Heart sounds: Normal heart sounds. Pulmonary:      Effort: Pulmonary effort is normal.      Breath sounds: Normal breath sounds. No wheezing. Abdominal:      General: Bowel sounds are normal. There is no distension. Palpations: Abdomen is soft. Tenderness: There is no abdominal tenderness. Musculoskeletal:      Lumbar back: He exhibits tenderness, bony tenderness, deformity and pain. Lymphadenopathy:      Cervical: No cervical adenopathy. Skin:     General: Skin is warm and dry. Findings: Bruising present. Neurological:      General: No focal deficit present. Mental Status: He is alert and oriented to person, place, and time. Sensory: No sensory deficit. Coordination: Coordination normal.      Gait: Gait abnormal.   Psychiatric:         Behavior: Behavior normal.         Thought Content:  Thought content normal.         Judgment: Judgment normal.       /68 (Site: Left Upper Arm, Position: Sitting, Cuff Size: Medium Adult)   Pulse 84   Temp 97.4 °F (36.3 °C) (Temporal)   Resp 12   Wt 144 lb 9.6 oz (65.6 kg)   SpO2 98%   BMI 20.17 kg/m²     Assessment: Diagnosis Orders   1. Chronic midline low back pain without sciatica  lidocaine (LIDODERM) 5 %    External Referral To Orthopedic Surgery   2. Essential hypertension  Comprehensive Metabolic Panel    CBC Auto Differential   3. Hyperlipidemia with target LDL less than 130  Lipid Panel    Comprehensive Metabolic Panel    CBC Auto Differential   4. Scoliosis of thoracolumbar spine, unspecified scoliosis type  External Referral To Orthopedic Surgery       Plan: Will try lidoderm patches for pain relief  Refer to ortho  Fasting labs prior to next visit  Cont meds  Discussed use, benefit, and side effects of prescribed medications. Barriers tomedication compliance addressed. All patient questions answered. Pt voiced understanding. No follow-ups on file. Orders Placed This Encounter   Procedures    Lipid Panel     Standing Status:   Future     Standing Expiration Date:   8/4/2021     Order Specific Question:   Is Patient Fasting?/# of Hours     Answer:   requires 12 hour fast    Comprehensive Metabolic Panel     Standing Status:   Future     Standing Expiration Date:   8/4/2021    CBC Auto Differential     Standing Status:   Future     Standing Expiration Date:   8/4/2021    External Referral To Orthopedic Surgery     Referral Priority:   Routine     Referral Type:   Eval and Treat     Referral Reason:   Specialty Services Required     Requested Specialty:   Orthopedic Surgery     Number of Visits Requested:   1     Orders Placed This Encounter   Medications    lidocaine (LIDODERM) 5 %     Sig: Place 3 patches onto the skin every 24 hours 12 hours on, 12 hours off. Dispense:  90 patch     Refill:  0        Reccommended tobacco cessation options including pharmacologicmethods, counseled great than 3 minutes during this visit:  Yes  []  No  []     Patient given educational materials - see patient instructions. Discussed use, benefit, and sideeffects of prescribed medications.   All patient questions answered. Pt voiced understanding. Reviewed health maintenance. Instructed to continue current medications, diet and exercise. Patient agreed with treatment plan. Follow up as directed.      Electronically signed by Viral Mcnamara MD on 8/4/2020 at 1:19 PM

## 2020-08-10 ENCOUNTER — TELEPHONE (OUTPATIENT)
Dept: FAMILY MEDICINE CLINIC | Age: 85
End: 2020-08-10

## 2020-08-13 NOTE — TELEPHONE ENCOUNTER
Bran Cardoza called requesting a refill on the following medications:  Requested Prescriptions     Pending Prescriptions Disp Refills    metoprolol tartrate (LOPRESSOR) 25 MG tablet 45 tablet 3     Sig: Take 0.5 tablets by mouth 2 times daily     Pharmacy verified: [facility]   . pv      Date of last visit: 7/13/2020  Date of next visit (if applicable): 2/31/1309

## 2020-08-18 ENCOUNTER — TELEPHONE (OUTPATIENT)
Dept: CARDIOLOGY CLINIC | Age: 85
End: 2020-08-18

## 2020-08-18 NOTE — TELEPHONE ENCOUNTER
Pt lmovm asking if he can hold his thinners for a dental procedure with  Dr. Seth Kelley office on the 8/27/2020.

## 2020-09-21 RX ORDER — HYDROCHLOROTHIAZIDE 12.5 MG/1
TABLET ORAL
Qty: 30 TABLET | Refills: 0 | Status: SHIPPED | OUTPATIENT
Start: 2020-09-21 | End: 2020-09-21

## 2020-09-21 RX ORDER — HYDROCHLOROTHIAZIDE 12.5 MG/1
TABLET ORAL
Qty: 30 TABLET | Refills: 5 | Status: SHIPPED | OUTPATIENT
Start: 2020-09-21 | End: 2021-01-18

## 2020-09-21 RX ORDER — CLOTRIMAZOLE AND BETAMETHASONE DIPROPIONATE 10; .64 MG/G; MG/G
CREAM TOPICAL
Qty: 45 G | Refills: 1 | Status: SHIPPED | OUTPATIENT
Start: 2020-09-21 | End: 2021-03-16 | Stop reason: SDUPTHER

## 2020-09-21 NOTE — TELEPHONE ENCOUNTER
Patient called requesting a refill of his Hydrodiuril as well as his lotrisone cream. Patient's RX's are both pending to [facility].     Last appointment this department: 8/4/2020  Next appointment this department: 10/13/2020

## 2020-09-28 ENCOUNTER — OFFICE VISIT (OUTPATIENT)
Dept: FAMILY MEDICINE CLINIC | Age: 85
End: 2020-09-28
Payer: MEDICARE

## 2020-09-28 ENCOUNTER — HOSPITAL ENCOUNTER (OUTPATIENT)
Age: 85
Discharge: HOME | End: 2020-09-28
Payer: MEDICARE

## 2020-09-28 ENCOUNTER — HOSPITAL ENCOUNTER (OUTPATIENT)
Dept: GENERAL RADIOLOGY | Age: 85
Discharge: HOME | End: 2020-09-28
Payer: MEDICARE

## 2020-09-28 VITALS
OXYGEN SATURATION: 98 % | HEART RATE: 76 BPM | WEIGHT: 146.8 LBS | RESPIRATION RATE: 20 BRPM | BODY MASS INDEX: 20.47 KG/M2 | SYSTOLIC BLOOD PRESSURE: 112 MMHG | DIASTOLIC BLOOD PRESSURE: 70 MMHG | TEMPERATURE: 97.9 F

## 2020-09-28 LAB
BILIRUBIN, POC: NEGATIVE
BLOOD URINE, POC: NORMAL
CLARITY, POC: CLEAR
COLOR, POC: YELLOW
GLUCOSE URINE, POC: NEGATIVE
KETONES, POC: NEGATIVE
LEUKOCYTE EST, POC: NEGATIVE
NITRITE, POC: NEGATIVE
PH, POC: 5.5
PROTEIN, POC: NEGATIVE
SPECIFIC GRAVITY, POC: 1.02
UROBILINOGEN, POC: 0.2

## 2020-09-28 PROCEDURE — G8427 DOCREV CUR MEDS BY ELIG CLIN: HCPCS | Performed by: NURSE PRACTITIONER

## 2020-09-28 PROCEDURE — 81003 URINALYSIS AUTO W/O SCOPE: CPT | Performed by: NURSE PRACTITIONER

## 2020-09-28 PROCEDURE — 4040F PNEUMOC VAC/ADMIN/RCVD: CPT | Performed by: NURSE PRACTITIONER

## 2020-09-28 PROCEDURE — 99213 OFFICE O/P EST LOW 20 MIN: CPT | Performed by: NURSE PRACTITIONER

## 2020-09-28 PROCEDURE — 74018 RADEX ABDOMEN 1 VIEW: CPT

## 2020-09-28 PROCEDURE — 1036F TOBACCO NON-USER: CPT | Performed by: NURSE PRACTITIONER

## 2020-09-28 PROCEDURE — 1123F ACP DISCUSS/DSCN MKR DOCD: CPT | Performed by: NURSE PRACTITIONER

## 2020-09-28 PROCEDURE — G8420 CALC BMI NORM PARAMETERS: HCPCS | Performed by: NURSE PRACTITIONER

## 2020-09-28 NOTE — PROGRESS NOTES
[address] [place] [address] DR. [place] [address]  Dept: [phone]  Dept Fax: [phone]  Loc: [phone]    Nick Avalos is a 80 y.o. male who presents today for his medical conditions/complaints as noted below. Chief Complaint   Patient presents with    Diarrhea     started being constipated then took miralax 1 dose on thursday and friday evening started with diarrhea    Other     no appetite, havent ate much           HPI:     HPI    Diarrhea started 3 days ago. Prior to that was a little constipated. Took some miralax and the next day is when the diarrhea started. Took some imodium but no help. Does have some cramping of abdomen when has to have diarrhea. No appetite. Lots of gas. No fevers. Past Medical History:   Diagnosis Date    Atrial fibrillation ([address]     Back pain     Hernia     Hx of blood clots     lungs after prostate surgery    Hyperlipidemia     Hypertension     Osteoarthritis     Prostate cancer ([address] 2015    Prostate cancer ([address] 1/1/2015    Pulmonary emboli ([address] 11/2015    after prostate surgery    Wears glasses       Past Surgical History:   Procedure Laterality Date    [facility]    BACK SURGERY  [id number]    X stop    CATARACT REMOVAL Right 06/2001    Dr Viky Chi Left 11/2014    Dr Ayanna Car, 2009    Dr Aliene Lundborg  [address]?     Dr Dinah Cavazos of date    HERNIA REPAIR Left 11/22/2017    ROBOT RECURRENT LEFT INGUINAL HERNIA REPAIR performed by Elizabeth Jaimes MD at [address] BILATERAL Bilateral 9/5/2017    LUMBAR FACET MBB L3-4, L4-5, L5-S1 BILATERAL performed by Alisa Palacios MD at [address] SURGERY Left 04/09/2018    HIP INJECTION     OTHER SURGICAL HISTORY  10/7/2015 sinus pressure and sore throat. Eyes: Negative for visual disturbance. Respiratory: Negative for cough, chest tightness, shortness of breath, wheezing and stridor. Cardiovascular: Negative for chest pain, palpitations and leg swelling. Gastrointestinal: Positive for abdominal pain and diarrhea. Negative for abdominal distention, constipation, nausea and vomiting. Endocrine: Negative for polydipsia, polyphagia and polyuria. Genitourinary: Negative for decreased urine volume, difficulty urinating, dysuria, flank pain, frequency, hematuria and urgency. Musculoskeletal: Negative for arthralgias, back pain, gait problem, joint swelling, myalgias and neck pain. Skin: Negative for color change, pallor and rash. Neurological: Negative for dizziness, syncope, weakness, light-headedness, numbness and headaches. Hematological: Negative for adenopathy. Psychiatric/Behavioral: Negative for behavioral problems, self-injury and sleep disturbance. The patient is not nervous/anxious. Objective:     Physical Exam  Vitals signs and nursing note reviewed. Constitutional:       Appearance: Normal appearance. He is well-developed. HENT:      Head: Normocephalic. Right Ear: Tympanic membrane and external ear normal.      Left Ear: Tympanic membrane and external ear normal.      Nose: Nose normal.      Right Sinus: No maxillary sinus tenderness. Left Sinus: No maxillary sinus tenderness. Mouth/Throat:      Pharynx: Uvula midline. Neck:      Musculoskeletal: Normal range of motion and neck supple. Thyroid: No thyromegaly. Vascular: No carotid bruit or JVD. Trachea: Trachea normal.   Cardiovascular:      Rate and Rhythm: Normal rate and regular rhythm. Heart sounds: Normal heart sounds. No murmur. Pulmonary:      Effort: Pulmonary effort is normal. No respiratory distress. Breath sounds: Normal breath sounds. No decreased breath sounds, wheezing, rhonchi or rales. Chest:      Chest wall: No tenderness. Abdominal:      General: Bowel sounds are normal. There is no distension. Palpations: Abdomen is soft. There is no mass. Tenderness: There is no abdominal tenderness. Musculoskeletal: Normal range of motion. General: No tenderness or deformity. Lymphadenopathy:      Cervical: No cervical adenopathy. Skin:     General: Skin is warm and dry. Findings: No erythema or rash. Neurological:      Mental Status: He is alert and oriented to person, place, and time. Cranial Nerves: No cranial nerve deficit. Sensory: No sensory deficit. Motor: No abnormal muscle tone. Coordination: Coordination normal.      Gait: Gait normal.   Psychiatric:         Behavior: Behavior normal.         Thought Content: Thought content normal.         Judgment: Judgment normal.       /70 (Site: Left Upper Arm, Position: Sitting, Cuff Size: Medium Adult)   Pulse 76   Temp 97.9 °F (36.6 °C) (Temporal)   Resp 20   Wt 146 lb 12.8 oz (66.6 kg)   SpO2 98%   BMI 20.47 kg/m²     Assessment:       Diagnosis Orders   1. Viral gastroenteritis     2. Generalized abdominal pain  XR ABDOMEN (KUB) (SINGLE AP VIEW)    GI Bacterial Pathogens By PCR    POCT Urinalysis No Micro (Auto)   3. Diarrhea of presumed infectious origin  GI Bacterial Pathogens By PCR   4. Diarrhea of infectious origin         Plan:     Diarrhea likely from viral gastro  Ok for antidiarrheal  Obtain stool study  Urine normal  Obtain xray    Discussed use, benefit, and side effects of prescribed medications. Barriers tomedication compliance addressed. All patient questions answered. Pt voiced understanding. Return if symptoms worsen or fail to improve.     Orders Placed This Encounter   Procedures    GI Bacterial Pathogens By PCR     Standing Status:   Future     Standing Expiration Date:   9/28/2021    XR ABDOMEN (KUB) (SINGLE AP VIEW)     Standing Status:   Future     Number of Occurrences:   1     Standing Expiration Date:   9/28/2021    POCT Urinalysis No Micro (Auto)     No orders of the defined types were placed in this encounter. Reccommended tobacco cessation options including pharmacologicmethods, counseled great than 3 minutes during this visit:  Yes  []  No  []     Patient given educational materials - see patient instructions. Discussed use, benefit, and sideeffects of prescribed medications. All patient questions answered. Pt voiced understanding. Reviewed health maintenance. Instructed to continue current medications, diet and exercise. Patient agreed with treatment plan. Follow up as directed.      Electronically signed by SONIDO Mann CNP on 9/29/2020 at 8:16 AM

## 2020-09-29 ASSESSMENT — ENCOUNTER SYMPTOMS
NAUSEA: 0
SINUS PRESSURE: 0
BACK PAIN: 0
ABDOMINAL PAIN: 1
COLOR CHANGE: 0
STRIDOR: 0
SORE THROAT: 0
CONSTIPATION: 0
CHEST TIGHTNESS: 0
DIARRHEA: 1
ABDOMINAL DISTENTION: 0
SHORTNESS OF BREATH: 0
VOMITING: 0
WHEEZING: 0
COUGH: 0

## 2020-10-05 ENCOUNTER — NURSE ONLY (OUTPATIENT)
Dept: FAMILY MEDICINE CLINIC | Age: 85
End: 2020-10-05
Payer: MEDICARE

## 2020-10-05 LAB
ALBUMIN SERPL-MCNC: 4.5 G/DL (ref 3.5–5.1)
ALP BLD-CCNC: 60 U/L (ref 38–126)
ALT SERPL-CCNC: 15 U/L (ref 11–66)
ANION GAP SERPL CALCULATED.3IONS-SCNC: 12 MEQ/L (ref 8–16)
AST SERPL-CCNC: 22 U/L (ref 5–40)
BASOPHILS # BLD: 0.8 %
BASOPHILS ABSOLUTE: 0.1 THOU/MM3 (ref 0–0.1)
BILIRUB SERPL-MCNC: 0.7 MG/DL (ref 0.3–1.2)
BUN BLDV-MCNC: 26 MG/DL (ref 7–22)
CALCIUM SERPL-MCNC: 9.8 MG/DL (ref 8.5–10.5)
CHLORIDE BLD-SCNC: 101 MEQ/L (ref 98–111)
CHOLESTEROL, TOTAL: 191 MG/DL (ref 100–199)
CO2: 26 MEQ/L (ref 23–33)
CREAT SERPL-MCNC: 1.3 MG/DL (ref 0.4–1.2)
EOSINOPHIL # BLD: 3.8 %
EOSINOPHILS ABSOLUTE: 0.4 THOU/MM3 (ref 0–0.4)
ERYTHROCYTE [DISTWIDTH] IN BLOOD BY AUTOMATED COUNT: 13.1 % (ref 11.5–14.5)
ERYTHROCYTE [DISTWIDTH] IN BLOOD BY AUTOMATED COUNT: 48.6 FL (ref 35–45)
GFR SERPL CREATININE-BSD FRML MDRD: 52 ML/MIN/1.73M2
GLUCOSE BLD-MCNC: 110 MG/DL (ref 70–108)
HCT VFR BLD CALC: 47.1 % (ref 42–52)
HDLC SERPL-MCNC: 73 MG/DL
HEMOGLOBIN: 15.1 GM/DL (ref 14–18)
IMMATURE GRANS (ABS): 0.04 THOU/MM3 (ref 0–0.07)
IMMATURE GRANULOCYTES: 0.4 %
LDL CHOLESTEROL CALCULATED: 106 MG/DL
LYMPHOCYTES # BLD: 17.6 %
LYMPHOCYTES ABSOLUTE: 1.9 THOU/MM3 (ref 1–4.8)
MCH RBC QN AUTO: 32.5 PG (ref 26–33)
MCHC RBC AUTO-ENTMCNC: 32.1 GM/DL (ref 32.2–35.5)
MCV RBC AUTO: 101.3 FL (ref 80–94)
MONOCYTES # BLD: 8.3 %
MONOCYTES ABSOLUTE: 0.9 THOU/MM3 (ref 0.4–1.3)
NUCLEATED RED BLOOD CELLS: 0 /100 WBC
PLATELET # BLD: 165 THOU/MM3 (ref 130–400)
PMV BLD AUTO: 12.8 FL (ref 9.4–12.4)
POTASSIUM SERPL-SCNC: 4.1 MEQ/L (ref 3.5–5.2)
RBC # BLD: 4.65 MILL/MM3 (ref 4.7–6.1)
SEG NEUTROPHILS: 69.1 %
SEGMENTED NEUTROPHILS ABSOLUTE COUNT: 7.3 THOU/MM3 (ref 1.8–7.7)
SODIUM BLD-SCNC: 139 MEQ/L (ref 135–145)
TOTAL PROTEIN: 7.4 G/DL (ref 6.1–8)
TRIGL SERPL-MCNC: 62 MG/DL (ref 0–199)
WBC # BLD: 10.6 THOU/MM3 (ref 4.8–10.8)

## 2020-10-05 PROCEDURE — 36415 COLL VENOUS BLD VENIPUNCTURE: CPT | Performed by: FAMILY MEDICINE

## 2020-10-08 ENCOUNTER — TELEPHONE (OUTPATIENT)
Dept: FAMILY MEDICINE CLINIC | Age: 85
End: 2020-10-08

## 2020-10-08 NOTE — TELEPHONE ENCOUNTER
Pt called stating that he has been using Miralax for the last week to help his constipation per JR without much movement of his bowels.  Please advise    Call pt at [phone]

## 2020-10-08 NOTE — TELEPHONE ENCOUNTER
Pt can stop in and 3 samples of linzess 145 mcg to help his bowels get going. He needs to take as soon as he gets up 30-60 prior to eating. Samples place up with Alana.

## 2020-10-13 ENCOUNTER — OFFICE VISIT (OUTPATIENT)
Dept: FAMILY MEDICINE CLINIC | Age: 85
End: 2020-10-13
Payer: MEDICARE

## 2020-10-13 VITALS
HEART RATE: 66 BPM | TEMPERATURE: 97.1 F | WEIGHT: 147.8 LBS | DIASTOLIC BLOOD PRESSURE: 66 MMHG | OXYGEN SATURATION: 97 % | RESPIRATION RATE: 18 BRPM | SYSTOLIC BLOOD PRESSURE: 128 MMHG | BODY MASS INDEX: 20.61 KG/M2

## 2020-10-13 PROCEDURE — G8420 CALC BMI NORM PARAMETERS: HCPCS | Performed by: FAMILY MEDICINE

## 2020-10-13 PROCEDURE — 1036F TOBACCO NON-USER: CPT | Performed by: FAMILY MEDICINE

## 2020-10-13 PROCEDURE — 1123F ACP DISCUSS/DSCN MKR DOCD: CPT | Performed by: FAMILY MEDICINE

## 2020-10-13 PROCEDURE — 99214 OFFICE O/P EST MOD 30 MIN: CPT | Performed by: FAMILY MEDICINE

## 2020-10-13 PROCEDURE — G8484 FLU IMMUNIZE NO ADMIN: HCPCS | Performed by: FAMILY MEDICINE

## 2020-10-13 PROCEDURE — G8427 DOCREV CUR MEDS BY ELIG CLIN: HCPCS | Performed by: FAMILY MEDICINE

## 2020-10-13 PROCEDURE — 4040F PNEUMOC VAC/ADMIN/RCVD: CPT | Performed by: FAMILY MEDICINE

## 2020-10-13 ASSESSMENT — ENCOUNTER SYMPTOMS
NAUSEA: 0
SORE THROAT: 0
WHEEZING: 0
BACK PAIN: 1
RHINORRHEA: 0
SHORTNESS OF BREATH: 0
CONSTIPATION: 1
DIARRHEA: 0
ABDOMINAL PAIN: 0
COUGH: 0

## 2020-10-13 NOTE — PROGRESS NOTES
[address] [place] [address]  Dept: [phone]  Dept Fax: [phone]  Loc: [phone]    Rupesh He is a 80 y.o. male who presents today for his medical conditions/complaints as noted below. Chief Complaint   Patient presents with    3 Month Follow-Up    Discuss Labs           HPI:     Patient presents for 3-month follow-up of chronic medical conditions including hypertension, hyperlipidemia, chronic back pain. He does see the [place] who gives him Norco for his back pain. He states that he has been asked to not take more than 2 tablets/day and he tries to keep it to about 1-1/2 tablets. He does however mention that his back is often very achy at night and he wakes up multiple times in the night uncomfortable. He has to try to walk around to relieve his pain. He does state that he gets constipation related to his Norco which is part of the reason why he does not want to take more. MiraLAX helps with constipation and wonders if it is okay to continually take this. Blood pressures been well controlled and he tolerates his medications well. Recently had labs which were essentially within normal limits. His creatinine was elevated slightly at 1.3 but this is his baseline and has remained stable for quite some time. His wife did just recently passed away but he denies any issues with depression or anxiety. Mentions that he has a good support system with his children. Otherwise no complaints today.     Lab Results       Component                Value               Date                       WBC                      10.6                10/05/2020                 HGB                      15.1                10/05/2020                 HCT                      47.1                10/05/2020                 PLT                      165                 10/05/2020                 CHOL 191                 10/05/2020                 TRIG                     62                  10/05/2020                 HDL                      73                  10/05/2020                 ALT                      15                  10/05/2020                 AST                      22                  10/05/2020                 NA                       139                 10/05/2020                 K                        4.1                 10/05/2020                 CL                       101                 10/05/2020                 CREATININE               1.3 (H)             10/05/2020                 BUN                      26 (H)              10/05/2020                 CO2                      26                  10/05/2020                               Past Medical History:   Diagnosis Date    Atrial fibrillation (HCC)     Back pain     Hernia     Hx of blood clots     lungs after prostate surgery    Hyperlipidemia     Hypertension     Osteoarthritis     Prostate cancer ([address] 2015    Prostate cancer ([address] 1/1/2015    Pulmonary emboli ([address] 11/2015    after prostate surgery    Wears glasses       Past Surgical History:   Procedure Laterality Date    [facility]  01/[facility]    BACK SURGERY  0438,7000    X stop    CATARACT REMOVAL Right 06/2001    Dr Ronnell Guido Left 11/2014    Dr Valera Frankel, 2009    Dr Aleksandra Westbrook  [address]?     Dr Josiane Gilbert of date    HERNIA REPAIR Left 11/22/2017    ROBOT RECURRENT LEFT INGUINAL HERNIA REPAIR performed by Lila Priest MD at [address] [place] [address] Place 1 BILATERAL Bilateral 9/5/2017    LUMBAR FACET MBB L3-4, L4-5, L5-S1 BILATERAL performed by Samantha Sanches MD at [place] [address] Left 04/09/2018    HIP INJECTION     OTHER SURGICAL HISTORY  10/7/2015    XI ROBOTIC PROSTATECTOMY    OTHER SURGICAL HISTORY Bilateral 2017    lumbar facet block L3-S1    OTHER SURGICAL HISTORY Right 1980's    nerve release right lower arm    OTHER SURGICAL HISTORY  2018    Sacroiliac joint MBB    OTHER SURGICAL HISTORY Left 2018    Sacroiliac joint medial branch block     NM IMPLANT NEUROSTIM/ N/A 2018    THORACIC LAMINECTOMY PERMANENT SPINAL CORD STIMULATOR PADDLE PLUS BATTERY PLACEMENT performed by Dee King MD at [facility] ARTHRGRPHY&/ANES/STEROID W/IMAGE Left 2018    LEFT SI MBB performed by Omayra Carranza MD at [address] INJECT SI JOINT ARTHRGRPHY&/ANES/STEROID W/IMAGE Left 2018    LEFT SI MBB #2 performed by Omayra Carranza MD at [address] OFFICE/OUTPT VISIT,PROCEDURE ONLY Left 2018    LEFT HIP STEROID INJECTION WITH SEDATION performed by Omayra Carranza MD at [address] OFFICE/OUTPT VISIT,PROCEDURE ONLY N/A 2018    SPINAL CORD STIMULATOR IMPLANT TRIAL performed by Omayra Carranza MD at [address] [facility] BIOPSY  2012    Dr Chuckie Epps BIOPSY  2015    transrectal ultrasound with biopsy(+)    PROSTATE SURGERY      TURP    PROSTATE SURGERY  2009    Biopsy -Hyperplasia    SHOULDER ARTHROPLASTY  2005    right       Family History   Problem Relation Age of Onset    Heart Disease Father     Prostate Cancer Brother     Cancer Brother     Prostate Cancer Brother     Cancer Brother     Stroke Mother     Cancer Sister     Prostate Cancer Brother     Prostate Cancer Brother        Social History     Tobacco Use    Smoking status: Former Smoker     Packs/day: 1.00     Years: 10.00     Pack years: 10.00     Types: Cigarettes     Last attempt to quit: 7/3/1965     Years since quittin.3    Smokeless tobacco: Never Used   Substance Use Topics    Alcohol use:  Yes     Alcohol/week: 0.0 standard drinks     Comment: SOCIALLY wine a few times a year       Current Outpatient Medications   Medication Sig Dispense Refill    clotrimazole-betamethasone (LOTRISONE) 1-0.05 % cream Apply topically 2 times daily. PRN 45 g 1    hydroCHLOROthiazide (HYDRODIURIL) 12.5 MG tablet TAKE ONE TABLET BY MOUTH ONCE A DAY 30 tablet 5    metoprolol tartrate (LOPRESSOR) 25 MG tablet Take 0.5 tablets by mouth 2 times daily 90 tablet 3    lidocaine (LIDODERM) 5 % Place 3 patches onto the skin every 24 hours 12 hours on, 12 hours off. 90 patch 0    apixaban (ELIQUIS) 5 MG TABS tablet Take 1 tablet by mouth 2 times daily 180 tablet 2    amLODIPine (NORVASC) 10 MG tablet Take 0.5 tablets by mouth daily 30 tablet 2    aspirin 81 MG tablet Take 81 mg by mouth daily      HYDROcodone-acetaminophen (NORCO) 5-325 MG per tablet Take 1 tablet by mouth every 6 hours as needed for Pain. Candy Bare Multiple Vitamins-Minerals (PRESERVISION AREDS 2 PO) Take 1 tablet by mouth 2 times daily       No current facility-administered medications for this visit. Allergies   Allergen Reactions    Nitrofuran Derivatives Nausea Only     Severe stomach cramps    Nsaids Nausea Only and Other (See Comments)     Pain in stomach       Health Maintenance   Topic Date Due    DTaP/Tdap/Td vaccine (1 - Tdap) 03/26/1953    Shingles Vaccine (2 of 3) 07/19/2017    PSA counseling  10/24/2020    Flu vaccine (1) 10/13/2021 (Originally 9/1/2020)    Annual Wellness Visit (AWV)  02/10/2021    Potassium monitoring  10/05/2021    Creatinine monitoring  10/05/2021    Pneumococcal 65+ years Vaccine  Completed    Hepatitis A vaccine  Aged Out    Hepatitis B vaccine  Aged Out    Hib vaccine  Aged Out    Meningococcal (ACWY) vaccine  Aged Out       Subjective:      Review of Systems   Constitutional: Negative for chills, fatigue and fever. HENT: Negative for congestion, rhinorrhea and sore throat. Respiratory: Negative for cough, shortness of breath and wheezing.     Cardiovascular: Negative for chest pain and palpitations. Gastrointestinal: Positive for constipation. Negative for abdominal pain, diarrhea and nausea. Genitourinary: Negative for dysuria and hematuria. Musculoskeletal: Positive for arthralgias, back pain and gait problem. Negative for myalgias. Neurological: Negative for dizziness and headaches. Psychiatric/Behavioral: Positive for sleep disturbance (2/2 back pain). The patient is not nervous/anxious. Objective:     Physical Exam  Vitals signs and nursing note reviewed. Constitutional:       Appearance: He is well-developed. HENT:      Head: Normocephalic and atraumatic. Eyes:      General: No scleral icterus. Right eye: No discharge. Left eye: No discharge. Conjunctiva/sclera: Conjunctivae normal.   Neck:      Musculoskeletal: Normal range of motion. Cardiovascular:      Rate and Rhythm: Normal rate and regular rhythm. Heart sounds: Normal heart sounds. Pulmonary:      Effort: Pulmonary effort is normal.      Breath sounds: Normal breath sounds. No wheezing. Abdominal:      General: Bowel sounds are normal. There is no distension. Palpations: Abdomen is soft. Tenderness: There is no abdominal tenderness. Musculoskeletal:         General: No tenderness. Lumbar back: He exhibits decreased range of motion and deformity. Lymphadenopathy:      Cervical: No cervical adenopathy. Skin:     General: Skin is warm and dry. Neurological:      Mental Status: He is alert and oriented to person, place, and time. Coordination: Coordination normal.   Psychiatric:         Behavior: Behavior normal.         Thought Content:  Thought content normal.         Judgment: Judgment normal.       /66 (Site: Left Upper Arm, Position: Sitting, Cuff Size: Medium Adult)   Pulse 66   Temp 97.1 °F (36.2 °C) (Temporal)   Resp 18   Wt 147 lb 12.8 oz (67 kg)   SpO2 97%   BMI 20.61 kg/m²     Assessment:       Diagnosis Orders   1. Essential hypertension     2. Hyperlipidemia with target LDL less than 130     3. Chronic midline low back pain without sciatica     4. At high risk for falls         Plan:     Chronic conditions well controlled. Cont meds. Follow up in 6 months or prn    Discussed use, benefit, and side effects of prescribed medications. Barriers tomedication compliance addressed. All patient questions answered. Pt voiced understanding. Return in about 6 months (around 4/13/2021) for follow up. No orders of the defined types were placed in this encounter. No orders of the defined types were placed in this encounter. Reccommended tobacco cessation options including pharmacologicmethods, counseled great than 3 minutes during this visit:  Yes  []  No  []     Patient given educational materials - see patient instructions. Discussed use, benefit, and sideeffects of prescribed medications. All patient questions answered. Pt voiced understanding. Reviewed health maintenance. Instructed to continue current medications, diet and exercise. Patient agreed with treatment plan. Follow up as directed.      Electronically signed by Juliana Barillas MD on 10/13/2020 at 9:55 AM

## 2020-10-13 NOTE — PATIENT INSTRUCTIONS
Patient Education        Preventing Falls: Care Instructions  Your Care Instructions     Getting around your home safely can be a challenge if you have injuries or health problems that make it easy for you to fall. Loose rugs and furniture in walkways are among the dangers for many older people who have problems walking or who have poor eyesight. People who have conditions such as arthritis, osteoporosis, or dementia also have to be careful not to fall. You can make your home safer with a few simple measures. Follow-up care is a key part of your treatment and safety. Be sure to make and go to all appointments, and call your doctor if you are having problems. It's also a good idea to know your test results and keep a list of the medicines you take. How can you care for yourself at home? Taking care of yourself  · You may get dizzy if you do not drink enough water. To prevent dehydration, drink plenty of fluids, enough so that your urine is light yellow or clear like water. Choose water and other caffeine-free clear liquids. If you have kidney, heart, or liver disease and have to limit fluids, talk with your doctor before you increase the amount of fluids you drink. · Exercise regularly to improve your strength, muscle tone, and balance. Walk if you can. Swimming may be a good choice if you cannot walk easily. · Have your vision and hearing checked each year or any time you notice a change. If you have trouble seeing and hearing, you might not be able to avoid objects and could lose your balance. · Know the side effects of the medicines you take. Ask your doctor or pharmacist whether the medicines you take can affect your balance. Sleeping pills or sedatives can affect your balance. · Limit the amount of alcohol you drink. Alcohol can impair your balance and other senses. · Ask your doctor whether calluses or corns on your feet need to be removed.  If you wear loose-fitting shoes because of calluses or corns, you can lose your balance and fall. · Talk to your doctor if you have numbness in your feet. Preventing falls at home  · Remove raised doorway thresholds, throw rugs, and clutter. Repair loose carpet or raised areas in the floor. · Move furniture and electrical cords to keep them out of walking paths. · Use nonskid floor wax, and wipe up spills right away, especially on ceramic tile floors. · If you use a walker or cane, put rubber tips on it. If you use crutches, clean the bottoms of them regularly with an abrasive pad, such as steel wool. · Keep your house well lit, especially [facility], and outside walkways. Use night-lights in areas such as hallways and bathrooms. Add extra light switches or use remote switches (such as switches that go on or off when you clap your hands) to make it easier to turn lights on if you have to get up during the night. · Install sturdy handrails on stairways. · Move items in your cabinets so that the things you use a lot are on the lower shelves (about waist level). · Keep a cordless phone and a flashlight with new batteries by your bed. If possible, put a phone in each of the main rooms of your house, or carry a cell phone in case you fall and cannot reach a phone. Or, you can wear a device around your neck or wrist. You push a button that sends a signal for help. · Wear low-heeled shoes that fit well and give your feet good support. Use footwear with nonskid soles. Check the heels and soles of your shoes for wear. Repair or replace worn heels or soles. · Do not wear socks without shoes on wood floors. · Walk on the grass when the sidewalks are slippery. If you live in an area that gets snow and ice in the winter, sprinkle salt on slippery steps and sidewalks. Preventing falls in the bath  · Install grab bars and nonskid mats inside and outside your shower or tub and near the toilet and sinks. · Use shower chairs and bath benches.   · Use a hand-held shower head that will allow you to sit while showering. · Get into a tub or shower by putting the weaker leg in first. Get out of a tub or shower with your strong side first.  · Repair loose toilet seats and consider installing a raised toilet seat to make getting on and off the toilet easier. · Keep your bathroom door unlocked while you are in the shower. Where can you learn more? Go to https://[employer]. org and sign in to your [employer] account. Enter [phone] in the [employer]) box to learn more about \"Preventing Falls: Care Instructions. \"     If you do not have an account, please click on the \"Sign Up Now\" link. Current as of: April 15, 2020               Content Version: 12.6  © 4682-7505 [employer]. Care instructions adapted under license by [facility] ([facility]). If you have questions about a medical condition or this instruction, always ask your healthcare professional. [place] [address] any warranty or liability for your use of this information. Patient Education        Constipation: Care Instructions  Your Care Instructions     Constipation means that you have a hard time passing stools (bowel movements). People pass stools from 3 times a day to once every 3 days. What is normal for you may be different. Constipation may occur with pain in the rectum and cramping. The pain may get worse when you try to pass stools. Sometimes there are small amounts of bright red blood on toilet paper or the surface of stools. This is because of enlarged veins near the rectum (hemorrhoids). A few changes in your diet and lifestyle may help you avoid ongoing constipation. Your doctor may also prescribe medicine to help loosen your stool. Some medicines can cause constipation. These include pain medicines and antidepressants. Tell your doctor about all the medicines you take. Your doctor may want to make a medicine change to ease your symptoms.   Follow-up care is a key part of your treatment and safety. Be sure to make and go to all appointments, and call your doctor if you are having problems. It's also a good idea to know your test results and keep a list of the medicines you take. How can you care for yourself at home? · Drink plenty of fluids, enough so that your urine is light yellow or clear like water. If you have kidney, heart, or liver disease and have to limit fluids, talk with your doctor before you increase the amount of fluids you drink. · Include high-fiber foods in your diet each day. These include fruits, vegetables, beans, and whole grains. · Get at least 30 minutes of exercise on most days of the week. Walking is a good choice. You also may want to do other activities, such as running, swimming, cycling, or playing tennis or team sports. · Take a fiber supplement, such as Citrucel or Metamucil, every day. Read and follow all instructions on the label. · Schedule time each day for a bowel movement. A daily routine may help. Take your time having your bowel movement. · Support your feet with a small step stool when you sit on the toilet. This helps flex your hips and places your pelvis in a squatting position. · Your doctor may recommend an over-the-counter laxative to relieve your constipation. Examples are Milk of Magnesia and MiraLax. Read and follow all instructions on the label. Do not use laxatives on a long-term basis. When should you call for help? Call your doctor now or seek immediate medical care if:    · You have new or worse belly pain.     · You have new or worse nausea or vomiting.     · You have blood in your stools. Watch closely for changes in your health, and be sure to contact your doctor if:    · Your constipation is getting worse.     · You do not get better as expected. Where can you learn more? Go to https://chmeraryeb.[employer]. org and sign in to your [employer] account.  Enter 21 [phone] in the [address]

## 2020-10-26 ENCOUNTER — NURSE ONLY (OUTPATIENT)
Dept: FAMILY MEDICINE CLINIC | Age: 85
End: 2020-10-26
Payer: MEDICARE

## 2020-10-26 PROCEDURE — 36415 COLL VENOUS BLD VENIPUNCTURE: CPT | Performed by: FAMILY MEDICINE

## 2020-10-26 NOTE — PROGRESS NOTES
Patient was in for a lab draw today. He needed a PSA drawn for [employer] office. Venipuncture obtained from  left arm. Patient tolerated the procedure without complications or complaints.

## 2020-10-27 ENCOUNTER — TELEPHONE (OUTPATIENT)
Dept: UROLOGY | Age: 85
End: 2020-10-27

## 2020-10-27 LAB — PROSTATE SPECIFIC ANTIGEN: < 0.02 NG/ML (ref 0–1)

## 2020-10-27 NOTE — TELEPHONE ENCOUNTER
I called and notified the patient that his psa was undetectable. Sarah Gibbs will discuss in more detail at his ov next week. Patient voiced understanding.

## 2020-11-02 NOTE — PROGRESS NOTES
[address], [place], operated by [facility]  [address] [address]  Dept: [phone]  Loc: [phone]  Visit Date: 11/3/2020      HPI:     Lenor Cabot f/u for Prostate Cancer. He underwent Robotic Assisted Laparoscopic Radical Prostatectomy with Left Pelvic Lymph Node Dissection 10/7/15. Final pathology Haugen score 4+3 = 7, p T2c. Margins-tumor extends to cauterized anterior capsular margin (right anterior, 10 mm from apex). His PSA remains undetectable. He is doing well, still reports a little stress incontinence, not bothersome, not wearing any protection. Reports more back pain, and using cane now. Has been following orthopedic physician. He comes in today by himself. Hx is obtained from the patient and medical record. Current Outpatient Medications   Medication Sig Dispense Refill    clotrimazole-betamethasone (LOTRISONE) 1-0.05 % cream Apply topically 2 times daily. PRN 45 g 1    hydroCHLOROthiazide (HYDRODIURIL) 12.5 MG tablet TAKE ONE TABLET BY MOUTH ONCE A DAY 30 tablet 5    metoprolol tartrate (LOPRESSOR) 25 MG tablet Take 0.5 tablets by mouth 2 times daily 90 tablet 3    lidocaine (LIDODERM) 5 % Place 3 patches onto the skin every 24 hours 12 hours on, 12 hours off. 90 patch 0    apixaban (ELIQUIS) 5 MG TABS tablet Take 1 tablet by mouth 2 times daily 180 tablet 2    amLODIPine (NORVASC) 10 MG tablet Take 0.5 tablets by mouth daily 30 tablet 2    aspirin 81 MG tablet Take 81 mg by mouth daily      HYDROcodone-acetaminophen (NORCO) 5-325 MG per tablet Take 1 tablet by mouth every 6 hours as needed for Pain. Marshal Huffman Multiple Vitamins-Minerals (PRESERVISION AREDS 2 PO) Take 1 tablet by mouth 2 times daily       No current facility-administered medications for this visit.         Past Medical History  Lawrence Rico  has a past medical history of Atrial fibrillation ([address] Back pain, Hernia, Hx of blood clots, Hyperlipidemia, Hypertension, Osteoarthritis, Prostate cancer [facility]), Prostate cancer [facility]), Pulmonary emboli ([facility] [place] [address] and Wears glasses. Past Surgical History  The patient  has a past surgical history that includes back surgery (9815,5368); Total shoulder arthroplasty (2005); Prostate surgery (2003); Prostate surgery (2009); Prostate Biopsy (7-); Cataract removal with implant (Left, 11/2014); Prostate Biopsy (8/21/2015); hernia repair (1995, 2009); other surgical history (10/7/2015); other surgical history (Bilateral, 09/05/2017); Nerve Block Lumb Facet Level 1 Bilateral (Bilateral, 9/5/2017); hernia repair (8496'K?); Cataract removal (Right, 06/2001); Appendectomy (01/1961); hernia repair (Left, 11/22/2017); other surgical history (Right, 1980's); other surgical history (01/30/2018); pr inject si joint arthrgrphy&/anes/steroid w/image (Left, 1/30/2018); other surgical history (Left, 02/27/2018); pr inject si joint arthrgrphy&/anes/steroid w/image (Left, 2/27/2018); Nerve Surgery (Left, 04/09/2018); pr office/outpt visit,procedure only (Left, 4/9/2018); pr office/outpt visit,procedure only (N/A, 8/21/2018); pr implant neurostim/ (N/A, 9/17/2018); and eye surgery. Family History  This patient's family history includes Cancer in his brother, brother, and sister; Heart Disease in his father; Prostate Cancer in his brother, brother, brother, and brother; Stroke in his mother. Social History  Nba Zazueta  reports that he quit smoking about 55 years ago. His smoking use included cigarettes. He has a 10.00 pack-year smoking history. He has never used smokeless tobacco. He reports current alcohol use. He reports that he does not use drugs. Subjective:     Review of Systems  No problems with ears, nose or throat. No problems with eyes. No chest pain, shortness of breath, abdominal pain, extremity pain or weakness, and no neurological deficits. No rashes.  symptoms per HPI.   The remainder of the review of symptoms is negative. Objective:     PE:   Vitals:    11/03/20 0757   Temp: 97.1 °F (36.2 °C)   Weight: 146 lb (66.2 kg)   Height: 5' 10\" (1.778 m)       Constitutional: Alert and oriented times 3, no acute distress and cooperative to examination with appropriate mood and affect. HENT:   Head:        Normocephalic and atraumatic. Mouth/Throat:         Mucous membranes are normal.   Eyes:         EOM are normal. No scleral icterus. PERRLA. Neck:        Supple, symmetrical, trachea midline  Pulmonary/Chest:      Chest symmetric with normal A/P diameter,   Normal respiratory rate and rhthym. No use of accessory muscles. Abdominal:         Soft. No tenderness. Bowel sounds present. Musculoskeletal:         Normal range of motion. No edema or tenderness of lower extremities. Extremities: No cyanosis, clubbing, or edema present. Neurological:        Alert and oriented. No cranial nerve deficit. [place] Psychiatric:        Normal mood and affect.       Labs   Urine dip demonstrates   Results for POC orders placed in visit on 11/03/20   POCT Urinalysis no Micro   Result Value Ref Range    Color, UA      Clarity, UA      Glucose, UA POC      Bilirubin, UA      Ketones, UA      Spec Grav, UA      Blood, UA POC neg     pH, UA      Protein, UA POC      Urobilinogen, UA      Leukocytes, UA neg     Nitrite, UA neg      Results for POC orders placed in visit on 11/03/20   POCT Urinalysis no Micro   Result Value Ref Range    Color, UA      Clarity, UA      Glucose, UA POC      Bilirubin, UA      Ketones, UA      Spec Grav, UA      Blood, UA POC neg     pH, UA      Protein, UA POC      Urobilinogen, UA      Leukocytes, UA neg     Nitrite, UA neg        Patients recent PSA values are as follows  Lab Results   Component Value Date    PSA <0.02 10/26/2020    PSA <0.02 10/24/2019    PSA <0.02 10/26/2018        Recent BUN/Creatinine:  Lab Results   Component Value Date    BUN 26 10/05/2020    CREATININE 1.3 10/05/2020       Surgical

## 2020-11-03 ENCOUNTER — OFFICE VISIT (OUTPATIENT)
Dept: UROLOGY | Age: 85
End: 2020-11-03
Payer: MEDICARE

## 2020-11-03 VITALS — TEMPERATURE: 97.1 F | HEIGHT: 70 IN | WEIGHT: 146 LBS | BODY MASS INDEX: 20.9 KG/M2

## 2020-11-03 PROCEDURE — 1036F TOBACCO NON-USER: CPT | Performed by: NURSE PRACTITIONER

## 2020-11-03 PROCEDURE — G8420 CALC BMI NORM PARAMETERS: HCPCS | Performed by: NURSE PRACTITIONER

## 2020-11-03 PROCEDURE — G8427 DOCREV CUR MEDS BY ELIG CLIN: HCPCS | Performed by: NURSE PRACTITIONER

## 2020-11-03 PROCEDURE — 1123F ACP DISCUSS/DSCN MKR DOCD: CPT | Performed by: NURSE PRACTITIONER

## 2020-11-03 PROCEDURE — 81002 URINALYSIS NONAUTO W/O SCOPE: CPT | Performed by: NURSE PRACTITIONER

## 2020-11-03 PROCEDURE — 99213 OFFICE O/P EST LOW 20 MIN: CPT | Performed by: NURSE PRACTITIONER

## 2020-11-03 PROCEDURE — G8484 FLU IMMUNIZE NO ADMIN: HCPCS | Performed by: NURSE PRACTITIONER

## 2020-11-03 PROCEDURE — 4040F PNEUMOC VAC/ADMIN/RCVD: CPT | Performed by: NURSE PRACTITIONER

## 2021-01-18 ENCOUNTER — OFFICE VISIT (OUTPATIENT)
Dept: CARDIOLOGY CLINIC | Age: 86
End: 2021-01-18
Payer: MEDICARE

## 2021-01-18 VITALS
WEIGHT: 150.8 LBS | HEIGHT: 71 IN | BODY MASS INDEX: 21.11 KG/M2 | SYSTOLIC BLOOD PRESSURE: 132 MMHG | HEART RATE: 75 BPM | DIASTOLIC BLOOD PRESSURE: 74 MMHG

## 2021-01-18 DIAGNOSIS — I34.0 MODERATE MITRAL REGURGITATION: ICD-10-CM

## 2021-01-18 DIAGNOSIS — I10 ESSENTIAL HYPERTENSION: ICD-10-CM

## 2021-01-18 DIAGNOSIS — I48.0 PAF (PAROXYSMAL ATRIAL FIBRILLATION) (HCC): Primary | ICD-10-CM

## 2021-01-18 DIAGNOSIS — I71.21 ASCENDING AORTIC ANEURYSM: ICD-10-CM

## 2021-01-18 DIAGNOSIS — I35.1 MODERATE AORTIC REGURGITATION: ICD-10-CM

## 2021-01-18 PROCEDURE — 4040F PNEUMOC VAC/ADMIN/RCVD: CPT | Performed by: INTERNAL MEDICINE

## 2021-01-18 PROCEDURE — 1036F TOBACCO NON-USER: CPT | Performed by: INTERNAL MEDICINE

## 2021-01-18 PROCEDURE — G8420 CALC BMI NORM PARAMETERS: HCPCS | Performed by: INTERNAL MEDICINE

## 2021-01-18 PROCEDURE — 1123F ACP DISCUSS/DSCN MKR DOCD: CPT | Performed by: INTERNAL MEDICINE

## 2021-01-18 PROCEDURE — G8427 DOCREV CUR MEDS BY ELIG CLIN: HCPCS | Performed by: INTERNAL MEDICINE

## 2021-01-18 PROCEDURE — G8484 FLU IMMUNIZE NO ADMIN: HCPCS | Performed by: INTERNAL MEDICINE

## 2021-01-18 PROCEDURE — 93000 ELECTROCARDIOGRAM COMPLETE: CPT | Performed by: INTERNAL MEDICINE

## 2021-01-18 PROCEDURE — 99213 OFFICE O/P EST LOW 20 MIN: CPT | Performed by: INTERNAL MEDICINE

## 2021-01-18 NOTE — PROGRESS NOTES
Chief Complaint   Patient presents with    6 Month Follow-Up    Atrial Fibrillation    Check-Up       Pt here for a 6 month f/u    EKG done today    Denied cp,  Palpitations, or edema    Dizziness on standing on walk and standing quick    Sob on exertion- stable    Walk 2 to 3 block METS> 4    Nonsmoker    FHX  Had pacer    Hx of PE in the lung post op and had been on OA for 6 month and off it over one back      Patient Active Problem List   Diagnosis    Generalized anxiety disorder    Essential hypertension    Hyperlipidemia with target LDL less than 130    Back pain, chronic s/p surgery    History of pulmonary embolus (PE)    Spondylosis of lumbar region without myelopathy or radiculopathy    Persistent atrial fibrillation ([address]- newely DXed 10/12/17- CVR    Non-rheumatic mitral regurgitation- mod to severe    Moderate aortic regurgitation    Failed back syndrome of lumbar spine    Chronic pain disorder    Pneumothorax    Constipation    Ascending aortic aneurysm (HCC) 4.1 cm on CTA and 4.5 on echo    Nonexudative age-related macular degeneration    Secondary pigmentary retinal degeneration    Venous retinal branch occlusion    PAF (paroxysmal atrial fibrillation) ([address]    Moderate mitral regurgitation       Past Surgical History:   Procedure Laterality Date    APPENDECTOMY  01/1961    [facility]    BACK SURGERY  [id number]    X stop    CATARACT REMOVAL Right 06/2001    Dr Jefe Vera-   Karen Braden Left 11/2014    Dr Shell Rivas, 2009    Dr Michael Olguin  [address]?     Dr Jamie Monzon of date    HERNIA REPAIR Left 11/22/2017    ROBOT RECURRENT LEFT INGUINAL HERNIA REPAIR performed by Slava Hanna MD at [facility] FACET LEVEL 1 BILATERAL Bilateral 9/5/2017    LUMBAR FACET MBB L3-4, L4-5, L5-S1 BILATERAL performed by Monika Villanueva MD at [facility] 04/09/2018    HIP INJECTION     OTHER SURGICAL HISTORY  10/7/2015    XI ROBOTIC PROSTATECTOMY    OTHER SURGICAL HISTORY Bilateral 09/05/2017    lumbar facet block L3-S1    OTHER SURGICAL HISTORY Right 1980's    nerve release right lower arm    OTHER SURGICAL HISTORY  01/30/2018    Sacroiliac joint MBB    OTHER SURGICAL HISTORY Left 02/27/2018    Sacroiliac joint medial branch block     NC IMPLANT NEUROSTIM/ N/A 9/17/2018    THORACIC LAMINECTOMY PERMANENT SPINAL CORD STIMULATOR PADDLE PLUS BATTERY PLACEMENT performed by Mauro Rouse MD at [facility] [address] ARTHRGRPHY&/ANES/STEROID W/IMAGE Left 1/30/2018    LEFT SI MBB performed by Luciano Galindo MD at [address] INJECT SI JOINT ARTHRGRPHY&/ANES/STEROID W/IMAGE Left 2/27/2018    LEFT SI MBB #2 performed by Luciano Galindo MD at [address] OFFICE/OUTPT VISIT,PROCEDURE ONLY Left 4/9/2018    LEFT HIP STEROID INJECTION WITH SEDATION performed by Luciano Galindo MD at [address] OFFICE/OUTPT [facility] N/A 8/21/2018    SPINAL CORD STIMULATOR IMPLANT TRIAL performed by Luciano Galindo MD at [address] [facility] BIOPSY  7-    Dr Traci Weiss BIOPSY  8/21/2015    transrectal ultrasound with biopsy(+)    PROSTATE SURGERY  2003    TURP    PROSTATE SURGERY  2009    Biopsy -Hyperplasia    SHOULDER ARTHROPLASTY  2005    right       Allergies   Allergen Reactions    Nitrofuran Derivatives Nausea Only     Severe stomach cramps    Nsaids Nausea Only and Other (See Comments)     Pain in stomach        Family History   Problem Relation Age of Onset    Heart Disease Father     Prostate Cancer Brother     Cancer Brother     Prostate Cancer Brother     Cancer Brother     Stroke Mother     Cancer Sister     Prostate Cancer Brother     Prostate Cancer Brother         Social History     Socioeconomic History    Marital status:      Spouse name: Kadie Conte Number of children: 3    Years of education: Not on file    Highest education level: Not on file   Occupational History    Not on file   Social Needs    Financial resource strain: Not hard at all   Violeta-Lou insecurity     Worry: Never true     Inability: Never true   [employer] needs     Medical: No     Non-medical: No   Tobacco Use    Smoking status: Former Smoker     Packs/day: 1.00     Years: 10.00     Pack years: 10.00     Types: Cigarettes     Quit date: 7/3/1965     Years since quittin.5    Smokeless tobacco: Never Used   Substance and Sexual Activity    Alcohol use: Yes     Alcohol/week: 0.0 standard drinks     Comment: SOCIALLY wine a few times a year     Drug use: No    Sexual activity: Not Currently   Lifestyle    Physical activity     Days per week: Not on file     Minutes per session: Not on file    Stress: Not on file   Relationships    Social connections     Talks on phone: Not on file     Gets together: Not on file     Attends Mandaeism service: Not on file     Active member of club or organization: Not on file     Attends meetings of clubs or organizations: Not on file     Relationship status: Not on file    Intimate partner violence     Fear of current or ex partner: Not on file     Emotionally abused: Not on file     Physically abused: Not on file     Forced sexual activity: Not on file   Other Topics Concern    Not on file   Social History Narrative    Not on file       Current Outpatient Medications   Medication Sig Dispense Refill    VITAMIN D PO Take by mouth      metoprolol tartrate (LOPRESSOR) 25 MG tablet Take 1 tablet by mouth 2 times daily 60 tablet 7    clotrimazole-betamethasone (LOTRISONE) 1-0.05 % cream Apply topically 2 times daily. PRN 45 g 1    lidocaine (LIDODERM) 5 % Place 3 patches onto the skin every 24 hours 12 hours on, 12 hours off.  90 patch 0    apixaban (ELIQUIS) 5 MG TABS tablet Take 1 tablet by mouth 2 times daily 180 tablet 2    amLODIPine (NORVASC) 10 MG tablet Take 0.5 tablets by mouth daily 30 tablet 2    aspirin 81 MG tablet Take 81 mg by mouth daily      HYDROcodone-acetaminophen (NORCO) 5-325 MG per tablet Take 1 tablet by mouth every 6 hours as needed for Pain. [employer] Multiple Vitamins-Minerals (PRESERVISION AREDS 2 PO) Take 1 tablet by mouth 2 times daily       No current facility-administered medications for this visit. Review of Systems -     General ROS: negative  Psychological ROS: negative  Hematological and Lymphatic ROS: No history of blood clots or bleeding disorder. Respiratory ROS: no cough, shortness of breath, or wheezing  Cardiovascular ROS: no chest pain or dyspnea on exertion  Gastrointestinal ROS: negative  Genito-Urinary ROS: no dysuria, trouble voiding, or hematuria  Musculoskeletal ROS: negative  Neurological ROS: no TIA or stroke symptoms  Dermatological ROS: negative      Blood pressure 132/74, pulse 75, height 5' 10.5\" (1.791 m), weight 150 lb 12.8 oz (68.4 kg). Physical Examination:    General appearance - alert, well appearing, and in no distress  Mental status - alert, oriented to person, place, and time  Neck - supple, no significant adenopathy, no JVD, or carotid bruits  Chest - clear to auscultation, no wheezes, rales or rhonchi, symmetric air entry  Heart - normal rate, regular rhythm, normal S1, S2, , rubs, clicks or gallops but +ve murmurs  Abdomen - soft, nontender, nondistended, no masses or organomegaly  Neurological - alert, oriented, normal speech, no focal findings or movement disorder noted  Musculoskeletal - no joint tenderness, deformity or swelling  Extremities - peripheral pulses normal, no pedal edema, no clubbing or cyanosis  Skin - normal coloration and turgor, no rashes, no suspicious skin lesions noted    Lab  No results for input(s): CKTOTAL, CKMB, CKMBINDEX, TROPONINI in the last 72 hours.   CBC:   Lab Results   Component Value Date    WBC 10.6 10/05/2020    RBC 4.65 10/05/2020     08/26/2011    HGB 15.1 10/05/2020    HCT 47.1 10/05/2020    .3 10/05/2020    MCH 32.5 10/05/2020    MCHC 32.1 10/05/2020    RDW 12.3 06/30/2018     10/05/2020    MPV 12.8 10/05/2020     BMP:    Lab Results   Component Value Date     10/05/2020    K 4.1 10/05/2020     10/05/2020    CO2 26 10/05/2020    BUN 26 10/05/2020    LABALBU 4.5 10/05/2020    CREATININE 1.3 10/05/2020    CALCIUM 9.8 10/05/2020    LABGLOM 52 10/05/2020    GLUCOSE 110 10/05/2020    GLUCOSE 89 05/14/2016     Hepatic Function Panel:    Lab Results   Component Value Date    ALKPHOS 60 10/05/2020    ALT 15 10/05/2020    AST 22 10/05/2020    PROT 7.4 10/05/2020    BILITOT 0.7 10/05/2020    BILIDIR <0.2 09/21/2018    LABALBU 4.5 10/05/2020     Magnesium:  No results found for: MG  Warfarin PT/INR:  No components found for: PTPATWAR, PTINRWAR  HgBA1c:  No results found for: LABA1C  FLP:    Lab Results   Component Value Date    TRIG 62 10/05/2020    HDL 73 10/05/2020    LDLCALC 106 10/05/2020     TSH:    Lab Results   Component Value Date    TSH 3.380 07/24/2018       EKG 10/12/17  Atrial fibrillation  Septal infarct , age undetermined  Abnormal ECG  When compared with ECG of 06-NOV-2015 22:29,  Atrial fibrillation has replaced Sinus rhythm  Nonspecific T wave abnormality now evident in Inferior leads  Confirmed by Whitney Lopez MD, Monica Alcala ([id number]) on 10/12/2017 8:03:16 PM      Conclusions      Summary   Normal left ventricle size and systolic function. Ejection fraction was   estimated at 60 to 65 %.  There were no regional left ventricular wall   motion abnormalities and wall thickness was within normal limits.   The left atrium is Moderately dilated.   Moderate to severly enlarged right atrium size.   Moderate-to-severe mitral regurgitation with centrally directed jet.   Moderate aortic regurgitation is noted.      Signature      ----------------------------------------------------------------   Electronically signed by Ann Gonzalez MD (Interpreting   physician) on 10/26/2017 at 12:55 PM   ----------------------------------------------------------------    Nuc  Stress neg      CONCLUSION:  This is an complete atrial fibrillation with average  heart rate of 91 beats per minute ranging from 47 to 150 beats per  minute. No significant pause of more than 2.3 second noted. Rare  ventricular ectopic beat total of 277, predominantly isolated, few  couplets, few ventricular bigeminy. No supraventricular tachycardia. No other form of arrhythmia noted. The AFib seems to be rate well  controlled. The patient at times  gets AFib with rapid ventricular  response. Average heart rate is 91 beats per minute, so the patient  may benefit from increasing the dose of AV jason blocking agent.     Thank you.           BENJAMIN Woodson M.D.     D: 11/02/2017 18:04:27            Conclusions      Summary   Normal left ventricle size and systolic function. Ejection fraction was   estimated at 60 to 65 %.  There were no regional left ventricular wall   motion abnormalities and wall thickness was within normal limits.   The left atrium is Moderately dilated.   Moderate to severly enlarged right atrium size.   Moderate-to-severe mitral regurgitation with centrally directed jet.   Moderate aortic regurgitation is noted.      Signature      ----------------------------------------------------------------   Electronically signed by Ann Gonzalez MD (Interpreting   physician) on 10/26/2017 at 12:55 PM       Conclusions      Summary   Normal left ventricle size and systolic function.   Ejection fraction was estimated at 65 %.   There were no regional left ventricular wall motion abnormalities and wall   thickness was within normal limits.   The left atrium is Moderately to severely dilated.   MILD TO Moderately enlarged right atrium size.   Moderate aortic regurgitation is noted.   Mild to Moderate mitral regurgitation is present.   Aortic aneurysm noted in the ascending aorta .   Aortic aneurysm measures 4.5 CM .   CONSIDER CTA FOR BETTER EVALUATION OF THE ASCENDING AORTIC ANEURYSM      Signature      ----------------------------------------------------------------   Electronically signed by Candy Ortiz MD (Interpreting   physician) on 01/11/2019 at 09:20 PM       Conclusions      Summary   Normal left ventricle size and systolic function. Ejection fraction was estimated at 60 %. There were no regional left ventricular wall motion abnormalities and wall   thickness was within normal limits. The left atrium is Moderately dilated. Mild to Moderately enlarged right atrium size. Mild to Moderate mitral regurgitation is present. Mild-to-moderate aortic regurgitation is   Signature      ----------------------------------------------------------------   Electronically signed by Candy Ortiz MD (Interpreting   physician) on 01/15/2020 at 06:54 PM          CTA Jan 2019     The aorta is minimally dilated measuring 4.1 cm within the ascending aorta. It is ectatic. Descending aorta measures 3.2 cm     ekg 1/13/20  Atr flutter with CVR and variable av block    ekg 1/18/21  Sinus  Rhythm   WITHIN NORMAL LIMITS      Assessment     Diagnosis Orders   1. PAF (paroxysmal atrial fibrillation) ([address]     2. Essential hypertension  EKG 12 lead   3. Moderate aortic regurgitation  EKG 12 lead   4. Ascending aortic aneurysm (HCC) 4.1 cm on CTA and 4.5 on echo     5. Moderate mitral regurgitation           Plan   The most  current meds and labs reviewed    Continue the current treatment and with constant vigilance to changes in symptoms and also any potential side effects. Return for care or seek medical attention immediately if symptoms got worse and/or develop new symptoms.     Persistent atr FIB-CVR./PAFlutter  chads vasc 3  Need OA  Holter 48 hrs-Average heart rate of 91 beats per minute ranging from 47 to 150 beats per  Minute. Cont toprol XL 25 po qd  Dose [facility] by [facility]   apixaban 2.5 po bid after blood test per pat  CR 1.3 wt 63 lb  The risk and benefit of OA well explained including ICH and pat agreed to be on OA    Sob on exertion and new atr fib with cvr  The  echo and lexiscan nuc result reviewed and d/w the pat  No more leovnox due to intolerance    Mod MR- and Mod AR  The F/u echo 2020- mild to Mod AR and MR  Stable     Ascending aortic aneurysm 4.1 CM by CTA jan 2019 and 4.5 cm by echo  F/u CTA  In 6 months  Stop hctz 12.5 po qd  Increase lopressor 25 po bid  Skip the dose for HR < 60 ( pat has bp ci=uff and can check BP and HR prior to lopressor)    Hypertension, on medical treatment. Seems to be under good control. Patient is compliant with medical treatment. Home  mmhg    CKD III  Stop hctz 12.5 po qd  Hydration  BMP next visit    Dizziness  On standing at times  Hydration    D/w the pat at length the plan of care    Discussed use, benefit, and side effects of prescribed medications. All patient questions answered. Pt voiced understanding. Instructed to continue current medications, diet and exercise. Continue risk factor modification and medical management. Patient agreed with treatment plan. Follow up as directed.     Doing well and stable      Lipid panel and liver function test before next appointment      RTC in 6 months      Sin Espinosa [facility]

## 2021-02-08 ENCOUNTER — HOSPITAL ENCOUNTER (OUTPATIENT)
Age: 86
Discharge: HOME | End: 2021-02-08
Payer: MEDICARE

## 2021-02-08 DIAGNOSIS — I34.0 MODERATE MITRAL REGURGITATION: ICD-10-CM

## 2021-02-08 DIAGNOSIS — I35.1 MODERATE AORTIC REGURGITATION: ICD-10-CM

## 2021-02-08 DIAGNOSIS — I71.21 ASCENDING AORTIC ANEURYSM: ICD-10-CM

## 2021-02-08 DIAGNOSIS — I48.0 PAF (PAROXYSMAL ATRIAL FIBRILLATION) (HCC): ICD-10-CM

## 2021-02-08 DIAGNOSIS — I10 ESSENTIAL HYPERTENSION: ICD-10-CM

## 2021-02-08 LAB
ANION GAP SERPL CALCULATED.3IONS-SCNC: 11 MEQ/L (ref 8–16)
BUN BLDV-MCNC: 31 MG/DL (ref 7–22)
CALCIUM SERPL-MCNC: 9.4 MG/DL (ref 8.5–10.5)
CHLORIDE BLD-SCNC: 104 MEQ/L (ref 98–111)
CO2: 26 MEQ/L (ref 23–33)
CREAT SERPL-MCNC: 1.1 MG/DL (ref 0.4–1.2)
GFR SERPL CREATININE-BSD FRML MDRD: 63 ML/MIN/1.73M2
GLUCOSE BLD-MCNC: 97 MG/DL (ref 70–108)
MAGNESIUM: 2.3 MG/DL (ref 1.6–2.4)
POTASSIUM SERPL-SCNC: 4.1 MEQ/L (ref 3.5–5.2)
SODIUM BLD-SCNC: 141 MEQ/L (ref 135–145)

## 2021-02-08 PROCEDURE — 36415 COLL VENOUS BLD VENIPUNCTURE: CPT

## 2021-02-08 PROCEDURE — 80048 BASIC METABOLIC PNL TOTAL CA: CPT

## 2021-02-08 PROCEDURE — 83735 ASSAY OF MAGNESIUM: CPT

## 2021-03-16 DIAGNOSIS — L29.9 PRURITUS: ICD-10-CM

## 2021-03-16 RX ORDER — CLOTRIMAZOLE AND BETAMETHASONE DIPROPIONATE 10; .64 MG/G; MG/G
CREAM TOPICAL
Qty: 45 G | Refills: 1 | Status: SHIPPED | OUTPATIENT
Start: 2021-03-16 | End: 2022-01-03 | Stop reason: SDUPTHER

## 2021-03-16 NOTE — TELEPHONE ENCOUNTER
Jeremy Kingsley called requesting a refill on the following medications:  Requested Prescriptions     Pending Prescriptions Disp Refills    clotrimazole-betamethasone (LOTRISONE) 1-0.05 % cream 45 g 1     Sig: Apply topically 2 times daily.  PRN     Pharmacy verified: [id number], [address]      Date of last visit: 10/13/2020  Date of next visit (if applicable): 1/82/5376

## 2021-04-13 ENCOUNTER — OFFICE VISIT (OUTPATIENT)
Dept: FAMILY MEDICINE CLINIC | Age: 86
End: 2021-04-13
Payer: MEDICARE

## 2021-04-13 VITALS
HEIGHT: 70 IN | WEIGHT: 154 LBS | BODY MASS INDEX: 22.05 KG/M2 | HEART RATE: 62 BPM | TEMPERATURE: 98.1 F | SYSTOLIC BLOOD PRESSURE: 126 MMHG | DIASTOLIC BLOOD PRESSURE: 80 MMHG | RESPIRATION RATE: 12 BRPM | OXYGEN SATURATION: 98 %

## 2021-04-13 DIAGNOSIS — C61 PROSTATE CANCER (HCC): ICD-10-CM

## 2021-04-13 DIAGNOSIS — Z00.00 ROUTINE GENERAL MEDICAL EXAMINATION AT A HEALTH CARE FACILITY: Primary | ICD-10-CM

## 2021-04-13 PROCEDURE — 1123F ACP DISCUSS/DSCN MKR DOCD: CPT | Performed by: FAMILY MEDICINE

## 2021-04-13 PROCEDURE — 4040F PNEUMOC VAC/ADMIN/RCVD: CPT | Performed by: FAMILY MEDICINE

## 2021-04-13 PROCEDURE — G0439 PPPS, SUBSEQ VISIT: HCPCS | Performed by: FAMILY MEDICINE

## 2021-04-13 ASSESSMENT — PATIENT HEALTH QUESTIONNAIRE - PHQ9
SUM OF ALL RESPONSES TO PHQ QUESTIONS 1-9: 0
2. FEELING DOWN, DEPRESSED OR HOPELESS: 0

## 2021-04-13 ASSESSMENT — LIFESTYLE VARIABLES
HOW MANY STANDARD DRINKS CONTAINING ALCOHOL DO YOU HAVE ON A TYPICAL DAY: 0
HOW OFTEN DO YOU HAVE A DRINK CONTAINING ALCOHOL: 1
AUDIT-C TOTAL SCORE: 1
HOW OFTEN DURING THE LAST YEAR HAVE YOU FOUND THAT YOU WERE NOT ABLE TO STOP DRINKING ONCE YOU HAD STARTED: 0
HAVE YOU OR SOMEONE ELSE BEEN INJURED AS A RESULT OF YOUR DRINKING: 0
AUDIT TOTAL SCORE: 1

## 2021-04-13 NOTE — PROGRESS NOTES
Past Surgical History:   Procedure Laterality Date    APPENDECTOMY  01/1961    [facility] BACK SURGERY  [id number]    X stop    CATARACT REMOVAL Right 06/2001    Dr Chowdhury Points Left 11/2014    Dr Edith Ortiz, 2009    Dr Oc Roblero  [id number]?     Dr Luba Rodríguez of date    HERNIA REPAIR Left 11/22/2017    ROBOT RECURRENT LEFT INGUINAL HERNIA REPAIR performed by Chaitanya Quiles MD at [address] 1 BILATERAL Bilateral 9/5/2017    LUMBAR FACET MBB L3-4, L4-5, L5-S1 BILATERAL performed by Hollie Mckeon MD at [place] [id number] Left 04/09/2018    HIP INJECTION     OTHER SURGICAL HISTORY  10/7/2015    XI ROBOTIC PROSTATECTOMY    OTHER SURGICAL HISTORY Bilateral 09/05/2017    lumbar facet block L3-S1    OTHER SURGICAL HISTORY Right 1980's    nerve release right lower arm    OTHER SURGICAL HISTORY  01/30/2018    Sacroiliac joint MBB    OTHER SURGICAL HISTORY Left 02/27/2018    Sacroiliac joint medial branch block     SC IMPLANT NEUROSTIM/ N/A 9/17/2018    THORACIC LAMINECTOMY PERMANENT SPINAL CORD STIMULATOR PADDLE PLUS BATTERY PLACEMENT performed by Snehal Lombardo MD at [facility] 46 ARTHRGRPHY&/ANES/STEROID W/IMAGE Left 1/30/2018    LEFT SI MBB performed by Hollie Mckeon MD at [address] INJECT SI JOINT ARTHRGRPHY&/ANES/STEROID W/IMAGE Left 2/27/2018    LEFT SI MBB #2 performed by Hollie Mckeon MD at [address] OFFICE/OUTPT VISIT,PROCEDURE ONLY Left 4/9/2018    LEFT HIP STEROID INJECTION WITH SEDATION performed by Hollie Mckeon MD at [address] OFFICE/OUTPT VISIT,PROCEDURE ONLY N/A 8/21/2018    SPINAL CORD STIMULATOR IMPLANT TRIAL performed by Hollie Mckeon MD at [address] [facility]  7-    Dr Alfred Klein BIOPSY  8/21/2015    transrectal ultrasound with biopsy(+)    PROSTATE SURGERY  2003    TURP    PROSTATE SURGERY  2009    Biopsy -Hyperplasia    SHOULDER ARTHROPLASTY  2005    right         Family History   Problem Relation Age of Onset    Heart Disease Father     Prostate Cancer Brother     Cancer Brother     Prostate Cancer Brother     Cancer Brother     Stroke Mother     Cancer Sister     Prostate Cancer Brother     Prostate Cancer Brother        CareTeam (Including outside providers/suppliers regularly involved in providing care):   Patient Care Team:  Scout Sherman MD as PCP - General (Family Medicine)  Scout Sherman MD as PCP - [facility] Empaneled Provider  Cecy Yoon PSYD (Psychology)    Wt Readings from Last 3 Encounters:   04/13/21 154 lb (69.9 kg)   01/18/21 150 lb 12.8 oz (68.4 kg)   11/03/20 146 lb (66.2 kg)     Vitals:    04/13/21 0744   BP: 126/80   Pulse: 62   Resp: 12   Temp: 98.1 °F (36.7 °C)   TempSrc: Temporal   SpO2: 98%   Weight: 154 lb (69.9 kg)   Height: 5' 10\" (1.778 m)     Body mass index is 22.1 kg/m². Based upon direct observation of the patient, evaluation of cognition reveals recent and remote memory intact. Pulmonary/Chest: clear to auscultation bilaterally- no wheezes, rales or rhonchi, normal air movement, no respiratory distress  Cardiovascular: normal rate, normal S1 and S2, no gallops, intact distal pulses and no carotid bruits    Patient's complete Health Risk Assessment and screening values have been reviewed and are found in Flowsheets. The following problems were reviewed today and where indicated follow up appointments were made and/or referrals ordered.     Positive Risk Factor Screenings with Interventions:           Health Habits/Nutrition:  Health Habits/Nutrition  Do you exercise for at least 20 minutes 2-3 times per week?: (!) No  Have you lost any weight without trying in the past 3 months?: No  Do you eat only one meal per day?: No  Have you seen the dentist within the past year?: Yes  Body mass index: 22.09  Health Habits/Nutrition Interventions:  · Inadequate physical activity:  patient is not ready to increase his/her physical activity level at this time       Personalized Preventive Plan   Current Health Maintenance Status  Immunization History   Administered Date(s) Administered    COVID-19, Moderna, PF, 100mcg/0.5mL 01/20/2021, 02/17/2021    Influenza Virus Vaccine 05/24/2017    Pneumococcal Conjugate 13-valent ([id number]) 06/30/2016    Pneumococcal Polysaccharide ([id number]) 05/24/2017, 12/17/2018    Zoster Live (Zostavax) 05/24/2017        Health Maintenance   Topic Date Due    Shingles Vaccine (2 of 3) 07/19/2017    Annual Wellness Visit (AWV)  Never done    Flu vaccine (Season Ended) 10/13/2021 (Originally 9/1/2021)    DTaP/Tdap/Td vaccine (1 - Tdap) 04/13/2022 (Originally 3/26/1953)    PSA counseling  10/26/2021    Potassium monitoring  02/08/2022    Creatinine monitoring  04/06/2022    Pneumococcal 65+ years Vaccine  Completed    COVID-19 Vaccine  Completed    Hepatitis A vaccine  Aged Out    Hepatitis B vaccine  Aged Out    Hib vaccine  Aged Out    Meningococcal (ACWY) vaccine  Aged Out     Recommendations for KXEN Due: see orders and patient instructions/AVS.  . Recommended screening schedule for the next 5-10 years is provided to the patient in written form: see Patient Patricia  was seen today for medicare awv. Diagnoses and all orders for this visit:    Routine general medical examination at a health care facility    Prostate cancer [facility])  -resolved. Following w/ urology.  psa undetectable last draw    Electronically signed by Home Issa MD on 4/13/2021 at 8:08 AM

## 2021-04-13 NOTE — PATIENT INSTRUCTIONS
Learning About Shaka Barahona  What is a living will? A living will, also called a declaration, is a legal form. It tells your family and your doctor your wishes when you can't speak for yourself. It's used by the health professionals who will treat you as you near the end of your life or if you get seriously hurt or ill. If you put your wishes in writing, your loved ones and others will know what kind of care you want. They won't need to guess. This can ease your mind and be helpful to others. And you can change or cancel your living will at any time. A living will is not the same as an estate or property will. An estate will explains what you want to happen with your money and property after you die. How do you use it? A living will is used to describe the kinds of treatment or life support you want as you near the end of your life or if you get seriously hurt or ill. Keep these facts in mind about living [place]. · Your living will is used only if you can't speak or make decisions for yourself. Most often, one or more doctors must certify that you can't speak or decide for yourself before your living will takes effect. · If you get better and can speak for yourself again, you can accept or refuse any treatment. It doesn't matter what you said in your living will. · Some states may limit your right to refuse treatment in certain cases. For example, you may need to clearly state in your living will that you don't want artificial hydration and nutrition, such as being fed through a tube. Is a living will a legal document? A living will is a legal document. Each state has its own laws about [place]. And a living will may be called something else in your state. Here are some things to know about [place]. · You don't need an  to complete a living will. But legal advice can be helpful if your state's laws are unclear.  It can also help if your health history is complicated or your family can't agree on what should be in your living will. · You can change your living will at any time. Some people find that their wishes about end-of-life care change as their health changes. If you make big changes to your living will, complete a new form. · If you move to another state, make sure that your living will is legal in the state where you now live. In most cases, doctors will respect your wishes even if you have a form from a different state. · You might use a universal form that has been approved by many states. This kind of form can sometimes be filled out and stored online. Your digital copy will then be available wherever you have a connection to the internet. The doctors and nurses who need to treat you can find it right away. · Your state may offer an online registry. This is another place where you can store your living will online. · It's a good idea to get your living will notarized. This means using a person called a  to watch two people sign, or witness, your living will. What should you know when you create a living will? Here are some questions to ask yourself as you make your living will:  · Do you know enough about life support methods that might be used? If not, talk to your doctor so you know what might be done if you can't breathe on your own, your heart stops, or you can't swallow. · What things would you still want to be able to do after you receive life-support methods? Would you want to be able to walk? To speak? To eat on your own? To live without the help of machines? · Do you want certain Synagogue practices performed if you become very ill? · If you have a choice, where do you want to be cared for? In your home? At a hospital or nursing home? · If you have a choice at the end of your life, where would you prefer to die? At home? In a hospital or nursing home? Somewhere else? · Would you prefer to be buried or cremated?   · Do you want your organs to

## 2021-07-07 LAB
PROSTATE SPECIFIC ANTIGEN: 0.04 NG/ML
VITAMIN D 25-HYDROXY: 24
VITAMIN D2, 25 HYDROXY: NORMAL
VITAMIN D3,25 HYDROXY: NORMAL

## 2021-07-19 ENCOUNTER — OFFICE VISIT (OUTPATIENT)
Dept: CARDIOLOGY CLINIC | Age: 86
End: 2021-07-19
Payer: MEDICARE

## 2021-07-19 VITALS
DIASTOLIC BLOOD PRESSURE: 77 MMHG | WEIGHT: 157 LBS | HEIGHT: 71 IN | BODY MASS INDEX: 21.98 KG/M2 | HEART RATE: 62 BPM | SYSTOLIC BLOOD PRESSURE: 153 MMHG

## 2021-07-19 DIAGNOSIS — I34.0 MODERATE MITRAL REGURGITATION: ICD-10-CM

## 2021-07-19 DIAGNOSIS — I10 ESSENTIAL HYPERTENSION: ICD-10-CM

## 2021-07-19 DIAGNOSIS — E78.5 HYPERLIPIDEMIA WITH TARGET LDL LESS THAN 130: ICD-10-CM

## 2021-07-19 DIAGNOSIS — I48.0 PAF (PAROXYSMAL ATRIAL FIBRILLATION) (HCC): Primary | ICD-10-CM

## 2021-07-19 DIAGNOSIS — I71.21 ASCENDING AORTIC ANEURYSM: ICD-10-CM

## 2021-07-19 DIAGNOSIS — I35.1 MODERATE AORTIC REGURGITATION: ICD-10-CM

## 2021-07-19 PROCEDURE — 1123F ACP DISCUSS/DSCN MKR DOCD: CPT | Performed by: INTERNAL MEDICINE

## 2021-07-19 PROCEDURE — 4040F PNEUMOC VAC/ADMIN/RCVD: CPT | Performed by: INTERNAL MEDICINE

## 2021-07-19 PROCEDURE — 1036F TOBACCO NON-USER: CPT | Performed by: INTERNAL MEDICINE

## 2021-07-19 PROCEDURE — G8420 CALC BMI NORM PARAMETERS: HCPCS | Performed by: INTERNAL MEDICINE

## 2021-07-19 PROCEDURE — 99214 OFFICE O/P EST MOD 30 MIN: CPT | Performed by: INTERNAL MEDICINE

## 2021-07-19 PROCEDURE — G8427 DOCREV CUR MEDS BY ELIG CLIN: HCPCS | Performed by: INTERNAL MEDICINE

## 2021-07-19 NOTE — PROGRESS NOTES
Chief Complaint   Patient presents with    6 Month Follow-Up    Atrial Fibrillation         6 month follow up. EKG done 1-. Denied cp,  Palpitations, or edema    Dizziness on standing on walk and standing quick- better    Sob on exertion- stable    Walk 2 to 3 block METS> 4    Nonsmoker    FHX  Had pacer    Hx of PE in the lung post op and had been on OA for 6 month and off it over one back      Patient Active Problem List   Diagnosis    Prostate cancer    Generalized anxiety disorder    Essential hypertension    Hyperlipidemia with target LDL less than 130    Back pain, chronic s/p surgery    History of pulmonary embolus (PE)    Spondylosis of lumbar region without myelopathy or radiculopathy    Persistent atrial fibrillation ([address]- newely DXed 10/12/17- CVR    Non-rheumatic mitral regurgitation- mod to severe    Moderate aortic regurgitation    Failed back syndrome of lumbar spine    Chronic pain disorder    Pneumothorax    Constipation    Ascending aortic aneurysm (HCC) 4.1 cm on CTA and 4.5 on echo    Nonexudative age-related macular degeneration    Secondary pigmentary retinal degeneration    Venous retinal branch occlusion    PAF (paroxysmal atrial fibrillation) ([address]    Moderate mitral regurgitation       Past Surgical History:   Procedure Laterality Date    APP[facility]Y  01/1961    [facility]    BACK SURGERY  [id number]    X stop    CATARACT REMOVAL Right 06/2001    Dr Shahriar Sun Left 11/2014    Dr Jeni Martino, 2009    Dr Nancy Diaz  [address]?     Dr Benjamin Garrett of date    HERNIA REPAIR Left 11/22/2017    ROBOT RECURRENT LEFT INGUINAL HERNIA REPAIR performed by Demi Smart MD at [address] LEVEL 1 BILATERAL Bilateral 9/5/2017    LUMBAR FACET MBB L3-4, L4-5, L5-S1 BILATERAL performed by Katherine Leroy MD at [address] [facility] SURGERY Left 04/09/2018    HIP INJECTION     OTHER SURGICAL HISTORY  10/7/2015    XI ROBOTIC PROSTATECTOMY    OTHER SURGICAL HISTORY Bilateral 09/05/2017    lumbar facet block L3-S1    OTHER SURGICAL HISTORY Right 1980's    nerve release right lower arm    OTHER SURGICAL HISTORY  01/30/2018    Sacroiliac joint MBB    OTHER SURGICAL HISTORY Left 02/27/2018    Sacroiliac joint medial branch block     AR IMPLANT NEUROSTIM/ N/A 9/17/2018    THORACIC LAMINECTOMY PERMANENT SPINAL CORD STIMULATOR PADDLE PLUS BATTERY PLACEMENT performed by Tony Jimenez MD at [facility] 46 ARTHRGRPHY&/ANES/STEROID W/IMAGE Left 1/30/2018    LEFT SI MBB performed by Alejandro Srinivasan MD at [address] INJECT SI JOINT ARTHRGRPHY&/ANES/STEROID W/IMAGE Left 2/27/2018    LEFT SI MBB #2 performed by Alejandro Srinivasan MD at [address] OFFICE/OUTPT VISIT,PROCEDURE ONLY Left 4/9/2018    LEFT HIP STEROID INJECTION WITH SEDATION performed by Alejandro Srinivasan MD at [address] OFFICE/OUTPT [facility] N/A 8/21/2018    SPINAL CORD STIMULATOR IMPLANT TRIAL performed by Alejandro Srinivasan MD at [address] [facility] BIOPSY  7-    Dr Yuliana Melchor BIOPSY  8/21/2015    transrectal ultrasound with biopsy(+)    PROSTATE SURGERY  2003    TURP    PROSTATE SURGERY  2009    Biopsy -Hyperplasia    SHOULDER ARTHROPLASTY  2005    right       Allergies   Allergen Reactions    Nitrofuran Derivatives Nausea Only     Severe stomach cramps    Nsaids Nausea Only and Other (See Comments)     Pain in stomach        Family History   Problem Relation Age of Onset    Heart Disease Father     Prostate Cancer Brother     Cancer Brother     Prostate Cancer Brother     Cancer Brother     Stroke Mother     Cancer Sister     Prostate Cancer Brother     Prostate Cancer Brother         Social History     Socioeconomic History    Marital status:      Spouse name: Rosalind Meneses Number of children: 3    Years of education: Not on file    Highest education level: Not on file   Occupational History    Not on file   Tobacco Use    Smoking status: Former Smoker     Packs/day: 1.00     Years: 10.00     Pack years: 10.00     Types: Cigarettes     Quit date: 7/3/1965     Years since quittin.0    Smokeless tobacco: Never Used   Vaping Use    Vaping Use: Never used   Substance and Sexual Activity    Alcohol use: Yes     Alcohol/week: 0.0 standard drinks     Comment: SOCIALLY wine a few times a year     Drug use: No    Sexual activity: Not Currently   Other Topics Concern    Not on file   Social History Narrative    Not on file     Social Determinants of Health     Financial Resource Strain:     Difficulty of Paying Living Expenses:    Food Insecurity:     Worried About Running Out of Food in the Last Year:     [address] in the Last Year:    Transportation Needs:     Lack of Transportation (Medical):  Lack of Transportation (Non-Medical):    Physical Activity:     Days of Exercise per Week:     Minutes of Exercise per Session:    Stress:     Feeling of Stress :    Social Connections:     Frequency of Communication with Friends and Family:     Frequency of Social Gatherings with Friends and Family:     Attends [employer] Services:     Active Member of Clubs or Organizations:     Attends Club or Organization Meetings:     Marital Status:    Intimate Partner Violence:     Fear of Current or Ex-Partner:     Emotionally Abused:     Physically Abused:     Sexually Abused:        Current Outpatient Medications   Medication Sig Dispense Refill    clotrimazole-betamethasone (LOTRISONE) 1-0.05 % cream Apply topically 2 times daily.  PRN 45 g 1    VITAMIN D PO Take by mouth      metoprolol tartrate (LOPRESSOR) 25 MG tablet Take 1 tablet by mouth 2 times daily 60 tablet 7    apixaban (ELIQUIS) 5 MG TABS tablet Take 1 tablet by WBC 10.6 10/05/2020    RBC 4.65 10/05/2020     08/26/2011    HGB 15.1 10/05/2020    HCT 47.1 10/05/2020    .3 10/05/2020    MCH 32.5 10/05/2020    MCHC 32.1 10/05/2020    RDW 12.3 06/30/2018     10/05/2020    MPV 12.8 10/05/2020     BMP:    Lab Results   Component Value Date     02/08/2021    K 4.1 02/08/2021     02/08/2021    CO2 26 02/08/2021    BUN 31 02/08/2021    LABALBU 4.5 10/05/2020    CREATININE 1.1 02/08/2021    CALCIUM 9.4 02/08/2021    LABGLOM 63 02/08/2021    GLUCOSE 97 02/08/2021    GLUCOSE 89 05/14/2016     Hepatic Function Panel:    Lab Results   Component Value Date    ALKPHOS 60 10/05/2020    ALT 15 10/05/2020    AST 22 10/05/2020    PROT 7.4 10/05/2020    BILITOT 0.7 10/05/2020    BILIDIR <0.2 09/21/2018    LABALBU 4.5 10/05/2020     Magnesium:    Lab Results   Component Value Date    MG 2.3 02/08/2021     Warfarin PT/INR:  No components found for: PTPATWAR, PTINRWAR  HgBA1c:  No results found for: LABA1C  FLP:    Lab Results   Component Value Date    TRIG 62 10/05/2020    HDL 73 10/05/2020    LDLCALC 106 10/05/2020     TSH:    Lab Results   Component Value Date    TSH 3.380 07/24/2018       EKG 10/12/17  Atrial fibrillation  Septal infarct , age undetermined  Abnormal ECG  When compared with ECG of 06-NOV-2015 22:29,  Atrial fibrillation has replaced Sinus rhythm  Nonspecific T wave abnormality now evident in Inferior leads  Confirmed by Bonifacio Menendez MD, Mark Leo ([id number]) on 10/12/2017 8:03:16 PM      Conclusions      Summary   Normal left ventricle size and systolic function. Ejection fraction was   estimated at 60 to 65 %.  There were no regional left ventricular wall   motion abnormalities and wall thickness was within normal limits.   The left atrium is Moderately dilated.   Moderate to severly enlarged right atrium size.   Moderate-to-severe mitral regurgitation with centrally directed jet.   Moderate aortic regurgitation is noted.      Signature      ----------------------------------------------------------------   Electronically signed by Jamil Esparza MD (Interpreting   physician) on 10/26/2017 at 12:55 PM   ----------------------------------------------------------------    Nuc  Stress neg      CONCLUSION:  This is an complete atrial fibrillation with average  heart rate of 91 beats per minute ranging from 47 to 150 beats per  minute. No significant pause of more than 2.3 second noted. Rare  ventricular ectopic beat total of 277, predominantly isolated, few  couplets, few ventricular bigeminy. No supraventricular tachycardia. No other form of arrhythmia noted. The AFib seems to be rate well  controlled. The patient at times  gets AFib with rapid ventricular  response. Average heart rate is 91 beats per minute, so the patient  may benefit from increasing the dose of AV jason blocking agent.     Thank you.           BEKELE W. Rosezetta Najjar, M.D.     D: 11/02/2017 18:04:27            Conclusions      Summary   Normal left ventricle size and systolic function. Ejection fraction was   estimated at 60 to 65 %.  There were no regional left ventricular wall   motion abnormalities and wall thickness was within normal limits.   The left atrium is Moderately dilated.   Moderate to severly enlarged right atrium size.   Moderate-to-severe mitral regurgitation with centrally directed jet.   Moderate aortic regurgitation is noted.      Signature      ----------------------------------------------------------------   Electronically signed by Jamil Esparza MD (Interpreting   physician) on 10/26/2017 at 12:55 PM       Conclusions      Summary   Normal left ventricle size and systolic function.   Ejection fraction was estimated at 65 %.   There were no regional left ventricular wall motion abnormalities and wall   thickness was within normal limits.   The left atrium is Moderately to severely dilated.   MILD TO Moderately enlarged right atrium size.   Moderate aortic regurgitation is noted.   Mild to Moderate mitral regurgitation is present.   Aortic aneurysm noted in the ascending aorta .   Aortic aneurysm measures 4.5 CM .   CONSIDER CTA FOR BETTER EVALUATION OF THE ASCENDING AORTIC ANEURYSM      Signature      ----------------------------------------------------------------   Electronically signed by Wing Chuckie BILLY (Interpreting   physician) on 01/11/2019 at 09:20 PM       Conclusions      Summary   Normal left ventricle size and systolic function. Ejection fraction was estimated at 60 %. There were no regional left ventricular wall motion abnormalities and wall   thickness was within normal limits. The left atrium is Moderately dilated. Mild to Moderately enlarged right atrium size. Mild to Moderate mitral regurgitation is present. Mild-to-moderate aortic regurgitation is   Signature      ----------------------------------------------------------------   Electronically signed by Wing Chuckie BILLY (Interpreting   physician) on 01/15/2020 at 06:54 PM          CTA Jan 2019     The aorta is minimally dilated measuring 4.1 cm within the ascending aorta. It is ectatic. Descending aorta measures 3.2 cm     ekg 1/13/20  Atr flutter with CVR and variable av block    ekg 1/18/21  Sinus  Rhythm   WITHIN NORMAL LIMITS        Assessment     Diagnosis Orders   1. PAF (paroxysmal atrial fibrillation) ([address]     2. Hyperlipidemia with target LDL less than 130     3. Essential hypertension     4. Moderate mitral regurgitation     5. Moderate aortic regurgitation     6. Ascending aortic aneurysm (HCC) 4.1 cm on CTA and 4.5 on echo           Plan   The most  current meds and labs reviewed    Continue the current treatment and with constant vigilance to changes in symptoms and also any potential side effects. Return for care or seek medical attention immediately if symptoms got worse and/or develop new symptoms.     Parox  atr FIB-CVR./PAFlutter  chads vasc 3  Need OA  Holter 48 hrs-Average heart rate of 91 beats per minute ranging from 47 to 150 beats per  Minute. Cont toprol XL 25 po qd  Cont   apixaban 5 po bid   The risk and benefit of OA well explained including ICH and pat agreed to be on OA    Sob on exertion and new atr fib with cvr  The  echo and lexiscan nuc result reviewed and d/w the pat  No more leovnox due to intolerance    Mod MR- and Mod AR  The F/u echo 2020- mild to Mod AR and MR  Stable     Ascending aortic aneurysm 4.1 CM by CTA jan 2019 and 4.5 cm by echo  F/u CTA  In 6 months  Stop hctz 12.5 po qd  Increase lopressor 25 po bid  Skip the dose for HR < 60 ( pat has bp ci=uff and can check BP and HR prior to lopressor)    Hypertension, on medical treatment. Seems to be under good control. Patient is compliant with medical treatment. Home  to 145 mmhg    CKD III and get better after Off  hctz 12.5 po qd  Hydration  BMP next visit    Hx of Dizziness  On standing at times -resolved after stop HCTZ and Hydration    D/w the pat at length the plan of care    Discussed use, benefit, and side effects of prescribed medications. All patient questions answered. Pt voiced understanding. Instructed to continue current medications, diet and exercise. Continue risk factor modification and medical management. Patient agreed with treatment plan. Follow up as directed.     Doing well and stable    Lipid panel and liver function test before next appointment      RTC in 6 months      [facility]

## 2021-07-28 ENCOUNTER — HOSPITAL ENCOUNTER (OUTPATIENT)
Dept: NON INVASIVE DIAGNOSTICS | Age: 86
Discharge: HOME | End: 2021-07-28
Payer: MEDICARE

## 2021-07-28 DIAGNOSIS — I48.0 PAF (PAROXYSMAL ATRIAL FIBRILLATION) (HCC): ICD-10-CM

## 2021-07-28 DIAGNOSIS — I34.0 MODERATE MITRAL REGURGITATION: ICD-10-CM

## 2021-07-28 DIAGNOSIS — I35.1 MODERATE AORTIC REGURGITATION: ICD-10-CM

## 2021-07-28 LAB
LV EF: 60 %
LVEF MODALITY: NORMAL

## 2021-07-28 PROCEDURE — 93306 TTE W/DOPPLER COMPLETE: CPT

## 2021-10-06 ENCOUNTER — TELEPHONE (OUTPATIENT)
Dept: FAMILY MEDICINE CLINIC | Age: 86
End: 2021-10-06

## 2021-10-06 NOTE — TELEPHONE ENCOUNTER
----- Message from Cecil [id number] sent at 10/6/2021 11:03 AM EDT -----  Subject: Message to Provider    QUESTIONS  Information for Provider? Patient was trying to see Dr. Sushil Bonilla sooner, was   trying to get his blood pressure checked and was wondering if he is taking   too much medicine.   ---------------------------------------------------------------------------  --------------  CALL BACK INFO  What is the best way for the office to contact you? OK to leave message on   voicemail  Preferred Call Back Phone Number? [phone]  ---------------------------------------------------------------------------  --------------  SCRIPT ANSWERS  Relationship to Patient?  Self

## 2021-10-07 ENCOUNTER — OFFICE VISIT (OUTPATIENT)
Dept: FAMILY MEDICINE CLINIC | Age: 86
End: 2021-10-07
Payer: MEDICARE

## 2021-10-07 VITALS
RESPIRATION RATE: 14 BRPM | BODY MASS INDEX: 21.64 KG/M2 | DIASTOLIC BLOOD PRESSURE: 72 MMHG | TEMPERATURE: 97.9 F | SYSTOLIC BLOOD PRESSURE: 132 MMHG | HEART RATE: 78 BPM | OXYGEN SATURATION: 98 % | HEIGHT: 70 IN | WEIGHT: 151.2 LBS

## 2021-10-07 DIAGNOSIS — I10 ESSENTIAL HYPERTENSION: Primary | ICD-10-CM

## 2021-10-07 DIAGNOSIS — G89.29 CHRONIC MIDLINE LOW BACK PAIN WITHOUT SCIATICA: ICD-10-CM

## 2021-10-07 DIAGNOSIS — G89.4 CHRONIC PAIN DISORDER: ICD-10-CM

## 2021-10-07 DIAGNOSIS — K59.01 SLOW TRANSIT CONSTIPATION: ICD-10-CM

## 2021-10-07 DIAGNOSIS — M54.50 CHRONIC MIDLINE LOW BACK PAIN WITHOUT SCIATICA: ICD-10-CM

## 2021-10-07 PROCEDURE — G8484 FLU IMMUNIZE NO ADMIN: HCPCS | Performed by: FAMILY MEDICINE

## 2021-10-07 PROCEDURE — 1036F TOBACCO NON-USER: CPT | Performed by: FAMILY MEDICINE

## 2021-10-07 PROCEDURE — G8427 DOCREV CUR MEDS BY ELIG CLIN: HCPCS | Performed by: FAMILY MEDICINE

## 2021-10-07 PROCEDURE — 4040F PNEUMOC VAC/ADMIN/RCVD: CPT | Performed by: FAMILY MEDICINE

## 2021-10-07 PROCEDURE — G8420 CALC BMI NORM PARAMETERS: HCPCS | Performed by: FAMILY MEDICINE

## 2021-10-07 PROCEDURE — 99214 OFFICE O/P EST MOD 30 MIN: CPT | Performed by: FAMILY MEDICINE

## 2021-10-07 PROCEDURE — 1123F ACP DISCUSS/DSCN MKR DOCD: CPT | Performed by: FAMILY MEDICINE

## 2021-10-07 RX ORDER — AMLODIPINE BESYLATE 5 MG/1
5 TABLET ORAL DAILY
Qty: 90 TABLET | Refills: 3 | Status: SHIPPED | OUTPATIENT
Start: 2021-10-07 | End: 2022-05-31

## 2021-10-07 SDOH — ECONOMIC STABILITY: FOOD INSECURITY: WITHIN THE PAST 12 MONTHS, THE FOOD YOU BOUGHT JUST DIDN'T LAST AND YOU DIDN'T HAVE MONEY TO GET MORE.: NEVER TRUE

## 2021-10-07 SDOH — ECONOMIC STABILITY: FOOD INSECURITY: WITHIN THE PAST 12 MONTHS, YOU WORRIED THAT YOUR FOOD WOULD RUN OUT BEFORE YOU GOT MONEY TO BUY MORE.: NEVER TRUE

## 2021-10-07 ASSESSMENT — ENCOUNTER SYMPTOMS
WHEEZING: 0
BACK PAIN: 1
NAUSEA: 0
ABDOMINAL PAIN: 0
RHINORRHEA: 0
COUGH: 0
DIARRHEA: 0
CONSTIPATION: 1
SORE THROAT: 0
SHORTNESS OF BREATH: 0

## 2021-10-07 ASSESSMENT — SOCIAL DETERMINANTS OF HEALTH (SDOH): HOW HARD IS IT FOR YOU TO PAY FOR THE VERY BASICS LIKE FOOD, HOUSING, MEDICAL CARE, AND HEATING?: NOT HARD AT ALL

## 2021-10-07 NOTE — TELEPHONE ENCOUNTER
Pt called into the office stating that he takes Amlodipine 10mg daily. Pt does have some medication left at home and is ok with cutting them in half. Pt stated if you want to send a new script to the [place] that is ok or he can use up his refills.  Please advise    Call pt to let him know at [phone]

## 2021-10-07 NOTE — PROGRESS NOTES
[address] [place] [address] DR. Lambert [id number]  Dept: [address]: [address] [facility] Chio (:  1934) is a 80 y.o. male, here for evaluation of the following chief complaint(s):  6 Month Follow-Up      ASSESSMENT/PLAN:  1. Essential hypertension  2. Slow transit constipation  3. Chronic pain disorder  4. Chronic midline low back pain without sciatica  Currently taking 10 mg of amlodipine, will decrease to 5 mg. Continue with MiraLAX for constipation. Patient will have fasting labs done at the health [facility] this weekend. Follows up with the [place] for pain control. No follow-ups on file. SUBJECTIVE/OBJECTIVE:  Patient presents with complaints of low blood pressure. Mentions that in the evening, his blood pressure will be as low as 50 diastolic. He does sometimes feel fatigued or lightheaded when this occurs. He has not had any falls. Upon review of meds, it looks as if patient should be taking only half a tablet of amlodipine 10 mg but he believes he is taking a full tablet. He also has chronic pain and he follows up with the [place] for this. He does have difficulty sleeping related to the pain but does take pain pills which help. He denies any chest pain or shortness of breath. He does get constipation but states MiraLAX helps with this. Review of Systems   Constitutional: Negative for chills, fatigue and fever. HENT: Negative for congestion, rhinorrhea and sore throat. Respiratory: Negative for cough, shortness of breath and wheezing. Cardiovascular: Negative for chest pain and palpitations. Gastrointestinal: Positive for constipation. Negative for abdominal pain, diarrhea and nausea. Genitourinary: Negative for dysuria and hematuria. Musculoskeletal: Positive for arthralgias, back pain and gait problem. Negative for myalgias.    Neurological: Positive for weakness and light-headedness. Negative for dizziness and headaches. Psychiatric/Behavioral: Positive for sleep disturbance. The patient is not nervous/anxious. Physical Exam  Vitals and nursing note reviewed. Constitutional:       Appearance: He is well-developed. HENT:      Head: Normocephalic and atraumatic. Eyes:      General: No scleral icterus. Right eye: No discharge. Left eye: No discharge. Conjunctiva/sclera: Conjunctivae normal.   Cardiovascular:      Rate and Rhythm: Normal rate and regular rhythm. Heart sounds: Normal heart sounds. Pulmonary:      Effort: Pulmonary effort is normal.      Breath sounds: Normal breath sounds. No wheezing. Skin:     General: Skin is warm and dry. Neurological:      Mental Status: He is alert and oriented to person, place, and time. Mental status is at baseline. Psychiatric:         Behavior: Behavior normal.         Thought Content: Thought content normal.         Judgment: Judgment normal.         Vitals:    10/07/21 1015   BP: 132/72   Pulse: 78   Resp: 14   Temp: 97.9 °F (36.6 °C)   SpO2: 98%              An electronic signature was used to authenticate this note.     --Sherlyn Love MD

## 2021-10-07 NOTE — TELEPHONE ENCOUNTER
Yes, he can cut 10mg tabs in half, but I printed new script for 5mg, please fax to [place]. thanks

## 2021-10-11 LAB
ALBUMIN SERPL-MCNC: 4.5 G/DL
ALP BLD-CCNC: 53 U/L
ALT SERPL-CCNC: 16 U/L
ANION GAP SERPL CALCULATED.3IONS-SCNC: 1.6 MMOL/L
AST SERPL-CCNC: 21 U/L
BILIRUB SERPL-MCNC: 0.8 MG/DL (ref 0.1–1.4)
BUN BLDV-MCNC: 29 MG/DL
CALCIUM SERPL-MCNC: 9.6 MG/DL
CHLORIDE BLD-SCNC: 102 MMOL/L
CO2: 29 MMOL/L
CREAT SERPL-MCNC: 1.3 MG/DL
GFR CALCULATED: 52
GLUCOSE BLD-MCNC: 104 MG/DL
POTASSIUM SERPL-SCNC: 4 MMOL/L
SODIUM BLD-SCNC: 138 MMOL/L
TOTAL PROTEIN: 7.3

## 2021-10-12 ENCOUNTER — TELEPHONE (OUTPATIENT)
Dept: FAMILY MEDICINE CLINIC | Age: 86
End: 2021-10-12

## 2021-10-12 DIAGNOSIS — R11.0 NAUSEA: Primary | ICD-10-CM

## 2021-10-12 RX ORDER — ONDANSETRON 4 MG/1
4 TABLET, FILM COATED ORAL 3 TIMES DAILY PRN
Qty: 30 TABLET | Refills: 0 | Status: SHIPPED | OUTPATIENT
Start: 2021-10-12 | End: 2022-04-04 | Stop reason: SDUPTHER

## 2021-10-12 NOTE — TELEPHONE ENCOUNTER
Pt called is asking if there is something you can order or something OTC he can get for his nausea and upset stomach. States he is taking the miralax as directed and that has helped his bowel problem, but still has frequent nausea and stomach upset. Is traveling to see family in [facility] so would like something to alleviate the nausea. hiram youngblood    [phone] Abiodun  Please call to let him know what he is to do.

## 2021-10-22 ENCOUNTER — HOSPITAL ENCOUNTER (OUTPATIENT)
Age: 86
Discharge: HOME | End: 2021-10-22
Payer: MEDICARE

## 2021-10-22 DIAGNOSIS — C61 PROSTATE CANCER (HCC): ICD-10-CM

## 2021-10-22 PROCEDURE — 36415 COLL VENOUS BLD VENIPUNCTURE: CPT

## 2021-10-22 PROCEDURE — 84153 ASSAY OF PSA TOTAL: CPT

## 2021-10-23 LAB — PROSTATE SPECIFIC ANTIGEN: 0.04 NG/ML (ref 0–1)

## 2021-10-26 ENCOUNTER — HOSPITAL ENCOUNTER (EMERGENCY)
Age: 86
Discharge: HOME | End: 2021-10-26
Attending: FAMILY MEDICINE
Payer: MEDICARE

## 2021-10-26 VITALS
SYSTOLIC BLOOD PRESSURE: 150 MMHG | TEMPERATURE: 97.5 F | RESPIRATION RATE: 18 BRPM | OXYGEN SATURATION: 96 % | HEART RATE: 70 BPM | DIASTOLIC BLOOD PRESSURE: 65 MMHG

## 2021-10-26 DIAGNOSIS — I10 ESSENTIAL HYPERTENSION: ICD-10-CM

## 2021-10-26 DIAGNOSIS — S01.502A TONGUE WOUND, INITIAL ENCOUNTER: Primary | ICD-10-CM

## 2021-10-26 PROCEDURE — 99283 EMERGENCY DEPT VISIT LOW MDM: CPT

## 2021-10-26 PROCEDURE — 2500000003 HC RX 250 WO HCPCS: Performed by: FAMILY MEDICINE

## 2021-10-26 RX ORDER — TRANEXAMIC ACID 100 MG/ML
1000 INJECTION, SOLUTION INTRAVENOUS ONCE
Status: COMPLETED | OUTPATIENT
Start: 2021-10-26 | End: 2021-10-26

## 2021-10-26 RX ADMIN — TRANEXAMIC ACID 1000 MG: 1 INJECTION, SOLUTION INTRAVENOUS at 09:47

## 2021-10-26 ASSESSMENT — ENCOUNTER SYMPTOMS
SHORTNESS OF BREATH: 0
ABDOMINAL PAIN: 0
WHEEZING: 0
DIARRHEA: 0
BACK PAIN: 0
COUGH: 0
SORE THROAT: 0
NAUSEA: 0
VOMITING: 0

## 2021-10-26 NOTE — ED PROVIDER NOTES
[address] [facility]          CHIEF COMPLAINT       Chief Complaint   Patient presents with    Laceration     to tongue       Nurses Notes reviewed and I agree except as noted in the HPI. HISTORY OF PRESENT ILLNESS    Jackie Pryor is a 80 y.o. male who presents for evaluation of tongue wound. Patient states that he was eating potato chips yesterday evening when either he bit his tongue or the chip cause injury. He has had bleeding from a wound in the tongue since yesterday evening. His sleep is disrupted secondary to this. He is taking Eliquis but did not take his morning dose this morning. He does not have much associated pain. No lightheadedness or dizziness. He has had a similar tongue lesion that required suturing in the past, he believes about 2 years ago. REVIEW OF SYSTEMS     Review of Systems   Constitutional: Negative for activity change, appetite change, chills and fever. HENT: Negative for ear pain and sore throat. Tongue wound   Respiratory: Negative for cough, shortness of breath and wheezing. Cardiovascular: Negative for chest pain and leg swelling. Gastrointestinal: Negative for abdominal pain, diarrhea, nausea and vomiting. Genitourinary: Negative for dysuria, flank pain and hematuria. Musculoskeletal: Negative for arthralgias, back pain, gait problem and neck pain. Skin: Negative for rash and wound. Neurological: Negative for weakness, light-headedness and headaches. Psychiatric/Behavioral: Negative for agitation and hallucinations. The patient is not nervous/anxious. PAST MEDICAL HISTORY    has a past medical history of Atrial fibrillation ([address] Back pain, Hernia, Hx of blood clots, Hyperlipidemia, Hypertension, Osteoarthritis, Prostate cancer ([address] Prostate cancer ([address] Pulmonary emboli ([address] and Wears glasses.     SURGICAL HISTORY      has a past surgical history that includes back surgery (6902,1956); Total shoulder arthroplasty (); Prostate surgery (); Prostate surgery (); Prostate Biopsy (2012); Cataract removal with implant (Left, 2014); Prostate Biopsy (2015); hernia repair (, ); other surgical history (10/7/2015); other surgical history (Bilateral, 2017); Nerve Block Lumb Facet Level 1 Bilateral (Bilateral, 2017); hernia repair (1332'I?); Cataract removal (Right, 2001); Appendectomy (1961); hernia repair (Left, 2017); other surgical history (Right, ); other surgical history (2018); pr inject si joint arthrgrphy&/anes/steroid w/image (Left, 2018); other surgical history (Left, 2018); pr inject si joint arthrgrphy&/anes/steroid w/image (Left, 2018); Nerve Surgery (Left, 2018); pr office/outpt visit,procedure only (Left, 2018); pr office/outpt visit,procedure only (N/A, 2018); pr implant neurostim/ (N/A, 2018); and eye surgery. CURRENT MEDICATIONS       Previous Medications    AMLODIPINE (NORVASC) 5 MG TABLET    Take 1 tablet by mouth daily    APIXABAN (ELIQUIS) 5 MG TABS TABLET    Take 1 tablet by mouth 2 times daily    ASPIRIN 81 MG TABLET    Take 81 mg by mouth daily    CLOTRIMAZOLE-BETAMETHASONE (LOTRISONE) 1-0.05 % CREAM    Apply topically 2 times daily. PRN    HYDROCODONE-ACETAMINOPHEN (NORCO) 5-325 MG PER TABLET    Take 1 tablet by mouth every 6 hours as needed for Pain. METOPROLOL TARTRATE (LOPRESSOR) 25 MG TABLET    Take 1 tablet by mouth 2 times daily    MULTIPLE VITAMINS-MINERALS (PRESERVISION AREDS 2 PO)    Take 1 tablet by mouth 2 times daily    ONDANSETRON (ZOFRAN) 4 MG TABLET    Take 1 tablet by mouth 3 times daily as needed for Nausea or Vomiting    VITAMIN D PO    Take by mouth       ALLERGIES     is allergic to nitrofuran derivatives and nsaids. FAMILY HISTORY     He indicated that his mother is . He indicated that his father is .  He indicated that only one of his two sisters is alive. He indicated that only one of his four brothers is alive. family history includes Cancer in his brother, brother, and sister; Heart Disease in his father; Prostate Cancer in his brother, brother, brother, and brother; Stroke in his mother. SOCIAL HISTORY      reports that he quit smoking about 56 years ago. His smoking use included cigarettes. He has a 10.00 pack-year smoking history. He has never used smokeless tobacco. He reports current alcohol use. He reports that he does not use drugs. PHYSICAL EXAM     INITIAL VITALS:  temporal temperature is 97.5 °F (36.4 °C). His blood pressure is 150/65 (abnormal) and his pulse is 70. His respiration is 18 and oxygen saturation is 96%. Physical Exam  Vitals and nursing note reviewed. Constitutional:       General: He is not in acute distress. Appearance: He is not diaphoretic. HENT:      Head: Normocephalic and atraumatic. Mouth/Throat:      Comments: No definite laceration noted to the central tongue. .  Patient has a skin tear of the tongue with underlying hematoma and clotted blood. There is some oozing from the area. Cardiovascular:      Rate and Rhythm: Normal rate and regular rhythm. Heart sounds: Normal heart sounds. Pulmonary:      Effort: Pulmonary effort is normal.      Breath sounds: Normal breath sounds. Abdominal:      General: Bowel sounds are normal. There is no distension. Palpations: Abdomen is soft. Tenderness: There is no abdominal tenderness. Lymphadenopathy:      Cervical: No cervical adenopathy. Skin:     General: Skin is warm and dry. Neurological:      Mental Status: He is alert.          DIFFERENTIAL DIAGNOSIS:   Tongue wound, laceration, skin tear    DIAGNOSTIC RESULTS       LABS:   Labs Reviewed - No data to display    DEPARTMENT COURSE:   Vitals:    Vitals:    10/26/21 0908 10/26/21 1108   BP: (!) 172/81 (!) 150/65   Pulse: 75 70   Resp: 18    Temp: 97.5 °F (36.4 °C)    TempSrc: Temporal    SpO2: 96%        MDM:  Patient presents for evaluation of bleeding wound to his tongue. TXA was applied topically and injected about the region in addition to application of direct pressure. Bleeding eventually stopped. Wound care instructions were reviewed. No visible open wound was noted so suturing was not performed. Patient recommend follow-up with ENT or return to the department for any symptom worsening or for evaluation of new concerning symptoms. He will hold his Eliquis until tomorrow evening. Patient blood pressure was noted to be elevated while in the department. He will continue use of his antihypertensive medications and follow-up with his PCP. CRITICAL CARE:   None    CONSULTS:  None    PROCEDURES:  A total of 2 ml of TXA was injected into and around patient's tongue central wound. Gauze impregnated with TXA was also used topically. Patient tolerated the procedure and bleeding did eventually stop. FINAL IMPRESSION      1. Tongue wound, initial encounter    2. Essential hypertension          DISPOSITION/PLAN   Discharge    PATIENT REFERRED TO:  Ryanne Waters MD  [facility].  [facility]    Schedule an appointment as soon as possible for a visit   As needed      DISCHARGEMEDICATIONS:  New Prescriptions    No medications on file       (Please note that portions of this note were completedwith a voice recognition program.  Efforts were made to edit the dictations but occasionally words are mis-transcribed.)    MD Karime Wong MD  10/26/21 7176

## 2021-10-26 NOTE — ED NOTES
No bleeding noted from pt's tongue. Pt given discharge instructions. Verbalized understanding and home care. Pt left ambulatory per self. Pt in stable condition.       Yeni Castillo RN  10/26/21 0475

## 2021-10-26 NOTE — ED NOTES
Patient was eating last evening and bite a potato chip that he feels cut his tongue. Patient has had persistent bleeding since then. Did not take his Eliquis this morning. Patient is alert and cooperative. Skin warm and dry. Color normal for ethnicity.      Beba Zuniga RN  10/26/21 1578

## 2021-10-28 ENCOUNTER — TELEPHONE (OUTPATIENT)
Dept: FAMILY MEDICINE CLINIC | Age: 86
End: 2021-10-28

## 2021-11-01 NOTE — TELEPHONE ENCOUNTER
Luciana Zuniga called requesting a refill on the following medications:  Requested Prescriptions     Pending Prescriptions Disp Refills    metoprolol tartrate (LOPRESSOR) 25 MG tablet 60 tablet 7     Sig: Take 1 tablet by mouth 2 times daily     Pharmacy verified:  [facility]      Date of last visit: 07-19-21  Date of next visit (if applicable): 2/42/7140

## 2021-11-02 ENCOUNTER — VIRTUAL VISIT (OUTPATIENT)
Dept: UROLOGY | Age: 86
End: 2021-11-02
Payer: MEDICARE

## 2021-11-02 DIAGNOSIS — C61 PROSTATE CANCER (HCC): Primary | ICD-10-CM

## 2021-11-02 PROCEDURE — 99441 PR PHYS/QHP TELEPHONE EVALUATION 5-10 MIN: CPT | Performed by: NURSE PRACTITIONER

## 2021-11-02 NOTE — PROGRESS NOTES
Sharif Mireles is a 80 y.o. male evaluated via telephone on 11/2/2021. Consent:  He and/or health care decision maker is aware that that he may receive a bill for this telephone service, depending on his insurance coverage, and has provided verbal consent to proceed: Yes      Documentation:  I communicated with the patient and/or health care decision maker about prostate cancer. Lul Arlyn Barroso f/u for Prostate Cancer. He underwent Robotic Assisted Laparoscopic Radical Prostatectomy with Left Pelvic Lymph Node Dissection 10/7/15.  Final pathology [place] score 4+3 = 7, p T2c. Margins-tumor extends to cauterized anterior capsular margin (right anterior, 10 mm from apex). His PSA remains undetectable. He is doing well, still reports a little stress incontinence, not bothersome, not wearing any protection    PSA has been undetectable up until recently. Now 0.04. Repeat in 2-3 months with a follow up. Will get [facility] results, that were done a few months ago. Back pain, taking hydrocodone for it which is helping. No abdominal pain. No unintentional weight loss. Urinary symptoms are stable from last year. Details of this discussion including any medical advice provided: as above      I affirm this is a Patient Initiated Episode with a Patient who has not had a related appointment within my department in the past 7 days or scheduled within the next 24 hours. Patient identification was verified at the start of the visit: Yes    Total Time: minutes: 5-10 minutes    The visit was conducted pursuant to the emergency declaration under the [facility], [address] [place] [address] authority and the [employer] Act. Patient identification was verified, and a caregiver was present when appropriate. The patient was located in a state where the provider was credentialed to provide care.     Note: not billable if this call serves to triage the patient into an appointment for the relevant concern      Shyrl Spurling, APRN - CNP

## 2022-01-03 DIAGNOSIS — L29.9 PRURITUS: ICD-10-CM

## 2022-01-03 RX ORDER — CLOTRIMAZOLE AND BETAMETHASONE DIPROPIONATE 10; .64 MG/G; MG/G
CREAM TOPICAL
Qty: 45 G | Refills: 1 | Status: SHIPPED | OUTPATIENT
Start: 2022-01-03 | End: 2022-07-13

## 2022-01-03 NOTE — TELEPHONE ENCOUNTER
Pt called the office stating that he has misplaced his medication and is needing a new tube sent to the pharmacy.     10/7/2021  4/4/2022    Please advise

## 2022-01-18 ENCOUNTER — OFFICE VISIT (OUTPATIENT)
Dept: CARDIOLOGY CLINIC | Age: 87
End: 2022-01-18
Payer: MEDICARE

## 2022-01-18 VITALS
DIASTOLIC BLOOD PRESSURE: 87 MMHG | BODY MASS INDEX: 21.65 KG/M2 | HEART RATE: 73 BPM | WEIGHT: 154.6 LBS | HEIGHT: 71 IN | SYSTOLIC BLOOD PRESSURE: 143 MMHG

## 2022-01-18 DIAGNOSIS — I48.0 PAF (PAROXYSMAL ATRIAL FIBRILLATION) (HCC): Primary | ICD-10-CM

## 2022-01-18 DIAGNOSIS — I35.1 MODERATE AORTIC REGURGITATION: ICD-10-CM

## 2022-01-18 DIAGNOSIS — I71.21 ASCENDING AORTIC ANEURYSM: ICD-10-CM

## 2022-01-18 DIAGNOSIS — E78.5 HYPERLIPIDEMIA WITH TARGET LDL LESS THAN 130: ICD-10-CM

## 2022-01-18 DIAGNOSIS — I10 ESSENTIAL HYPERTENSION: ICD-10-CM

## 2022-01-18 DIAGNOSIS — I34.0 MODERATE MITRAL REGURGITATION: ICD-10-CM

## 2022-01-18 PROCEDURE — 4040F PNEUMOC VAC/ADMIN/RCVD: CPT | Performed by: INTERNAL MEDICINE

## 2022-01-18 PROCEDURE — 99214 OFFICE O/P EST MOD 30 MIN: CPT | Performed by: INTERNAL MEDICINE

## 2022-01-18 PROCEDURE — G8427 DOCREV CUR MEDS BY ELIG CLIN: HCPCS | Performed by: INTERNAL MEDICINE

## 2022-01-18 PROCEDURE — 1123F ACP DISCUSS/DSCN MKR DOCD: CPT | Performed by: INTERNAL MEDICINE

## 2022-01-18 PROCEDURE — G8420 CALC BMI NORM PARAMETERS: HCPCS | Performed by: INTERNAL MEDICINE

## 2022-01-18 PROCEDURE — G8484 FLU IMMUNIZE NO ADMIN: HCPCS | Performed by: INTERNAL MEDICINE

## 2022-01-18 PROCEDURE — 93000 ELECTROCARDIOGRAM COMPLETE: CPT | Performed by: INTERNAL MEDICINE

## 2022-01-18 PROCEDURE — 1036F TOBACCO NON-USER: CPT | Performed by: INTERNAL MEDICINE

## 2022-01-18 NOTE — PROGRESS NOTES
Chief Complaint   Patient presents with    6 Month Follow-Up       Pt is here for: 6 month follow up     EKG was done today: 01/18/2022    Palpitations once in a blue moon    Denied cp,  or edema    Dizziness on standing on walk and standing quick- better    Sob on exertion- stable    Walk 2 to 3 block METS> 4    Nonsmoker    FHX  Had pacer    Hx of PE in the lung post op and had been on OA for 6 month and off it over one back      Patient Active Problem List   Diagnosis    Prostate cancer    Generalized anxiety disorder    Essential hypertension    Hyperlipidemia with target LDL less than 130    Back pain, chronic s/p surgery    History of pulmonary embolus (PE)    Spondylosis of lumbar region without myelopathy or radiculopathy    Persistent atrial fibrillation ([address]- newely DXed 10/12/17- CVR    Non-rheumatic mitral regurgitation- mod to severe    Moderate aortic regurgitation    Failed back syndrome of lumbar spine    Chronic pain disorder    Pneumothorax    Constipation    Ascending aortic aneurysm (HCC) 4.1 cm on CTA and 4.5 on echo    Nonexudative age-related macular degeneration    Secondary pigmentary retinal degeneration    Venous retinal branch occlusion    PAF (paroxysmal atrial fibrillation) ([address]    Moderate mitral regurgitation       Past Surgical History:   Procedure Laterality Date    APPENDECTOMY  01/1961    [facility]    BACK SURGERY  [id number]    X stop    CATARACT REMOVAL Right 06/2001    Dr Olman Spear Left 11/2014    Dr Jos Quintana, 2009    Dr Yamila Au  7758'F?     Dr Javan Kumar of date    HERNIA REPAIR Left 11/22/2017    ROBOT RECURRENT LEFT INGUINAL HERNIA REPAIR performed by Merari Remy MD at [facility] FACET LEVEL 1 BILATERAL Bilateral 9/5/2017    LUMBAR FACET MBB L3-4, L4-5, L5-S1 BILATERAL performed by Arnulfo Rodriguez MD at [facility] OR    NERVE SURGERY Left 04/09/2018    HIP INJECTION     OTHER SURGICAL HISTORY  10/7/2015    XI ROBOTIC PROSTATECTOMY    OTHER SURGICAL HISTORY Bilateral 09/05/2017    lumbar facet block L3-S1    OTHER SURGICAL HISTORY Right 1980's    nerve release right lower arm    OTHER SURGICAL HISTORY  01/30/2018    Sacroiliac joint MBB    OTHER SURGICAL HISTORY Left 02/27/2018    Sacroiliac joint medial branch block     HI IMPLANT NEUROSTIM/ N/A 9/17/2018    THORACIC LAMINECTOMY PERMANENT SPINAL CORD STIMULATOR PADDLE PLUS BATTERY PLACEMENT [facility] ARTHRGRPHY&/ANES/STEROID W/IMAGE Left 1/30/2018    LEFT SI MBB performed by Kisha Cheema MD at [address] INJECT SI JOINT ARTHRGRPHY&/ANES/STEROID W/IMAGE Left 2/27/2018    LEFT SI MBB #2 performed by Kisha Cheema MD at [address] OFFICE/OUTPT VISIT,PROCEDURE ONLY Left 4/9/2018    LEFT HIP STEROID INJECTION WITH SEDATION performed by Kisha Cheema MD at [address] OFFICE/OUTPT [facility] N/A 8/21/2018    SPINAL CORD STIMULATOR IMPLANT TRIAL performed by Kisha Cheema MD at [address] [facility] BIOPSY  7-    Dr Goncalves Numbers BIOPSY  8/21/2015    transrectal ultrasound with biopsy(+)    PROSTATE SURGERY  2003    TURP    PROSTATE SURGERY  2009    Biopsy -Hyperplasia    SHOULDER ARTHROPLASTY  2005    right       Allergies   Allergen Reactions    Nitrofuran Derivatives Nausea Only     Severe stomach cramps    Nsaids Nausea Only and Other (See Comments)     Pain in stomach        Family History   Problem Relation Age of Onset    Heart Disease Father     Prostate Cancer Brother     Cancer Brother     Prostate Cancer Brother     Cancer Brother     Stroke Mother     Cancer Sister     Prostate Cancer Brother     Prostate Cancer Brother         Social History     Socioeconomic History    Marital status:      Spouse name: Bryan Angel Number of children: 3    Years of education: Not on file    Highest education level: Not on file   Occupational History    Not on file   Tobacco Use    Smoking status: Former Smoker     Packs/day: 1.00     Years: 10.00     Pack years: 10.00     Types: Cigarettes     Quit date: 7/3/1965     Years since quittin.5    Smokeless tobacco: Never Used   Vaping Use    Vaping Use: Never used   Substance and Sexual Activity    Alcohol use: Yes     Alcohol/week: 0.0 standard drinks     Comment: SOCIALLY wine a few times a year     Drug use: No    Sexual activity: Not Currently   Other Topics Concern    Not on file   Social History Narrative    Not on file     Social Determinants of Health     Financial Resource Strain: Low Risk     Difficulty of Paying Living Expenses: Not hard at all   Food Insecurity: No Food Insecurity    Worried About [address] [employer] in the Last Year: Never true    [facility] in the Last Year: Never true   Transportation Needs:     Lack of Transportation (Medical): Not on file    Lack of Transportation (Non-Medical):  Not on file   Physical Activity:     Days of Exercise per Week: Not on file    Minutes of Exercise per Session: Not on file   Stress:     Feeling of Stress : Not on file   Social Connections:     Frequency of Communication with Friends and Family: Not on file    Frequency of Social Gatherings with Friends and Family: Not on file    Attends [employer] Services: Not on file    Active Member of Clubs or Organizations: Not on file    Attends Club or Organization Meetings: Not on file    Marital Status: Not on file   Intimate Partner Violence:     Fear of Current or Ex-Partner: Not on file    Emotionally Abused: Not on file    Physically Abused: Not on file    Sexually Abused: Not on file   Housing Stability:     Unable to Pay for Housing in the Last Year: Not on file    [facility]: Not on file    Unstable Housing in the Last Year: Not on file       Current Outpatient Medications   Medication Sig Dispense Refill    clotrimazole-betamethasone (LOTRISONE) 1-0.05 % cream Apply topically 2 times daily. PRN 45 g 1    metoprolol tartrate (LOPRESSOR) 25 MG tablet Take 1 tablet by mouth 2 times daily (Patient taking differently: Take 12.5 mg by mouth 2 times daily ) 60 tablet 3    ondansetron (ZOFRAN) 4 MG tablet Take 1 tablet by mouth 3 times daily as needed for Nausea or Vomiting 30 tablet 0    amLODIPine (NORVASC) 5 MG tablet Take 1 tablet by mouth daily 90 tablet 3    VITAMIN D PO Take by mouth      apixaban (ELIQUIS) 5 MG TABS tablet Take 1 tablet by mouth 2 times daily 180 tablet 2    aspirin 81 MG tablet Take 81 mg by mouth daily      HYDROcodone-acetaminophen (NORCO) 5-325 MG per tablet Take 1 tablet by mouth every 6 hours as needed for Pain.  Multiple Vitamins-Minerals (PRESERVISION AREDS 2 PO) Take 1 tablet by mouth 2 times daily       No current facility-administered medications for this visit. Review of Systems -     General ROS: negative  Psychological ROS: negative  Hematological and Lymphatic ROS: No history of blood clots or bleeding disorder. Respiratory ROS: no cough, shortness of breath, or wheezing  Cardiovascular ROS: no chest pain or dyspnea on exertion  Gastrointestinal ROS: negative  Genito-Urinary ROS: no dysuria, trouble voiding, or hematuria  Musculoskeletal ROS: negative  Neurological ROS: no TIA or stroke symptoms  Dermatological ROS: negative      Blood pressure (!) 143/87, pulse 73, height 5' 10.5\" (1.791 m), weight 154 lb 9.6 oz (70.1 kg).         Physical Examination:    General appearance - alert, well appearing, and in no distress  Mental status - alert, oriented to person, place, and time  Neck - supple, no significant adenopathy, no JVD, or carotid bruits  Chest - clear to auscultation, no wheezes, rales or rhonchi, symmetric air entry  Heart - normal rate, regular rhythm, normal S1, S2, , rubs, clicks or gallops but +ve murmurs  Abdomen - soft, nontender, nondistended, no masses or organomegaly  Neurological - alert, oriented, normal speech, no focal findings or movement disorder noted  Musculoskeletal - no joint tenderness, deformity or swelling  Extremities - peripheral pulses normal, no pedal edema, no clubbing or cyanosis  Skin - normal coloration and turgor, no rashes, no suspicious skin lesions noted    Lab  No results for input(s): CKTOTAL, CKMB, CKMBINDEX, TROPONINI in the last 72 hours.   CBC:   Lab Results   Component Value Date    WBC 10.6 10/05/2020    RBC 4.65 10/05/2020     08/26/2011    HGB 15.1 10/05/2020    HCT 47.1 10/05/2020    .3 10/05/2020    MCH 32.5 10/05/2020    MCHC 32.1 10/05/2020    RDW 12.3 06/30/2018     10/05/2020    MPV 12.8 10/05/2020     BMP:    Lab Results   Component Value Date     10/11/2021    K 4.0 10/11/2021     10/11/2021    CO2 29 10/11/2021    BUN 29 10/11/2021    LABALBU 4.5 10/11/2021    CREATININE 1.3 10/11/2021    CALCIUM 9.6 10/11/2021    LABGLOM 52 10/11/2021    LABGLOM 63 02/08/2021    GLUCOSE 104 10/11/2021    GLUCOSE 89 05/14/2016     Hepatic Function Panel:    Lab Results   Component Value Date    ALKPHOS 53 10/11/2021    ALT 16 10/11/2021    AST 21 10/11/2021    PROT 7.4 10/05/2020    BILITOT 0.8 10/11/2021    BILIDIR <0.2 09/21/2018    LABALBU 4.5 10/11/2021     Magnesium:    Lab Results   Component Value Date    MG 2.3 02/08/2021     Warfarin PT/INR:  No components found for: PTPATWAR, PTINRWAR  HgBA1c:  No results found for: LABA1C  FLP:    Lab Results   Component Value Date    TRIG 62 10/05/2020    HDL 73 10/05/2020    LDLCALC 106 10/05/2020     TSH:    Lab Results   Component Value Date    TSH 3.380 07/24/2018       EKG 10/12/17  Atrial fibrillation  Septal infarct , age undetermined  Abnormal ECG  When compared with ECG of 06-NOV-2015 22:29,  Atrial fibrillation has replaced Sinus rhythm  Nonspecific T wave abnormality now evident in Inferior leads  Confirmed by Lloyd Cooper MD, Shar Maze ([id number]) on 10/12/2017 8:03:16 PM      Conclusions      Summary   Normal left ventricle size and systolic function. Ejection fraction was   estimated at 60 to 65 %. There were no regional left ventricular wall   motion abnormalities and wall thickness was within normal limits.   The left atrium is Moderately dilated.   Moderate to severly enlarged right atrium size.   Moderate-to-severe mitral regurgitation with centrally directed jet.   Moderate aortic regurgitation is noted.      Signature      ----------------------------------------------------------------   Electronically signed by Valery Sanchez MD (Interpreting   physician) on 10/26/2017 at 12:55 PM   ----------------------------------------------------------------    Nuc  Stress neg      CONCLUSION:  This is an complete atrial fibrillation with average  heart rate of 91 beats per minute ranging from 47 to 150 beats per  minute. No significant pause of more than 2.3 second noted. Rare  ventricular ectopic beat total of 277, predominantly isolated, few  couplets, few ventricular bigeminy. No supraventricular tachycardia. No other form of arrhythmia noted. The AFib seems to be rate well  controlled. The patient at times  gets AFib with rapid ventricular  response. Average heart rate is 91 beats per minute, so the patient  may benefit from increasing the dose of AV jason blocking agent.     Thank you.           BENJAMIN Cooper M.D.     D: 11/02/2017 18:04:27            Conclusions      Summary   Normal left ventricle size and systolic function. Ejection fraction was   estimated at 60 to 65 %.  There were no regional left ventricular wall   motion abnormalities and wall thickness was within normal limits.   The left atrium is Moderately dilated.   Moderate to severly enlarged right atrium size.   Moderate-to-severe mitral regurgitation with centrally directed jet.   Moderate aortic regurgitation is noted.      Signature      ----------------------------------------------------------------   Electronically signed by Fatuma Viveros MD (Interpreting   physician) on 10/26/2017 at 12:55 PM       Conclusions      Summary   Normal left ventricle size and systolic function.   Ejection fraction was estimated at 65 %.   There were no regional left ventricular wall motion abnormalities and wall   thickness was within normal limits.   The left atrium is Moderately to severely dilated.   MILD TO Moderately enlarged right atrium size.   Moderate aortic regurgitation is noted.   Mild to Moderate mitral regurgitation is present.   Aortic aneurysm noted in the ascending aorta .   Aortic aneurysm measures 4.5 CM .   CONSIDER CTA FOR BETTER EVALUATION OF THE ASCENDING AORTIC ANEURYSM      Signature      ----------------------------------------------------------------   Electronically signed by Fatuma Viveros MD (Interpreting   physician) on 01/11/2019 at 09:20 PM       Conclusions      Summary   Normal left ventricle size and systolic function. Ejection fraction was estimated at 60 %. There were no regional left ventricular wall motion abnormalities and wall   thickness was within normal limits. The left atrium is Moderately dilated. Mild to Moderately enlarged right atrium size. Mild to Moderate mitral regurgitation is present. Mild-to-moderate aortic regurgitation is   Signature      ----------------------------------------------------------------   Electronically signed by Fatuma Viveros MD (Interpreting   physician) on 01/15/2020 at 06:54 PM         Conclusions      Summary   Normal left ventricle size and systolic function. Ejection fraction was   estimated at 60 %. There were no regional left ventricular wall motion   abnormalities and wall thickness was within normal limits.    Doppler parameters were consistent with abnormal left ventricular   relaxation (grade 1 diastolic dysfunction). The left atrium is Mildly dilated. Dilation of the aortic root with size of 4.1 cm  Mild AR and MILD MR      Signature      ----------------------------------------------------------------   Electronically signed by Kathy Morales MD (Interpreting   physician) on 07/28/2021 at 04:45 PM   ----------------------------------------------------------------      CTA Jan 2019     The aorta is minimally dilated measuring 4.1 cm within the ascending aorta. It is ectatic. Descending aorta measures 3.2 cm     ekg 1/13/20  Atr flutter with CVR and variable av block    ekg 1/18/21  Sinus  Rhythm   WITHIN NORMAL LIMITS    ekg 1/18/22  Sinus  Rhythm  - occasional PAC     # PACs = 1.  WITHIN NORMAL LIMITS          Assessment     Diagnosis Orders   1. PAF (paroxysmal atrial fibrillation) (HCC)  EKG 12 Lead   2. Essential hypertension  EKG 12 Lead   3. Hyperlipidemia with target LDL less than 130  EKG 12 Lead   4. Moderate mitral regurgitation  EKG 12 Lead   5. Moderate aortic regurgitation  EKG 12 Lead   6. Ascending aortic aneurysm (HCC)  EKG 12 Lead         Plan   The  current meds and labs reviewed    Continue the current treatment and with constant vigilance to changes in symptoms and also any potential side effects. Return for care or seek medical attention immediately if symptoms got worse and/or develop new symptoms. Parox  atr FIB-CVR./PAFlutter  chads vasc 3  Cont lopressor and OAc  Need OA  Holter 48 hrs-Average heart rate of 91 beats per minute ranging from 47 to 150 beats per  Minute.   Cont   apixaban 5 po bid   The risk and benefit of OA well explained including ICH and pat agreed to be on OA    Sob on exertion and new atr fib with cvr  The  echo and lexiscan nuc result reviewed and d/w the pat  No more leovnox due to intolerance    Mod MR- and Mod AR  The F/u echo 2020- mild to Mod AR and MR  Stable     Ascending aortic aneurysm 4.1 CM by CTA jan 2019 and 4.5 cm by echo  F/u CTA  In 6 months  Off  hctz 12.5 po qd  Cont  lopressor 12.5 po bid  Skip the dose for HR < 60 ( pat has bp ci=uff and can check BP and HR prior to lopressor)    Hypertension, on medical treatment. Seems to be under good control. Patient is compliant with medical treatment. Home  to 145 mmhg    CKD III and get better after Off  hctz 12.5 po qd  Hydration  BMP next visit    Hx of Dizziness  On standing at times -resolved after stop HCTZ and Hydration    D/w the pat at length the plan of care    Georgi Aly is Doing well and stable    Lipid panel and liver function test before next appointment    Discussed use, benefit, and side effects of prescribed medications. All patient questions answered. Pt voiced understanding. Instructed to continue current medications, diet and exercise. Continue risk factor modification and medical management. Patient agreed with treatment plan. Follow up as directed.       RTC in 6 months      [facility]

## 2022-01-19 ENCOUNTER — HOSPITAL ENCOUNTER (OUTPATIENT)
Age: 87
Discharge: HOME | End: 2022-01-19
Payer: MEDICARE

## 2022-01-19 DIAGNOSIS — C61 PROSTATE CANCER (HCC): ICD-10-CM

## 2022-01-19 PROCEDURE — 36415 COLL VENOUS BLD VENIPUNCTURE: CPT

## 2022-01-19 PROCEDURE — 84153 ASSAY OF PSA TOTAL: CPT

## 2022-01-20 ENCOUNTER — TELEPHONE (OUTPATIENT)
Dept: UROLOGY | Age: 87
End: 2022-01-20

## 2022-01-20 LAB — PROSTATE SPECIFIC ANTIGEN: 0.05 NG/ML (ref 0–1)

## 2022-02-09 ENCOUNTER — OFFICE VISIT (OUTPATIENT)
Dept: UROLOGY | Age: 87
End: 2022-02-09
Payer: MEDICARE

## 2022-02-09 VITALS
DIASTOLIC BLOOD PRESSURE: 82 MMHG | HEIGHT: 71 IN | BODY MASS INDEX: 21.7 KG/M2 | SYSTOLIC BLOOD PRESSURE: 156 MMHG | WEIGHT: 155 LBS

## 2022-02-09 DIAGNOSIS — C61 PROSTATE CANCER (HCC): Primary | ICD-10-CM

## 2022-02-09 DIAGNOSIS — R31.29 MICROHEMATURIA: ICD-10-CM

## 2022-02-09 LAB
BACTERIA: NORMAL
BILIRUBIN URINE: NEGATIVE
BILIRUBIN URINE: NEGATIVE
BLOOD URINE, POC: NORMAL
BLOOD, URINE: NEGATIVE
CASTS: NORMAL /LPF
CASTS: NORMAL /LPF
CHARACTER, URINE: CLEAR
CHARACTER, URINE: CLEAR
COLOR, URINE: YELLOW
COLOR: YELLOW
CRYSTALS: NORMAL
EPITHELIAL CELLS, UA: NORMAL /HPF
GLUCOSE URINE: NEGATIVE MG/DL
GLUCOSE, URINE: NEGATIVE MG/DL
KETONES, URINE: NEGATIVE
KETONES, URINE: NEGATIVE
LEUKOCYTE CLUMPS, URINE: NEGATIVE
LEUKOCYTE ESTERASE, URINE: NEGATIVE
MISCELLANEOUS LAB TEST RESULT: NORMAL
NITRITE, URINE: NEGATIVE
NITRITE, URINE: NEGATIVE
PH UA: 7 (ref 5–9)
PH, URINE: 7 (ref 5–9)
PROTEIN UA: NEGATIVE MG/DL
PROTEIN, URINE: NEGATIVE MG/DL
RBC URINE: NORMAL /HPF
RENAL EPITHELIAL, UA: NORMAL
SPECIFIC GRAVITY UA: 1.01 (ref 1–1.03)
SPECIFIC GRAVITY, URINE: 1.02 (ref 1–1.03)
UROBILINOGEN, URINE: 0.2 EU/DL (ref 0–1)
UROBILINOGEN, URINE: 0.2 EU/DL (ref 0–1)
WBC UA: NORMAL /HPF
YEAST: NORMAL

## 2022-02-09 PROCEDURE — G8420 CALC BMI NORM PARAMETERS: HCPCS | Performed by: NURSE PRACTITIONER

## 2022-02-09 PROCEDURE — 1036F TOBACCO NON-USER: CPT | Performed by: NURSE PRACTITIONER

## 2022-02-09 PROCEDURE — 4040F PNEUMOC VAC/ADMIN/RCVD: CPT | Performed by: NURSE PRACTITIONER

## 2022-02-09 PROCEDURE — G8427 DOCREV CUR MEDS BY ELIG CLIN: HCPCS | Performed by: NURSE PRACTITIONER

## 2022-02-09 PROCEDURE — 99213 OFFICE O/P EST LOW 20 MIN: CPT | Performed by: NURSE PRACTITIONER

## 2022-02-09 PROCEDURE — G8484 FLU IMMUNIZE NO ADMIN: HCPCS | Performed by: NURSE PRACTITIONER

## 2022-02-09 PROCEDURE — 81003 URINALYSIS AUTO W/O SCOPE: CPT | Performed by: NURSE PRACTITIONER

## 2022-02-09 PROCEDURE — 1123F ACP DISCUSS/DSCN MKR DOCD: CPT | Performed by: NURSE PRACTITIONER

## 2022-02-09 NOTE — PROGRESS NOTES
[id number] [address] [facility] [id number]  Dept: [phone]  Loc: [phone]  Visit Date: 2/9/2022      HPI:     Romy Daveyderic f/u for Prostate Cancer. He underwent Robotic Assisted Laparoscopic Radical Prostatectomy with Left Pelvic Lymph Node Dissection 10/7/15. Final pathology Arlyn score 4+3 = 7, p T2c. Margins-tumor extends to cauterized anterior capsular margin (right anterior, 10 mm from apex). His PSA was undetectable until 7/2021, it was 0.04, and has remained stable this that time. He is doing well, still reports a little stress incontinence, not bothersome, not wearing any protection. Reports more back pain, and using cane now. Has been following orthopedic physician. No unintentional weight loss. No gross hematuria or dysuria. He comes in today by himself. Hx is obtained from the patient and medical record. Current Outpatient Medications   Medication Sig Dispense Refill    clotrimazole-betamethasone (LOTRISONE) 1-0.05 % cream Apply topically 2 times daily. PRN 45 g 1    metoprolol tartrate (LOPRESSOR) 25 MG tablet Take 1 tablet by mouth 2 times daily (Patient taking differently: Take 12.5 mg by mouth 2 times daily ) 60 tablet 3    ondansetron (ZOFRAN) 4 MG tablet Take 1 tablet by mouth 3 times daily as needed for Nausea or Vomiting 30 tablet 0    amLODIPine (NORVASC) 5 MG tablet Take 1 tablet by mouth daily 90 tablet 3    VITAMIN D PO Take by mouth      apixaban (ELIQUIS) 5 MG TABS tablet Take 1 tablet by mouth 2 times daily 180 tablet 2    aspirin 81 MG tablet Take 81 mg by mouth daily      HYDROcodone-acetaminophen (NORCO) 5-325 MG per tablet Take 1 tablet by mouth every 6 hours as needed for Pain.  Multiple Vitamins-Minerals (PRESERVISION AREDS 2 PO) Take 1 tablet by mouth 2 times daily       No current facility-administered medications for this visit.        Past Medical History  Patrice Adrian  has a past medical history of Atrial fibrillation ([address] Back pain, Hernia, Hx of blood clots, Hyperlipidemia, Hypertension, Osteoarthritis, Prostate cancer ([address] Prostate cancer ([address] Pulmonary emboli ([address] and Wears glasses. Past Surgical History  The patient  has a past surgical history that includes back surgery (7953,[id number]); Total shoulder arthroplasty (2005); Prostate surgery (2003); Prostate surgery (2009); Prostate Biopsy (7-); Cataract removal with implant (Left, 11/2014); Prostate Biopsy (8/21/2015); hernia repair (1995, 2009); other surgical history (10/7/2015); other surgical history (Bilateral, 09/05/2017); Nerve Block Lumb Facet Level 1 Bilateral (Bilateral, 9/5/2017); hernia repair ([address]?); Cataract removal (Right, 06/2001); Appendectomy (01/1961); hernia repair (Left, 11/22/2017); other surgical history (Right, 1980's); other surgical history (01/30/2018); pr inject si joint arthrgrphy&/anes/steroid w/image (Left, 1/30/2018); other surgical history (Left, 02/27/2018); pr inject si joint arthrgrphy&/anes/steroid w/image (Left, 2/27/2018); Nerve Surgery (Left, 04/09/2018); pr office/outpt visit,procedure only (Left, 4/9/2018); pr office/outpt visit,procedure only (N/A, 8/21/2018); pr implant neurostim/ (N/A, 9/17/2018); and eye surgery. Family History  This patient's family history includes Cancer in his brother, brother, and sister; Heart Disease in his father; Prostate Cancer in his brother, brother, brother, and brother; Stroke in his mother. Social History  Patrice Adrian  reports that he quit smoking about 56 years ago. His smoking use included cigarettes. He has a 10.00 pack-year smoking history. He has never used smokeless tobacco. He reports current alcohol use. He reports that he does not use drugs. Subjective:     Review of Systems  No problems with ears, nose or throat. No problems with eyes.   No chest pain, shortness of breath, abdominal pain, extremity pain or weakness, and no neurological deficits. No rashes.  symptoms per HPI. The remainder of the review of symptoms is negative. Objective:     PE:   Vitals:    02/09/22 1051   BP: (!) 156/82   Weight: 155 lb (70.3 kg)   Height: 5' 11\" (1.803 m)       Constitutional: Alert and oriented times 3, no acute distress and cooperative to examination with appropriate mood and affect. HENT:   Head:        Normocephalic and atraumatic. Mouth/Throat:         Mucous membranes are normal.   Eyes:         EOM are normal. No scleral icterus. PERRLA. Neck:        Supple, symmetrical, trachea midline  Pulmonary/Chest:      Chest symmetric with normal A/P diameter,   Normal respiratory rate and rhthym. No use of accessory muscles. Abdominal:         Soft. No tenderness. Bowel sounds present. Musculoskeletal:         Normal range of motion. Uses cane. Back pain. Extremities: No cyanosis, clubbing, or edema present. Neurological:        Alert and oriented. Psychiatric:        Normal mood and affect.       Labs   Urine dip demonstrates   Results for POC orders placed in visit on 02/09/22   POCT Urinalysis No Micro (Auto)   Result Value Ref Range    Glucose, Ur Negative NEGATIVE mg/dl    Bilirubin Urine Negative     Ketones, Urine Negative NEGATIVE    Specific Gravity, Urine 1.020 1.002 - 1.030    Blood, UA POC Trace-intact NEGATIVE    pH, Urine 7.00 5.0 - 9.0    Protein, Urine Negative NEGATIVE mg/dl    Urobilinogen, Urine 0.20 0.0 - 1.0 eu/dl    Nitrite, Urine Negative NEGATIVE    Leukocyte Clumps, Urine Negative NEGATIVE    Color, Urine Yellow YELLOW-STRAW    Character, Urine Clear CLR-SL.CLOUD     Results for POC orders placed in visit on 02/09/22   POCT Urinalysis No Micro (Auto)   Result Value Ref Range    Glucose, Ur Negative NEGATIVE mg/dl    Bilirubin Urine Negative     Ketones, Urine Negative NEGATIVE    Specific Gravity, Urine 1.020 1.002 - 1.030    Blood, UA POC Trace-intact NEGATIVE pH, Urine 7.00 5.0 - 9.0    Protein, Urine Negative NEGATIVE mg/dl    Urobilinogen, Urine 0.20 0.0 - 1.0 eu/dl    Nitrite, Urine Negative NEGATIVE    Leukocyte Clumps, Urine Negative NEGATIVE    Color, Urine Yellow YELLOW-STRAW    Character, Urine Clear CLR-SL.CLOUD       Patients recent PSA values are as follows  Lab Results   Component Value Date    PSA 0.05 01/19/2022    PSA 0.04 10/22/2021    PSA 0.04 07/07/2021        Recent BUN/Creatinine:  Lab Results   Component Value Date    BUN 29 10/11/2021    CREATININE 1.3 10/11/2021       Surgical Pathology  FINAL DIAGNOSIS:  A. Posterior neck of bladder, biopsy:   Negative for malignancy.   Benign smooth muscle. B. Lymph nodes (2), left pelvic, resection:   Negative for malignancy. C. Prostate, radical prostatectomy:   Invasive prostatic adenocarcinoma, [place] score 4+3 = 7, p T2c.   Margins-tumor extends to cauterized anterior capsular margin (right  anterior, 10 mm from apex).   Oakes and bladder neck margins negative.   No seminal vesicle invasion.   Angiolymphatic invasion and perineural invasion present.      High-grade PIN and benign nodular hyperplasia. Assessment & Plan:     Prostate Cancer  Nocturia    Lb Castaneda f/u today for Prostate Cancer. He is s/p RALRP in 2015, Arlyn score 4+3=7, pT2c. His PSA was undetectable until July of 2021 when it was 0.04, now its 0.05. Discussed monitoring PSA a little closer. FU 4 months with PSA prior.     Follow up   SONIDO Burt  Urology

## 2022-03-14 NOTE — TELEPHONE ENCOUNTER
Chief Complaint: Consult requested by Alexus Cunningham M.D. for evaluation of Osteoporosis/Osteopenia/Bone Loss    Subjective:   1.  Postmenopausal osteoporosis:   Prabha Lizama is a 84 y.o. female who I am asked to see in consultation for evaluation osteoporosis.   Diagnosed on 2017    Bone Density on 2/14/2020: Spine T Score: -3.5, Hip T Score: -3.5.  With a high probability of fracture  Bone Density on 3/16/2017: Spine T Score: -4.3, Hip T Score: -3.3 .With a high probability of fracture     She fell on March 3rd, she fell backwards while on her way to the bathroom, she relates this fall to walking barefooted with the lights off.    She was diagnosed with osteoporosis by bone density scan in 2017.   Patient denies history of fracture.   Patient admits to history of height loss.     The cause of osteoporosis is felt to be due to postmenopausal estrogen deficiency.   She is currently being treated with calcium (600mg)  and vitamin D (unknown) supplementation.   She is not currently being treated with bisphosphonates     Osteoporosis Risk Factors   Nonmodifiable  Personal Hx of fracture as an adult: yes  Hx of fracture in first-degree relative: no   race: yes  Advanced age: yes  Female sex: yes  Dementia: no  Poor health/frailty: no     Potentially modifiable:  Tobacco use: no  Low body weight (<127 lbs): no  Estrogen deficiency     early menopause (age <45) or bilateral ovariectomy: no     prolonged premenopausal amenorrhea (>1 yr): no  Low calcium intake (lifelong): no  Alcoholism: no  Recurrent falls: yes  Inadequate physical activity: no, walking       Ref. Range 3/2/2022 04:15   25-Hydroxy   Vitamin D 25 Latest Ref Range: 30.0 - 100.0 ng/mL 50.0       2. Multiple thyroid nodules:   Ultrasound on 7/19/2021 showed multiple thyroid nodules:  Nodule on right lower thyroid lobe measuring 3.25 cm TR 3, nodule on right lower medial thyroid lobe measuring 1.6 cm TR 4, nodule on left middle thyroid lobe  With contrast please.  thanks "measuring 1.74 cm TR 3, nodule on left medial lateral thyroid lobe measuring 1.48 cm TR 4  She had a biopsy done \"probably before 2021\"  Denies any dysphagia, voice changes.     Denies  hair lost, heat interlerance,   Reports lack of sleep, Hair lost     Ref. Range 3/2/2022 04:15   TSH Latest Ref Range: 0.450 - 4.500 uIU/mL 0.927   Free T-4 Latest Ref Range: 0.82 - 1.77 ng/dL 2.05 (H)   T3,Free Latest Ref Range: 2.0 - 4.4 pg/mL 3.6       Patient's medications, allergies, and social histories were reviewed and updated as appropriate.      ROS:     CONS:     No fever, no chills, no weight loss, no fatigue   EYES:      No diplopia, no blurry vision, no redness of eyes, no swelling of eyelids   ENT:    No hearing loss, No ear pain, No sore throat, no dysphagia, no neck swelling   CV:     No chest pain, no palpitations, no claudication, no orthopnea, no PND   PULM:    No SOB, no cough, no hemoptysis, no wheezing    GI:   No nausea, no vomiting, no diarrhea, no constipation, no bloody stools   :  Passing urine well, no dysuria, no hematuria   ENDO:   No polyuria, no polydipsia, no heat intolerance, no cold intolerance   NEURO: No headaches, no dizziness, no convulsions, no tremors   MUSC:  No joint swellings, no arthralgias, no myalgias, no weakness   SKIN:   No rash, no ulcers, no dry skin   PSYCH:   No depression, no anxiety, no difficulty sleeping       Past Medical History:  Patient Active Problem List    Diagnosis Date Noted   • Anemia 01/12/2021   • Hyponatremia 08/31/2020   • Myalgia 08/30/2020   • Anxiety 08/30/2020   • Dyspepsia 08/29/2020   • History of hypertension 08/28/2020   • Insomnia, unspecified 11/05/2019   • Compression fracture of vertebra (HCC) 07/10/2018   • Compression fracture of L3 vertebra (HCC) 05/18/2018   • Pure hypercholesterolemia 01/15/2018   • Osteoporosis 04/13/2017   • Hair loss 02/08/2017   • Alopecia 12/11/2015   • Hypothyroidism 10/28/2014   • Multiple thyroid nodules    • 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   • Impaired fasting blood sugar 07/10/2014   • Primary hypertension 02/16/2010       Past Surgical History:  Past Surgical History:   Procedure Laterality Date   • APPENDECTOMY     • TONSILLECTOMY          Allergies:  Amoxicillin, Hydralazine, Morphine, and Penicillins     Current Medications:    Current Outpatient Medications:   •  baclofen (LIORESAL) 10 MG Tab, Take 0.5 Tablets by mouth at bedtime for 10 days., Disp: 5 Tablet, Rfl: 0  •  traMADol (ULTRAM) 50 MG Tab, Take 1 Tablet by mouth every 8 hours as needed for Moderate Pain for up to 7 days., Disp: 21 Tablet, Rfl: 0  •  lidocaine (LIDODERM) 5 % Patch, Place 1 Patch on the skin every 24 hours., Disp: 15 Patch, Rfl: 0  •  LORazepam (ATIVAN) 0.5 MG Tab, Take 1 Tablet by mouth every four hours as needed for Anxiety for up to 90 days., Disp: 30 Tablet, Rfl: 0  •  amLODIPine (NORVASC) 2.5 MG Tab, Take 1 Tablet by mouth every day., Disp: 90 Tablet, Rfl: 3  •  telmisartan (MICARDIS) 80 MG Tab, Take 1 Tablet by mouth every morning., Disp: 90 Tablet, Rfl: 1  •  carvedilol (COREG) 25 MG Tab, TAKE 1 TABLET BY MOUTH TWICE DAILY, Disp: 180 Tablet, Rfl: 0  •  terazosin (HYTRIN) 2 MG Cap, TAKE 2 CAPSULE BY MOUTH TWICE DAILY, TOTAL OF 8MG(4 CAPSULES IN 24 HOURS), Disp: 360 Capsule, Rfl: 0    Social History:  Social History     Socioeconomic History   • Marital status:      Spouse name: Not on file   • Number of children: Not on file   • Years of education: Not on file   • Highest education level: Not on file   Occupational History   • Not on file   Tobacco Use   • Smoking status: Never Smoker   • Smokeless tobacco: Never Used   Vaping Use   • Vaping Use: Never used   Substance and Sexual Activity   • Alcohol use: No   • Drug use: No   • Sexual activity: Not Currently     Partners: Male   Other Topics Concern   • Not on file   Social History Narrative   • Not on file     Social Determinants of Health     Financial Resource  "Strain: Not on file   Food Insecurity: Not on file   Transportation Needs: Not on file   Physical Activity: Not on file   Stress: Not on file   Social Connections: Not on file   Intimate Partner Violence: Not on file   Housing Stability: Not on file        Family History:   Family History   Problem Relation Age of Onset   • Heart Disease Mother    • Heart Disease Father    • Hypertension Sister    • Arthritis Sister    • Thyroid Sister          PHYSICAL EXAM:   Vital signs: /82 (BP Location: Left arm, Patient Position: Sitting, BP Cuff Size: Adult)   Pulse 78   Ht 1.676 m (5' 6\")   Wt 64.8 kg (142 lb 14.4 oz)   SpO2 97%   BMI 23.06 kg/m²   GENERAL: Well-developed, well-nourished  in no apparent distress.   EYE: No ocular and eyelid asymmetry, Anicteric sclerae,  PERRL, No exophthalmos or lidlag  HENT: Hearing grossly intact, Normocephalic, atraumatic. Pink, moist mucous membranes, No exudate  NECK: Supple. Trachea midline. thyroid is normal in size without nodules or tenderness  CARDIOVASCULAR: Regular rate and rhythm. No murmurs, rubs, or gallops.   LUNGS: Clear to auscultation bilaterally   LYMPH: No cervical, supraclavicular,  adenopathy palpated.   SKIN: No rashes, lesions. Turgor is normal.    Labs:  Lab Results   Component Value Date/Time    WBC 6.0 08/08/2021 06:37 PM    WBC 4.0 08/04/2010 08:10 AM    RBC 4.66 08/08/2021 06:37 PM    RBC 4.53 08/04/2010 08:10 AM    HEMOGLOBIN 15.1 08/08/2021 06:37 PM    MCV 94.8 08/08/2021 06:37 PM    MCV 95 08/04/2010 08:10 AM    MCH 32.4 08/08/2021 06:37 PM    MCH 31.8 08/04/2010 08:10 AM    MCHC 34.2 08/08/2021 06:37 PM    RDW 44.0 08/08/2021 06:37 PM    RDW 13.6 08/04/2010 08:10 AM    MPV 10.3 08/08/2021 06:37 PM       Lab Results   Component Value Date/Time    SODIUM 141 03/02/2022 04:15 AM    SODIUM 138 08/08/2021 06:37 PM    POTASSIUM 4.3 03/02/2022 04:15 AM    POTASSIUM 3.7 08/08/2021 06:37 PM    CHLORIDE 104 03/02/2022 04:15 AM    CHLORIDE 104 08/08/2021 " 06:37 PM    CO2 23 03/02/2022 04:15 AM    CO2 22 08/08/2021 06:37 PM    ANION 12.0 08/08/2021 06:37 PM    GLUCOSE 108 (H) 03/02/2022 04:15 AM    GLUCOSE 108 (H) 08/08/2021 06:37 PM    BUN 11 03/02/2022 04:15 AM    BUN 9 08/08/2021 06:37 PM    CREATININE 0.79 03/02/2022 04:15 AM    CREATININE 0.67 08/08/2021 06:37 PM    CREATININE 0.87 07/07/2011 03:30 PM    CALCIUM 9.4 03/02/2022 04:15 AM    CALCIUM 9.3 08/08/2021 06:37 PM    ASTSGOT 16 03/02/2022 04:15 AM    ASTSGOT 16 08/08/2021 06:37 PM    ALTSGPT 22 03/02/2022 04:15 AM    ALTSGPT 23 08/08/2021 06:37 PM    TBILIRUBIN 0.4 03/02/2022 04:15 AM    TBILIRUBIN 0.4 08/08/2021 06:37 PM    ALBUMIN 3.8 03/02/2022 04:15 AM    ALBUMIN 3.9 08/08/2021 06:37 PM    TOTPROTEIN 6.5 03/02/2022 04:15 AM    TOTPROTEIN 6.8 08/08/2021 06:37 PM    GLOBULIN 2.7 03/02/2022 04:15 AM    GLOBULIN 2.9 08/08/2021 06:37 PM    AGRATIO 1.4 03/02/2022 04:15 AM    AGRATIO 1.3 08/08/2021 06:37 PM       Lab Results   Component Value Date/Time    TSHULTRASEN 0.790 08/08/2021 1837     Lab Results   Component Value Date/Time    FREET4 1.44 04/29/2016 1303     Imaging:  Bone Density on 2/14/2020: Spine T Score: -3.5, Hip T Score: -3.5.  With a high probability of fracture  Bone Density on 3/16/2017: Spine T Score: -4.3, Hip T Score: -3.3 .With a high probability of fracture    ASSESSMENT/PLAN:   1. Postmenopausal osteoporosis  Extensive discussion with patient about pathophysiology of osteoporosis, risk and benefits of treatment    She is currently not taking any treatment for osteoporosis  It is my recommendation that based on last DEXA scan she should be placed on an anabolic agent, however I discussed with her other treatment options such as Prolia, Reclast, Fosamax    At this time she chooses not to start any treatment for osteoporosis, and she would like to discuss that with me on her next appointment    I suggested doing some blood work to evaluate further underlying reasons for Osteoporosis  including PTH levels in case she decides to choose an anabolic agent as her treatment method.  At this time she would not like to do any blood work and she would like to discuss that with me on her next appointment    2.  Multiple thyroid nodules:  Last ultrasound showed multiple thyroid nodules that I would like to reevaluate with an ultrasound.  At this time patient would like to wait before performing an ultrasound, and she would like to discuss that with me on her next appointment.  She has had an FNA biopsy of her thyroid nodules before, she stated it was a horrible experience and she would like not to have that done again.    Disposition: I gave patient printed information about different medication methods for the treatment of osteoporosis, suggested for her to come back in 3 months to reevaluate if she is willing to start the process for treatment for osteoporosis, and an ultrasound for her thyroid nodules    she would rather come back and see me in 1 year.  I suggested for her to follow-up on these issues with her primary care provider, but she would rather come back and see me to reevaluate.    Thank you kindly for allowing me to participate in the endocrine care plan for this patient.    Glenn Ricci A.P.R.N.  03/14/22    CC:   Alexus Cunningham M.D.

## 2022-04-04 ENCOUNTER — OFFICE VISIT (OUTPATIENT)
Dept: FAMILY MEDICINE CLINIC | Age: 87
End: 2022-04-04
Payer: MEDICARE

## 2022-04-04 VITALS
RESPIRATION RATE: 16 BRPM | HEIGHT: 71 IN | BODY MASS INDEX: 21.92 KG/M2 | SYSTOLIC BLOOD PRESSURE: 128 MMHG | OXYGEN SATURATION: 97 % | DIASTOLIC BLOOD PRESSURE: 78 MMHG | HEART RATE: 64 BPM | WEIGHT: 156.6 LBS | TEMPERATURE: 97.7 F

## 2022-04-04 DIAGNOSIS — R11.0 NAUSEA: ICD-10-CM

## 2022-04-04 DIAGNOSIS — I10 ESSENTIAL HYPERTENSION: ICD-10-CM

## 2022-04-04 DIAGNOSIS — G89.29 CHRONIC MIDLINE LOW BACK PAIN WITHOUT SCIATICA: Primary | ICD-10-CM

## 2022-04-04 DIAGNOSIS — C61 PROSTATE CANCER (HCC): ICD-10-CM

## 2022-04-04 DIAGNOSIS — M54.50 CHRONIC MIDLINE LOW BACK PAIN WITHOUT SCIATICA: Primary | ICD-10-CM

## 2022-04-04 PROCEDURE — G8427 DOCREV CUR MEDS BY ELIG CLIN: HCPCS | Performed by: FAMILY MEDICINE

## 2022-04-04 PROCEDURE — 1123F ACP DISCUSS/DSCN MKR DOCD: CPT | Performed by: FAMILY MEDICINE

## 2022-04-04 PROCEDURE — 4040F PNEUMOC VAC/ADMIN/RCVD: CPT | Performed by: FAMILY MEDICINE

## 2022-04-04 PROCEDURE — 99214 OFFICE O/P EST MOD 30 MIN: CPT | Performed by: FAMILY MEDICINE

## 2022-04-04 PROCEDURE — 1036F TOBACCO NON-USER: CPT | Performed by: FAMILY MEDICINE

## 2022-04-04 PROCEDURE — G8420 CALC BMI NORM PARAMETERS: HCPCS | Performed by: FAMILY MEDICINE

## 2022-04-04 RX ORDER — ONDANSETRON 4 MG/1
4 TABLET, FILM COATED ORAL 3 TIMES DAILY PRN
Qty: 30 TABLET | Refills: 3 | Status: SHIPPED | OUTPATIENT
Start: 2022-04-04 | End: 2022-07-13

## 2022-04-04 ASSESSMENT — ENCOUNTER SYMPTOMS
SORE THROAT: 0
RHINORRHEA: 0
NAUSEA: 0
SHORTNESS OF BREATH: 0
WHEEZING: 0
COUGH: 0
CONSTIPATION: 0
DIARRHEA: 0
ABDOMINAL PAIN: 0
BACK PAIN: 1

## 2022-04-04 NOTE — PROGRESS NOTES
[address] [place] [address]  Dept: [phone]  Loc: [address] [facility] [place] (:  1934) is a 80 y.o. male, here for evaluation of the following chief complaint(s):  6 Month Follow-Up      ASSESSMENT/PLAN:  1. Chronic midline low back pain without sciatica  2. Nausea  -     ondansetron (ZOFRAN) 4 MG tablet; Take 1 tablet by mouth 3 times daily as needed for Nausea or Vomiting, Disp-30 tablet, R-3Normal  3. Essential hypertension  4. Prostate cancer ([address]  cont norco prn, push fluids to avoid constipation  refill zofran. Cont miralax as needed  bp controlled. Cont norvasc 5mg and metoprolol 12.5mg bid  Follows with urology    Return in about 6 months (around 10/4/2022) for AWV. SUBJECTIVE/OBJECTIVE:  Presents for 6-month follow-up of chronic medical conditions including hypertension, chronic low back pain, chronic constipation with nausea, history of prostate cancer. He states his blood pressure has been good to his knowledge. He checks it occasionally at home and it typically runs 130 over 70s. Denies any headache or chest pain. He did decrease his metoprolol to 12.5 mg twice daily and this has helped prevent his dizziness. Heart rate and diastolic are now normal.  Does have chronic [facility] Takes Norco at night which does help him sleep although causes nausea and constipation. He takes MiraLAX for this. Patient does state that he had labs done at the [place] earlier this year and they were reviewed and essentially normal.  Minimally elevated renal function. He also has a history of prostate cancer and follows up with urology. Denies any chest pain or shortness of breath. Denies palpitations. Does bruise easily due to his Eliquis but otherwise tolerates it well. Review of Systems   Constitutional: Negative for chills, fatigue and fever.    HENT: Positive for congestion and postnasal drip (in the morning). Negative for rhinorrhea and sore throat. Respiratory: Negative for cough, shortness of breath and wheezing. Cardiovascular: Negative for chest pain and palpitations. Gastrointestinal: Negative for abdominal pain, constipation, diarrhea and nausea. Genitourinary: Negative for dysuria and hematuria. Musculoskeletal: Positive for arthralgias and back pain. Negative for myalgias. Neurological: Negative for dizziness and headaches. Hematological: Bruises/bleeds easily. Psychiatric/Behavioral: Negative for decreased concentration, dysphoric mood and sleep disturbance. The patient is not nervous/anxious. Physical Exam  Vitals and nursing note reviewed. Constitutional:       General: He is not in acute distress. Appearance: He is well-developed. HENT:      Head: Normocephalic and atraumatic. Right Ear: Hearing, tympanic membrane, ear canal and external ear normal.      Left Ear: Hearing, tympanic membrane, ear canal and external ear normal.      Nose:      Right Sinus: No maxillary sinus tenderness or frontal sinus tenderness. Left Sinus: No maxillary sinus tenderness or frontal sinus tenderness. Mouth/Throat:      Pharynx: No oropharyngeal exudate or posterior oropharyngeal erythema. Eyes:      General: No scleral icterus. Right eye: No discharge. Left eye: No discharge. Conjunctiva/sclera: Conjunctivae normal.      Pupils: Pupils are equal, round, and reactive to light. Cardiovascular:      Rate and Rhythm: Normal rate and regular rhythm. Heart sounds: Normal heart sounds. Pulmonary:      Effort: Pulmonary effort is normal. No respiratory distress. Breath sounds: Normal breath sounds. No wheezing. Abdominal:      General: Bowel sounds are normal. There is no distension. Palpations: Abdomen is soft. Tenderness: There is no abdominal tenderness.    Musculoskeletal: General: Tenderness present. Normal range of motion. Cervical back: Normal range of motion and neck supple. Lymphadenopathy:      Cervical: No cervical adenopathy. Skin:     General: Skin is warm and dry. Findings: No rash. Neurological:      Mental Status: He is alert and oriented to person, place, and time. Motor: No abnormal muscle tone. Coordination: Coordination normal.   Psychiatric:         Behavior: Behavior normal.         Thought Content: Thought content normal.         Judgment: Judgment normal.         Vitals:    04/04/22 0753   BP: 128/78   Pulse: 64   Resp: 16   Temp: 97.7 °F (36.5 °C)   SpO2: 97%              An electronic signature was used to authenticate this note.     --Kelly Flores MD

## 2022-04-22 ENCOUNTER — CARE COORDINATION (OUTPATIENT)
Dept: CARE COORDINATION | Age: 87
End: 2022-04-22

## 2022-04-22 NOTE — CARE COORDINATION
Attempted to reach patient for Care Coordination enrollment s/p recent list referral for assistance with the management of his healthcare needs. Patient was not available at the time of my call and generic voicemail message was left asking him to please return call to my direct number. Introduction My Chart message was also sent. Will continue to work to f/u with patient in the future.

## 2022-04-26 ENCOUNTER — CARE COORDINATION (OUTPATIENT)
Dept: CARE COORDINATION | Age: 87
End: 2022-04-26

## 2022-04-26 SDOH — ECONOMIC STABILITY: INCOME INSECURITY: IN THE LAST 12 MONTHS, WAS THERE A TIME WHEN YOU WERE NOT ABLE TO PAY THE MORTGAGE OR RENT ON TIME?: NO

## 2022-04-26 SDOH — ECONOMIC STABILITY: HOUSING INSECURITY
IN THE LAST 12 MONTHS, WAS THERE A TIME WHEN YOU DID NOT HAVE A STEADY PLACE TO SLEEP OR SLEPT IN A SHELTER (INCLUDING NOW)?: NO

## 2022-04-26 SDOH — HEALTH STABILITY: PHYSICAL HEALTH: ON AVERAGE, HOW MANY MINUTES DO YOU ENGAGE IN EXERCISE AT THIS LEVEL?: 0 MIN

## 2022-04-26 SDOH — ECONOMIC STABILITY: FOOD INSECURITY: WITHIN THE PAST 12 MONTHS, THE FOOD YOU BOUGHT JUST DIDN'T LAST AND YOU DIDN'T HAVE MONEY TO GET MORE.: NEVER TRUE

## 2022-04-26 SDOH — ECONOMIC STABILITY: HOUSING INSECURITY: IN THE LAST 12 MONTHS, HOW MANY PLACES HAVE YOU LIVED?: 1

## 2022-04-26 SDOH — ECONOMIC STABILITY: FOOD INSECURITY: WITHIN THE PAST 12 MONTHS, YOU WORRIED THAT YOUR FOOD WOULD RUN OUT BEFORE YOU GOT MONEY TO BUY MORE.: NEVER TRUE

## 2022-04-26 SDOH — HEALTH STABILITY: PHYSICAL HEALTH: ON AVERAGE, HOW MANY DAYS PER WEEK DO YOU ENGAGE IN MODERATE TO STRENUOUS EXERCISE (LIKE A BRISK WALK)?: 0 DAYS

## 2022-04-26 ASSESSMENT — SOCIAL DETERMINANTS OF HEALTH (SDOH)
HOW HARD IS IT FOR YOU TO PAY FOR THE VERY BASICS LIKE FOOD, HOUSING, MEDICAL CARE, AND HEATING?: NOT VERY HARD
DO YOU BELONG TO ANY CLUBS OR ORGANIZATIONS SUCH AS CHURCH GROUPS UNIONS, FRATERNAL OR ATHLETIC GROUPS, OR SCHOOL GROUPS?: YES
HOW OFTEN DO YOU ATTENT MEETINGS OF THE CLUB OR ORGANIZATION YOU BELONG TO?: MORE THAN 4 TIMES PER YEAR
HOW OFTEN DO YOU ATTEND CHURCH OR RELIGIOUS SERVICES?: MORE THAN 4 TIMES PER YEAR
HOW OFTEN DO YOU GET TOGETHER WITH FRIENDS OR RELATIVES?: MORE THAN THREE TIMES A WEEK
IN A TYPICAL WEEK, HOW MANY TIMES DO YOU TALK ON THE PHONE WITH FAMILY, FRIENDS, OR NEIGHBORS?: MORE THAN THREE TIMES A WEEK

## 2022-04-26 ASSESSMENT — LIFESTYLE VARIABLES
HOW OFTEN DO YOU HAVE A DRINK CONTAINING ALCOHOL: MONTHLY OR LESS
HOW MANY STANDARD DRINKS CONTAINING ALCOHOL DO YOU HAVE ON A TYPICAL DAY: 1 OR 2

## 2022-04-26 NOTE — CARE COORDINATION
Ambulatory Care Coordination Note  4/26/2022  CM Risk Score: 4  Charlson 10 Year Mortality Risk Score: 100%     ACC: Dionne Felton RN    Summary Note: Patient returned call for Care Coordination enrollment s/p recent list referral for assistance with the management of his healthcare needs. Care Coordination program was briefly reviewed with patient and initial eval information obtained. Patient shared he has been doing well and he remains active with his family , neighbors, and \"coffee club. \"  Patient denied any questions s/p recent PCP visit. Patient shared he continues to take norco for his chronic pain which does lead to some constipation that he takes miralax for and gets good results. Patient was encouraged to also try a daily stool softener and he verbalized understanding. Patient shared he continues to use his cane at all times and does have a wheelchair for longer distances if needed. Patient has other assistance devices as needed and he denied any need for additional DME at this time. Patient reported he lives independently and family continues to assist him with household chores, cooking, shopping, and transportation as needed. Patient also drives locally. Patient denied any need for additional ADL/DME needs at this time. Patient stated he also is involved with a program thru [employer] that allows him to eat out 4 x a month. Patient reported he manages his own medications and he denied any questions or concerns at this time. Patient denied any need for assistance with the costs of his medications as his insurance and VA benefits assist with coverage. Patient declines pharmacy and med assistance referrals and he was instructed to call with any changes. Patient reported he does monitor BP at home as instructed by cardiology and he was instructed to keep a BP log and share with providers at his appointments.   Patient denied any other needs, questions, or concerns at this time and he was encouraged to call with any that may develop. Actions:   - Reviewed Care Coordination program   - Completed initial eval information and SDOH assessment   - Monitored for ADL, DME, and transportation needs   - Monitored for medication questions/concerns - declines pharmacy and med assistance referrals   - Completed Med Rec   - Encouraged patient to keep BP log and to share with providers   - Monitored for questions s/p recent appointment   - Monitored for additional needs          Plan of Care:   - Continue with f/u calls for assistance with the management of his healthcare needs  - Educate on signs/symptoms to report   - Educate on the importance of early symptom recognition   - Educate on the availability of same day appointments, urgent care/walk in clinic options, and after hours on call resources   - Review and verify Healthcare Decision Maker information   - Patient is current with COVID-19 vaccine x 3   - Patient is schedule for Medicare AWV - Oct. 2022  - Monitor for ongoing needs and readiness to discharge from Care Coordination       Ambulatory Care Coordination Assessment    Care Coordination Protocol  Referral from Primary Care Provider: No  Week 1 - Initial Assessment     Do you have all of your prescriptions and are they filled?: No  Barriers to medication adherence: None  Are you able to afford your medications?: With Assistance  Medication Assistance Program: [employer]  How often do you have trouble taking your medications the way you have been told to take them?: I always take them as prescribed. Do you have Home O2 Therapy?: No      Ability to seek help/take action for Emergent Urgent situations i.e. fire, crime, inclement weather or health crisis. : Independent  Ability to ambulate to restroom: Independent  Ability handle personal hygeine needs (bathing/dressing/grooming): Independent  Ability to manage Medications:  Independent  Ability to prepare Food Preparation: Needs Assistance  Ability to maintain home (clean home, laundry): Needs Assistance  Ability to drive and/or has transportation: Independent  Ability to do shopping: Needs Assistance  Ability to manage finances: Independent  Is patient able to live independently?: Yes     Current Housing: Private Residence        Per the Fall Risk Screening, did the patient have 2 or more falls or 1 fall with injury in the past year?: No     Frequent urination at night?: No  Do you use rails/bars?: Yes  Do you have a non-slip tub mat?: Yes     Are you experiencing loss of meaning?: No  Are you experiencing loss of hope and peace?: No     Thinking about your patient's physical health needs, are there any symptoms or problems (risk indicators) you are unsure about that require further investigation?: Mild vague physical symptoms or problems; but do not impact on daily life or are not of concern to patient   Are the patients physical health problems impacting on their mental well-being?: No identified areas of concern   Are there any problems with your patients lifestyle behaviors (alcohol, drugs, diet, exercise) that are impacting on physical or mental well-being?: No identified areas of concern   Do you have any other concerns about your patients mental well-being?  How would you rate their severity and impact on the patient?: No identified areas of concern   How would you rate their home environment in terms of safety and stability (including domestic violence, insecure housing, neighbor harassment)?: Safe, stable, but with some inconsistency   How do daily activities impact on the patient's well-being? (include current or anticipated unemployment, work, caregiving, access to transportation or other): Some general dissatisfaction but no concern   How would you rate their social network (family, work, friends)?: Good participation with social networks   How would you rate their financial resources (including ability to afford all required medical care)?: Financially secure, some resource challenges   How wells does the patient now understand their health and well-being (symptoms, signs or risk factors) and what they need to do to manage their health?: Reasonable to good understanding and already engages in managing health or is willing to undertake better management   How well do you think your patient can engage in healthcare discussions? (Barriers include language, deafness, aphasia, alcohol or drug problems, learning difficulties, concentration): Clear and open communication, no identified barriers   Do other services need to be involved to help this patient?: Other care/services not required at this time   Suggested Interventions and Community Resources  Fall Risk Prevention: In Process Other Services or Interventions: Follows with [address] [facility] urology and [facility] cardiology    Medication Assistance Program: Declined   Medi Set or Pill Pack: Completed   Pharmacist: Declined   Senior Services: Completed   Social Work: Declined   Other Services: Completed   Transportation Services: Declined         Set up/Review an Education Plan, Set up/Review Goals              Prior to Admission medications    Medication Sig Start Date End Date Taking?  Authorizing Provider   Polyethylene Glycol 3350 (MIRALAX PO) Take by mouth as needed   Yes Historical Provider, MD   metoprolol tartrate (LOPRESSOR) 25 MG tablet Take 1 tablet by mouth 2 times daily  Patient taking differently: Take 12.5 mg by mouth 2 times daily  11/1/21  Yes SONIDO Rodriguez - CNP   amLODIPine (NORVASC) 5 MG tablet Take 1 tablet by mouth daily 10/7/21  Yes Carlie Mendes MD   VITAMIN D PO Take by mouth   Yes Historical Provider, MD   apixaban (ELIQUIS) 5 MG TABS tablet Take 1 tablet by mouth 2 times daily 7/8/19  Yes Kristal Pearl MD   aspirin 81 MG tablet Take 81 mg by mouth daily   Yes Historical Provider, MD   HYDROcodone-acetaminophen (NORCO) 5-325 MG per tablet Take 1 tablet by mouth every 6 hours as needed for Pain. Yes Historical Provider, MD   Multiple Vitamins-Minerals (PRESERVISION AREDS 2 PO) Take 1 tablet by mouth 2 times daily   Yes Historical Provider, MD   ondansetron (ZOFRAN) 4 MG tablet Take 1 tablet by mouth 3 times daily as needed for Nausea or Vomiting 4/4/22   Manjula Marquez MD   clotrimazole-betamethasone (LOTRISONE) 1-0.05 % cream Apply topically 2 times daily.  PRN  Patient not taking: Reported on 4/4/2022 1/3/22   Manjula Marquez MD       Future Appointments   Date Time Provider Yonas Boyer   6/10/2022 10:15 AM Tony 1827, APRN - CNP N Leann Celis [facility] [place]   7/18/2022  1:15 PM Jennifer Valdes MD N [facility] [place]   10/3/2022  8:00 AM Manjula Marquez MD [facility] [place] Assessment    Do you have any symptoms that are causing concern?: Yes  Progression since Onset: Intermittent - Waxing/Waning  Reported Symptoms: Constipation, Pain      and Care Coordination Episodes    Type: Amb Care Management  Episode: Complex Care  Noted: 4/26/2022  Comments: List Referral -

## 2022-04-28 ASSESSMENT — PATIENT HEALTH QUESTIONNAIRE - PHQ9
SUM OF ALL RESPONSES TO PHQ9 QUESTIONS 1 & 2: 0
SUM OF ALL RESPONSES TO PHQ QUESTIONS 1-9: 0
2. FEELING DOWN, DEPRESSED OR HOPELESS: 0
1. LITTLE INTEREST OR PLEASURE IN DOING THINGS: 0
SUM OF ALL RESPONSES TO PHQ QUESTIONS 1-9: 0

## 2022-05-02 ENCOUNTER — OFFICE VISIT (OUTPATIENT)
Dept: FAMILY MEDICINE CLINIC | Age: 87
End: 2022-05-02
Payer: MEDICARE

## 2022-05-02 VITALS
SYSTOLIC BLOOD PRESSURE: 142 MMHG | DIASTOLIC BLOOD PRESSURE: 98 MMHG | WEIGHT: 150.2 LBS | BODY MASS INDEX: 20.96 KG/M2 | RESPIRATION RATE: 20 BRPM | HEART RATE: 86 BPM | OXYGEN SATURATION: 98 %

## 2022-05-02 DIAGNOSIS — I10 ESSENTIAL HYPERTENSION: Primary | ICD-10-CM

## 2022-05-02 DIAGNOSIS — I48.0 PAF (PAROXYSMAL ATRIAL FIBRILLATION) (HCC): ICD-10-CM

## 2022-05-02 PROCEDURE — 1123F ACP DISCUSS/DSCN MKR DOCD: CPT | Performed by: FAMILY MEDICINE

## 2022-05-02 PROCEDURE — 1036F TOBACCO NON-USER: CPT | Performed by: FAMILY MEDICINE

## 2022-05-02 PROCEDURE — 4040F PNEUMOC VAC/ADMIN/RCVD: CPT | Performed by: FAMILY MEDICINE

## 2022-05-02 PROCEDURE — G8420 CALC BMI NORM PARAMETERS: HCPCS | Performed by: FAMILY MEDICINE

## 2022-05-02 PROCEDURE — G8427 DOCREV CUR MEDS BY ELIG CLIN: HCPCS | Performed by: FAMILY MEDICINE

## 2022-05-02 PROCEDURE — 99214 OFFICE O/P EST MOD 30 MIN: CPT | Performed by: FAMILY MEDICINE

## 2022-05-02 ASSESSMENT — ENCOUNTER SYMPTOMS
NAUSEA: 0
ABDOMINAL PAIN: 0
SORE THROAT: 0
RHINORRHEA: 0
WHEEZING: 0
COUGH: 0
SHORTNESS OF BREATH: 0
BACK PAIN: 1
CONSTIPATION: 0
DIARRHEA: 0

## 2022-05-02 NOTE — PROGRESS NOTES
[address]  [place] [address]  Dept: [phone]  Loc: [address] [facility] Ch[place] (:  1934) is a 80 y.o. male, here for evaluation of the following chief complaint(s):  Atrial Fibrillation      ASSESSMENT/PLAN:  1. Essential hypertension  2. PAF (paroxysmal atrial fibrillation) ([facility])   will increase metoprolol to 25 mg bid (had been taking just 12.5). Consider increasing amlodipine in future if diastolic stays elevated. No follow-ups on file. SUBJECTIVE/OBJECTIVE:  Elle Comer with complaints of elevated blood pressure. He states that over the weekend his bottom number has been over 100. He has felt somewhat fatigued but denies any chest pain or increased shortness of breath. He does have paroxysmal A. fib and states that he can feel his heart palpating some but denies pain associated with it. He was previously taking metoprolol 25 mg twice daily and this decreased few months ago due to blood pressure running low. He states lately now his blood pressures been elevated again. He believes his heart rate stays around 70-80. He has not had any lower extremity swelling. Otherwise he is feeling well besides his chronic back pain. Review of Systems   Constitutional: Positive for fatigue. Negative for chills and fever. HENT: Negative for congestion, rhinorrhea and sore throat. Respiratory: Negative for cough, shortness of breath and wheezing. Cardiovascular: Negative for chest pain and palpitations. Gastrointestinal: Negative for abdominal pain, constipation, diarrhea and nausea. Genitourinary: Negative for dysuria and hematuria. Musculoskeletal: Positive for arthralgias, back pain and gait problem. Negative for myalgias. Neurological: Negative for dizziness and headaches. Psychiatric/Behavioral: Negative for sleep disturbance. The patient is not nervous/anxious. Physical Exam  Vitals and nursing note reviewed. Constitutional:       Appearance: He is well-developed. HENT:      Head: Normocephalic and atraumatic. Eyes:      General: No scleral icterus. Right eye: No discharge. Left eye: No discharge. Conjunctiva/sclera: Conjunctivae normal.   Cardiovascular:      Rate and Rhythm: Normal rate. Rhythm irregular. Heart sounds: Normal heart sounds. Pulmonary:      Effort: Pulmonary effort is normal.      Breath sounds: Normal breath sounds. No wheezing. Skin:     General: Skin is warm and dry. Neurological:      Mental Status: He is alert and oriented to person, place, and time. Mental status is at baseline. Psychiatric:         Behavior: Behavior normal.         Thought Content: Thought content normal.         Judgment: Judgment normal.         Vitals:    05/02/22 0749   BP: (!) 142/98   Pulse: 86   Resp:    SpO2: 98%        On this date 5/2/2022 I have spent 30 minutes reviewing previous notes, test results and face to face with the patient discussing the diagnosis and importance of compliance with the treatment plan as well as documenting on the day of the visit. An electronic signature was used to authenticate this note.     --Estephanie Magana MD

## 2022-05-05 ENCOUNTER — CARE COORDINATION (OUTPATIENT)
Dept: CARE COORDINATION | Age: 87
End: 2022-05-05

## 2022-05-05 NOTE — CARE COORDINATION
Ambulatory Care Coordination Note  5/5/2022  CM Risk Score: 4  Charlson 10 Year Mortality Risk Score: 100%     ACC: Linda Moe RN    Summary Note: Patient was called for continued Care Coordination follow up and education re: the management of his healthcare needs. Patient shared he is doing \"ok\" but he continues to have c/o intermittent joint pain that has been slightly worse at times this week d/t the weather. Patient was encouraged to complete home stretches/exercises as needed and to take frequent breaks and patient verbalized understanding. Patient reported he was having concerns with his BP earlier this week and he was able to be evaluated by PCP and have metoprolol adjusted. Patient denied any questions re: his appointment and reported he is taking new dose of metoprolol as directed. ACM encouraged patient to monitor BP 1-2 times a day or with symptoms and to keep a log. Patient acknowledged understanding. Patient was educated on signs/symptoms to report as well as the importance of early symptom recognition and follow up. ACM educated patient on the availability of same day appointments, urgent care/walk in clinic options and after hours on call resources. Patient verbalized understanding. Patient denied any other questions, concerns, or needs and he was encouraged to call with any that may develop.           Actions:   - Reviewed signs/symptoms to report   - Reviewed importance of early symptom recognition and follow up   - Educated on availability of same day appointments, walk in clinic options, and after hours on call resources   - Encouraged patient to stay as active as he can to help with chronic pain   - Encouraged patient to complete home stretches/exercsises as able   - Monitored for questions s/p recent BP visit   - Monitored for questions re: new metoprolol dose   - Monitored for additional needs         Plan of Care:   - Continue with f/u calls for assistance with the management of his healthcare needs  - Educate on signs/symptoms to report - In Process   - Educate on the importance of early symptom recognition - In Process  - Educate on the availability of same day appointments, urgent care/walk in clinic options, and after hours on call resources - Completed   - Review and verify Healthcare Decision Maker information   - Patient is current with COVID-19 vaccine x 3   - Patient is schedule for Medicare AWV - Oct. 2022  - Monitor for ongoing needs and readiness to discharge from [address] [place] [address] Coordination Interventions    Referral from Primary Care Provider: No  Suggested Interventions and Community Resources  Fall Risk Prevention: Completed (Comment: May 2022)  Medication Assistance Program: Declined (Comment: Denied any needs at this time. Does receive assistance thru [facility] - April 2022)  Medi Set or Pill Pack: Completed (Comment: April 2022)  Pharmacist: Lesia Moran (Comment: April 2022)  Senior Services: Completed (Comment: Receives 4 meal vouchers to eat out monthly thru [facility] - April 2022)  Social Work: Declined (Comment: Denied any need for additional resource needs - April 2022)  Other Services: Completed (Comment: Current with [address] [place] [address] - April 2022)  Transportation Support: Declined  Other Services or Interventions: Follows with [address] [place] [address] - [facility] urology and [facility] cardiology          Goals Addressed                 This Visit's Progress       Care Coordination     Conditions and Symptoms   Improving     I will schedule office visits, as directed by my provider. I will keep my appointment or reschedule if I have to cancel. I will notify my provider of any barriers to my plan of care. I will notify my provider of any symptoms that indicate a worsening of my condition.     Barriers: overwhelmed by complexity of regimen, stress, and lack of education  Plan for overcoming my barriers: Continue to provide education to patient and monitor for resource needs Confidence: 8/10  Anticipated Goal Completion Date: 8/1/2022                Prior to Admission medications    Medication Sig Start Date End Date Taking? Authorizing Provider   metoprolol tartrate (LOPRESSOR) 25 MG tablet Take 1 tablet by mouth 2 times daily 5/2/22   Paul Hernandez MD   Polyethylene Glycol 3350 (MIRALAX PO) Take by mouth as needed    Historical Provider, MD   ondansetron (ZOFRAN) 4 MG tablet Take 1 tablet by mouth 3 times daily as needed for Nausea or Vomiting  Patient not taking: Reported on 5/2/2022 4/4/22   Paul Hernandez MD   clotrimazole-betamethasone (LOTRISONE) 1-0.05 % cream Apply topically 2 times daily. PRN  Patient not taking: Reported on 4/4/2022 1/3/22   Paul Hernandez MD   amLODIPine (NORVASC) 5 MG tablet Take 1 tablet by mouth daily 10/7/21   Paul Hernandez MD   VITAMIN D PO Take by mouth    Historical Provider, MD   apixaban (ELIQUIS) 5 MG TABS tablet Take 1 tablet by mouth 2 times daily 7/8/19   Renee Man MD   aspirin 81 MG tablet Take 81 mg by mouth daily    Historical Provider, MD   HYDROcodone-acetaminophen (NORCO) 5-325 MG per tablet Take 1 tablet by mouth every 6 hours as needed for Pain.      Historical Provider, MD   Multiple Vitamins-Minerals (PRESERVISION AREDS 2 PO) Take 1 tablet by mouth 2 times daily    Historical Provider, MD       Future Appointments   Date Time Provider Yonas Boyer   6/10/2022 10:15 AM SONIDO Salguero - CNP N Shamika Hedge [facility] [place]   7/18/2022  1:15 PM Renee Man MD N [facility] [place]   10/3/2022  8:00 AM [facility] Hernandez [facility]  [place]     ,   General Assessment    Do you have any symptoms that are causing concern?: Yes  Progression since Onset: Unchanged, Intermittent - Waxing/Waning  Reported Symptoms: Pain      and Care Coordination Episodes    Type: Amb Care Management  Episode: Complex Care  Noted: 4/26/2022  Comments: List Referral -

## 2022-05-11 ENCOUNTER — CARE COORDINATION (OUTPATIENT)
Dept: CARE COORDINATION | Age: 87
End: 2022-05-11

## 2022-05-11 NOTE — CARE COORDINATION
Ambulatory Care Coordination Note  5/11/2022  CM Risk Score: 4  Charlson 10 Year Mortality Risk Score: 100%     ACC: Rosa Maria Wilson RN    Summary Note: Patient was called for continued Care Coordination follow up and education re: the management of his healthcare needs. Patient shared he has been doing \"ok\" and he denied any current concerns. Patient stated he has made recent metoprolol change as directed and instructions were reviewed in detail. Patient verbalized understanding. Patient stated he has had some intermittent c/o \"pain/odd\" feeling in his calf during the middle of the night. Patient denied any c/o redness or SOB being present. Patient stated BP is elevated at times of the pain with most recent reading being 160/90. Patient was instructed to be sure he continues to drink plenty of fluids and to call for an appointment with any ongoing, change, or worsening of symptoms. Patient was also instructed to keep BP log and to drop it off for PCP to review sometime next week. Patient verbalized understanding. Hypertension education was reviewed with patient and he verbalized understanding. Patient was again educated on signs/symptoms to report and the importance of early symptom recognition and follow up. Patient verbalized understanding. Patient denied any other questions, concerns, or needs and he was encouraged to call with any that may develop.           Actions:   - Reviewed signs/symptoms to report   - Reviewed importance of early symptom recognition   - Reviewed importance of following up with any ongoing concerns/symptoms   - Educated on hypertension   - Educated on need to keep BP log and bring to office for PCP to review  - Monitored for additional needs          Plan of Care:   - Continue with f/u calls for assistance with the management of his healthcare needs  - Educate on signs/symptoms to report - In Process   - Educate on the importance of early symptom recognition - In Process  - Educate on the availability of same day appointments, urgent care/walk in clinic options, and after hours on call resources - Completed   - Review and verify [address] is current with COVID-19 vaccine x 3   - Patient is schedule for Medicare AW - Oct. 2022  - Monitor for ongoing needs and readiness to discharge from [address] [place] [address] Interventions    Referral from Primary Care Provider: No  Suggested Interventions and Community Resources  Fall Risk Prevention: Completed (Comment: May 2022)  Medication Assistance Program: Declined (Comment: Denied any needs at this time. Does receive assistance thru the [facility] - April 2022)  Medi Set or Pill Pack: Completed (Comment: April 2022)  Pharmacist: Nancy Douglas (Comment: April 2022)  Senior Services: Completed (Comment: Receives 4 meal vouchers to eat out monthly thru [facility] - April 2022)  Social Work: Declined (Comment: Denied any need for additional resource needs - April 2022)  Other Services: Completed (Comment: Current with [address] [place] [address] - April 2022)  Transportation Support: Declined  Other Services or Interventions: Follows with [address] [place] [address] - [facility] urology and [facility] cardiology          Goals Addressed                 This Visit's Progress       Care Coordination     Conditions and Symptoms   Improving     I will schedule office visits, as directed by my provider. I will keep my appointment or reschedule if I have to cancel. I will notify my provider of any barriers to my plan of care. I will notify my provider of any symptoms that indicate a worsening of my condition. Barriers: overwhelmed by complexity of regimen, stress, and lack of education  Plan for overcoming my barriers: Continue to provide education to patient and monitor for resource needs   Confidence: 8/10  Anticipated Goal Completion Date: 8/1/2022                Prior to Admission medications    Medication Sig Start Date End Date Taking? Authorizing Provider   metoprolol tartrate (LOPRESSOR) 25 MG tablet Take 1 tablet by mouth 2 times daily 5/2/22   Kerry Ochoa MD   Polyethylene Glycol 3350 (MIRALAX PO) Take by mouth as needed    Historical Provider, MD   ondansetron (ZOFRAN) 4 MG tablet Take 1 tablet by mouth 3 times daily as needed for Nausea or Vomiting  Patient not taking: Reported on 5/2/2022 4/4/22   Kerry Ochoa MD   clotrimazole-betamethasone (LOTRISONE) 1-0.05 % cream Apply topically 2 times daily. PRN  Patient not taking: Reported on 4/4/2022 1/3/22   Kerry Ochoa MD   amLODIPine (NORVASC) 5 MG tablet Take 1 tablet by mouth daily 10/7/21   Kerry Ochoa MD   VITAMIN D PO Take by mouth    Historical Provider, MD   apixaban (ELIQUIS) 5 MG TABS tablet Take 1 tablet by mouth 2 times daily 7/8/19   Karina Ruiz MD   aspirin 81 MG tablet Take 81 mg by mouth daily    Historical Provider, MD   HYDROcodone-acetaminophen (NORCO) 5-325 MG per tablet Take 1 tablet by mouth every 6 hours as needed for Pain.      Historical Provider, MD   Multiple Vitamins-Minerals (PRESERVISION AREDS 2 PO) Take 1 tablet by mouth 2 times daily    Historical Provider, MD       Future Appointments   Date Time Provider Yonas Boyer   6/10/2022 10:15 AM SONIDO Coats - CNP N Sadia Pierre [facility] [place]   7/18/2022  1:15 PM Karina Ruiz MD N [facility] AM [place]   10/3/2022  8:00 AM Kerry Ochoa MD [facility] Med [facility] AM [place]     ,   General Assessment    Do you have any symptoms that are causing concern?: Yes  Progression since Onset: Intermittent - Waxing/Waning  Reported Symptoms: Pain      and Care Coordination Episodes    Type: Amb Care Management  Episode: Complex Care  Noted: 4/26/2022  Comments: List Referral -

## 2022-05-14 LAB
ALBUMIN SERPL-MCNC: 4.1 G/DL
ALP BLD-CCNC: 53 U/L
ALT SERPL-CCNC: 17 U/L
ANION GAP SERPL CALCULATED.3IONS-SCNC: NORMAL MMOL/L
AST SERPL-CCNC: 20 U/L
BASOPHILS ABSOLUTE: 0.1 /ΜL
BASOPHILS RELATIVE PERCENT: 1.1 %
BILIRUB SERPL-MCNC: 0.8 MG/DL (ref 0.1–1.4)
BUN BLDV-MCNC: 32 MG/DL
CALCIUM SERPL-MCNC: 9.2 MG/DL
CHLORIDE BLD-SCNC: 106 MMOL/L
CHOLESTEROL, TOTAL: 191 MG/DL
CHOLESTEROL/HDL RATIO: 1.7
CO2: 26 MMOL/L
CREAT SERPL-MCNC: 1.6 MG/DL
EOSINOPHILS ABSOLUTE: 0.6 /ΜL
EOSINOPHILS RELATIVE PERCENT: 6 %
GFR CALCULATED: 41
GLUCOSE BLD-MCNC: 97 MG/DL
HCT VFR BLD CALC: 44.5 % (ref 41–53)
HDLC SERPL-MCNC: 67 MG/DL (ref 35–70)
HEMOGLOBIN: 14.8 G/DL (ref 13.5–17.5)
LDL CHOLESTEROL CALCULATED: 113 MG/DL (ref 0–160)
LYMPHOCYTES ABSOLUTE: 2 /ΜL
LYMPHOCYTES RELATIVE PERCENT: 20.6 %
MCH RBC QN AUTO: 32.6 PG
MCHC RBC AUTO-ENTMCNC: 33.4 G/DL
MCV RBC AUTO: 97.8 FL
MONOCYTES ABSOLUTE: 1 /ΜL
MONOCYTES RELATIVE PERCENT: 10.1 %
NEUTROPHILS ABSOLUTE: 5.9 /ΜL
NEUTROPHILS RELATIVE PERCENT: 62.2 %
NONHDLC SERPL-MCNC: NORMAL MG/DL
PDW BLD-RTO: 13.8 %
PLATELET # BLD: 178 K/ΜL
PMV BLD AUTO: 10.2 FL
POTASSIUM SERPL-SCNC: 4.2 MMOL/L
RBC # BLD: 4.55 10^6/ΜL
SODIUM BLD-SCNC: 141 MMOL/L
TOTAL PROTEIN: 6.8
TRIGL SERPL-MCNC: 55 MG/DL
VLDLC SERPL CALC-MCNC: 11 MG/DL
WBC # BLD: 9.6 10^3/ML

## 2022-05-18 ENCOUNTER — CARE COORDINATION (OUTPATIENT)
Dept: CARE COORDINATION | Age: 87
End: 2022-05-18

## 2022-05-18 NOTE — CARE COORDINATION
Attempted to reach patient for continued Care Coordination follow up and education re: the management of his healthcare needs  Patient was unavailable at the time of my call, and generic voicemail message was left asking patient to please return call to my direct number. Will continue to work to f/u with patient in the future.

## 2022-05-23 ENCOUNTER — CARE COORDINATION (OUTPATIENT)
Dept: CARE COORDINATION | Age: 87
End: 2022-05-23

## 2022-05-23 DIAGNOSIS — I10 ESSENTIAL HYPERTENSION: Primary | ICD-10-CM

## 2022-05-23 RX ORDER — HYDRALAZINE HYDROCHLORIDE 25 MG/1
25 TABLET, FILM COATED ORAL 4 TIMES DAILY PRN
Qty: 90 TABLET | Refills: 3 | Status: SHIPPED | OUTPATIENT
Start: 2022-05-23 | End: 2022-07-13

## 2022-05-23 NOTE — ACP (ADVANCE CARE PLANNING)
Advance Care Planning   Healthcare Decision Maker:    Primary Decision Maker: Case Cheek - Child - [phone]    Secondary Decision Maker: Isaiah Chaudhary - Child - [phone]    Today we documented Decision Maker(s) consistent with ACP documents on file. Reviewed and verified Healthcare Decision Maker information with patient.

## 2022-05-23 NOTE — CARE COORDINATION
Dr. Barbour Lies:  Please see note below. Patient stated he continues with intermittent c/o hypertension, especially in the [id number] time of day. Patient stated 2 days ago BP was 175/100 @ 0445 and today at approx 0500 BP was 162/96. Patient denies specific complaints but states he wakes up and \"feels funny. \"  Patient stated he is currently taking 1 tab metoprolol BID and 1/2 tab Amlodipine daily.

## 2022-05-23 NOTE — CARE COORDINATION
Please increase amlodipine to 1/2 tab twice daily.   I can also send in prescription for prn hydralazine for pt to take if bp >160/90

## 2022-05-23 NOTE — CARE COORDINATION
Ambulatory Care Coordination Note  5/23/2022  CM Risk Score: 4  Charlson 10 Year Mortality Risk Score: 100%     ACC: Parvin Avitia RN    Summary Note: Patient returned call for continued Care Coordination follow up and education re: the management of his healthcare needs. Patient shared he has been doing well but continues with occasional c/o hypertension. Patient reported he has had 2 episodes recently of awakening in the early morning hours ([id number]) with c/o \"feeling funny\" and BP was elevated at 175/100 and 162/96. Patient reported BP does come down after he takes his morning medications and f/u BP today was 132/78. Medications were reviewed with patient and he stated he is taking a full tab of metoprolol BID and 1/2 tab amlodipine daily. Hypertension education and signs/symptoms were reviewed and patient verbalized understanding. Will update PCP. Patient reported his pain remains at his baseline and he continues to take prn norco as directed. Patient admits to occasional c/o constipation but resolves with use of Miralax every couple of days. ACM discussed starting a daily stool softener to help with constipation and possibly decrease need for miralax and patient verbalized understanding. Healthcare Decision Maker information was also reviewed with patient and ACP note completed. Patient denied any other questions, concerns, or needs and he was encouraged to call with any that may develop.           Actions:   - Reviewed signs/symptoms to report   - Reviewed hypertension education   - Reviewed importance of early symptom recognition and follow up   - Verified current BP medications   - Updated PCP of HTN concerns   - Encouraged patient to consider stool softener with long term prn norco use   - Reviewed and verified Healthcare Decision Maker information - Complete ACP note   - Monitored for additional needs         Plan of Care:   - Continue with f/u calls for assistance with the management of his healthcare needs  - Educate on signs/symptoms to report - In [place]  - Educate on the importance of early symptom recognition - In Process  - Educate on the availability of same day appointments, urgent care/walk in clinic options, and after hours on call resources - Completed   - Review and verify Healthcare Decision Maker information - Completed  - Patient is current with COVID-19 vaccine x 3   - Patient is schedule for Medicare AW - Oct. 2022  - Monitor for ongoing needs and readiness to discharge from Care Coordination          Lab Results     None          Care Coordination Interventions    Referral from Primary Care Provider: No  Suggested Interventions and Community Resources  Fall Risk Prevention: Completed (Comment: May 2022)  Medication Assistance Program: Declined (Comment: Denied any needs at this time. Does receive assistance thru [facility] - April 2022)  Medi Set or Pill Pack: Completed (Comment: April 2022)  Pharmacist: Adela Christine (Comment: April 2022)  Senior Services: Completed (Comment: Receives 4 meal vouchers to eat out monthly thru [facility] - April 2022)  Social Work: Declined (Comment: Denied any need for additional resource needs - April 2022)  Other Services: Completed (Comment: Current with [id number] [facility] - April 2022)  Transportation Support: Declined  Other Services or Interventions: Follows with [address] [facility] - STR urology and STR cardiology          Goals Addressed                 This Visit's Progress       Care Coordination     Conditions and Symptoms   Improving     I will schedule office visits, as directed by my provider. I will keep my appointment or reschedule if I have to cancel. I will notify my provider of any barriers to my plan of care. I will notify my provider of any symptoms that indicate a worsening of my condition.     Barriers: overwhelmed by complexity of regimen, stress, and lack of education  Plan for overcoming my barriers: Continue to provide education to patient and monitor for resource needs   Confidence: 8/10  Anticipated Goal Completion Date: 8/1/2022                Prior to Admission medications    Medication Sig Start Date End Date Taking? Authorizing Provider   metoprolol tartrate (LOPRESSOR) 25 MG tablet Take 1 tablet by mouth 2 times daily 5/2/22  Yes Bernadette Lyn MD   Polyethylene Glycol 3350 (MIRALAX PO) Take by mouth as needed   Yes Historical Provider, MD   amLODIPine (NORVASC) 5 MG tablet Take 1 tablet by mouth daily 10/7/21  Yes Bernadette Lyn MD   HYDROcodone-acetaminophen (NORCO) 5-325 MG per tablet Take 1 tablet by mouth every 6 hours as needed for Pain. Yes Historical Provider, MD   ondansetron (ZOFRAN) 4 MG tablet Take 1 tablet by mouth 3 times daily as needed for Nausea or Vomiting  Patient not taking: Reported on 5/2/2022 4/4/22   Bernadette Lyn MD   clotrimazole-betamethasone (LOTRISONE) 1-0.05 % cream Apply topically 2 times daily.  PRN  Patient not taking: Reported on 4/4/2022 1/3/22   Bernadette Lyn MD   VITAMIN D PO Take by mouth    Historical Provider, MD   apixaban (ELIQUIS) 5 MG TABS tablet Take 1 tablet by mouth 2 times daily 7/8/19   Nkechi Maldonado MD   aspirin 81 MG tablet Take 81 mg by mouth daily    Historical Provider, MD   Multiple Vitamins-Minerals (PRESERVISION AREDS 2 PO) Take 1 tablet by mouth 2 times daily    Historical Provider, MD       Future Appointments   Date Time Provider Yonas Boyer   6/10/2022 10:15 AM SONIDO Lei - [address] [facility] [place]   7/18/2022  1:15 PM Nkechi Maldonado MD N [facility] Heart [facility] [place]   10/3/2022  8:00 AM Bernadette Lyn MD [facility] Med [facility] [place]     ,   General Assessment    Do you have any symptoms that are causing concern?: Yes  Progression since Onset: Unchanged, Intermittent - Waxing/Waning  Reported Symptoms: Pain      and Care Coordination Episodes    Type: Amb Care Management  Episode: Complex Care  Noted: 4/26/2022  Comments: List Referral -

## 2022-05-23 NOTE — CARE COORDINATION
Patient called and updated. Patient states he will try 1/2 tab Amlodipine BID as directed and if has concerns in the future will call back for prn hydralazine order. Patient was able to verbalize instructions and he denied any questions or concerns. Patient was educated on signs/symptoms to report and he verbalized understanding. Patient was instructed to continue to monitor BP 1-2 times daily and to continue with BP log as previously directed. Patient will call with any ongoing symptoms or concerns. Patient denied any other questions, concerns, or needs and he was encouraged to call with any that may develop.

## 2022-05-23 NOTE — CARE COORDINATION
Attempted to reach patient for continued Care Coordination follow up and education re: the management of his healthcare needs. Patient was unavailable at the time of my call, and generic voicemail message was left asking patient to please return call to my direct number. Will continue to work to f/u with patient in the future.

## 2022-05-25 DIAGNOSIS — C61 PROSTATE CANCER (HCC): Primary | ICD-10-CM

## 2022-05-31 ENCOUNTER — CARE COORDINATION (OUTPATIENT)
Dept: CARE COORDINATION | Age: 87
End: 2022-05-31

## 2022-05-31 DIAGNOSIS — I10 ESSENTIAL HYPERTENSION: Primary | ICD-10-CM

## 2022-05-31 DIAGNOSIS — I10 ESSENTIAL HYPERTENSION: ICD-10-CM

## 2022-05-31 RX ORDER — AMLODIPINE BESYLATE 10 MG/1
10 TABLET ORAL DAILY
COMMUNITY
End: 2022-05-31

## 2022-05-31 RX ORDER — AMLODIPINE BESYLATE 5 MG/1
5 TABLET ORAL DAILY
Qty: 90 TABLET | Refills: 1 | Status: SHIPPED | OUTPATIENT
Start: 2022-05-31 | End: 2022-06-01 | Stop reason: SDUPTHER

## 2022-05-31 NOTE — CARE COORDINATION
ACM verified today with patient prior to him sharing BP concerns with ACM last week (5/23/22 note) he was taking 1/2 tab of a 10 mg tablet (med list stated he was taking 1/2 of a 5 mg tablet daily). Patient then increased to 1/2 tab BID as directed by PCP (total of 10 mg daily) and he stated today BP was doing better and only those episodes of hypertension as explained in note today since med change. ACM Spoke with Kelly at [facility] and she stated they have not filled 10 mg amlodipine since 2019 and filled today new script for 5 mg daily. ACM spoke with patient again he again verified the dose he currently has is 10 mg and it came from the [address] [place] [address]. Contact information provided. Patient instructed to take 1/2 of 10 mg tablet daily until he hears back from PCP/ACM. ACM spoke with Pooja Nunez and then pharmacist [facility] at the [address] [place] [address] verified they are currently filling patient's 10 mg dose of Amlodipine with instructions to take 1/2 tab daily, Eliquis, and he recently received a 26 day supply of 5 mg Norco to take BID as needed. Will update PCP of this information and for further clarification of what patient should be taking for his BP.

## 2022-05-31 NOTE — CARE COORDINATION
Dr. Yosvany Bean:  Please see note below. Patient stated today he has been taking 1/2 tab of 10 mg tablet BID (not 5 mg tablet that was listed on med list) and BP is improving (med list updated). If to continue will need new script for 5 mg tablet BID in the future as it is difficult for him to cut the 10 mg tablets.

## 2022-05-31 NOTE — CARE COORDINATION
Ambulatory Care Coordination Note  5/31/2022  CM Risk Score: 4  Charlson 10 Year Mortality Risk Score: 100%     ACC: Cindy Lyn RN    Summary Note: Patient returned call for continued Care Coordination follow up and education re: the management of his healthcare needs. Patient shared BP has improved with recent change of his amlodipine but he did have 2 recent episodes of hypertension with readings being 162/96 and 168/94. Patient stated BP has typically been in the 130-140/70 range. Patient denied any symptoms of concerns being present. Med review completed and patient verified he is taking 1/2 tab of a 10 mg tablet (not 5 mg as prev listed on med list) BID. Med list was updated and will also update PCP. Metoprolol directions were also reviewed with patient and he verbalized understanding. ACM educated patient on signs/symptoms to report as well as importance of early symptom recognition and he verbalized understanding. Patient denied any other questions, concerns, or needs and he was encouraged to call with any that may develop.           Actions:   - Reviewed signs/symptoms to report   - Reviewed importance of early symptom recognition and follow up   - F/u on recent BP concerns   - Reviewed recent medication changes   - Updated PCP   - Monitored for additional needs          Plan of Care:   - Continue with f/u calls for assistance with the management of his healthcare needs  - Educate on signs/symptoms to report - In Process   - Educate on the importance of early symptom recognition - In Process  - Educate on the availability of same day appointments, urgent care/walk in clinic options, and after hours on call resources - Completed   - Review and verify [address] information - Completed  - Patient is current with COVID-19 vaccine x 3   - Patient is schedule for Medicare AWV - Oct. 2022  - Monitor for ongoing needs and readiness to discharge from Care Coordination      Lab Results None          Care Coordination Interventions    Referral from Primary Care Provider: No  Suggested Interventions and Community Resources  Fall Risk Prevention: Completed (Comment: May 2022)  Medication Assistance Program: Declined (Comment: Denied any needs at this time. Does receive assistance thru [facility] - April 2022)  Medi Set or Pill Pack: Completed (Comment: April 2022)  Pharmacist: Jayna Lamb (Comment: April 2022)  Senior Services: Completed (Comment: Receives 4 meal vouchers to eat out monthly thru [facility] - April 2022)  Social Work: Declined (Comment: Denied any need for additional resource needs - April 2022)  Other Services: Completed (Comment: Current with [id number] [place] [address] - April 2022)  Transportation Support: Declined  Other Services or Interventions: Follows with [address] [place] [address] - [facility] urology and [facility] cardiology          Goals Addressed                 This Visit's Progress       Care Coordination     Conditions and Symptoms   Improving     I will schedule office visits, as directed by my provider. I will keep my appointment or reschedule if I have to cancel. I will notify my provider of any barriers to my plan of care. I will notify my provider of any symptoms that indicate a worsening of my condition. Barriers: overwhelmed by complexity of regimen, stress, and lack of education  Plan for overcoming my barriers: Continue to provide education to patient and monitor for resource needs   Confidence: 8/10  Anticipated Goal Completion Date: 8/1/2022                Prior to Admission medications    Medication Sig Start Date End Date Taking?  Authorizing Provider   amLODIPine (NORVASC) 10 MG tablet Take 10 mg by mouth daily Patient taking 1/2 tab BID - May 2022   Yes Historical Provider, MD   metoprolol tartrate (LOPRESSOR) 25 MG tablet Take 1 tablet by mouth 2 times daily 5/2/22  Yes Maame Giordano MD   hydrALAZINE (APRESOLINE) 25 MG tablet Take 1 tablet by mouth 4 times daily as needed (bp >160/90) 5/23/22 Horace Barger MD   Polyethylene Glycol 3350 (MIRALAX PO) Take by mouth as needed    Historical Provider, MD   ondansetron (ZOFRAN) 4 MG tablet Take 1 tablet by mouth 3 times daily as needed for Nausea or Vomiting  Patient not taking: Reported on 5/2/2022 4/4/22   Horace Barger MD   clotrimazole-betamethasone (LOTRISONE) 1-0.05 % cream Apply topically 2 times daily. PRN  Patient not taking: Reported on 4/4/2022 1/3/22   Horace Barger MD   VITAMIN D PO Take by mouth    Historical Provider, MD   apixaban (ELIQUIS) 5 MG TABS tablet Take 1 tablet by mouth 2 times daily 7/8/19   Stacey Turner MD   aspirin 81 MG tablet Take 81 mg by mouth daily    Historical Provider, MD   HYDROcodone-acetaminophen (NORCO) 5-325 MG per tablet Take 1 tablet by mouth every 6 hours as needed for Pain.      Historical Provider, MD   Multiple Vitamins-Minerals (PRESERVISION AREDS 2 PO) Take 1 tablet by mouth 2 times daily    Historical Provider, MD       Future Appointments   Date Time Provider Yonas Boyer   6/10/2022 10:15 AM Yoav Chadwick APRN - CNP N [facility] [place]   7/18/2022  1:15 PM Stacey Turner MD N [facility] Heart [facility] [place]   10/3/2022  8:00 AM Horace Barger MD [facility] Med [facility] [place]     ,   General Assessment    Do you have any symptoms that are causing concern?: Yes  Progression since Onset: Gradually Improving  Reported Symptoms: Other (Comment: Hypertension )      and Care Coordination Episodes    Type: Amb Care Management  Episode: Complex Care  Noted: 4/26/2022  Comments: List Referral -

## 2022-05-31 NOTE — CARE COORDINATION
Attempted to reach patient for continued Care Coordination follow up and education re: the management of his healthcare needs and in f/u to recent medication change d/t BP concerns. Patient was unavailable at the time of my call, and generic voicemail message was left asking patient to please return call to my direct number. Will continue to work to f/u with patient in the future. Will also have CCSS reach out to patient for ongoing BP follow up.

## 2022-05-31 NOTE — CARE COORDINATION
He should be taking amlodipine just 5 mg once daily. It is a once daily med. New script sent in for amlodipine once daily 5mg.   thanks

## 2022-06-01 RX ORDER — AMLODIPINE BESYLATE 5 MG/1
5 TABLET ORAL 2 TIMES DAILY
Qty: 180 TABLET | Refills: 1 | Status: SHIPPED | OUTPATIENT
Start: 2022-06-01 | End: 2022-08-29 | Stop reason: SDUPTHER

## 2022-06-01 NOTE — CARE COORDINATION
Clarification after discussing with care coordination. Patient should be taking 5 mg amlodipine BID.   Monitor bp

## 2022-06-01 NOTE — CARE COORDINATION
Patient called and updated to take 5 mg Amlodipine BID as instructed (1/2 of 10 mg tab BID) and to continue to monitor BP. ACM educated what to call and report and patient verbalized understanding. Patient was also updated that new 5 mg tab BID prescription has been sent to the [address] [place] [address]. Patient verbalized understanding and denied any questions. Will continue to monitor and follow up with patient.

## 2022-06-02 ENCOUNTER — HOSPITAL ENCOUNTER (OUTPATIENT)
Age: 87
Discharge: HOME | End: 2022-06-02
Payer: MEDICARE

## 2022-06-02 DIAGNOSIS — C61 PROSTATE CANCER (HCC): ICD-10-CM

## 2022-06-02 PROCEDURE — 84153 ASSAY OF PSA TOTAL: CPT

## 2022-06-02 PROCEDURE — 36415 COLL VENOUS BLD VENIPUNCTURE: CPT

## 2022-06-03 LAB — PROSTATE SPECIFIC ANTIGEN: 0.05 NG/ML (ref 0–1)

## 2022-06-06 ENCOUNTER — TELEPHONE (OUTPATIENT)
Dept: FAMILY MEDICINE CLINIC | Age: 87
End: 2022-06-06

## 2022-06-06 NOTE — TELEPHONE ENCOUNTER
Pt called and he was at the pharmacy to  refills. Pt was wanting to know what the rx for Hydralazine was for. Advised pt that it is for his bp if reading >160/90. Pt voiced understanding. Pt will call if further questions.

## 2022-06-07 ENCOUNTER — CARE COORDINATION (OUTPATIENT)
Dept: CARE COORDINATION | Age: 87
End: 2022-06-07

## 2022-06-07 NOTE — CARE COORDINATION
Ambulatory Care Coordination Note  6/7/2022  CM Risk Score: 4  Charlson 10 Year Mortality Risk Score: 100%     ACC: Jillian Resendiz RN    Summary Note: Received call from patient re: his recent BP readings and ongoing healthcare management. Patient stated he has been doing well and BP has been stable on recent medication changes. Medications were reviewed with patient and he verbalized understanding. Patient denied any c/o lightheadedness, dizziness, or headaches being present. Hypertension education and signs/symptoms to report were reviewed and patient acknowledged understanding. ACM also reviewed importance of early symptom recognition and follow up and he verbalized understanding. Importance of ongoing medication compliance as well as need to call with questions/changes was also reviewed with patient and he verbalized understanding. Patient stated most recent BP readings have been as follows: 142/80 - 134/84 - 145/83 - 124/68 - 140/76  132/71. PCP updated. Patient denied any other questions, concerns, or needs and he was encouraged to call with any that may develop.           Actions:   - Reviewed signs/symptoms to report   - Reviewed importance of early symptom recognition and follow up   - Reviewed medications and importance of taking as directed   - Reviewed recent BP readings and updated PCP   - Monitored for additional needs           Plan of Care:   - Continue with f/u calls for assistance with the management of his healthcare needs  - Educate on signs/symptoms to report - In Process   - Educate on the importance of early symptom recognition - In Process  - Educate on the availability of same day appointments, urgent care/walk in clinic options, and after hours on call resources - Completed   - Review and verify Healthcare Decision Maker information - Completed  - Patient is current with COVID-19 vaccine x 3   - Patient is schedule for Medicare AWV - Oct. 2022  - Monitor for ongoing needs and readiness to discharge from Care Coordination      Lab Results     None          Care Coordination Interventions    Referral from Primary Care Provider: No  Suggested Interventions and Community Resources  Fall Risk Prevention: Completed (Comment: May 2022)  Medication Assistance Program: Declined (Comment: Denied any needs at this time. Does receive assistance thru the [facility] - April 2022)  Medi Set or Pill Pack: Completed (Comment: April 2022)  Pharmacist: Awa Vargas (Comment: April 2022)  Senior Services: Completed (Comment: Receives 4 meal vouchers to eat out monthly thru [facility] - April 2022)  Social Work: Declined (Comment: Denied any need for additional resource needs - April 2022)  Other Services: Completed (Comment: Current with [id number] [place] [address] - April 2022)  Transportation Support: Declined  Other Services or Interventions: Follows with [address] [place] [address] - [facility] urology and [facility] cardiology          Goals Addressed                 This Visit's Progress       Care Coordination     Conditions and Symptoms   Improving     I will schedule office visits, as directed by my provider. I will keep my appointment or reschedule if I have to cancel. I will notify my provider of any barriers to my plan of care. I will notify my provider of any symptoms that indicate a worsening of my condition. Barriers: overwhelmed by complexity of regimen, stress, and lack of education  Plan for overcoming my barriers: Continue to provide education to patient and monitor for resource needs   Confidence: 8/10  Anticipated Goal Completion Date: 8/1/2022                Prior to Admission medications    Medication Sig Start Date End Date Taking?  Authorizing Provider   metoprolol tartrate (LOPRESSOR) 25 MG tablet Take 1 tablet by mouth 2 times daily 6/7/22   Bernice Christian MD   amLODIPine (NORVASC) 5 MG tablet Take 1 tablet by mouth in the morning and at bedtime 6/1/22   Bernice Christian MD   hydrALAZINE (APRESOLINE) 25 MG tablet Take 1 tablet by mouth 4 times daily as needed (bp >160/90)  Patient not taking: Reported on 5/31/2022 5/23/22   Chan Bird MD   Polyethylene Glycol 3350 (MIRALAX PO) Take by mouth as needed    Historical Provider, MD   ondansetron (ZOFRAN) 4 MG tablet Take 1 tablet by mouth 3 times daily as needed for Nausea or Vomiting  Patient not taking: Reported on 5/2/2022 4/4/22   Chan Bird MD   clotrimazole-betamethasone (LOTRISONE) 1-0.05 % cream Apply topically 2 times daily. PRN  Patient not taking: Reported on 4/4/2022 1/3/22   Chan Bird MD   VITAMIN D PO Take by mouth    Historical Provider, MD   apixaban (ELIQUIS) 5 MG TABS tablet Take 1 tablet by mouth 2 times daily 7/8/19   Michael Aguirre MD   aspirin 81 MG tablet Take 81 mg by mouth daily    Historical Provider, MD   HYDROcodone-acetaminophen (NORCO) 5-325 MG per tablet Take 1 tablet by mouth.  Taking 2 x daily as needed - filled at [facility] - May 2022    Historical Provider, MD   Multiple Vitamins-Minerals (PRESERVISION AREDS 2 PO) Take 1 tablet by mouth 2 times daily    Historical Provider, MD       Future Appointments   Date Time Provider Yonas Boyer   6/10/2022 10:15 AM John Borrero, APRN - [address] [facility] [facility] [place]   7/18/2022  1:15 PM Michael Aguirre MD N SRPX Heart [facility] [place]   10/3/2022  8:00 AM Chan Bird MD [facility] Med [facility] [place] Assessment    Do you have any symptoms that are causing concern?: No      and Care Coordination Episodes    Type: Amb Care Management  Episode: Complex Care  Noted: 4/26/2022  Comments: List Referral -

## 2022-06-08 DIAGNOSIS — G89.4 CHRONIC PAIN DISORDER: Primary | ICD-10-CM

## 2022-06-08 DIAGNOSIS — M41.9 SCOLIOSIS OF THORACOLUMBAR SPINE, UNSPECIFIED SCOLIOSIS TYPE: ICD-10-CM

## 2022-06-08 DIAGNOSIS — I48.0 PAF (PAROXYSMAL ATRIAL FIBRILLATION) (HCC): ICD-10-CM

## 2022-06-08 RX ORDER — HYDROCODONE BITARTRATE AND ACETAMINOPHEN 5; 325 MG/1; MG/1
1 TABLET ORAL 2 TIMES DAILY PRN
Qty: 60 TABLET | Refills: 0 | Status: SHIPPED | OUTPATIENT
Start: 2022-06-08 | End: 2022-07-08

## 2022-06-10 ENCOUNTER — OFFICE VISIT (OUTPATIENT)
Dept: UROLOGY | Age: 87
End: 2022-06-10
Payer: MEDICARE

## 2022-06-10 VITALS
SYSTOLIC BLOOD PRESSURE: 120 MMHG | WEIGHT: 151.4 LBS | HEIGHT: 71 IN | BODY MASS INDEX: 21.19 KG/M2 | DIASTOLIC BLOOD PRESSURE: 72 MMHG

## 2022-06-10 DIAGNOSIS — C61 PROSTATE CANCER (HCC): Primary | ICD-10-CM

## 2022-06-10 LAB
BILIRUBIN URINE: NEGATIVE
BLOOD URINE, POC: NEGATIVE
CHARACTER, URINE: CLEAR
COLOR, URINE: YELLOW
GLUCOSE URINE: NEGATIVE MG/DL
KETONES, URINE: NEGATIVE
LEUKOCYTE CLUMPS, URINE: NEGATIVE
NITRITE, URINE: NEGATIVE
PH, URINE: 6.5 (ref 5–9)
PROTEIN, URINE: NEGATIVE MG/DL
SPECIFIC GRAVITY, URINE: 1.01 (ref 1–1.03)
UROBILINOGEN, URINE: 0.2 EU/DL (ref 0–1)

## 2022-06-10 PROCEDURE — G8420 CALC BMI NORM PARAMETERS: HCPCS | Performed by: NURSE PRACTITIONER

## 2022-06-10 PROCEDURE — 99213 OFFICE O/P EST LOW 20 MIN: CPT | Performed by: NURSE PRACTITIONER

## 2022-06-10 PROCEDURE — 81003 URINALYSIS AUTO W/O SCOPE: CPT | Performed by: NURSE PRACTITIONER

## 2022-06-10 PROCEDURE — 1036F TOBACCO NON-USER: CPT | Performed by: NURSE PRACTITIONER

## 2022-06-10 PROCEDURE — G8427 DOCREV CUR MEDS BY ELIG CLIN: HCPCS | Performed by: NURSE PRACTITIONER

## 2022-06-10 PROCEDURE — 1123F ACP DISCUSS/DSCN MKR DOCD: CPT | Performed by: NURSE PRACTITIONER

## 2022-06-10 ASSESSMENT — ENCOUNTER SYMPTOMS
BACK PAIN: 0
VOMITING: 0
ABDOMINAL PAIN: 0
NAUSEA: 0

## 2022-06-10 NOTE — PROGRESS NOTES
[id number] [facility] [place] [address] [place] [id number]  Dept: [phone]  Loc: [phone]    Visit Date: 6/10/2022        HPI:     Francisco Hartley is a 80 y.o. male who presents today for:  Chief Complaint   Patient presents with    Prostate Cancer    Follow-up       HPI   Pt seen in follow up for prostate cancer. Pt underwent RALRP with L PLND 10/7/15 by Dr. Kiersten Burton. Pathology with GS 4+3=7 prostate cancer. Tumor noted to extend to cauterized anterior capsular margin (right anterior, 10 mm from apex). Pt's PSA undetectable until 7/2021. Since that time it has been running 0.04-0.05. Has chronic back spanning 8-10 years time. No change or acute worsening. Current Outpatient Medications   Medication Sig Dispense Refill    metoprolol tartrate (LOPRESSOR) 25 MG tablet Take 1 tablet by mouth 2 times daily 180 tablet 3    HYDROcodone-acetaminophen (NORCO) 5-325 MG per tablet Take 1 tablet by mouth 2 times daily as needed for Pain for up to 30 days. 60 tablet 0    amLODIPine (NORVASC) 5 MG tablet Take 1 tablet by mouth in the morning and at bedtime 180 tablet 1    hydrALAZINE (APRESOLINE) 25 MG tablet Take 1 tablet by mouth 4 times daily as needed (bp >160/90) 90 tablet 3    Polyethylene Glycol 3350 (MIRALAX PO) Take by mouth as needed      ondansetron (ZOFRAN) 4 MG tablet Take 1 tablet by mouth 3 times daily as needed for Nausea or Vomiting 30 tablet 3    clotrimazole-betamethasone (LOTRISONE) 1-0.05 % cream Apply topically 2 times daily. PRN 45 g 1    VITAMIN D PO Take by mouth      apixaban (ELIQUIS) 5 MG TABS tablet Take 1 tablet by mouth 2 times daily 180 tablet 2    aspirin 81 MG tablet Take 81 mg by mouth daily      Multiple Vitamins-Minerals (PRESERVISION AREDS 2 PO) Take 1 tablet by mouth 2 times daily       No current facility-administered medications for this visit.        Past Medical History  Renetta Bad has a past medical history of Atrial fibrillation ([address] Back pain, Hernia, Hx of blood clots, Hyperlipidemia, Hypertension, Osteoarthritis, Prostate cancer ([address] Prostate cancer ([address] Pulmonary emboli ([address] and Wears glasses. Past Surgical History  The patient  has a past surgical history that includes back surgery (0945,0506); Total shoulder arthroplasty (2005); Prostate surgery (2003); Prostate surgery (2009); Prostate Biopsy (7-); Cataract removal with implant (Left, 11/2014); Prostate Biopsy (8/21/2015); hernia repair (1995, 2009); other surgical history (10/7/2015); other surgical history (Bilateral, 09/05/2017); Nerve Block Lumb Facet Level 1 Bilateral (Bilateral, 9/5/2017); hernia repair (6258'H?); Cataract removal (Right, 06/2001); Appendectomy (01/1961); hernia repair (Left, 11/22/2017); other surgical history (Right, 1980's); other surgical history (01/30/2018); pr inject si joint arthrgrphy&/anes/steroid w/image (Left, 1/30/2018); other surgical history (Left, 02/27/2018); pr inject si joint arthrgrphy&/anes/steroid w/image (Left, 2/27/2018); Nerve Surgery (Left, 04/09/2018); pr office/outpt visit,procedure only (Left, 4/9/2018); pr office/outpt visit,procedure only (N/A, 8/21/2018); pr implant neurostim/ (N/A, 9/17/2018); and eye surgery. Family History  This patient's family history includes Cancer in his brother, brother, and sister; Heart Disease in his father; Prostate Cancer in his brother, brother, brother, and brother; Stroke in his mother. Social History  Marely Woodard  reports that he quit smoking about 56 years ago. His smoking use included cigarettes. He has a 10.00 pack-year smoking history. He has never used smokeless tobacco. He reports current alcohol use of about 1.0 standard drink of alcohol per week. He reports that he does not use drugs.       Subjective:      Review of Systems   Constitutional: Negative for activity change, appetite change, chills, diaphoresis, fatigue, fever and unexpected weight change. Gastrointestinal: Negative for abdominal pain, nausea and vomiting. Genitourinary: Negative for decreased urine volume, difficulty urinating, dysuria, flank pain, frequency, hematuria and urgency. Musculoskeletal: Negative for back pain. Objective:   /72   Ht 5' 10.98\" (1.803 m)   Wt 151 lb 6.4 oz (68.7 kg)   BMI 21.13 kg/m²     Physical Exam  Vitals reviewed. Constitutional:       General: He is not in acute distress. Appearance: Normal appearance. He is well-developed. He is not ill-appearing or diaphoretic. HENT:      Head: Normocephalic and atraumatic. Right Ear: External ear normal.      Left Ear: External ear normal.      Nose: Nose normal.      Mouth/Throat:      Mouth: Mucous membranes are moist.   Eyes:      General: No scleral icterus. Right eye: No discharge. Left eye: No discharge. Neck:      Vascular: No JVD. Trachea: No tracheal deviation. Pulmonary:      Effort: Pulmonary effort is normal. No respiratory distress. Abdominal:      General: There is no distension. Tenderness: There is no abdominal tenderness. Musculoskeletal:         General: Normal range of motion. Neurological:      Mental Status: He is alert and oriented to person, place, and time. Mental status is at baseline. Psychiatric:         Mood and Affect: Mood normal.         Behavior: Behavior normal.         Thought Content:  Thought content normal.         POC  Results for POC orders placed in visit on 06/10/22   POCT Urinalysis No Micro (Auto)   Result Value Ref Range    Glucose, Ur Negative NEGATIVE mg/dl    Bilirubin Urine Negative     Ketones, Urine Negative NEGATIVE    Specific Gravity, Urine 1.010 1.002 - 1.030    Blood, UA POC Negative NEGATIVE    pH, Urine 6.50 5.0 - 9.0    Protein, Urine Negative NEGATIVE mg/dl    Urobilinogen, Urine 0.20 0.0 - 1.0 eu/dl    Nitrite, Urine Negative NEGATIVE    Leukocyte Clumps, Urine Negative NEGATIVE    Color, Urine Yellow YELLOW-STRAW    Character, Urine Clear CLR-SL.CLOUD         Patients recent PSA values are as follows  Lab Results   Component Value Date    PSA 0.05 06/02/2022    PSA 0.05 01/19/2022    PSA 0.04 10/22/2021        Recent BUN/Creatinine:  Lab Results   Component Value Date    BUN 29 10/11/2021    CREATININE 1.3 10/11/2021     Surgical Pathology  FINAL DIAGNOSIS:  A. Posterior neck of bladder, biopsy:   Negative for malignancy.   Benign smooth muscle. B. Lymph nodes (2), left pelvic, resection:   Negative for malignancy. C. Prostate, radical prostatectomy:   Invasive prostatic adenocarcinoma, [place] score 4+3 = 7, p T2c.   Margins-tumor extends to cauterized anterior capsular margin (right  anterior, 10 mm from apex).   Tebbetts and bladder neck margins negative.   No seminal vesicle invasion.   Angiolymphatic invasion and perineural invasion present.      High-grade PIN and benign nodular hyperplasia. Assessment:   Prostate cancer  Nocturia    Plan:     PSA no longer undetectable but still low at 0.05. Was 0.04 a year ago. Discussed with Andree Monroy and recommend continuing to monitor at this time. Repeat PSA in 6 months. F/u in 6 months with PSA a few days prior.

## 2022-06-20 ENCOUNTER — CARE COORDINATION (OUTPATIENT)
Dept: CARE COORDINATION | Age: 87
End: 2022-06-20

## 2022-06-20 NOTE — CARE COORDINATION
Ambulatory Care Coordination Note  6/20/2022  CM Risk Score: 4  Charlson 10 Year Mortality Risk Score: 100%     ACC: Burke Adams RN    Summary Note: Patient returned call for continued Care Coordination follow up and education re: the management of his healthcare needs. Patient shared he has been doing well and BP has remained stable with only one recent concern for hypertension. Patient stated BP yesterday was elevated upon awaking at 0600 - 170/90 with . Patient reported BP improved as he took reading 1 hour later after medications and reading was 120/70 with HR 64. Patient denied any other symptoms, such as dizziness, headache, etc... Patient was instructed to call and report any changes and he acknowledged understanding. Patient shared he has noticed he has a \"gut ache\" at times when he awakens in the AM.  Patient denied any c/o significant N/V or pain being present. Patient feels could be r/t his metoprolol d/t not taking medication with food as recommended. Patient was encouraged to be sure to take medication with food/light snack to prevent ongoing symptoms and if symptoms continue or worsen he should call and follow up. Patient verbalized understanding. ACM reviewed signs/symptoms to report as well as the importance of early symptom recognition and follow up. Patient verbalized understanding. Patient denied any other questions, concerns, or needs and he was encouraged to call with any that may develop.           Actions:   - Reviewed signs/symptoms to report   - Reviewed importance of early symptom recognition and follow up   - Reviewed hypertension education and what to report   - Encouraged patient to take medications with food to limit GI concerns   - Educated on signs/symptoms to report   - Reviewed importance of early symptom recognition and follow up   - Monitored for additional needs          Plan of Care:   - Continue with f/u calls for assistance with the management of his healthcare needs  - Educate on signs/symptoms to report - In [facility]  - Educate on the importance of early symptom recognition - In Process  - Educate on the availability of same day appointments, urgent care/walk in clinic options, and after hours on call resources - Completed   - Review and verify Healthcare Decision Maker information - Completed  - Patient is current with COVID-19 vaccine x 3   - Patient is schedule for Medicare AWV - Oct. 2022  - Monitor for ongoing needs and readiness to discharge from Care Coordination      Lab Results     None          Care Coordination Interventions    Referral from Primary Care Provider: No  Suggested Interventions and Community Resources  Fall Risk Prevention: Completed (Comment: May 2022)  Medication Assistance Program: Declined (Comment: Denied any needs at this time. Does receive assistance thru the [facility] - April 2022)  Medi Set or Pill Pack: Completed (Comment: April 2022)  Pharmacist: Robyn Porter (Comment: April 2022)  Senior Services: Completed (Comment: Receives 4 meal vouchers to eat out monthly thru [employer] - April 2022)  Social Work: Declined (Comment: Denied any need for additional resource needs - April 2022)  Other Services: Completed (Comment: Current with [id number] [facility] - April 2022)  Transportation Support: Declined  Other Services or Interventions: Follows with [id number] [facility] - [facility] urology and [facility] cardiology          Goals Addressed                 This Visit's Progress       Care Coordination     Conditions and Symptoms   Improving     I will schedule office visits, as directed by my provider. I will keep my appointment or reschedule if I have to cancel. I will notify my provider of any barriers to my plan of care. I will notify my provider of any symptoms that indicate a worsening of my condition.     Barriers: overwhelmed by complexity of regimen, stress, and lack of education  Plan for overcoming my barriers: Continue to provide education to patient and monitor for resource needs   Confidence: 8/10  Anticipated Goal Completion Date: 8/1/2022                Prior to Admission medications    Medication Sig Start Date End Date Taking? Authorizing Provider   metoprolol tartrate (LOPRESSOR) 25 MG tablet Take 1 tablet by mouth 2 times daily 6/8/22   Apolinar Leal MD   HYDROcodone-acetaminophen (NORCO) 5-325 MG per tablet Take 1 tablet by mouth 2 times daily as needed for Pain for up to 30 days. 6/8/22 7/8/22  Apolinar Leal MD   amLODIPine (NORVASC) 5 MG tablet Take 1 tablet by mouth in the morning and at bedtime 6/1/22   Apolinar Leal MD   hydrALAZINE (APRESOLINE) 25 MG tablet Take 1 tablet by mouth 4 times daily as needed (bp >160/90) 5/23/22   Apolinar Leal MD   Polyethylene Glycol 3350 (MIRALAX PO) Take by mouth as needed    Historical Provider, MD   ondansetron (ZOFRAN) 4 MG tablet Take 1 tablet by mouth 3 times daily as needed for Nausea or Vomiting 4/4/22   Apolinar Leal MD   clotrimazole-betamethasone (LOTRISONE) 1-0.05 % cream Apply topically 2 times daily.  PRN 1/3/22   Apolinar Leal MD   VITAMIN D PO Take by mouth    Historical Provider, MD   apixaban (ELIQUIS) 5 MG TABS tablet Take 1 tablet by mouth 2 times daily 7/8/19   Goyo Canales MD   aspirin 81 MG tablet Take 81 mg by mouth daily    Historical Provider, MD   Multiple Vitamins-Minerals (PRESERVISION AREDS 2 PO) Take 1 tablet by mouth 2 times daily    Historical Provider, MD       Future Appointments   Date Time Provider Yonas Boyer   7/18/2022  1:15 PM Goyo Canales MD [facility] [place]   10/3/2022  8:00 AM Apolinar Leal MD [facility] CG [place]   12/14/2022 10:30 AM Eva Davenport APRN - [address] [place] [address] Assessment    Do you have any symptoms that are causing concern?: No      and Care Coordination Episodes    Type: Amb Care Management  Episode: Complex Care  Noted: 4/26/2022  Comments: List Referral -

## 2022-07-02 ENCOUNTER — HOSPITAL ENCOUNTER (EMERGENCY)
Age: 87
Discharge: HOME | End: 2022-07-02
Attending: EMERGENCY MEDICINE
Payer: MEDICARE

## 2022-07-02 VITALS
TEMPERATURE: 97.5 F | RESPIRATION RATE: 16 BRPM | WEIGHT: 150 LBS | HEART RATE: 85 BPM | OXYGEN SATURATION: 98 % | BODY MASS INDEX: 21 KG/M2 | HEIGHT: 71 IN | SYSTOLIC BLOOD PRESSURE: 149 MMHG | DIASTOLIC BLOOD PRESSURE: 87 MMHG

## 2022-07-02 DIAGNOSIS — K59.00 CONSTIPATION, UNSPECIFIED CONSTIPATION TYPE: Primary | ICD-10-CM

## 2022-07-02 DIAGNOSIS — R26.89 BALANCE DISORDER: ICD-10-CM

## 2022-07-02 PROCEDURE — 99282 EMERGENCY DEPT VISIT SF MDM: CPT

## 2022-07-02 ASSESSMENT — PAIN - FUNCTIONAL ASSESSMENT
PAIN_FUNCTIONAL_ASSESSMENT: NONE - DENIES PAIN
PAIN_FUNCTIONAL_ASSESSMENT: NONE - DENIES PAIN

## 2022-07-02 ASSESSMENT — ENCOUNTER SYMPTOMS
EYE DISCHARGE: 0
CONSTIPATION: 1
SHORTNESS OF BREATH: 0
VOMITING: 0
ABDOMINAL PAIN: 0
WHEEZING: 0
NAUSEA: 0
EYE PAIN: 0
DIARRHEA: 0
SORE THROAT: 0
BLOOD IN STOOL: 0

## 2022-07-02 NOTE — ED TRIAGE NOTES
Pt states he's off balance. Yesterday almost fell due to poor balance. C/o sinus drainage and slight facial pressure. Feels SOB at night when \"I get so stuffy. \" c/o constipation, had a normal BM Wed, and and small BM Thur. Started a stool softener but doesn't think it's helping.

## 2022-07-02 NOTE — ED NOTES
Pt pink, warm and dry, breathing with ease. Milk of Magnesium discussed. Encouraged to increase water intake. Encouraged to discontinue norco. Pt will follow up with family doctor. AVS reviewed including follow up appointments, diagnosis, and care of self at home. Denies questions or concerns. Pt remains alert and oriented. Pt discharged in stable condition.       Dayna Taylor RN  07/02/22 1406 Betzaida Cruz RN  07/02/22 4832

## 2022-07-02 NOTE — ED NOTES
Rectal exam done per Dr. Dwayne Resendiz with myself present.       Patricia Isbell RN  07/02/22 4205

## 2022-07-02 NOTE — ED PROVIDER NOTES
[address]  [address]  Phone: [phone]    eMERGENCY dEPARTMENT eNCOUnter           [facility]       Chief Complaint   Patient presents with    Constipation     Had regular BM Wed, and very small BM yesterday.  Dizziness     Feels like he's off balance. Thinks he has a sinus infection, lots of drainage, some sinus pressure. Nurses Notes reviewed and I agree except as noted in the HPI. HISTORY OF PRESENT ILLNESS    Garry Jeans is a 80 y.o. male who presented via private vehicle with the above-mentioned complaints. He is accompanied by his son. His last bowel movement was 3 days ago. He has an urge to defecate. He describes his symptoms as mild. He denies abdominal pain, nausea or vomiting. He denies chest pain or shortness of breath. He also said that he has poor balance chronically, uses cane. He has a chronic back pain for which he takes Norco at bedtime. He denies fall or injury. Has no change in mental status. REVIEW OF SYSTEMS     Review of Systems   Constitutional: Negative for chills and fever. HENT: Negative for sore throat. Eyes: Negative for pain and discharge. Respiratory: Negative for shortness of breath and wheezing. Cardiovascular: Negative for chest pain and palpitations. Gastrointestinal: Positive for constipation. Negative for abdominal pain, blood in stool, diarrhea, nausea and vomiting. Genitourinary: Negative for dysuria and hematuria. Musculoskeletal: Negative for neck pain and neck stiffness. Neurological: Negative for seizures, syncope and headaches. Psychiatric/Behavioral: Negative for confusion. PAST MEDICAL HISTORY    has a past medical history of Atrial fibrillation ([address] Back pain, Hernia, Hx of blood clots, Hyperlipidemia, Hypertension, Osteoarthritis, Prostate cancer ([address] Prostate cancer ([address] Pulmonary emboli ([address] and Wears glasses.     SURGICAL HISTORY normal.      Breath sounds: Normal breath sounds. Abdominal:      General: Bowel sounds are normal. There is no distension. Palpations: Abdomen is soft. Tenderness: There is no abdominal tenderness. Genitourinary:     Comments: Rectal examination was normal, there was no stool in the rectal vault  Musculoskeletal:      Cervical back: Neck supple. Neurological:      General: No focal deficit present. Mental Status: He is alert and oriented to person, place, and time. Cranial Nerves: No cranial nerve deficit. Motor: No weakness. Coordination: Coordination normal.           DIFFERENTIAL DIAGNOSIS:       DIAGNOSTIC RESULTS       LABS:   Labs Reviewed - No data to display    EMERGENCY DEPARTMENT COURSE:   Vitals:    Vitals:    07/02/22 0911   BP: (!) 149/87   Pulse: 85   Resp: 16   Temp: 97.5 °F (36.4 °C)   TempSrc: Temporal   SpO2: 98%   Weight: 150 lb (68 kg)   Height: 5' 10.5\" (1.791 m)     Patient presented with constipation and chronic imbalance. He does not appear ill or toxic. His neurological examination was normal.  His rectal examination was normal as well. I discussed diagnosis and treatment plan with him. I recommended he take milk of magnesia 30 mils at bedtime for 10 days    FINAL IMPRESSION      1. Constipation, unspecified constipation type    2.  Balance disorder          DISPOSITION/PLAN   Discharged home in good condition    PATIENT REFERRED TO:  Basilio Escalera MD  [address]   [facility] [place] [phone]    In 2 days        DISCHARGE MEDICATIONS:  New Prescriptions    No medications on file       (Please note that portions of this note were completed with a voice recognition program.  Efforts were made to edit the dictations but occasionally words are mis-transcribed.)    MD Yehuda Lees MD  07/02/22 8786

## 2022-07-05 ENCOUNTER — CARE COORDINATION (OUTPATIENT)
Dept: CARE COORDINATION | Age: 87
End: 2022-07-05

## 2022-07-05 NOTE — CARE COORDINATION
Ambulatory Care Coordination  ED Follow up Call    Reason for ED visit:  Balance disorder, constipation   Status:     improved    Did you call your PCP prior to going to the ED? No      Did you receive a discharge instructions from the Emergency Room? Yes  Review of Instructions:     Understands what to report/when to return?:  Yes   Understands discharge instructions?:  Yes   Following discharge instructions?:  Yes   If not why? n/a    Are there any new complaints of pain? No  New Pain Meds? No    Constipation prophylaxis needed? N/A    If you have a wound is the dressing clean, dry, and intact? N/A  Understands wound care regimen? N/A    Are there any other complaints/concerns that you wish to tell your provider? no    FU appts/Provider:    Future Appointments   Date Time Provider Yonas Boyer   7/18/2022  1:15 PM Kevin Juarez MD N [facility] Heart [facility] - [place]   10/3/2022  8:00 AM Lokesh Cleveland MD Fam Med CG [facility] [place]   12/14/2022 10:[facility] [place] - [address]           New Medications?:   No      Medication Reconciliation by phone - No-reviewed bp meds as pt had questions regarding norvasc dose and metoprolol dose. Pt taking both twice daily. Understands Medications? Yes  Taking Medications? Yes  Can you swallow your pills? Yes    Any further needs in the home i.e. Equipment? No    Link to services in community?:  N/A   Which services:  N/a  Pt states that it was suggested to pt at [facility] that some PT may be beneficial d/t unsteadiness. Constipation has resolved. Message routed to PCP office to arrange f/u appt.

## 2022-07-05 NOTE — CARE COORDINATION
Spoke with pt today following recent [facility] visit. Pt needs to arrange f/u with Dr. Sushil Bonilla for f/u. Wants to discuss physical therapy and would also like to discuss medications. Pt available to be seen this wk if appt available. Please advise.

## 2022-07-05 NOTE — CARE COORDINATION
Attempted to reach Michelle Mar today for f/u from [facility] visit. No answer. Message left to return call.

## 2022-07-13 ENCOUNTER — OFFICE VISIT (OUTPATIENT)
Dept: FAMILY MEDICINE CLINIC | Age: 87
End: 2022-07-13
Payer: MEDICARE

## 2022-07-13 ENCOUNTER — CARE COORDINATION (OUTPATIENT)
Dept: CARE COORDINATION | Age: 87
End: 2022-07-13

## 2022-07-13 VITALS
HEIGHT: 71 IN | BODY MASS INDEX: 21.21 KG/M2 | DIASTOLIC BLOOD PRESSURE: 90 MMHG | SYSTOLIC BLOOD PRESSURE: 150 MMHG | RESPIRATION RATE: 18 BRPM | TEMPERATURE: 98.1 F | HEART RATE: 62 BPM | OXYGEN SATURATION: 96 %

## 2022-07-13 DIAGNOSIS — K59.01 SLOW TRANSIT CONSTIPATION: ICD-10-CM

## 2022-07-13 DIAGNOSIS — R26.81 UNSTEADY GAIT: ICD-10-CM

## 2022-07-13 DIAGNOSIS — M47.816 SPONDYLOSIS OF LUMBAR REGION WITHOUT MYELOPATHY OR RADICULOPATHY: Primary | ICD-10-CM

## 2022-07-13 PROCEDURE — G8427 DOCREV CUR MEDS BY ELIG CLIN: HCPCS | Performed by: FAMILY MEDICINE

## 2022-07-13 PROCEDURE — G8420 CALC BMI NORM PARAMETERS: HCPCS | Performed by: FAMILY MEDICINE

## 2022-07-13 PROCEDURE — 1036F TOBACCO NON-USER: CPT | Performed by: FAMILY MEDICINE

## 2022-07-13 PROCEDURE — 1123F ACP DISCUSS/DSCN MKR DOCD: CPT | Performed by: FAMILY MEDICINE

## 2022-07-13 PROCEDURE — 99214 OFFICE O/P EST MOD 30 MIN: CPT | Performed by: FAMILY MEDICINE

## 2022-07-13 ASSESSMENT — ENCOUNTER SYMPTOMS
NAUSEA: 0
COUGH: 0
BACK PAIN: 1
RHINORRHEA: 0
CONSTIPATION: 0
SHORTNESS OF BREATH: 0
ABDOMINAL PAIN: 0
SORE THROAT: 0
DIARRHEA: 0
WHEEZING: 0

## 2022-07-13 NOTE — PROGRESS NOTES
[address] [place] [address]  Dept: [phone]  Loc: [address] [facility] Chio (:  1934) is a 80 y.o. male, here for evaluation of the following chief complaint(s):  ED Follow-up (22- constipation-pt states that constipation has resolved)      ASSESSMENT/PLAN:  1. Spondylosis of lumbar region without myelopathy or radiculopathy  -     Handicap Placard MISC; Starting Wed 2022, Disp-1 each, R-0, PrintExp 2027  -     External Referral To Physical Therapy  2. Slow transit constipation  3. Unsteady gait  -     External Referral To Physical Therapy    Cont pain meds as needed  Stool soften, fluids, and miralax for bowel regimen  Refer to PT for unsteady gai  No follow-ups on file. SUBJECTIVE/OBJECTIVE:  Presents for ER follow-up. He went to the ER with complaints of constipation as well as feeling unsteady. States the constipation has resolved but it does seem like a constant chapin since he takes the pain medication. He is required to take pain meds due to his back pain. He states that he feels unsteady with his gait because of his posture and weakness in his legs due to his back. Does also feel dizzy sometimes when he changes positions quickly. Has not had any falls but does use a cane. Wonders if therapy could help. Denies any chest pain or shortness of breath. Review of Systems   Constitutional: Positive for activity change. Negative for appetite change, chills, fatigue and fever. HENT: Negative for congestion, rhinorrhea and sore throat. Respiratory: Negative for cough, shortness of breath and wheezing. Cardiovascular: Negative for chest pain and palpitations. Gastrointestinal: Negative for abdominal pain, constipation, diarrhea and nausea. Genitourinary: Negative for dysuria and hematuria.    Musculoskeletal: Positive for arthralgias, back pain and gait problem. Negative for myalgias. Neurological: Positive for weakness and light-headedness. Negative for headaches. Psychiatric/Behavioral: Negative for sleep disturbance. The patient is not nervous/anxious. Physical Exam  Vitals and nursing note reviewed. Constitutional:       Appearance: He is well-developed. He is ill-appearing (frail). HENT:      Head: Normocephalic and atraumatic. Eyes:      General: No scleral icterus. Right eye: No discharge. Left eye: No discharge. Conjunctiva/sclera: Conjunctivae normal.   Cardiovascular:      Rate and Rhythm: Normal rate and regular rhythm. Heart sounds: Normal heart sounds. Pulmonary:      Effort: Pulmonary effort is normal.      Breath sounds: Normal breath sounds. No wheezing. Musculoskeletal:         General: Deformity (increased kyphosis) present. Skin:     General: Skin is warm and dry. Neurological:      Mental Status: He is alert and oriented to person, place, and time. Mental status is at baseline. Motor: Weakness present. Gait: Gait abnormal.   Psychiatric:         Behavior: Behavior normal.         Thought Content: Thought content normal.         Judgment: Judgment normal.         Vitals:    07/13/22 0957   BP: (!) 150/90   Pulse: 62   Resp: 18   Temp: 98.1 °F (36.7 °C)   SpO2: 96%              An electronic signature was used to authenticate this note.     --Linda Barillas MD

## 2022-07-13 NOTE — CARE COORDINATION
Attempted to reach patient for continued Care Coordination follow up and education re: the management of his healthcare needs and ongoing f/u from recent ED and office visits. Patient was unavailable at the time of my call, and generic voicemail messages were left asking patient to please return call to my direct number. Patient was seen by PCP earlier today. Will continue to work to f/u with patient in the future.

## 2022-07-14 NOTE — CARE COORDINATION
Ambulatory Care Coordination Note  7/14/2022  CM Risk Score: 8  Charlson 10 Year Mortality Risk Score: 100%     ACC: Kareem Tamez RN    Summary Note: Patient returned call from yesterday re: the management of his healthcare needs and ongoing f/u s/p recent ED visit. Patient shared he is doing well and he was able to f/u with PCP yesterday as scheduled. Patient denied any questions re: his appointment. Patient stated BP has remained stable and he denied any concerns. Patient reported he has received OP PT orders and is scheduled for his initial evaluation tomorrow at [facility] in [place]. ACM reviewed importance of attending appointments as scheduled and completing any home exercises/stretches as directed. Patient verbalized understanding. Patient reported he continues to have a decent appetite and is working to make better food choices. Patient stated constipation has improved and he denied any ongoing concerns. ACM reviewed signs/symptoms to report and he was reminded of the importance of early symptom recognition and follow up. Patient acknowledged understanding. Patient denied any other questions, concerns, or needs and he was encouraged to call with any that may develop.            Actions:   - Reviewed signs/symptoms to report   - Reviewed importance of early symptom recognition and follow up   - Monitored for questions s/p recent PCP visit   - F/u on [facility] referral - scheduled for eval tomorrow   - Encouraged patient to complete home exercises/stretches as directed   - F/u on recent constipation concerns   - Monitored for additional needs         Plan of Care:   - Continue with f/u calls for assistance with the management of his healthcare needs  - Educate on signs/symptoms to report - In Process   - Educate on the importance of early symptom recognition - In Process  - Educate on the availability of same day appointments, urgent care/walk in clinic options, and after hours on call resources - Completed   - Review and verify Healthcare Decision Maker information - Completed  - Patient is current with COVID-19 vaccine x 3   - Patient is schedule for Medicare AW - Oct. 2022  - Monitor for ongoing needs and readiness to discharge from Care Coordination      Lab Results     None          Care Coordination Interventions    Referral from Primary Care Provider: No  Suggested Interventions and Community Resources  Fall Risk Prevention: Completed (Comment: May 2022)  Medication Assistance Program: Declined (Comment: Denied any needs at this time. Does receive assistance thru the [facility] - April 2022)  Medi Set or Pill Pack: Completed (Comment: April 2022)  Pharmacist: Karon Garcia (Comment: April 2022)  Physical Therapy: Completed (Comment: OP PT - [facility] - July 2022)  Senior Services: Completed (Comment: Receives 4 meal vouchers to eat out monthly thru [facility] - April 2022)  Social Work: Declined (Comment: Denied any need for additional resource needs - April 2022)  Other Services: Completed (Comment: Current with [address] [place] [address] - April 2022)  Transportation Support: Declined  Other Services or Interventions: Follows with [address] [place] [address] - STR urology and STR cardiology          Goals Addressed                 This Visit's Progress       Care Coordination     Conditions and Symptoms   Improving     I will schedule office visits, as directed by my provider. I will keep my appointment or reschedule if I have to cancel. I will notify my provider of any barriers to my plan of care. I will notify my provider of any symptoms that indicate a worsening of my condition. Barriers: overwhelmed by complexity of regimen, stress, and lack of education  Plan for overcoming my barriers: Continue to provide education to patient and monitor for resource needs   Confidence: 8/10  Anticipated Goal Completion Date: 8/1/2022                Prior to Admission medications    Medication Sig Start Date End Date Taking?  Authorizing Provider   Handicap Placard MISC by Does not apply route Exp 7/13/2027 7/13/22   Amando Elder MD   docusate (COLACE) 50 MG/5ML liquid Take 50 mg by mouth 2 times daily  Patient not taking: Reported on 7/13/2022    Historical Provider, MD   metoprolol tartrate (LOPRESSOR) 25 MG tablet Take 1 tablet by mouth 2 times daily 6/8/22   Amando Elder MD   amLODIPine (NORVASC) 5 MG tablet Take 1 tablet by mouth in the morning and at bedtime 6/1/22   Amando Elder MD   Polyethylene Glycol 3350 (MIRALAX PO) Take by mouth as needed    Historical Provider, MD   VITAMIN D PO Take by mouth    Historical Provider, MD   apixaban (ELIQUIS) 5 MG TABS tablet Take 1 tablet by mouth 2 times daily 7/8/19   Fanta Mejia MD   aspirin 81 MG tablet Take 81 mg by mouth daily    Historical Provider, MD   Multiple Vitamins-Minerals (PRESERVISION AREDS 2 PO) Take 1 tablet by mouth 2 times daily    Historical Provider, MD       Future Appointments   Date Time Provider Yonas Boyer   7/18/2022  1:15 PM Fanta Mejia MD [facility] [address]   10/3/2022  8:00 AM Amando Elder MD [facility] [address]   12/14/2022 10:30 AM Kim Deras, APRN - [address] [place] [address] Assessment    Do you have any symptoms that are causing concern?: Yes  Progression since Onset: Gradually Improving  Reported Symptoms: Constipation      and Care Coordination Episodes    Type: Amb Care Management  Episode: Complex Care  Noted: 4/26/2022  Comments: List Referral -

## 2022-07-18 ENCOUNTER — OFFICE VISIT (OUTPATIENT)
Dept: CARDIOLOGY CLINIC | Age: 87
End: 2022-07-18
Payer: MEDICARE

## 2022-07-18 VITALS
BODY MASS INDEX: 21.06 KG/M2 | HEART RATE: 63 BPM | WEIGHT: 150.4 LBS | HEIGHT: 71 IN | DIASTOLIC BLOOD PRESSURE: 87 MMHG | SYSTOLIC BLOOD PRESSURE: 142 MMHG

## 2022-07-18 DIAGNOSIS — I35.1 MODERATE AORTIC REGURGITATION: ICD-10-CM

## 2022-07-18 DIAGNOSIS — I34.0 MODERATE MITRAL REGURGITATION: ICD-10-CM

## 2022-07-18 DIAGNOSIS — I10 ESSENTIAL HYPERTENSION: ICD-10-CM

## 2022-07-18 DIAGNOSIS — I48.0 PAF (PAROXYSMAL ATRIAL FIBRILLATION) (HCC): Primary | ICD-10-CM

## 2022-07-18 DIAGNOSIS — E78.5 HYPERLIPIDEMIA WITH TARGET LDL LESS THAN 130: ICD-10-CM

## 2022-07-18 DIAGNOSIS — I71.21 ASCENDING AORTIC ANEURYSM: ICD-10-CM

## 2022-07-18 PROCEDURE — 1123F ACP DISCUSS/DSCN MKR DOCD: CPT | Performed by: INTERNAL MEDICINE

## 2022-07-18 PROCEDURE — G8420 CALC BMI NORM PARAMETERS: HCPCS | Performed by: INTERNAL MEDICINE

## 2022-07-18 PROCEDURE — G8427 DOCREV CUR MEDS BY ELIG CLIN: HCPCS | Performed by: INTERNAL MEDICINE

## 2022-07-18 PROCEDURE — 99214 OFFICE O/P EST MOD 30 MIN: CPT | Performed by: INTERNAL MEDICINE

## 2022-07-18 PROCEDURE — 1036F TOBACCO NON-USER: CPT | Performed by: INTERNAL MEDICINE

## 2022-07-18 NOTE — PROGRESS NOTES
Chief Complaint   Patient presents with    6 Month Follow-Up    Atrial Fibrillation             Pt here for a 6 month f/u    EKG done 1-18-22      Palpitations once in a blue moon    Denied cp,  or edema    Dizziness on standing on walk and standing quick- better  Balance issue at am    Sob on exertion- stable    Walk 2 to 3 block METS> 4    Nonsmoker    FHX  Had pacer    Hx of PE in the lung post op and had been on OA for 6 month and off it over one back      Patient Active Problem List   Diagnosis    Prostate cancer    Generalized anxiety disorder    Essential hypertension    Hyperlipidemia with target LDL less than 130    Back pain, chronic s/p surgery    History of pulmonary embolus (PE)    Spondylosis of lumbar region without myelopathy or radiculopathy    Persistent atrial fibrillation ([address]- newely DXed 10/12/17- CVR    Non-rheumatic mitral regurgitation- mod to severe    Moderate aortic regurgitation    Failed back syndrome of lumbar spine    Chronic pain disorder    Pneumothorax    Constipation    Ascending aortic aneurysm (HCC) 4.1 cm on CTA and 4.5 on echo    Nonexudative age-related macular degeneration    Secondary pigmentary retinal degeneration    Venous retinal branch occlusion    PAF (paroxysmal atrial fibrillation) (HCC)    Moderate mitral regurgitation       Past Surgical History:   Procedure Laterality Date    APPENDECTOMY  01/1961    [facility]    BACK SURGERY  [id number]    X stop    CARPAL TUNNEL RELEASE Right     x2 on right    CATARACT REMOVAL Right 06/2001    Dr Sammie Rockwell Left 11/2014    Dr Benji Bonds, 2009    Dr Courtney Delacruz  [address]?     Dr Kita Mancera of date    HERNIA REPAIR Left 11/22/2017    ROBOT RECURRENT LEFT INGUINAL HERNIA REPAIR performed by Rhonda White MD at [address] BILATERAL Bilateral 9/5/2017    LUMBAR FACET MBB L3-4, L4-5, L5-S1 BILATERAL performed by Edson Aviles MD at [facility] Left 04/09/2018    HIP INJECTION     OTHER SURGICAL HISTORY  10/7/2015    XI ROBOTIC PROSTATECTOMY    OTHER SURGICAL HISTORY Bilateral 09/05/2017    lumbar facet block L3-S1    OTHER SURGICAL HISTORY Right 1980's    nerve release right lower arm    OTHER SURGICAL HISTORY  01/30/2018    Sacroiliac joint MBB    OTHER SURGICAL HISTORY Left 02/27/2018    Sacroiliac joint medial branch block     CA IMPLANT NEUROSTIM/ N/A 9/17/2018    THORACIC LAMINECTOMY PERMANENT SPINAL CORD STIMULATOR PADDLE PLUS BATTERY PLACEMENT performed by Gaurav Mak MD at [address] ARTHRGRPHY&/ANES/STEROID W/IMAGE Left 1/30/2018    LEFT SI MBB performed by Edson Aviles MD at [place] [address] SI JOINT ARTHRGRPHY&/ANES/STEROID W/IMAGE Left 2/27/2018    LEFT SI MBB #2 performed by Edson Aviles MD at [facility] OFFICE/OUTPT VISIT,PROCEDURE ONLY Left 4/9/2018    LEFT HIP STEROID INJECTION WITH SEDATION performed by Edson Aviles MD at [facility] OFFICE/OUTPT [facility] N/A 8/21/2018    SPINAL CORD STIMULATOR IMPLANT TRIAL performed by Edson Aviles MD at [facility]  7-    Dr Ihsan Wilcox  8/21/2015    transrectal ultrasound with biopsy(+)    PROSTATE SURGERY  2003    TURP    PROSTATE SURGERY  2009    Biopsy -Hyperplasia    SHOULDER ARTHROPLASTY  2005    right       Allergies   Allergen Reactions    Nitrofuran Derivatives Nausea Only     Severe stomach cramps    Nsaids Nausea Only and Other (See Comments)     Pain in stomach        Family History   Problem Relation Age of Onset    Heart Disease Father     Prostate Cancer Brother     Cancer Brother     Prostate Cancer Brother     Cancer Brother     Stroke Mother     Cancer Sister     Prostate Cancer Brother     Prostate Cancer Brother         Social History     Socioeconomic History    Marital status:      Spouse name: Britt Portillo    Number of children: 4    Years of education: Not on file    Highest education level: Not on file   Occupational History    Not on file   Tobacco Use    Smoking status: Former     Packs/day: 1.00     Years: 10.00     Pack years: 10.00     Types: Cigarettes     Quit date: 7/3/1965     Years since quittin.0    Smokeless tobacco: Never   Vaping Use    Vaping Use: Never used   Substance and Sexual Activity    Alcohol use: Yes     Alcohol/week: 1.0 standard drink     Types: 1 Glasses of wine per week     Comment: SOCIALLY wine a few times a year     Drug use: No    Sexual activity: Not Currently   Other Topics Concern    Not on file   Social History Narrative    Not on file     Social Determinants of Health     Financial Resource Strain: Low Risk     Difficulty of Paying Living Expenses: Not very hard   Food Insecurity: No Food Insecurity    Worried About Running Out of Food in the Last Year: Never true    Ran Out of Food in the Last Year: Never true   Transportation Needs: No Transportation Needs    Lack of Transportation (Medical): No    Lack of Transportation (Non-Medical): No   Physical Activity: Inactive    Days of Exercise per Week: 0 days    Minutes of Exercise per Session: 0 min   Stress: No Stress Concern Present    Feeling of Stress : Only a little   Social Connections: Moderately Integrated    Frequency of Communication with Friends and Family: More than three times a week    Frequency of Social Gatherings with Friends and Family: More than three times a week    Attends Scientologist Services: More than 4 times per year    Active Member of [employer] Group or Organizations: Yes    Attends Club or Organization Meetings: More than 4 times per year    Marital Status:     Intimate Partner Violence: Not on file   Housing Stability: Low Risk     Unable to Pay for Housing in the Last Year: No    Number of [place] in the Last Year: 1 Unstable Housing in the Last Year: No       Current Outpatient Medications   Medication Sig Dispense Refill    Handicap Placard MISC by Does not apply route Exp 7/13/2027 1 each 0    docusate (COLACE) 50 MG/5ML liquid Take 50 mg by mouth in the morning and 50 mg before bedtime. metoprolol tartrate (LOPRESSOR) 25 MG tablet Take 1 tablet by mouth 2 times daily 180 tablet 3    amLODIPine (NORVASC) 5 MG tablet Take 1 tablet by mouth in the morning and at bedtime 180 tablet 1    Polyethylene Glycol 3350 (MIRALAX PO) Take by mouth as needed      VITAMIN D PO Take by mouth      apixaban (ELIQUIS) 5 MG TABS tablet Take 1 tablet by mouth 2 times daily 180 tablet 2    Multiple Vitamins-Minerals (PRESERVISION AREDS 2 PO) Take 1 tablet by mouth 2 times daily       No current facility-administered medications for this visit. Review of Systems -     General ROS: negative  Psychological ROS: negative  Hematological and Lymphatic ROS: No history of blood clots or bleeding disorder. Respiratory ROS: no cough, shortness of breath, or wheezing  Cardiovascular ROS: no chest pain or dyspnea on exertion  Gastrointestinal ROS: negative  Genito-Urinary ROS: no dysuria, trouble voiding, or hematuria  Musculoskeletal ROS: negative  Neurological ROS: no TIA or stroke symptoms  Dermatological ROS: negative      Blood pressure (!) 142/87, pulse 63, height 5' 10.5\" (1.791 m), weight 150 lb 6.4 oz (68.2 kg).         Physical Examination:    General appearance - alert, well appearing, and in no distress  Mental status - alert, oriented to person, place, and time  Neck - supple, no significant adenopathy, no JVD, or carotid bruits  Chest - clear to auscultation, no wheezes, rales or rhonchi, symmetric air entry  Heart - normal rate, regular rhythm, normal S1, S2, , rubs, clicks or gallops but +ve murmurs  Abdomen - soft, nontender, nondistended, no masses or organomegaly  Neurological - alert, oriented, normal speech, no focal findings or movement disorder noted  Musculoskeletal - no joint tenderness, deformity or swelling  Extremities - peripheral pulses normal, no pedal edema, no clubbing or cyanosis  Skin - normal coloration and turgor, no rashes, no suspicious skin lesions noted    Lab  No results for input(s): CKTOTAL, CKMB, CKMBINDEX, TROPONINI in the last 72 hours.   CBC:   Lab Results   Component Value Date/Time    WBC 9.6 05/14/2022 12:00 AM    RBC 4.55 05/14/2022 12:00 AM     08/26/2011 01:55 PM    HGB 14.8 05/14/2022 12:00 AM    HCT 44.5 05/14/2022 12:00 AM    MCV 97.8 05/14/2022 12:00 AM    MCH 32.6 05/14/2022 12:00 AM    MCHC 33.4 05/14/2022 12:00 AM    RDW 13.8 05/14/2022 12:00 AM     05/14/2022 12:00 AM    MPV 10.2 05/14/2022 12:00 AM     BMP:    Lab Results   Component Value Date/Time     05/14/2022 12:00 AM    K 4.2 05/14/2022 12:00 AM     05/14/2022 12:00 AM    CO2 26 05/14/2022 12:00 AM    BUN 32 05/14/2022 12:00 AM    LABALBU 4.1 05/14/2022 12:00 AM    CREATININE 1.6 05/14/2022 12:00 AM    CALCIUM 9.2 05/14/2022 12:00 AM    LABGLOM 41 05/14/2022 12:00 AM    LABGLOM 63 02/08/2021 08:14 AM    GLUCOSE 97 05/14/2022 12:00 AM    GLUCOSE 89 05/14/2016 06:30 AM     Hepatic Function Panel:    Lab Results   Component Value Date/Time    ALKPHOS 53 05/14/2022 12:00 AM    ALT 17 05/14/2022 12:00 AM    AST 20 05/14/2022 12:00 AM    PROT 7.4 10/05/2020 08:47 AM    BILITOT 0.8 05/14/2022 12:00 AM    BILIDIR <0.2 09/21/2018 09:19 AM    LABALBU 4.1 05/14/2022 12:00 AM     Magnesium:    Lab Results   Component Value Date/Time    MG 2.3 02/08/2021 08:14 AM     Warfarin PT/INR:  No components found for: PTPATWAR, PTINRWAR  HgBA1c:  No results found for: LABA1C  FLP:    Lab Results   Component Value Date/Time    TRIG 55 05/14/2022 12:00 AM    HDL 67 05/14/2022 12:00 AM    LDLCALC 113 05/14/2022 12:00 AM     TSH:    Lab Results   Component Value Date/Time    TSH 3.380 07/24/2018 10:55 AM       EKG 10/12/17  Atrial fibrillation  Septal infarct , age undetermined  Abnormal ECG  When compared with ECG of 06-NOV-2015 22:29,  Atrial fibrillation has replaced Sinus rhythm  Nonspecific T wave abnormality now evident in Inferior leads  Confirmed by Kiki Hernandez MD, Raoul Lock ([id number]) on 10/12/2017 8:03:16 PM      Conclusions      Summary   Normal left ventricle size and systolic function. Ejection fraction was   estimated at 60 to 65 %. There were no regional left ventricular wall   motion abnormalities and wall thickness was within normal limits. The left atrium is Moderately dilated. Moderate to severly enlarged right atrium size. Moderate-to-severe mitral regurgitation with centrally directed jet. Moderate aortic regurgitation is noted. Signature      ----------------------------------------------------------------   Electronically signed by Efraín Turner MD (Interpreting   physician) on 10/26/2017 at 12:55 PM   ----------------------------------------------------------------    Nuc  Stress neg      CONCLUSION:  This is an complete atrial fibrillation with average  heart rate of 91 beats per minute ranging from 47 to 150 beats per  minute. No significant pause of more than 2.3 second noted. Rare  ventricular ectopic beat total of 277, predominantly isolated, few  couplets, few ventricular bigeminy. No supraventricular tachycardia. No other form of arrhythmia noted. The AFib seems to be rate well  controlled. The patient at times  gets AFib with rapid ventricular  response. Average heart rate is 91 beats per minute, so the patient  may benefit from increasing the dose of AV jason blocking agent. Thank you. Ashanti Block. Kiki Hernandez M.D.     D: 11/02/2017 18:04:27            Conclusions      Summary   Normal left ventricle size and systolic function. Ejection fraction was   estimated at 60 to 65 %. There were no regional left ventricular wall   motion abnormalities and wall thickness was within normal limits.    The left atrium is Moderately dilated. Moderate to severly enlarged right atrium size. Moderate-to-severe mitral regurgitation with centrally directed jet. Moderate aortic regurgitation is noted. Signature      ----------------------------------------------------------------   Electronically signed by Mulugeta Paiz MD (Interpreting   physician) on 10/26/2017 at 12:55 PM       Conclusions      Summary   Normal left ventricle size and systolic function. Ejection fraction was estimated at 65 %. There were no regional left ventricular wall motion abnormalities and wall   thickness was within normal limits. The left atrium is Moderately to severely dilated. MILD TO Moderately enlarged right atrium size. Moderate aortic regurgitation is noted. Mild to Moderate mitral regurgitation is present. Aortic aneurysm noted in the ascending aorta . Aortic aneurysm measures 4.5 CM . CONSIDER CTA FOR BETTER EVALUATION OF THE ASCENDING AORTIC ANEURYSM      Signature      ----------------------------------------------------------------   Electronically signed by Mulugeta Paiz MD (Interpreting   physician) on 01/11/2019 at 09:20 PM       Conclusions      Summary   Normal left ventricle size and systolic function. Ejection fraction was estimated at 60 %. There were no regional left ventricular wall motion abnormalities and wall   thickness was within normal limits. The left atrium is Moderately dilated. Mild to Moderately enlarged right atrium size. Mild to Moderate mitral regurgitation is present. Mild-to-moderate aortic regurgitation is   Signature      ----------------------------------------------------------------   Electronically signed by Mulugeta Paiz MD (Interpreting   physician) on 01/15/2020 at 06:54 PM         Conclusions      Summary   Normal left ventricle size and systolic function. Ejection fraction was   estimated at 60 %.  There were no regional left ventricular wall motion abnormalities and wall thickness was within normal limits. Doppler parameters were consistent with abnormal left ventricular   relaxation (grade 1 diastolic dysfunction). The left atrium is Mildly dilated. Dilation of the aortic root with size of 4.1 cm  Mild AR and MILD MR      Signature      ----------------------------------------------------------------   Electronically signed by Monika Coulter MD (Interpreting   physician) on 07/28/2021 at 04:45 PM   ----------------------------------------------------------------      CTA Jan 2019     The aorta is minimally dilated measuring 4.1 cm within the ascending aorta. It is ectatic. Descending aorta measures 3.2 cm     ekg 1/13/20  Atr flutter with CVR and variable av block    ekg 1/18/21  Sinus  Rhythm   WITHIN NORMAL LIMITS    ekg 1/18/22  Sinus  Rhythm  - occasional PAC     # PACs = 1.  WITHIN NORMAL LIMITS          Assessment     Diagnosis Orders   1. PAF (paroxysmal atrial fibrillation) ([address]        2. Essential hypertension        3. Hyperlipidemia with target LDL less than 130        4. Moderate aortic regurgitation        5. Moderate mitral regurgitation        6. Ascending aortic aneurysm (HCC) 4.1 cm on CTA and 4.5 on echo                Plan     The  most current meds and labs reviewed    Continue the current treatment and with constant vigilance to changes in symptoms and also any potential side effects. Return for care or seek medical attention immediately if symptoms got worse and/or develop new symptoms. Parox  atr FIB-CVR./PAFlutter  chads vasc 3  Cont lopressor and OAc  Need OA  Holter 48 hrs-Average heart rate of 91 beats per minute ranging from 47 to 150 beats per  Minute.   Cont   apixaban 5 po bid   The risk and benefit of OA well explained including ICH and pat agreed to be on OA  Stop asa 81    Sob on exertion and new atr fib with cvr  The  echo and lexiscan nuc result reviewed and d/w the pat  No more leovnox due to intolerance    Mod MR- and Mod AR  The F/u echo 2020- mild to Mod AR and MR  Stable     Ascending aortic aneurysm 4.1 CM by CTA jan 2019 and 4.1 cm by echo  Off  hctz 12.5 po qd  Cont  lopressor 25 po bid  Skip the dose for HR < 60 ( pat has bp ci=uff and can check BP and HR prior to lopressor)    Hypertension, on medical treatment. Seems to be under good control. Patient is compliant with medical treatment. Home  to 145 mmhg    CKD III and get better after Off  hctz 12.5 po qd  Hydration  BMP next visit    Hx of Dizziness  On standing at times -resolved after stop HCTZ and Hydration  Re-advised hydration    D/w the pat at length the plan of care    Overall Sammy Garibay is Doing well and stable    Lipid panel and liver function test before next appointment    Discussed use, benefit, and side effects of prescribed medications. All patient questions answered. Pt voiced understanding. Instructed to continue current medications, diet and exercise. Continue risk factor modification and medical management. Patient agreed with treatment plan. Follow up as directed.       RTC in 6 months      Zulma Melvin, [facility]

## 2022-07-19 ENCOUNTER — APPOINTMENT (OUTPATIENT)
Dept: GENERAL RADIOLOGY | Age: 87
End: 2022-07-19
Payer: MEDICARE

## 2022-07-19 ENCOUNTER — HOSPITAL ENCOUNTER (EMERGENCY)
Age: 87
Discharge: HOME | End: 2022-07-19
Attending: FAMILY MEDICINE
Payer: MEDICARE

## 2022-07-19 VITALS
HEART RATE: 80 BPM | TEMPERATURE: 98 F | RESPIRATION RATE: 18 BRPM | SYSTOLIC BLOOD PRESSURE: 156 MMHG | OXYGEN SATURATION: 96 % | DIASTOLIC BLOOD PRESSURE: 88 MMHG

## 2022-07-19 DIAGNOSIS — I48.92 PAROXYSMAL ATRIAL FLUTTER (HCC): ICD-10-CM

## 2022-07-19 DIAGNOSIS — B37.2 CANDIDAL INTERTRIGO: ICD-10-CM

## 2022-07-19 DIAGNOSIS — R06.02 SHORTNESS OF BREATH: Primary | ICD-10-CM

## 2022-07-19 LAB
ALBUMIN SERPL-MCNC: 3.8 GM/DL (ref 3.4–5)
ALP BLD-CCNC: 55 U/L (ref 46–116)
ALT SERPL-CCNC: 27 U/L (ref 14–63)
ANION GAP: 9 MEQ/L (ref 8–16)
AST SERPL-CCNC: 22 U/L (ref 15–37)
BASOPHILS # BLD: 0.9 % (ref 0–3)
BILIRUB SERPL-MCNC: 0.7 MG/DL (ref 0.2–1)
BUN BLDV-MCNC: 32 MG/DL (ref 7–18)
CHLORIDE BLD-SCNC: 103 MEQ/L (ref 98–107)
CO2: 25 MEQ/L (ref 21–32)
CREAT SERPL-MCNC: 1.4 MG/DL (ref 0.6–1.3)
EKG ATRIAL RATE: 256 BPM
EKG P AXIS: 83 DEGREES
EKG Q-T INTERVAL: 364 MS
EKG QRS DURATION: 88 MS
EKG QTC CALCULATION (BAZETT): 375 MS
EKG R AXIS: -14 DEGREES
EKG T AXIS: 17 DEGREES
EKG VENTRICULAR RATE: 64 BPM
EOSINOPHILS RELATIVE PERCENT: 3.9 % (ref 0–4)
GFR, ESTIMATED: 51 ML/MIN/1.73M2
GLUCOSE BLD-MCNC: 124 MG/DL (ref 74–106)
HCT VFR BLD CALC: 43.8 % (ref 42–52)
HEMOGLOBIN: 14.5 GM/DL (ref 14–18)
LYMPHOCYTES # BLD: 17.7 % (ref 15–47)
MAGNESIUM: 2 MG/DL (ref 1.8–2.4)
MCH RBC QN AUTO: 32.1 PG (ref 27–31)
MCHC RBC AUTO-ENTMCNC: 33 GM/DL (ref 33–37)
MCV RBC AUTO: 97.2 FL (ref 80–94)
MONOCYTES: 6.5 % (ref 0–12)
NT PRO BNP: 1780 PG/ML (ref 0–1800)
PDW BLD-RTO: 13.2 % (ref 11.5–14.5)
PLATELET # BLD: 164 THOU/MM3 (ref 130–400)
PMV BLD AUTO: 9.1 FL (ref 7.4–10.4)
POC CALCIUM: 9.2 MG/DL (ref 8.5–10.1)
POTASSIUM SERPL-SCNC: 4 MEQ/L (ref 3.5–5.1)
RBC # BLD: 4.5 MILL/MM3 (ref 4.7–6.1)
SEGS: 71 % (ref 43–75)
SODIUM BLD-SCNC: 137 MEQ/L (ref 136–145)
TOTAL PROTEIN: 7 GM/DL (ref 6.4–8.2)
TROPONIN, HIGH SENSITIVITY: 13.9 PG/ML (ref 0–76.1)
WBC # BLD: 10 THOU/MM3 (ref 4.8–10.8)

## 2022-07-19 PROCEDURE — 83880 ASSAY OF NATRIURETIC PEPTIDE: CPT

## 2022-07-19 PROCEDURE — 83735 ASSAY OF MAGNESIUM: CPT

## 2022-07-19 PROCEDURE — 71045 X-RAY EXAM CHEST 1 VIEW: CPT

## 2022-07-19 PROCEDURE — 84484 ASSAY OF TROPONIN QUANT: CPT

## 2022-07-19 PROCEDURE — 80053 COMPREHEN METABOLIC PANEL: CPT

## 2022-07-19 PROCEDURE — 93010 ELECTROCARDIOGRAM REPORT: CPT | Performed by: INTERNAL MEDICINE

## 2022-07-19 PROCEDURE — 99285 EMERGENCY DEPT VISIT HI MDM: CPT

## 2022-07-19 PROCEDURE — 85025 COMPLETE CBC W/AUTO DIFF WBC: CPT

## 2022-07-19 PROCEDURE — 74019 RADEX ABDOMEN 2 VIEWS: CPT

## 2022-07-19 PROCEDURE — 93005 ELECTROCARDIOGRAM TRACING: CPT | Performed by: FAMILY MEDICINE

## 2022-07-19 RX ORDER — NYSTATIN 100000 U/G
CREAM TOPICAL
Qty: 30 G | Refills: 1 | Status: SHIPPED | OUTPATIENT
Start: 2022-07-19

## 2022-07-19 ASSESSMENT — PAIN SCALES - GENERAL: PAINLEVEL_OUTOF10: 3

## 2022-07-19 ASSESSMENT — PAIN DESCRIPTION - ORIENTATION: ORIENTATION: MID

## 2022-07-19 ASSESSMENT — PAIN DESCRIPTION - LOCATION: LOCATION: CHEST

## 2022-07-19 ASSESSMENT — PAIN - FUNCTIONAL ASSESSMENT: PAIN_FUNCTIONAL_ASSESSMENT: 0-10

## 2022-07-19 ASSESSMENT — PAIN DESCRIPTION - DESCRIPTORS: DESCRIPTORS: TIGHTNESS

## 2022-07-19 NOTE — ED NOTES
Dr. Billie Simmons at the bedside to update patient and family on plan of care.      Dane Jerry, RN  07/19/22 [id number]

## 2022-07-19 NOTE — ED NOTES
Patient presents to the ED via private auto with daughter with complaints of SOB, chest pain, and feeling off balanced. States that the symptoms have been happening in the morning for the past couple of weeks. States that the symptoms have been worse the last couple of days. Saw Dr. Ankush Tinoco yesterday.      Sadiq Horton RN  07/19/22 [id number]

## 2022-07-19 NOTE — ED NOTES
Ambulated with one assist from bathroom to room without difficulty.      Wil Schmid, RN  07/19/22 [id number]

## 2022-07-20 ENCOUNTER — TELEPHONE (OUTPATIENT)
Dept: FAMILY MEDICINE CLINIC | Age: 87
End: 2022-07-20

## 2022-07-20 ENCOUNTER — CARE COORDINATION (OUTPATIENT)
Dept: CARE COORDINATION | Age: 87
End: 2022-07-20

## 2022-07-20 ENCOUNTER — TELEPHONE (OUTPATIENT)
Dept: CARDIOLOGY CLINIC | Age: 87
End: 2022-07-20

## 2022-07-20 ASSESSMENT — ENCOUNTER SYMPTOMS
VOMITING: 0
BACK PAIN: 0
COUGH: 0
ABDOMINAL PAIN: 0
NAUSEA: 0
SHORTNESS OF BREATH: 1
SORE THROAT: 0
CONSTIPATION: 0
DIARRHEA: 0

## 2022-07-20 NOTE — CARE COORDINATION
Dr. Trey Meneses - Please see note below. Patient with recent [facility] ED visit for c/o SOB, chest pain, and dizziness. Patient is feeling better today but stated he continues with intermittent \"slight dizziness. \"  Patient recently started OP PT and he is asking if it is ok if he continues therapy treatments d/t recent ED visit? Please advise. Thank you!

## 2022-07-20 NOTE — CARE COORDINATION
Ambulatory Care Coordination Note  7/20/2022    ACC: Nettie Keita RN    Summary Note: Patient was called for continued Care Coordination follow up and education re: the management of his healthcare needs and in f/u to recent [facility] ED visit for c/o SOB, chest pain, and dizziness. Patient shared he is feeling better today but continues to feel a \"little off. \"  Patient denied any questions re: his discharge instructions. Patient stated he has followed up with cardiology office and is awaiting return call at this time. Patient declined PCP f/u appointment as he was able to just f/u with PCP last week. Patient stated he was able to start Nystatin for his umbilical infection as ordered and he was instructed to call if no improvement noted for ongoing follow up. Patient verbalized understanding. Med Rec was completed. Patient denied any recent c/o palpitation beings being present. Patient denied any ongoing c/o chest pain. Patient stated his weight remains stable. Patient continues to monitor his BP routinely but he did not have his log available at the time of our call to review specific readings. Patient stated BP has remained stable. Patient was educated on availability of same day appointments and he was educated that he can call office with change of non emergent symptoms as same day appointments are available. Patient verbalized understanding. Signs/symptoms to report as well as the importance of early symptom recognition and follow up were reviewed with patient and he verbalized understanding. Patient was educate don the need to f/u with providers with any change of symptoms and he verbalized understanding. Patient stated he has been evaluated by OP PT but is unsure if he should continue d/t recent symptoms - will f/u with cardiology. Patient denied any other questions, concerns, or needs and he was encouraged to call with any that may develop.           Actions:   - Reviewed signs/symptoms to report   - Reviewed importance of early symptom recognition and follow up   - Reviewed availability of same day appointments   - Offered PCP f/u appointment - Patient declines   - F/u with cardiology re: concerns of continued OP PT  - Monitored for questions re: recent [facility] ED visit   - Completed Med Rec   - Monitored for additional needs          Plan of Care:  - Continue with f/u calls for assistance with the management of his healthcare needs  - Educate on signs/symptoms to report - In Process   - Educate on the importance of early symptom recognition - In Process  - Educate on the availability of same day appointments, urgent care/walk in clinic options, and after hours on call resources - Completed   - Review and verify Healthcare Decision Maker information - Completed  - Patient is current with COVID-19 vaccine x 3  - Patient is schedule for Medicare AWV - Oct. 2022  - Monitor for ongoing needs and readiness to discharge from [address] [place] [address]  ED Follow up Call    Reason for ED visit:  SOB, Chest Pain, and Dizziness    Status:     improved    Did you call your PCP prior to going to the ED? No      Did you receive a discharge instructions from the Emergency Room? Yes  Review of Instructions:     Understands what to report/when to return?:  Yes   Understands discharge instructions?:  Yes   Following discharge instructions?:  Yes   If not why? N/A     Are there any new complaints of pain? No  New Pain Meds? No    Constipation prophylaxis needed? N/A    If you have a wound is the dressing clean, dry, and intact? N/A  Understands wound care regimen? N/A    Are there any other complaints/concerns that you wish to tell your provider?    Intermittent dizziness continues and f/u with cardiology re: concerns of continued OP PT    FU appts/Provider:    Future Appointments   Date Time Provider Yonas Boyer   10/3/2022  8:00 AM Malaika Meza MD [address] [facility] [address] [facility]   12/14/2022 10:30 AM SONIDO Koch - CNP [address] [facility] - [address] [facility]   1/23/2023  1:30 PM Bam Amado MD N [facility] - [address] [facility]       New Medications?:   Yes - Nystatin       Medication Reconciliation by phone - Yes  Understands Medications? Yes  Taking Medications? Yes  Can you swallow your pills? Yes    Any further needs in the home i.e. Equipment? No    Link to services in community?:  N/A   Which services:  N/A       Lab Results       None            Care Coordination Interventions    Referral from Primary Care Provider: No  Suggested Interventions and Community Resources  Fall Risk Prevention: Completed (Comment: May 2022)  Medication Assistance Program: Declined (Comment: Denied any needs at this time. Does receive assistance thru the [facility] - April 2022)  Medi Set or Pill Pack: Completed (Comment: April 2022)  Pharmacist: Danny West (Comment: April 2022)  Physical Therapy: Completed (Comment: OP PT - [facility] - July 2022)  Senior Services: Completed (Comment: Receives 4 meal vouchers to eat out monthly thru [facility] - April 2022)  Social Work: Declined (Comment: Denied any need for additional resource needs - April 2022)  Other Services: Completed (Comment: Current with [id number] [place] [address] - April 2022)  Transportation Support: Declined  Other Services or Interventions: Follows with [id number] [place] [address] - [facility] urology and [facility] cardiology           Goals Addressed                   This Visit's Progress       Care Coordination     Conditions and Symptoms   Improving     I will schedule office visits, as directed by my provider. I will keep my appointment or reschedule if I have to cancel. I will notify my provider of any barriers to my plan of care. I will notify my provider of any symptoms that indicate a worsening of my condition.     Barriers: overwhelmed by complexity of regimen, stress, and lack of education  Plan for overcoming my barriers: Continue to provide education to patient and monitor for resource

## 2022-07-20 NOTE — TELEPHONE ENCOUNTER
Pt saw Dr Jasvir Dukes this past Monday the 18th. Ended up in ER in [place] yesterday for SOB and Parox Atrial Flutter. Pt calling to notify Dr Jasvir Dukes and have him review his record. Anything needed at this time?

## 2022-07-20 NOTE — ED PROVIDER NOTES
[address]/[facility] COMPLAINT       Chief Complaint   Patient presents with    Shortness of Breath    Chest Pain    Dizziness       Nurses Notes reviewed and I agree except as noted in the HPI. HISTORY OF PRESENT ILLNESS    Garry Jeans is a 80 y.o. male who presents for evaluation of shortness of breath, intermittent chest pressure, and feeling off balance. Patient's sensation of dizziness and feeling off balance is chronic. For this reason patient was taken off his hydrochlorothiazide sometime ago and is only taking metoprolol. Patient actually saw his cardiologist yesterday but did not have any chest pressure and it is noted that patient does experience shortness of breath with exertion at times. Patient reports that he just noted that his shortness of breath was worse today. REVIEW OF SYSTEMS     Review of Systems   Constitutional:  Negative for chills and fever. HENT:  Positive for congestion. Negative for sore throat. Respiratory:  Positive for shortness of breath. Negative for cough. Cardiovascular:  Positive for chest pain (intermittent pressure). Negative for palpitations and leg swelling. Gastrointestinal:  Negative for abdominal pain, constipation, diarrhea, nausea and vomiting. Genitourinary:  Negative for difficulty urinating and hematuria. Musculoskeletal:  Negative for back pain. Skin:  Positive for rash (umbilicus). Negative for wound. Neurological:  Positive for dizziness (chronic). Negative for numbness. Psychiatric/Behavioral:  Negative for behavioral problems and hallucinations. PAST MEDICAL HISTORY    has a past medical history of Atrial fibrillation ([address] Back pain, Hernia, Hx of blood clots, Hyperlipidemia, Hypertension, Osteoarthritis, Prostate cancer ([address] Prostate cancer ([address] Pulmonary emboli ([address] and Wears glasses.     SURGICAL HISTORY      has a past surgical history that includes back surgery (2338,2552); Total shoulder arthroplasty (2005); Prostate surgery (2003); Prostate surgery (2009); Prostate Biopsy (7-); Cataract removal with implant (Left, 11/2014); Prostate Biopsy (8/21/2015); hernia repair (1995, 2009); other surgical history (10/7/2015); other surgical history (Bilateral, 09/05/2017); Nerve Block Lumb Facet Level 1 Bilateral (Bilateral, 9/5/2017); hernia repair (7956'B?); Cataract removal (Right, 06/2001); Appendectomy (01/1961); hernia repair (Left, 11/22/2017); other surgical history (Right, 1980's); other surgical history (01/30/2018); pr inject si joint arthrgrphy&/anes/steroid w/image (Left, 1/30/2018); other surgical history (Left, 02/27/2018); pr inject si joint arthrgrphy&/anes/steroid w/image (Left, 2/27/2018); Nerve Surgery (Left, 04/09/2018); pr office/outpt visit,procedure only (Left, 4/9/2018); pr office/outpt visit,procedure only (N/A, 8/21/2018); pr implant neurostim/ (N/A, 9/17/2018); eye surgery; and Carpal tunnel release (Right). CURRENT MEDICATIONS       Discharge Medication List as of 7/19/2022  5:28 PM        CONTINUE these medications which have NOT CHANGED    Details   Probiotic Product (PROBIOTIC-10 PO) Take by mouthHistorical Med      Handicap Placard MISC Starting Wed 7/13/2022, Disp-1 each, R-0, PrintExp 7/13/2027      docusate (COLACE) 50 MG/5ML liquid Take 50 mg by mouth in the morning and 50 mg before bedtime. Historical Med      metoprolol tartrate (LOPRESSOR) 25 MG tablet Take 1 tablet by mouth 2 times daily, Disp-180 tablet, R-3Print      amLODIPine (NORVASC) 5 MG tablet Take 1 tablet by mouth in the morning and at bedtime, Disp-180 tablet, R-1Normal      Polyethylene Glycol 3350 (MIRALAX PO) Take by mouth as neededHistorical Med      VITAMIN D PO Take by mouthHistorical Med      apixaban (ELIQUIS) 5 MG TABS tablet Take 1 tablet by mouth 2 times daily, Disp-180 tablet, R-2LOT # [id number]    EXP: 1/2020Normal      Multiple Vitamins-Minerals (PRESERVISION AREDS 2 PO) Take 1 tablet by mouth 2 times dailyHistorical Med             ALLERGIES     is allergic to nitrofuran derivatives and nsaids. FAMILY HISTORY     He indicated that his mother is . He indicated that his father is . He indicated that only one of his two sisters is alive. He indicated that only one of his four brothers is alive. family history includes Cancer in his brother, brother, and sister; Heart Disease in his father; Prostate Cancer in his brother, brother, brother, and brother; Stroke in his mother. SOCIAL HISTORY      reports that he quit smoking about 57 years ago. His smoking use included cigarettes. He has a 10.00 pack-year smoking history. He has never used smokeless tobacco. He reports current alcohol use of about 1.0 standard drink per week. He reports that he does not use drugs. PHYSICAL EXAM     INITIAL VITALS:  temperature is 98 °F (36.7 °C). His blood pressure is 156/88 (abnormal) and his pulse is 80. His respiration is 18 and oxygen saturation is 96%. Physical Exam  Vitals and nursing note reviewed. Constitutional:       General: He is not in acute distress. Appearance: He is not diaphoretic. HENT:      Head: Normocephalic and atraumatic. Cardiovascular:      Rate and Rhythm: Normal rate and regular rhythm. Heart sounds: Normal heart sounds. Pulmonary:      Effort: Pulmonary effort is normal.      Breath sounds: Normal breath sounds. No rhonchi or rales. Abdominal:      General: Bowel sounds are normal. There is no distension. Palpations: Abdomen is soft. Tenderness: There is no abdominal tenderness. Musculoskeletal:      Right lower leg: Edema (trace pitting) present. Left lower leg: Edema (trace pitting) present. Lymphadenopathy:      Cervical: No cervical adenopathy. Skin:     General: Skin is warm and dry. Findings: Erythema present.    Neurological:      General: No focal deficit present. Mental Status: He is alert and oriented to person, place, and time. Psychiatric:         Mood and Affect: Mood normal.         Behavior: Behavior normal.       DIFFERENTIAL DIAGNOSIS:   CHF, paroxysmal atrial flutter/atrial fib, pneumonia    DIAGNOSTIC RESULTS     EKG: All EKG's are interpreted by the Emergency Department Physician whoeither signs or Co-signs this chart in the absence of a cardiologist.  Atrial flutter with a 4-1 AV conduction. Heart rate 64. QRS duration 88. QTc 375. Axis -14. RADIOLOGY: non-plain filmimages(s) such as CT, Ultrasound and MRI are read by the radiologist.  XR ABDOMEN (2 VIEWS)   Final Result   1. Prior lumbar surgery. Findings of constipation. 2. No pneumoperitoneum. 3. Interposition of the hepatic flexure between the liver and right hemidiaphragm. **This report has been created using voice recognition software. It may contain minor errors which are inherent in voice recognition technology. **      Final report electronically signed by Dr. Gerardo Abreu on 7/19/2022 4:38 PM      XR CHEST PORTABLE   Final Result   1. Mildly cardiomegaly. Mildly tortuous and ectatic thoracic aorta. Neurostimulator leads project over the midthoracic spine. Old, healed granulomatous disease. 2. Mild atelectasis/pneumonia along left hemidiaphragm. Moderate right basilar atelectasis/pneumonia. Right shoulder prosthesis. 3. Questionable small pneumoperitoneum under right hemidiaphragm versus projectional artifact. AP upright and left side down decubitus abdominal x-rays would be helpful for further evaluation. Alternatively, CT abdomen/pelvis could be obtained, if    clinically indicated. **This report has been created using voice recognition software. It may contain minor errors which are inherent in voice recognition technology. **      Final report electronically signed by Dr. Gerardo Abreu on 7/19/2022 3:30 PM            LABS:   Labs Reviewed CBC WITH AUTO DIFFERENTIAL - Abnormal; Notable for the following components:       Result Value    RBC 4.50 (*)     MCV 97.2 (*)     MCH 32.1 (*)     All other components within normal limits   COMPREHENSIVE METABOLIC PANEL - Abnormal; Notable for the following components:    Glucose 124 (*)     CREATININE 1.4 (*)     BUN 32 (*)     All other components within normal limits   GLOMERULAR FILTRATION RATE, ESTIMATED - Abnormal; Notable for the following components:    GFR, Estimated 51 (*)     All other components within normal limits   MAGNESIUM   TROPONIN   BRAIN NATRIURETIC PEPTIDE   ANION GAP       DEPARTMENT COURSE:   Vitals:    Vitals:    07/19/22 1445 07/19/22 1515 07/19/22 1545 07/19/22 1615   BP: 136/74 136/78 (!) 143/73 (!) 156/88   Pulse: 65 69 68 80   Resp: 12 18     Temp:       SpO2: 97% 96% 96% 96%       MDM:  Patient presents for evaluation of shortness of breath. Patient proBNP is noted to be within normal range for his age though it is at the higher aspect of normal.  Patient has a bit of trace pitting edema noted in bilateral lower extremities. Do not feel that diuretic is appropriate for patient at this time based on chart review and noting that patient had to be taken off of hydrochlorothiazide secondary to his dizziness. Patient ambulates with a cane and does not prefer walking with a walker. Feel that if patient gets more dizzy taking a diuretic he may have a fall while taking a blood thinner which can lead to morbidity. ECG demonstrates atrial flutter which is rate controlled and patient does have history of paroxysmal atrial fibrillation ablation and flutter for which he takes Eliquis. We will have patient follow-up with his cardiologist.  He has not had an echo since 7/28/2021 when it was noted that he had grade 1 diastolic dysfunction and an ejection fraction of 60%. Patient and his daughter were in agreement with above-stated plans.   They will return to the department for any symptom worsening or for evaluation of new concerning symptoms. Patient was prescribed nystatin for intertriginous candidiasis involving his umbilical region. CRITICAL CARE:   None    CONSULTS:  None    PROCEDURES:  None    FINAL IMPRESSION      1. Shortness of breath    2. Paroxysmal atrial flutter (HCC)    3. Candidal intertrigo          DISPOSITION/PLAN   Discharge    PATIENT REFERRED TO:  Zulma Melvin MD  [facility], [address]  [phone]    Call in 1 day  regarding shortness of breath and possible ECHO order      DISCHARGEMEDICATIONS:  Discharge Medication List as of 7/19/2022  5:28 PM        START taking these medications    Details   nystatin (MYCOSTATIN) 316972 UNIT/GM cream Apply topically 2 times daily. , Disp-30 g, R-1, Normal             (Please note that portions of this note were completedwith a voice recognition program.  Efforts were made to edit the dictations but occasionally words are mis-transcribed.)    MD Lesley Gonzalez MD  07/20/22 3092

## 2022-07-22 NOTE — CARE COORDINATION
Patient updated cardiology also agrees with continuation of PT and that cardiology office has reached out to discuss recent symptoms. Patient verbalized understanding and reported he will f/u with cardiology office. Patient denied any other questions, concerns, or needs.

## 2022-07-22 NOTE — TELEPHONE ENCOUNTER
Spoke with pt. Pt states he is doing much better but still gets some lightheadedness. Pt is asking if he can return to physical therapy?

## 2022-07-25 NOTE — CARE COORDINATION
9/18/18, 11:42 AM    Discharge plan discussed by  and . Discharge plan reviewed with patient/ family. Patient/ family verbalize understanding of discharge plan and are in agreement with plan. Understanding was demonstrated using the teach back method. Surgery yesterday: THORACIC LAMINECTOMY PERMANENT SPINAL CORD STIMULATOR PADDLE PLUS BATTERY PLACEMENT  Outpatient in a bed. Home today with wife. Has Rolling walker.
[de-identified] : \par Patient is a 87 year old male, with PMH as noted below, who presents for evaluation of abdominal pain and recurrent vomiting. Pt is brought in by his daughter and wife. Patient with h/o dementia, history by wife and daughter. \par \par Pt has been c/o abdominal pain x 1 week, sometimes grimacing in pain and unable to explain exactly where the pain is. He was having vomiting after lunch and they recently changed his lunch to 2 eggs and a protein shake. \par \par He has breakfast around 10am and lunch around 2pm and dinner around 6-7pm. \par \par Pt with known h/o gastroparesis, currently on metoclopramide 5mg TID. \par \par Pt has rx from PCP for CT a/p without IV contrast. He is also scheduled for a CT chest for screening with pulmonology. Pt has a h/o choledocholithiasis which was missed on CT a/p and only found on MRCP. He had a recent U/S abdomen and pelvis which showed normal gallbladder and mild thickening to bladder wall consistent with chronic outlet obstruction 2/2 enlarged prostate. \par \par Patient denies pyrosis, dysphagia, rectal bleeding, or unexplained weight loss.\par

## 2022-07-26 ENCOUNTER — CARE COORDINATION (OUTPATIENT)
Dept: CARE COORDINATION | Age: 87
End: 2022-07-26

## 2022-07-26 NOTE — CARE COORDINATION
Ambulatory Care Coordination Note  7/26/2022    ACC: Chantal Martines RN    Summary Note: Patient called for continued Care Coordination follow up and education re: the management of his healthcare needs. Patient shared he is doing \"ok\" but continues to have some intermittent c/o weakness/dizziness upon awaking in the AM.  Patient shared symptoms improve as his day goes on and he denied this being any different than he discussed with providers in the recent past.  Patient reported he is not driving at this time d/t recent symptoms and family is assisting him with transportation as needed. Patient stated BP has remained stable and he denied any recent c/o hypo or hypertension. ACM reviewed signs/symptoms to report with patient. Patient was again educated on the importance of early symptom recognition and prompt follow up. Patient verbalized understanding. ACM again reviewed need to continue to complete and f/u with OP PT as ordered. Patient verbalized understanding and stated he is scheduled to resume therapy next week. Home safety education was briefly reviewed. Patient denied any other questions, concerns, or needs and he was encouraged to call with any that may develop.           Actions:   - Reviewed signs/symptoms to report   - Reviewed importance of early symptom recognition and prompt PCP f/u   - Reviewed home safety education   - Monitored for c/o hypotension/hypertension  - Reviewed need to restart OP PT as recently directed by PCP and cardiology   - Monitored for additional needs          Plan of Care:   - Continue with f/u calls for assistance with the management of his healthcare needs  - Educate on signs/symptoms to report - In Process   - Educate on the importance of early symptom recognition - In Process  - Educate on the availability of same day appointments, urgent care/walk in clinic options, and after hours on call resources - Completed   - Review and verify Shubhamra [address] information - Completed  - Patient is current with COVID-19 vaccine x 3  - Patient is schedule for Medicare AWV - Oct. 2022  - Monitor for ongoing needs and readiness to discharge from Care Coordination      Lab Results       None            Care Coordination Interventions    Referral from Primary Care Provider: No  Suggested Interventions and Community Resources  Fall Risk Prevention: Completed (Comment: May 2022)  Medication Assistance Program: Declined (Comment: Denied any needs at this time. Does receive assistance thru the [facility] - April 2022)  Medi Set or Pill Pack: Completed (Comment: April 2022)  Pharmacist: Mateo Garcia (Comment: April 2022)  Physical Therapy: Completed (Comment: OP PT - [facility] [place] - July 2022)  Senior Services: Completed (Comment: Receives 4 meal vouchers to eat out monthly thru [facility] - April 2022)  Social Work: Declined (Comment: Denied any need for additional resource needs - April 2022)  Other Services: Completed (Comment: Current with [address] [place] [address] - April 2022)  Transportation Support: Declined  Other Services or Interventions: Follows with [address] [place] [address] - STR urology and STR cardiology           Goals Addressed                   This Visit's Progress       Care Coordination     Conditions and Symptoms   No change     I will schedule office visits, as directed by my provider. I will keep my appointment or reschedule if I have to cancel. I will notify my provider of any barriers to my plan of care. I will notify my provider of any symptoms that indicate a worsening of my condition. Barriers: overwhelmed by complexity of regimen, stress, and lack of education  Plan for overcoming my barriers: Continue to provide education to patient and monitor for resource needs   Confidence: 8/10  Anticipated Goal Completion Date: 8/1/2022                  Prior to Admission medications    Medication Sig Start Date End Date Taking?  Authorizing Provider   Probiotic Product (PROBIOTIC-10 PO) Take by mouth    Historical Provider, MD   nystatin (MYCOSTATIN) 452463 UNIT/GM cream Apply topically 2 times daily. 7/19/22   Lesley Cavazos MD   Handicap Placard MISC by Does not apply route Exp 7/13/2027 7/13/22   Eva Ramirez MD   docusate (COLACE) 50 MG/5ML liquid Take 50 mg by mouth in the morning and 50 mg before bedtime.     Historical Provider, MD   metoprolol tartrate (LOPRESSOR) 25 MG tablet Take 1 tablet by mouth 2 times daily 6/8/22   Eva Ramirez MD   amLODIPine (NORVASC) 5 MG tablet Take 1 tablet by mouth in the morning and at bedtime 6/1/22   Eva Ramirez MD   Polyethylene Glycol 3350 (MIRALAX PO) Take by mouth as needed    Historical Provider, MD   VITAMIN D PO Take by mouth    Historical Provider, MD   apixaban (ELIQUIS) 5 MG TABS tablet Take 1 tablet by mouth 2 times daily 7/8/19   Zulma Melvin MD   Multiple Vitamins-Minerals (PRESERVISION AREDS 2 PO) Take 1 tablet by mouth 2 times daily    Historical Provider, MD       Future Appointments   Date Time Provider Yonas Bagleyi   10/3/2022  8:00 AM Eva Ramirez MD [facility]   12/14/2022 10:30 AM SONIDO Henderson - CNP [facility]   1/23/2023  1:30 PM Zulma Melvin MD N [facility]   ,   General Assessment    Do you have any symptoms that are causing concern?: Yes  Progression since Onset: Unchanged, Intermittent - Waxing/Waning  Reported Symptoms: Fatigue, Weakness (Comment: dizziness)     , and Care Coordination Episodes    Type: Amb Care Management  Episode: Complex Care  Noted: 4/26/2022  Comments: List Referral -

## 2022-08-10 ENCOUNTER — CARE COORDINATION (OUTPATIENT)
Dept: CARE COORDINATION | Age: 87
End: 2022-08-10

## 2022-08-10 NOTE — CARE COORDINATION
Ambulatory Care Coordination Note  8/10/2022    ACC: Becky Lomas RN    Summary Note: Patient was called for continued care Coordination follow up and education re: the management of his healthcare needs. Patient shared he has been doing well and he is restarting PT as directed. Patient was encouraged to complete any home exercises b/w visits as instructed and patient verbalized understanding. Patient shared intermittent dizziness continues and he was again encouraged to discuss these symptoms with PT so they can help him manage symptoms. Patient verbalized understanding. Patient reported BP has remained stable with average readings being in the 130/70s. Patient stated highest recent reading was 152/87 and lowest reading was 113/67. Importance of routine monitoring as well as following up with any ongoing changes was reviewed with patient and she verbalized understanding. Fall prevention education was also reviewed with patient and he verbalized understanding. Patient denied any other questions, concerns, or needs and he was encouraged to call with any that may develop.           Actions:   - Reviewed signs/symptoms to report   - Reviewed importance of early symptom recognition and follow up   - Encouraged patient to complete home exercises as directed b/w PT visits   - Reviewed recent BP readings   - Reviewed fall prevention education   - Monitored for additional needs          Plan of Care:   - Continue with f/u calls for assistance with the management of his healthcare needs  - Educate on signs/symptoms to report - In Process   - Educate on the importance of early symptom recognition - In Process  - Educate on the availability of same day appointments, urgent care/walk in clinic options, and after hours on call resources - Completed   - Review and verify Healthcare Decision Maker information - Completed  - Patient is current with COVID-19 vaccine x 3  - Patient is schedule for Medicare AWV - Oct. 2022 - Scheduled   - Monitor for ongoing needs  - If patient remains stable will consider discharge from Care Coordination in the near future       Lab Results       None            Care Coordination Interventions    Referral from Primary Care Provider: No  Suggested Interventions and Community Resources  Fall Risk Prevention: Completed (Comment: May 2022)  Medication Assistance Program: Declined (Comment: Denied any needs at this time. Does receive assistance thru [facility] - April 2022)  Medi Set or Pill Pack: Completed (Comment: April 2022)  Pharmacist: Karon Garcia (Comment: April 2022)  Physical Therapy: Completed (Comment: OP PT - [facility] [place] - July 2022)  Senior Services: Completed (Comment: Receives 4 meal vouchers to eat out monthly thru [facility] - April 2022)  Social Work: Declined (Comment: Denied any need for additional resource needs - April 2022)  Other Services: Completed (Comment: Current with [address] [place] [address] - April 2022)  Transportation Support: Declined  Other Services or Interventions: Follows with [address] [place] [address] - STR urology and STR cardiology           Goals Addressed                   This Visit's Progress       Care Coordination     Conditions and Symptoms   Improving     I will schedule office visits, as directed by my provider. I will keep my appointment or reschedule if I have to cancel. I will notify my provider of any barriers to my plan of care. I will notify my provider of any symptoms that indicate a worsening of my condition. Barriers: overwhelmed by complexity of regimen, stress, and lack of education  Plan for overcoming my barriers: Continue to provide education to patient and monitor for resource needs   Confidence: 8/10  Anticipated Goal Completion Date: 8/1/2022                  Prior to Admission medications    Medication Sig Start Date End Date Taking?  Authorizing Provider   Probiotic Product (PROBIOTIC-10 PO) Take by mouth    Historical Provider, MD   nystatin (MYCOSTATIN) [id number]

## 2022-08-22 ENCOUNTER — CARE COORDINATION (OUTPATIENT)
Dept: CARE COORDINATION | Age: 87
End: 2022-08-22

## 2022-08-22 NOTE — CARE COORDINATION
Ambulatory Care Coordination Note  8/22/2022    ACC: Collette Mcallister RN    Summary Note: Patient called ACM re: concerns with his BP and medications. ACM returned call and patient shared he has been doing \"ok\" but continues with intermittent c/o dizziness, abdominal discomfort/constipation, increased gas/belching, and fatigue/weakness. Patient stated symptoms are unchanged from recent complaints and he feels may be r/t to his Metoprolol as symptoms started about the same time. Will update PCP. Patient was educated that PCP may wish to see him in the office and patient verbalized understanding. Patient reported he continues with PT as directed and he denied any recent falls or change in his mobility. Patient stated home BP's remain stable and they have been as follows:   145/75 - 63  144/76 - 67  122/66 - 62  152.84 - 83  127/74 - 64  128/67 - 64  124/74 - 64  122/76 - 66  132/73 - 63  125/72 - 66  138/71 - 64  133/73 - 66  132/73 - 63  125/72 - 66  138/71 - 64  133/73 - 66  150/80 - 94 (today)  ACM educated patient on the signs/symptoms to report as well as the importance of early symptom recognition and follow up. Patient verbalized understanding. Patient denied any other questions, concerns ,or needs and he was encouraged to call with any that may develop.           Actions:   - Reviewed signs/symptoms to report   - Reviewed importance of early symptom recognition and follow up   - Updated PCP of metoprolol concerns   - Shared recent home BP readings   - Encouraged patient to continue PT as directed and scheduled   - Monitored for additional needs          Plan of Care:   - Continue with f/u calls for assistance with the management of his healthcare needs  - Educate on signs/symptoms to report - In Process   - Educate on the importance of early symptom recognition - In Process  - Educate on the availability of same day appointments, urgent care/walk in clinic options, and after hours on call resources - Completed   - Review and verify Healthcare Decision Maker information - Completed  - Patient is current with COVID-19 vaccine x 3  - Patient is schedule for Medicare AW - Oct. 2022 - Scheduled   - Monitor for ongoing needs  - If patient remains stable will consider discharge from Care Coordination in the near future      Lab Results       None            Care Coordination Interventions    Referral from Primary Care Provider: No  Suggested Interventions and Community Resources  Fall Risk Prevention: Completed (Comment: May 2022)  Medication Assistance Program: Declined (Comment: Denied any needs at this time. Does receive assistance thru the [place] - April 2022)  Medi Set or Pill Pack: Completed (Comment: April 2022)  Pharmacist: Marisabel Schneider (Comment: April 2022)  Physical Therapy: Completed (Comment: OP PT - [facility] [place] - July 2022)  Senior Services: Completed (Comment: Receives 4 meal vouchers to eat out monthly thru [facility] - April 2022)  Social Work: Declined (Comment: Denied any need for additional resource needs - April 2022)  Other Services: Completed (Comment: Current with [address] [place] [address] - April 2022)  Transportation Support: Declined  Other Services or Interventions: Follows with [address] [place] [address] - STR urology and STR cardiology           Goals Addressed                   This Visit's Progress       Care Coordination     Conditions and Symptoms   Improving     I will schedule office visits, as directed by my provider. I will keep my appointment or reschedule if I have to cancel. I will notify my provider of any barriers to my plan of care. I will notify my provider of any symptoms that indicate a worsening of my condition.     Barriers: overwhelmed by complexity of regimen, stress, and lack of education  Plan for overcoming my barriers: Continue to provide education to patient and monitor for resource needs   Confidence: 8/10  Anticipated Goal Completion Date: 8/1/2022                  Prior to Admission medications    Medication Sig Start Date End Date Taking? Authorizing Provider   Probiotic Product (PROBIOTIC-10 PO) Take by mouth    Historical Provider, MD   nystatin (MYCOSTATIN) 702962 UNIT/GM cream Apply topically 2 times daily. 7/19/22   Lesley Cavazos MD   Handicap Placard MISC by Does not apply route Exp 7/13/2027 7/13/22   Eva Ramirez MD   docusate (COLACE) 50 MG/5ML liquid Take 50 mg by mouth in the morning and 50 mg before bedtime.     Historical Provider, MD   metoprolol tartrate (LOPRESSOR) 25 MG tablet Take 1 tablet by mouth 2 times daily 6/8/22   Eva Ramirez MD   amLODIPine (NORVASC) 5 MG tablet Take 1 tablet by mouth in the morning and at bedtime 6/1/22   Eva Ramirez MD   Polyethylene Glycol 3350 (MIRALAX PO) Take by mouth as needed    Historical Provider, MD   VITAMIN D PO Take by mouth    Historical Provider, MD   apixaban (ELIQUIS) 5 MG TABS tablet Take 1 tablet by mouth 2 times daily 7/8/19   Zulma Melvin MD   Multiple Vitamins-Minerals (PRESERVISION AREDS 2 PO) Take 1 tablet by mouth 2 times daily    Historical Provider, MD       Future Appointments   Date Time Provider Yonas Rosalind   10/3/2022  8:00 AM Eva Ramirez MD [facility]   12/14/2022 10:30 AM SONIDO Henderson - CNP Dil[facility] Villatoro [facility]   1/23/2023  1:30 PM Zulma Melvin MD [facility]   ,   General Assessment    Do you have any symptoms that are causing concern?: Yes  Progression since Onset: Unchanged, Intermittent - Waxing/Waning  Reported Symptoms: Abdominal Pain, Constipation, Other (Comment: dizziness)     , and Care Coordination Episodes    Type: Amb Care Management  Episode: Complex Care  Noted: 4/26/2022  Comments: List Referral -

## 2022-08-22 NOTE — CARE COORDINATION
Patient called and updated. Patient agreeable to PCP appointment and stated he is available all days but Tues and Thurs the next 2 weeks d/t his PT.  ACCHUCHO spoke with office staff and PCP appointment scheduled for Wed, 8/24 @ 1115. Patient called back and was updated of this information and he verbalized understanding and agreement.

## 2022-08-22 NOTE — CARE COORDINATION
Dr. Chika Valencia:  Please see note below. Patient shared he continues with intermittent c/o dizziness, abdominal discomfort/constipation, fatigue/weakness, and increased gas/belching. Patient feels may be r/t his Metoprolol as even though he has been taking for \"awhile\" he feels some symptoms started about the same time. Home BP readings listed below. Patient aware you may want to see for an appointment and is agreeable if needed. Please advise. Thank you!

## 2022-08-24 ENCOUNTER — OFFICE VISIT (OUTPATIENT)
Dept: FAMILY MEDICINE CLINIC | Age: 87
End: 2022-08-24
Payer: MEDICARE

## 2022-08-24 VITALS
WEIGHT: 146 LBS | SYSTOLIC BLOOD PRESSURE: 110 MMHG | BODY MASS INDEX: 20.44 KG/M2 | TEMPERATURE: 97.7 F | DIASTOLIC BLOOD PRESSURE: 62 MMHG | RESPIRATION RATE: 18 BRPM | HEIGHT: 71 IN | OXYGEN SATURATION: 98 % | HEART RATE: 68 BPM

## 2022-08-24 DIAGNOSIS — I48.0 PAF (PAROXYSMAL ATRIAL FIBRILLATION) (HCC): ICD-10-CM

## 2022-08-24 DIAGNOSIS — Z00.00 MEDICARE ANNUAL WELLNESS VISIT, SUBSEQUENT: Primary | ICD-10-CM

## 2022-08-24 DIAGNOSIS — K59.03 DRUG-INDUCED CONSTIPATION: ICD-10-CM

## 2022-08-24 DIAGNOSIS — R42 DIZZINESS: ICD-10-CM

## 2022-08-24 DIAGNOSIS — R14.0 ABDOMINAL BLOATING: ICD-10-CM

## 2022-08-24 DIAGNOSIS — R26.81 UNSTEADY GAIT: ICD-10-CM

## 2022-08-24 PROCEDURE — G8420 CALC BMI NORM PARAMETERS: HCPCS | Performed by: FAMILY MEDICINE

## 2022-08-24 PROCEDURE — 1036F TOBACCO NON-USER: CPT | Performed by: FAMILY MEDICINE

## 2022-08-24 PROCEDURE — G8427 DOCREV CUR MEDS BY ELIG CLIN: HCPCS | Performed by: FAMILY MEDICINE

## 2022-08-24 PROCEDURE — G0439 PPPS, SUBSEQ VISIT: HCPCS | Performed by: FAMILY MEDICINE

## 2022-08-24 PROCEDURE — 1123F ACP DISCUSS/DSCN MKR DOCD: CPT | Performed by: FAMILY MEDICINE

## 2022-08-24 PROCEDURE — 99214 OFFICE O/P EST MOD 30 MIN: CPT | Performed by: FAMILY MEDICINE

## 2022-08-24 ASSESSMENT — ENCOUNTER SYMPTOMS
WHEEZING: 0
COUGH: 0
SHORTNESS OF BREATH: 0
SORE THROAT: 0
NAUSEA: 0
ABDOMINAL PAIN: 1
RHINORRHEA: 0
DIARRHEA: 0
CONSTIPATION: 1

## 2022-08-24 ASSESSMENT — LIFESTYLE VARIABLES
HOW OFTEN DO YOU HAVE A DRINK CONTAINING ALCOHOL: NEVER
HOW MANY STANDARD DRINKS CONTAINING ALCOHOL DO YOU HAVE ON A TYPICAL DAY: PATIENT DOES NOT DRINK

## 2022-08-24 ASSESSMENT — PATIENT HEALTH QUESTIONNAIRE - PHQ9
SUM OF ALL RESPONSES TO PHQ9 QUESTIONS 1 & 2: 0
SUM OF ALL RESPONSES TO PHQ QUESTIONS 1-9: 0
2. FEELING DOWN, DEPRESSED OR HOPELESS: 0
SUM OF ALL RESPONSES TO PHQ QUESTIONS 1-9: 0
1. LITTLE INTEREST OR PLEASURE IN DOING THINGS: 0

## 2022-08-24 NOTE — PROGRESS NOTES
[address] [place] [address] [address] Lambert [id number]  Dept: [phone]  Loc: [address] [facility] Chio (:  1934) is a 80 y.o. male, here for evaluation of the following chief complaint(s):  Medication Problem (Metoprolol side effects ) and Medicare AWV      ASSESSMENT/PLAN:  1. Medicare annual wellness visit, subsequent  2. PAF (paroxysmal atrial fibrillation) (HCC)  -     metoprolol tartrate (LOPRESSOR) 25 MG tablet; Take 0.5 tablets by mouth daily, Disp-90 tablet, R-3Adjust Sig  3. Dizziness  -     CT HEAD W WO CONTRAST; Future  -     VL DUP CAROTID BILATERAL; Future  4. Unsteady gait  -     CT HEAD W WO CONTRAST; Future  -     VL DUP CAROTID BILATERAL; Future    decrease metoprolol to 12.5 mg tablet daily instead of twice daily. Patient to monitor blood pressure and heart rate at home. Return in 1 month for recheck. We will get CT of the head as well as carotid Dopplers to evaluate dizziness. Safety tips given. Obstipation likely due to Norco use. Bloating may be related to MiraLAX so will hold MiraLAX and start Metamucil daily. Return for Medicare Annual Wellness Visit in 1 year. SUBJECTIVE/OBJECTIVE:  HPI  Patient presents with complaints of dizziness and unsteadiness as well as nausea. States that this has been going on intermittently for the past month or so. He wonders if it is related to his metoprolol. States he wakes up in the morning and feels nauseated and has abdominal bloating and feels very dizzy and lightheaded. States when he stands up he feels unsteady like he might fall over. Also if he changes positions he feels off balance. Has not had falls but has been using a cane. Blood pressure fluctuates dramatically with diastolic often in the 24X to 60s. Heart rate also fluctuates somewhat. He does take MiraLAX daily due to constipation and wonders if that is causing some of his bloating. sounds. No wheezing. Abdominal:      General: Bowel sounds are normal. There is no distension. Palpations: Abdomen is soft. Tenderness: There is no abdominal tenderness. Musculoskeletal:         General: No tenderness. Normal range of motion. Cervical back: Normal range of motion and neck supple. Lymphadenopathy:      Cervical: No cervical adenopathy. Skin:     General: Skin is warm and dry. Findings: No rash. Neurological:      Mental Status: He is alert and oriented to person, place, and time. Motor: No abnormal muscle tone. Coordination: Coordination normal.      Gait: Gait abnormal.   Psychiatric:         Mood and Affect: Mood and affect normal.         Behavior: Behavior normal.         Thought Content: Thought content normal.         Judgment: Judgment normal.       Vitals:    08/24/22 1114   BP: 110/62   Pulse: 68   Resp: 18   Temp: 97.7 °F (36.5 °C)   SpO2: 98%              An electronic signature was used to authenticate this note.     --Akash Vasquez MD

## 2022-08-24 NOTE — PROGRESS NOTES
Medicare Annual Wellness Visit    [facility] is here for Medication Problem (Metoprolol side effects ) and Medicare AWV    Assessment & Plan   Medicare annual wellness visit, subsequent  PAF (paroxysmal atrial fibrillation) (HCC)  -     metoprolol tartrate (LOPRESSOR) 25 MG tablet; Take 0.5 tablets by mouth daily, Disp-90 tablet, R-3Adjust Sig  Dizziness  -     CT HEAD W WO CONTRAST; Future  -     VL DUP CAROTID BILATERAL; Future  Unsteady gait  -     CT HEAD W WO CONTRAST; Future  -     VL DUP CAROTID BILATERAL; Future    Recommendations for Preventive Services Due: see orders and patient instructions/AVS.  Recommended screening schedule for the next 5-10 years is provided to the patient in written form: see Patient Instructions/AVS.       Return for Medicare Annual Wellness Visit in 1 year. Subjective   The following acute and/or chronic problems were also addressed today:  Dizziness- please see separate note    Patient's complete Health Risk Assessment and screening values have been reviewed and are found in Flowsheets. The following problems were reviewed today and where indicated follow up appointments were made and/or referrals ordered.     Positive Risk Factor Screenings with Interventions:    Fall Risk:  Do you feel unsteady or are you worried about falling? : (!) yes  2 or more falls in past year?: no  Fall with injury in past year?: no   Fall Risk Interventions:    Home safety tips provided  Medication adjusted- metoprolol decreased to 12.5 mg etta            General Health and ACP:  General  In general, how would you say your health is?: Good  In the past 7 days, have you experienced any of the following: New or Increased Pain, New or Increased Fatigue, Loneliness, Social Isolation, Stress or Anger?: (!) Yes  Select all that apply: (!) New or Increased Fatigue  Do you get the social and emotional support that you need?: Yes  Do you have a Living Will?: Yes    Advance Directives       Power of  Living Will ACP-Advance Directive ACP-Power of     Not on [place] on 08/20/18 Filed [address] Risk Interventions:  Fatigue: medication adjusted- metoprolol decreased              Objective   Vitals:    08/24/22 1114   BP: 110/62   Site: Left Upper Arm   Position: Sitting   Cuff Size: Medium Adult   Pulse: 68   Resp: 18   Temp: 97.7 °F (36.5 °C)   TempSrc: Temporal   SpO2: 98%   Weight: 146 lb (66.2 kg)   Height: 5' 10.5\" (1.791 m)      Body mass index is 20.65 kg/m². Allergies   Allergen Reactions    Nitrofuran Derivatives Nausea Only     Severe stomach cramps    Nsaids Nausea Only and Other (See Comments)     Pain in stomach     Prior to Visit Medications    Medication Sig Taking? Authorizing Provider   metoprolol tartrate (LOPRESSOR) 25 MG tablet Take 0.5 tablets by mouth daily Yes Maribell Chase MD   Probiotic Product (PROBIOTIC-10 PO) Take by mouth Yes Historical Provider, MD   nystatin (MYCOSTATIN) [id number] UNIT/GM cream Apply topically 2 times daily. Yes Lavell Noguera MD   Handicap Placard MISC by Does not apply route Exp 7/13/2027 Yes Maribell Chase MD   docusate (COLACE) 50 MG/5ML liquid Take 50 mg by mouth in the morning and 50 mg before bedtime.  Yes Historical Provider, MD   amLODIPine (NORVASC) 5 MG tablet Take 1 tablet by mouth in the morning and at bedtime Yes Maribell Chase MD   Polyethylene Glycol 3350 (MIRALAX PO) Take by mouth as needed Yes Historical Provider, MD   VITAMIN D PO Take by mouth Yes Historical Provider, MD   apixaban (ELIQUIS) 5 MG TABS tablet Take 1 tablet by mouth 2 times daily Yes Jeaneth Dukes MD   Multiple Vitamins-Minerals (PRESERVISION AREDS 2 PO) Take 1 tablet by mouth 2 times daily Yes Historical Provider, MD Glass (Including outside providers/suppliers regularly involved in providing care):   Patient Care Team:  Maribell Chase MD as PCP - General (Family Medicine)  Maribell Chase MD as PCP - Destiny Lucas Provider  Linda Lebron, RAFAEL (Psychology)  Rhianna Bobo MD as Cardiologist (Cardiology)  Excell HERIBERTO Meade as Ambulatory Care Manager     Reviewed and updated this visit:  Tobacco  Allergies  Meds  Med Hx  Surg Hx  Soc Hx  Fam Hx        Electronically signed by Sandra Cooney MD on 8/24/2022 at 12:50 PM

## 2022-08-24 NOTE — PATIENT INSTRUCTIONS
Stop miralax  Start metamucil  Change metoprolol to taking just 1/2 tab (12.5mg) in the morning only  CT of head  Ultrasound of neck. Personalized Preventive Plan for Cyn Shrestha - 8/24/2022  Medicare offers a range of preventive health benefits. Some of the tests and screenings are paid in full while other may be subject to a deductible, co-insurance, and/or copay. Some of these benefits include a comprehensive review of your medical history including lifestyle, illnesses that may run in your family, and various assessments and screenings as appropriate. After reviewing your medical record and screening and assessments performed today your provider may have ordered immunizations, labs, imaging, and/or referrals for you. A list of these orders (if applicable) as well as your Preventive Care list are included within your After Visit Summary for your review. Other Preventive Recommendations:    A preventive eye exam performed by an eye specialist is recommended every 1-2 years to screen for glaucoma; cataracts, macular degeneration, and other eye disorders. A preventive dental visit is recommended every 6 months. Try to get at least 150 minutes of exercise per week or 10,000 steps per day on a pedometer . Order or download the FREE \"Exercise & Physical Activity: Your Everyday Guide\" from [employer]. Call 1-622.893.1414 or search [employer] online. You need 9092-1517 mg of calcium and 6276-6466 IU of vitamin D per day. It is possible to meet your calcium requirement with diet alone, but a vitamin D supplement is usually necessary to meet this goal.  When exposed to the sun, use a sunscreen that protects against both UVA and UVB radiation with an SPF of 30 or greater. Reapply every 2 to 3 hours or after sweating, drying off with a towel, or swimming. Always wear a seat belt when traveling in a car.  Always wear a helmet when riding a bicycle or motorcycle.

## 2022-08-29 ENCOUNTER — CARE COORDINATION (OUTPATIENT)
Dept: CARE COORDINATION | Age: 87
End: 2022-08-29

## 2022-08-29 ENCOUNTER — TELEPHONE (OUTPATIENT)
Dept: FAMILY MEDICINE CLINIC | Age: 87
End: 2022-08-29

## 2022-08-29 DIAGNOSIS — I10 ESSENTIAL HYPERTENSION: ICD-10-CM

## 2022-08-29 DIAGNOSIS — M54.50 CHRONIC MIDLINE LOW BACK PAIN WITHOUT SCIATICA: Primary | ICD-10-CM

## 2022-08-29 DIAGNOSIS — G89.29 CHRONIC MIDLINE LOW BACK PAIN WITHOUT SCIATICA: Primary | ICD-10-CM

## 2022-08-29 DIAGNOSIS — I48.0 PAF (PAROXYSMAL ATRIAL FIBRILLATION) (HCC): ICD-10-CM

## 2022-08-29 RX ORDER — HYDROCODONE BITARTRATE AND ACETAMINOPHEN 5; 325 MG/1; MG/1
1 TABLET ORAL EVERY 6 HOURS PRN
COMMUNITY
End: 2022-08-29 | Stop reason: SDUPTHER

## 2022-08-29 RX ORDER — HYDROCODONE BITARTRATE AND ACETAMINOPHEN 5; 325 MG/1; MG/1
1.5 TABLET ORAL NIGHTLY
Qty: 135 TABLET | Refills: 0 | Status: SHIPPED | OUTPATIENT
Start: 2022-08-29 | End: 2022-10-17 | Stop reason: SDUPTHER

## 2022-08-29 RX ORDER — CHOLECALCIFEROL (VITAMIN D3) 125 MCG
5 CAPSULE ORAL NIGHTLY PRN
COMMUNITY

## 2022-08-29 RX ORDER — AMLODIPINE BESYLATE 5 MG/1
5 TABLET ORAL 2 TIMES DAILY
Qty: 180 TABLET | Refills: 1 | Status: SHIPPED | OUTPATIENT
Start: 2022-08-29

## 2022-08-29 NOTE — TELEPHONE ENCOUNTER
See Care Coordination note from this date. Med Rec was completed. Patient shared he needs all meds to go to the [facility] so he can get the best coverage thru his insurance. Please send current scripts to the [facility], especially recently changed metoprolol as patient is currently splitting a 50 mg tab into quarters. Thank you!

## 2022-08-29 NOTE — CARE COORDINATION
Ambulatory Care Coordination Note  8/29/2022    ACC: Nidia Sims, RN    Summary Note: Patient was called for continued Care Coordination follow up and education re: the management of his healthcare needs. Patient reported he is doing \"better\" and he denied any questions re: his recent PCP visit. Patient reported he has been taking decreased Metoprolol as directed but is cutting a 50 mg tab in 1/4 as that is what he has on file. Patient was updated new prescription has been sent to local pharmacy and patient reported all prescriptions should be going to the [facility]. Med Rec was completed with patient and PCP/office updated. Patient reported BP has remained stable with recent change in medications and he was instructed to call with any c/o ongoing high BP. Patient verbalized understanding. Patient is aware of upcoming CT head and doppler study and he reported his daughter will be providing transportation. Patient denied any other questions, concerns, or needs and he was encouraged to call with any that may develop.           Actions:   - Reviewed signs/symptoms to report   - Reviewed importance of early symptom recognition and follow up   - Monitored for questions re: recent PCP visit   - Reviewed recent medication changes   - Completed Med Rec  - Updated PCP/office of need for scripts to be sent to [address] [place] [address] for additional needs          Plan of Care:   - Continue with f/u calls for assistance with the management of his healthcare needs  - Educate on signs/symptoms to report - In Process   - Educate on the importance of early symptom recognition - In Process  - Educate on the availability of same day appointments, urgent care/walk in clinic options, and after hours on call resources - Completed   - Review and verify Healthcare Decision Maker information - Completed  - Patient is current with COVID-19 vaccine x 3  - Patient is schedule for Medicare AW - Oct. 2022 - Scheduled   - Monitor for ongoing needs  - If patient remains stable will consider discharge from Care Coordination in the near future      Lab Results       None            Care Coordination Interventions    Referral from Primary Care Provider: No  Suggested Interventions and Community Resources  Fall Risk Prevention: Completed (Comment: May 2022)  Medication Assistance Program: Declined (Comment: Denied any needs at this time. Does receive assistance thru [facility] - April 2022)  Medi Set or Pill Pack: Completed (Comment: April 2022)  Pharmacist: Heather Ingram (Comment: April 2022)  Physical Therapy: Completed (Comment: OP PT - [facility] - July 2022)  Senior Services: Completed (Comment: Receives 4 meal vouchers to eat out monthly thru [employer] - April 2022)  Social Work: Declined (Comment: Denied any need for additional resource needs - April 2022)  Other Services: Completed (Comment: Current with [address] [place] [address] - April 2022)  Transportation Support: Declined  Other Services or Interventions: Follows with [address] [place] [address] - [facility] urology and STR cardiology           Goals Addressed                   This Visit's Progress       Care Coordination     Conditions and Symptoms   Improving     I will schedule office visits, as directed by my provider. I will keep my appointment or reschedule if I have to cancel. I will notify my provider of any barriers to my plan of care. I will notify my provider of any symptoms that indicate a worsening of my condition. Barriers: overwhelmed by complexity of regimen, stress, and lack of education  Plan for overcoming my barriers: Continue to provide education to patient and monitor for resource needs   Confidence: 8/10  Anticipated Goal Completion Date: 8/1/2022                  Prior to Admission medications    Medication Sig Start Date End Date Taking?  Authorizing Provider   Psyllium (METAMUCIL PO) Take by mouth daily as needed   Yes Historical Provider, MD   HYDROcodone-acetaminophen (NORCO) 5-325 MG per tablet dizziness)     , and Care Coordination Episodes    Type: Amb Care Management  Episode: Complex Care  Noted: 4/26/2022  Comments: List Referral -

## 2022-09-08 ENCOUNTER — CARE COORDINATION (OUTPATIENT)
Dept: CARE COORDINATION | Age: 87
End: 2022-09-08

## 2022-09-08 NOTE — CARE COORDINATION
Ambulatory Care Coordination Note  9/8/2022    ACC: Klaudia Hitchcock RN    Summary Note: Patient called ACM with update. Patient shared he has been feeling better and feels PT continues to help with his weakness and dizziness. Patient shared he recently had to cancel Carotid US and CT head d/t not feeling well the day of his appointments. Patient reported he is feeling better now and plans to reschedule but not in the immediate future d/t feeling better with ongoing PT. ACM reviewed need to reschedule as soon as he is able as well as discussing any concerns or questions with his providers. Patient verbalized understanding. ACM will update PCP. Patient stated his BP has been stable and he continues to monitor as directed. ACM reviewed signs/symptoms to report with patient as well as the importance of early symptom recognition and follow up. Patient acknowledged understanding. Patient denied any other questions, concerns, or needs and he was encouraged to call with any that may develop.           Actions:   - Reviewed signs/symptoms to report   - Reviewed importance of early symptom recognition and follow up   - Monitored for BP questions/concerns   - Updated PCP re: recently missed appointments/testing   - Encouraged ongoing PT   - Monitored for additional needs          Plan of Care:   - Continue with f/u calls for assistance with the management of his healthcare needs  - Educate on signs/symptoms to report - In Process   - Educate on the importance of early symptom recognition - In Process  - Educate on the availability of same day appointments, urgent care/walk in clinic options, and after hours on call resources - Completed   - Review and verify Healthcare Decision Maker information - Completed  - Patient is current with COVID-19 vaccine x 3  - Patient is schedule for Medicare AW - Oct. 2022 - Scheduled   - Monitor for ongoing needs  - If patient remains stable will consider discharge from Care Coordination in the near future      Lab Results       None            Care Coordination Interventions    Referral from Primary Care Provider: No  Suggested Interventions and Community Resources  Fall Risk Prevention: Completed (Comment: May 2022)  Medication Assistance Program: Declined (Comment: Denied any needs at this time. Does receive assistance thru the [facility] - April 2022)  Medi Set or Pill Pack: Completed (Comment: April 2022)  Pharmacist: Kavon Mcintyre (Comment: April 2022)  Physical Therapy: Completed (Comment: OP PT - [facility] - July 2022)  Senior Services: Completed (Comment: Receives 4 meal vouchers to eat out monthly thru [facility] - April 2022)  Social Work: Declined (Comment: Denied any need for additional resource needs - April 2022)  Other Services: Completed (Comment: Current with [address] [place] [address] - April 2022)  Transportation Support: Declined  Other Services or Interventions: Follows with [address] [place] [address] - [facility] urology and STR cardiology           Goals Addressed                   This Visit's Progress       Care Coordination     Conditions and Symptoms   Improving     I will schedule office visits, as directed by my provider. I will keep my appointment or reschedule if I have to cancel. I will notify my provider of any barriers to my plan of care. I will notify my provider of any symptoms that indicate a worsening of my condition. Barriers: overwhelmed by complexity of regimen, stress, and lack of education  Plan for overcoming my barriers: Continue to provide education to patient and monitor for resource needs   Confidence: 8/10  Anticipated Goal Completion Date: 8/1/2022                  Prior to Admission medications    Medication Sig Start Date End Date Taking?  Authorizing Provider   Psyllium (METAMUCIL PO) Take by mouth daily as needed    Historical Provider, MD   melatonin 5 MG TABS tablet Take 5 mg by mouth nightly as needed    Historical Provider, MD   metoprolol tartrate (LOPRESSOR) 25 MG tablet Take 0.5 tablets by mouth daily 8/29/22   Abiodun Ness MD   HYDROcodone-acetaminophen (NORCO) 5-325 MG per tablet Take 1.5 tablets by mouth at bedtime for 90 days. Takes 1 1/2 tabs daily at bedtime - thru the [facility] - Aug. 2022 8/29/22 11/27/22  Abiodun Ness MD   amLODIPine (NORVASC) 5 MG tablet Take 1 tablet by mouth in the morning and at bedtime 8/29/22   Abiodun Ness MD   apixaban (ELIQUIS) 5 MG TABS tablet Take 1 tablet by mouth 2 times daily 8/29/22   Abiodun Ness MD   Probiotic Product (PROBIOTIC-10 PO) Take by mouth    Historical Provider, MD   nystatin (MYCOSTATIN) 662848 UNIT/GM cream Apply topically 2 times daily. 7/19/22   Karime Garcia MD   Handicap Placard MISC by Does not apply route Exp 7/13/2027 7/13/22   Abiodun Ness MD   docusate (COLACE) 50 MG/5ML liquid Take 50 mg by mouth in the morning and 50 mg before bedtime.   Patient not taking: Reported on 8/29/2022    Historical Provider, MD   Polyethylene Glycol 3350 (MIRALAX PO) Take by mouth as needed  Patient not taking: Reported on 8/29/2022    Historical Provider, MD   VITAMIN D PO Take by mouth    Historical Provider, MD   Multiple Vitamins-Minerals (PRESERVISION AREDS 2 PO) Take 1 tablet by mouth 2 times daily    Historical Provider, MD       Future Appointments   Date Time Provider Yonas Boyer   10/3/2022  8:00 AM [facility]ess [facility] [place]   12/14/2022 10:30 AM Brennan Winters, APRN - CNP N Varsha Montes [facility] AM [place]   1/23/2023  1:30 PM Waleska Melton MD N [facility] AM [place]   ,   General Assessment    Do you have any symptoms that are causing concern?: Yes  Progression since Onset: Gradually Improving  Reported Symptoms: Weakness, Other (Comment: dizziness)     , and Care Coordination Episodes    Type: Amb Care Management  Episode: Complex Care  Noted: 4/26/2022  Comments: List Referral -

## 2022-09-19 ENCOUNTER — TELEPHONE (OUTPATIENT)
Dept: FAMILY MEDICINE CLINIC | Age: 87
End: 2022-09-19

## 2022-09-19 DIAGNOSIS — F41.9 ANXIETY: Primary | ICD-10-CM

## 2022-09-19 RX ORDER — LORAZEPAM 0.5 MG/1
0.5 TABLET ORAL EVERY 8 HOURS PRN
Qty: 3 TABLET | Refills: 0 | Status: SHIPPED | OUTPATIENT
Start: 2022-09-19 | End: 2022-09-20

## 2022-09-22 ENCOUNTER — CARE COORDINATION (OUTPATIENT)
Dept: CARE COORDINATION | Age: 87
End: 2022-09-22

## 2022-09-22 NOTE — CARE COORDINATION
Ambulatory Care Coordination Note  9/22/2022    ACC: Lucy Amador RN    Summary Note: Patient was called for continued Care Coordination follow up and education re: the management of his healthcare needs. Patient shared he has been doing well and he denied any current complaints or concerns. Patient shared dizziness has improved with recent PT and he is hopeful to return to PT in a couple of weeks for continued strengthening. Patient was encouraged to complete home exercises b/w visits and he verbalized understanding. Patient reported BP continues to be stable. Patient reported BP is initially higher in the AM and then improves after he takes his medications. Patient was educated on importance of ongoing BP monitoring and logging and ACM reviewed what patient should report. Patient verbalized understanding. Patient is aware of upcoming CT and carotid doppler but is unsure about use of ativan prior d/t being told he needs to be NPO for 4 hours prior. ACM left message to clarify with radiology and await return call at this time. ACM again reviewed signs/symptoms to report with patient as well as the importance of early symptom recognition and follow up. Patient verbalized understanding. Patient denied any other questions, concerns, or needs and he was encouraged to call with any that may develop.           Actions:   - Reviewed signs/symptoms to report   - Reviewed importance of early symptom recognition and follow up   - Reviewed importance of ongoing BP monitoring and what to report   - Reviewed importance of ongoing home exercises b/w PT visits   - Reviewed upcoming testing appointments   - F/u with radiology re: test instructions   - Monitored for additional needs          Plan of Care:   - Continue with f/u calls for assistance with the management of his healthcare needs  - Educate on signs/symptoms to report - In Process   - Educate on the importance of early symptom recognition - Completed   - Educate on the availability of same day appointments, urgent care/walk in clinic options, and after hours on call resources - Completed   - Review and verify Healthcare Decision Maker information - Completed  - Patient is current with COVID-19 vaccine x 3  - Patient is schedule for Medicare AW - Oct. 2022 - Scheduled   - Monitor for ongoing needs  - If patient remains stable will consider discharge from Care Coordination in the near future      Lab Results       None            Care Coordination Interventions    Referral from Primary Care Provider: No  Suggested Interventions and Community Resources  Fall Risk Prevention: Completed (Comment: May 2022)  Medication Assistance Program: Declined (Comment: Denied any needs at this time. Does receive assistance thru [facility] - April 2022)  Medi Set or Pill Pack: Completed (Comment: April 2022)  Pharmacist: Ynes Abarca (Comment: April 2022)  Physical Therapy: Completed (Comment: OP PT - [facility] [place] - July 2022)  Senior Services: Completed (Comment: Receives 4 meal vouchers to eat out monthly thru [facility] - April 2022)  Social Work: Declined (Comment: Denied any need for additional resource needs - April 2022)  Other Services: Completed (Comment: Current with [address] [place] [address] - April 2022)  Transportation Support: Declined  Other Services or Interventions: Follows with [address] [place] [address] - STR urology and STR cardiology           Goals Addressed                   This Visit's Progress       Care Coordination     Conditions and Symptoms   Improving     I will schedule office visits, as directed by my provider. I will keep my appointment or reschedule if I have to cancel. I will notify my provider of any barriers to my plan of care. I will notify my provider of any symptoms that indicate a worsening of my condition.     Barriers: overwhelmed by complexity of regimen, stress, and lack of education  Plan for overcoming my barriers: Continue to provide education to patient and monitor for resource needs   Confidence: 8/10  Anticipated Goal Completion Date: 8/1/2022                  Prior to Admission medications    Medication Sig Start Date End Date Taking? Authorizing Provider   Psyllium (METAMUCIL PO) Take by mouth daily as needed    Historical Provider, MD   melatonin 5 MG TABS tablet Take 5 mg by mouth nightly as needed    Historical Provider, MD   metoprolol tartrate (LOPRESSOR) 25 MG tablet Take 0.5 tablets by mouth daily 8/29/22   Nataliia Chen MD   HYDROcodone-acetaminophen (NORCO) 5-325 MG per tablet Take 1.5 tablets by mouth at bedtime for 90 days. Takes 1 1/2 tabs daily at bedtime - thru the [facility] - Aug. 2022 8/29/22 11/27/22  Nataliia Chen MD   amLODIPine (NORVASC) 5 MG tablet Take 1 tablet by mouth in the morning and at bedtime 8/29/22   Nataliia Chen MD   apixaban (ELIQUIS) 5 MG TABS tablet Take 1 tablet by mouth 2 times daily 8/29/22   Nataliia Chen MD   Probiotic Product (PROBIOTIC-10 PO) Take by mouth    Historical Provider, MD   nystatin (MYCOSTATIN) 540133 UNIT/GM cream Apply topically 2 times daily. 7/19/22   Harmeet Colorado MD   Handicap Placard MISC by Does not apply route Exp 7/13/2027 7/13/22   Nataliia Chne MD   docusate (COLACE) 50 MG/5ML liquid Take 50 mg by mouth in the morning and 50 mg before bedtime.   Patient not taking: Reported on 8/29/2022    Historical Provider, MD   Polyethylene Glycol 3350 (MIRALAX PO) Take by mouth as needed  Patient not taking: Reported on 8/29/2022    Historical Provider, MD   VITAMIN D PO Take by mouth    Historical Provider, MD   Multiple Vitamins-Minerals (PRESERVISION AREDS 2 PO) Take 1 tablet by mouth 2 times daily    Historical Provider, MD       Future Appointments   Date Time Provider Yonas Boyer   9/27/2022  1:30 PM [facility] RM1 ULTRASOUND STRZ PUT OP STR [place]   9/27/2022  2:00 PM STR [facility] CT IMAGING RM1 STRZ PUT OP STR [place]   10/3/2022  8:00 AM [facility]   12/14/2022 10:30 AM Stepan De Los Santos Bakari, [facility]   1/23/2023  1:30 PM Javier Steel MD N [facility] Heart [facility] [place]   ,   General Assessment    Do you have any symptoms that are causing concern?: Yes  Progression since Onset: Gradually Improving  Reported Symptoms: Other (Comment: dizziness)     , and Care Coordination Episodes    Type: Amb Care Management  Episode: Complex Care  Noted: 4/26/2022  Comments: List Referral -

## 2022-09-22 NOTE — CARE COORDINATION
Received return call from Elmer Jaimes with [facility] Radiology Dept. Elmer Jaimes shared patient should be able to take prn Ativan for upcoming testing with a sip of water as prescribed. Patient was called back and updated. Patient verbalized understanding and denied any other questions, concerns, or needs.

## 2022-09-26 DIAGNOSIS — I10 ESSENTIAL HYPERTENSION: Primary | ICD-10-CM

## 2022-09-27 ENCOUNTER — HOSPITAL ENCOUNTER (OUTPATIENT)
Dept: ULTRASOUND IMAGING | Age: 87
Discharge: HOME | End: 2022-09-27
Payer: MEDICARE

## 2022-09-27 ENCOUNTER — HOSPITAL ENCOUNTER (OUTPATIENT)
Age: 87
Discharge: HOME | End: 2022-09-27
Payer: MEDICARE

## 2022-09-27 ENCOUNTER — HOSPITAL ENCOUNTER (OUTPATIENT)
Dept: CT IMAGING | Age: 87
Discharge: HOME | End: 2022-09-27
Payer: MEDICARE

## 2022-09-27 DIAGNOSIS — I65.22 STENOSIS OF LEFT CAROTID ARTERY: Primary | ICD-10-CM

## 2022-09-27 DIAGNOSIS — R26.81 UNSTEADY GAIT: ICD-10-CM

## 2022-09-27 DIAGNOSIS — R42 DIZZINESS: ICD-10-CM

## 2022-09-27 DIAGNOSIS — I10 ESSENTIAL HYPERTENSION: ICD-10-CM

## 2022-09-27 LAB
CREAT SERPL-MCNC: 1.4 MG/DL (ref 0.6–1.3)
GFR, ESTIMATED: 48 ML/MIN/1.73M2

## 2022-09-27 PROCEDURE — 82565 ASSAY OF CREATININE: CPT

## 2022-09-27 PROCEDURE — 36415 COLL VENOUS BLD VENIPUNCTURE: CPT

## 2022-09-27 PROCEDURE — 93880 EXTRACRANIAL BILAT STUDY: CPT

## 2022-09-27 PROCEDURE — 70470 CT HEAD/BRAIN W/O & W/DYE: CPT

## 2022-09-27 PROCEDURE — 6360000004 HC RX CONTRAST MEDICATION: Performed by: FAMILY MEDICINE

## 2022-09-27 RX ADMIN — IOPAMIDOL 100 ML: 755 INJECTION, SOLUTION INTRAVENOUS at 14:34

## 2022-10-03 ENCOUNTER — OFFICE VISIT (OUTPATIENT)
Dept: FAMILY MEDICINE CLINIC | Age: 87
End: 2022-10-03
Payer: MEDICARE

## 2022-10-03 VITALS
HEART RATE: 64 BPM | OXYGEN SATURATION: 98 % | HEIGHT: 71 IN | DIASTOLIC BLOOD PRESSURE: 70 MMHG | SYSTOLIC BLOOD PRESSURE: 130 MMHG | WEIGHT: 143.6 LBS | RESPIRATION RATE: 16 BRPM | BODY MASS INDEX: 20.1 KG/M2

## 2022-10-03 DIAGNOSIS — I48.0 PAF (PAROXYSMAL ATRIAL FIBRILLATION) (HCC): ICD-10-CM

## 2022-10-03 DIAGNOSIS — K59.01 SLOW TRANSIT CONSTIPATION: ICD-10-CM

## 2022-10-03 DIAGNOSIS — R42 DIZZINESS: ICD-10-CM

## 2022-10-03 DIAGNOSIS — G47.01 INSOMNIA DUE TO MEDICAL CONDITION: Primary | ICD-10-CM

## 2022-10-03 PROCEDURE — 99214 OFFICE O/P EST MOD 30 MIN: CPT | Performed by: FAMILY MEDICINE

## 2022-10-03 PROCEDURE — G8420 CALC BMI NORM PARAMETERS: HCPCS | Performed by: FAMILY MEDICINE

## 2022-10-03 PROCEDURE — 1123F ACP DISCUSS/DSCN MKR DOCD: CPT | Performed by: FAMILY MEDICINE

## 2022-10-03 PROCEDURE — 1036F TOBACCO NON-USER: CPT | Performed by: FAMILY MEDICINE

## 2022-10-03 PROCEDURE — G8484 FLU IMMUNIZE NO ADMIN: HCPCS | Performed by: FAMILY MEDICINE

## 2022-10-03 PROCEDURE — G8427 DOCREV CUR MEDS BY ELIG CLIN: HCPCS | Performed by: FAMILY MEDICINE

## 2022-10-03 RX ORDER — TRAZODONE HYDROCHLORIDE 50 MG/1
50 TABLET ORAL NIGHTLY PRN
Qty: 90 TABLET | Refills: 1 | Status: SHIPPED | OUTPATIENT
Start: 2022-10-03 | End: 2022-10-19

## 2022-10-03 ASSESSMENT — ENCOUNTER SYMPTOMS
SHORTNESS OF BREATH: 0
NAUSEA: 1
ABDOMINAL PAIN: 0
RHINORRHEA: 0
COUGH: 0
WHEEZING: 0
BACK PAIN: 1
SORE THROAT: 0
CONSTIPATION: 1
DIARRHEA: 0

## 2022-10-03 NOTE — PROGRESS NOTES
[facility] [facility]  [address] [place] [address]  Dept: [phone]  Loc: [address] [facility] [place] (:  1934) is a 80 y.o. male, here for evaluation of the following chief complaint(s):  Follow-up (1 month f/u dizziness med change, dizziness still present in the morning, no change since last visit)      ASSESSMENT/PLAN:  1. Insomnia due to medical condition  -     traZODone (DESYREL) 50 MG tablet; Take 1 tablet by mouth nightly as needed for Sleep, Disp-90 tablet, R-1Normal  2. PAF (paroxysmal atrial fibrillation) (HCC)  -     metoprolol tartrate (LOPRESSOR) 25 MG tablet; Take 0.5 tablets by mouth every morning, Disp-45 tablet, R-3Adjust Sig  3. Dizziness  4. Slow transit constipation    Start trazodone for insomnia. Cut back metoprolol to half tablet just once a day to see if this helps with dizziness. Take in morning only. CTA of the neck scheduled to evaluate carotids further. Continue Colace and Metamucil as needed for constipation. Return in about 2 months (around 12/3/2022) for follow up. SUBJECTIVE/OBJECTIVE:  HPI  Patient presents for follow-up of dizziness. He states that he still feeling dizzy to the point that he is not even driving. He started cutting his metoprolol into quarters and trying to take a quarter of a tablet twice a day but this has been difficult for him. He still feels dizzy in the morning but states as the day goes on he feels a little better. If he wakes up in the middle the night to go to the bathroom he also feels dizzy. He did have carotid ultrasound done which showed likely stenosis on the left greater than 70%. He is scheduled for CTA. He also states he is not sleeping well which she wonders if this is part of the problem. He is tried melatonin but this has not helped much.   He wakes up in the middle the night with bloating and gassiness but states he has been taking Metamucil 4 times daily as well as Colace to help with his bowels which are chronically constipated secondary to his pain medications. Poor sleep makes him feel worse. Review of Systems   Constitutional:  Positive for activity change, fatigue and unexpected weight change. Negative for chills and fever. HENT:  Negative for congestion, rhinorrhea and sore throat. Respiratory:  Negative for cough, shortness of breath and wheezing. Cardiovascular:  Negative for chest pain and palpitations. Gastrointestinal:  Positive for constipation and nausea. Negative for abdominal pain and diarrhea. Genitourinary:  Negative for dysuria and hematuria. Musculoskeletal:  Positive for arthralgias and back pain. Negative for myalgias. Neurological:  Positive for dizziness. Negative for headaches. Psychiatric/Behavioral:  Positive for sleep disturbance. The patient is not nervous/anxious. Physical Exam  Vitals and nursing note reviewed. Constitutional:       Appearance: He is well-developed. HENT:      Head: Normocephalic and atraumatic. Eyes:      General: No scleral icterus. Right eye: No discharge. Left eye: No discharge. Conjunctiva/sclera: Conjunctivae normal.   Cardiovascular:      Rate and Rhythm: Normal rate. Rhythm irregular. Heart sounds: Normal heart sounds. Pulmonary:      Effort: Pulmonary effort is normal.      Breath sounds: Normal breath sounds. No wheezing. Skin:     General: Skin is warm and dry. Neurological:      Mental Status: He is alert and oriented to person, place, and time. Mental status is at baseline. Psychiatric:         Behavior: Behavior normal.         Thought Content: Thought content normal.         Judgment: Judgment normal.       Vitals:    10/03/22 0757   BP: 130/70   Pulse: 64   Resp: 16   SpO2: 98%              An electronic signature was used to authenticate this note.     --Mirta Jacobson MD

## 2022-10-04 ENCOUNTER — CARE COORDINATION (OUTPATIENT)
Dept: CARE COORDINATION | Age: 87
End: 2022-10-04

## 2022-10-04 NOTE — CARE COORDINATION
Ambulatory Care Coordination Note  10/4/2022    ACC: Brooke Rodriguez RN    Patient was called for continued Care Coordination follow up and education re: the management of his healthcare needs. Patient shared he is doing well and he was able to f/u with PCP yesterday as scheduled. Patient denied any questions re: his visit and medication changes were reviewed. Patient verbalized understanding. Patient stated BP has remained stable. ACM reviewed importance of continued BP monitoring, especially with recent medication changes, and ACM educated patient on signs/symptoms to report and importance of early symptom recognition and follow up. Patient verbalized understanding. Patient shared dizziness continues to improve and safety information was briefly reviewed. Patient is aware of upcoming CTA and he denied any questions re: the test and he reported he has already arranged transportation. Patient denied any other questions, concerns, or needs and he was encouraged to call with any that may develop. Patient has remained stable and he is aware of the resources available to him so will plan to discharge from Care Coordination at this time. Patient verbalized understanding and agreement. Actions:   - Reviewed signs/symptoms to report   - Reviewed importance of early symptom recognition and follow up   - Reviewed importance of ongoing BP monitoring and follow up   - Monitored for questions re: recent PCP visit   - Reviewed recent medication changes   - Monitored for transportation needs re: upcoming CTA  - Monitored for additional needs          Plan of Care:   - Discharge from [facility] patient enrollment in the Remote Patient Monitoring (RPM) program for in-home monitoring: NA.     Lab Results       None            Care Coordination Interventions    Referral from Primary Care Provider: No  Suggested Interventions and Community Resources  Fall Risk Prevention: Completed (Comment: May 2022)  Medication Assistance Program: Declined (Comment: Denied any needs at this time. Does receive assistance thru [facility] - April 2022)  Medi Set or Pill Pack: Completed (Comment: April 2022)  Pharmacist: Willa Palencia (Comment: April 2022)  Physical Therapy: Completed (Comment: OP PT - [facility] [place] - July 2022)  Senior Services: Completed (Comment: Receives 4 meal vouchers to eat out monthly thru [employer] - April 2022)  Social Work: Declined (Comment: Denied any need for additional resource needs - April 2022)  Other Services: Completed (Comment: Current with [id number] [place] [address] - April 2022)  Transportation Support: Declined  Other Services or Interventions: Follows with [address] [place] [address] - STR urology and STR cardiology           Goals Addressed                   This Visit's Progress       Care Coordination     Conditions and Symptoms   On track     I will schedule office visits, as directed by my provider. I will keep my appointment or reschedule if I have to cancel. I will notify my provider of any barriers to my plan of care. I will notify my provider of any symptoms that indicate a worsening of my condition. Barriers: overwhelmed by complexity of regimen, stress, and lack of education  Plan for overcoming my barriers: Continue to provide education to patient and monitor for resource needs   Confidence: 8/10  Anticipated Goal Completion Date: 8/1/2022                  Prior to Admission medications    Medication Sig Start Date End Date Taking?  Authorizing Provider   metoprolol tartrate (LOPRESSOR) 25 MG tablet Take 0.5 tablets by mouth every morning 10/3/22   Jean-Pierre Sal MD   traZODone (DESYREL) 50 MG tablet Take 1 tablet by mouth nightly as needed for Sleep 10/3/22   Jean-Pierre Sal MD   Psyllium (METAMUCIL PO) Take by mouth daily as needed    Historical Provider, MD   melatonin 5 MG TABS tablet Take 5 mg by mouth nightly as needed    Historical Provider, MD   HYDROcodone-acetaminophen (NORCO) 5-325 MG per tablet Take 1.5 tablets by mouth at bedtime for 90 days. Takes 1 1/2 tabs daily at bedtime - thru the [facility] - Aug. 2022 8/29/22 11/27/22  Jean-Pierre Sal MD   amLODIPine (NORVASC) 5 MG tablet Take 1 tablet by mouth in the morning and at bedtime 8/29/22   Jean-Pierre Sal MD   apixaban (ELIQUIS) 5 MG TABS tablet Take 1 tablet by mouth 2 times daily 8/29/22   Jean-Pierre Sal MD   Probiotic Product (PROBIOTIC-10 PO) Take by mouth    Historical Provider, MD   nystatin (MYCOSTATIN) 006548 UNIT/GM cream Apply topically 2 times daily.   Patient not taking: Reported on 10/3/2022 7/19/22   Raimundo Monsalve MD   Handicap [facility] [address] [facility] by Does not apply route Exp 7/13/2027 7/13/22   Jean-Pierre Sal MD   Polyethylene Glycol 3350 (MIRALAX PO) Take by mouth as needed  Patient not taking: No sig reported    Historical Provider, MD   VITAMIN D PO Take by mouth    Historical Provider, MD   Multiple Vitamins-Minerals (PRESERVISION AREDS 2 PO) Take 1 tablet by mouth 2 times daily    Historical Provider, MD       Future Appointments   Date Time Provider Yonas Rosalind   10[facility]11[facility]20[facility] 10[facility] STR [facility] R   12/5/2022  1:30 PM Jean-Pierre Sal MD Fam Med CG P - [place]   12/14/2022 10:30 AM SONIDO Thorne - TOBY N Chintan Bruce P - [place]   1/23/2023  1:30 PM Arben Sanabria MD N [facility] P - [place]   ,   General Assessment    Do you have any symptoms that are causing concern?: Yes  Progression since Onset: Gradually Improving  Reported Symptoms: Other, Fatigue (Comment: dizziness)     , and Care Coordination Episodes    Type: Amb Care Management  Episode: Complex Care  Noted: 4/26/2022  Comments: List Referral -

## 2022-10-11 ENCOUNTER — HOSPITAL ENCOUNTER (OUTPATIENT)
Dept: CT IMAGING | Age: 87
Discharge: HOME | End: 2022-10-11
Payer: MEDICARE

## 2022-10-11 DIAGNOSIS — I65.22 STENOSIS OF LEFT CAROTID ARTERY: ICD-10-CM

## 2022-10-11 LAB
CREATININE, WHOLE BLOOD: 1.2 MG/DL (ref 0.5–1.2)
ESTIMATED GFR, PCACC: 57 ML/MIN/1.73M2

## 2022-10-11 PROCEDURE — 82565 ASSAY OF CREATININE: CPT

## 2022-10-11 PROCEDURE — 6360000004 HC RX CONTRAST MEDICATION: Performed by: FAMILY MEDICINE

## 2022-10-11 PROCEDURE — 70498 CT ANGIOGRAPHY NECK: CPT

## 2022-10-11 RX ADMIN — IOPAMIDOL 100 ML: 755 INJECTION, SOLUTION INTRAVENOUS at 10:54

## 2022-10-12 DIAGNOSIS — I65.22 STENOSIS OF LEFT CAROTID ARTERY: Primary | ICD-10-CM

## 2022-10-13 ENCOUNTER — CARE COORDINATION (OUTPATIENT)
Dept: CARE COORDINATION | Age: 87
End: 2022-10-13

## 2022-10-13 DIAGNOSIS — E04.1 THYROID NODULE: Primary | ICD-10-CM

## 2022-10-13 NOTE — CARE COORDINATION
Received call from patient re: recent CTA results as patient reported he has not yet heard anything from the office. CTA result notes reviewed with patient. Patient verbalized understanding. Patient educated that PCP or [facility] office will be reaching out to schedule an appointment as he has no specific provider in mind. Patient shared he is agreeable to Thyroid US as discussed, but prefers it be completed at [facility] if possible. Will update PCP and staff to assist.  Patient denied any other questions, concerns, or needs and he was encouraged to call with any that may develop.

## 2022-10-14 NOTE — TELEPHONE ENCOUNTER
Message had been left by another staff member and message was left to call office back regarding results. Pt has been scheduled for US on 10/20/22 @ 130. Pt is to arrive at [facility] at 1:15 for registration. Was able to reach pt, went over scheduling instructions, verbalized understanding with no concerns voice.

## 2022-10-17 DIAGNOSIS — M54.50 CHRONIC MIDLINE LOW BACK PAIN WITHOUT SCIATICA: ICD-10-CM

## 2022-10-17 DIAGNOSIS — G89.29 CHRONIC MIDLINE LOW BACK PAIN WITHOUT SCIATICA: ICD-10-CM

## 2022-10-17 RX ORDER — HYDROCODONE BITARTRATE AND ACETAMINOPHEN 5; 325 MG/1; MG/1
2 TABLET ORAL NIGHTLY
Qty: 60 TABLET | Refills: 0 | Status: SHIPPED | OUTPATIENT
Start: 2022-10-17 | End: 2022-11-16

## 2022-10-17 NOTE — TELEPHONE ENCOUNTER
[facility] called. [facility] day [facility] of his [place] and won't send him a new one without a new script for another 30 day supply. RX pended. He also stated he take 2 at night sometimes as the [facility] has told him in the past he can if he needs to. Refill pended to print.

## 2022-10-19 ENCOUNTER — TELEPHONE (OUTPATIENT)
Dept: FAMILY MEDICINE CLINIC | Age: 87
End: 2022-10-19

## 2022-10-20 ENCOUNTER — HOSPITAL ENCOUNTER (OUTPATIENT)
Dept: ULTRASOUND IMAGING | Age: 87
Discharge: HOME | End: 2022-10-20
Payer: MEDICARE

## 2022-10-20 DIAGNOSIS — E04.1 THYROID NODULE: ICD-10-CM

## 2022-10-20 DIAGNOSIS — E04.1 THYROID NODULE: Primary | ICD-10-CM

## 2022-10-20 PROCEDURE — 76536 US EXAM OF HEAD AND NECK: CPT

## 2022-10-25 ENCOUNTER — NURSE ONLY (OUTPATIENT)
Dept: FAMILY MEDICINE CLINIC | Age: 87
End: 2022-10-25
Payer: MEDICARE

## 2022-10-25 DIAGNOSIS — E04.1 THYROID NODULE: ICD-10-CM

## 2022-10-25 PROCEDURE — 36415 COLL VENOUS BLD VENIPUNCTURE: CPT | Performed by: NURSE PRACTITIONER

## 2022-10-26 LAB
THYROGLOBULIN AB: 3.1 IU/ML (ref 0–4)
THYROID PEROXIDASE ANTIBODY: 1.8 IU/ML (ref 0–9)

## 2022-10-31 ENCOUNTER — TELEPHONE (OUTPATIENT)
Dept: CARDIOLOGY CLINIC | Age: 87
End: 2022-10-31

## 2022-10-31 ENCOUNTER — OFFICE VISIT (OUTPATIENT)
Dept: CARDIOTHORACIC SURGERY | Age: 87
End: 2022-10-31
Payer: MEDICARE

## 2022-10-31 VITALS
BODY MASS INDEX: 20.99 KG/M2 | HEART RATE: 63 BPM | SYSTOLIC BLOOD PRESSURE: 121 MMHG | HEIGHT: 70 IN | WEIGHT: 146.6 LBS | DIASTOLIC BLOOD PRESSURE: 70 MMHG

## 2022-10-31 DIAGNOSIS — I65.21 OCCLUSION OF RIGHT CAROTID ARTERY: Primary | ICD-10-CM

## 2022-10-31 PROCEDURE — G8420 CALC BMI NORM PARAMETERS: HCPCS | Performed by: THORACIC SURGERY (CARDIOTHORACIC VASCULAR SURGERY)

## 2022-10-31 PROCEDURE — 1036F TOBACCO NON-USER: CPT | Performed by: THORACIC SURGERY (CARDIOTHORACIC VASCULAR SURGERY)

## 2022-10-31 PROCEDURE — 1123F ACP DISCUSS/DSCN MKR DOCD: CPT | Performed by: THORACIC SURGERY (CARDIOTHORACIC VASCULAR SURGERY)

## 2022-10-31 PROCEDURE — 99204 OFFICE O/P NEW MOD 45 MIN: CPT | Performed by: THORACIC SURGERY (CARDIOTHORACIC VASCULAR SURGERY)

## 2022-10-31 PROCEDURE — G8427 DOCREV CUR MEDS BY ELIG CLIN: HCPCS | Performed by: THORACIC SURGERY (CARDIOTHORACIC VASCULAR SURGERY)

## 2022-10-31 PROCEDURE — G8484 FLU IMMUNIZE NO ADMIN: HCPCS | Performed by: THORACIC SURGERY (CARDIOTHORACIC VASCULAR SURGERY)

## 2022-10-31 NOTE — TELEPHONE ENCOUNTER
Patient needs cardiac clearance for Dr. Gabriela Ly to perform a Left endarterectomy, date to be determined. Patient last seen by Dr. Joo Porter in 6 month follow up 7/18/22. Last echo 7/19/21, results as follows:    Summary   Normal left ventricle size and systolic function. Ejection fraction was   estimated at 60 %. There were no regional left ventricular wall motion   abnormalities and wall thickness was within normal limits. Doppler parameters were consistent with abnormal left ventricular   relaxation (grade 1 diastolic dysfunction). The left atrium is Mildly dilated. Dilation of the aortic root with size of 4.1 cm. Next appt 1/23/23 w/Dr. Joo Porter. Can patient be cleared, or will he need office visit for clearance? Please advise - thanks!

## 2022-11-01 ASSESSMENT — ENCOUNTER SYMPTOMS
NAUSEA: 0
RESPIRATORY NEGATIVE: 1
VOMITING: 0

## 2022-11-01 NOTE — PROGRESS NOTES
[facility] SURGERY  [address]  Dept: [phone]  Dept Fax: [phone]: [phone]    Visit Date: 10/31/2022    Mr. Barroso is a 80 y.o.male  who presented for:  Chief Complaint   Patient presents with    New Patient     NP referral from Dr. Tara Garcia, carotid issues       HPI:   HPI   80year old male with PMHx of paroxysmal atrial fibrillation on Eliquis, dizziness, scoliosis, chronic back pain and HTN who presents to clinic for evaluation of left sided critical carotid stenosis. He is accompanied by his son, who is a [employer] . He is a good historian. He states that he has been having chronic severe dizziness that is worse in the mornings when he arises, and better after 1-2 hours, and certainly by noon. He has no nausea/vomiting, syncope, history of TIA's or CVA's. He does have some chronic back pain and due to his back issues and unsteadiness, he is not walking well. Carotid Duplex:  Left ICA with >70% stenosis; left ICA velocities >300/92.4 cm/sec  Verterbrals antegrade and patent    CT angiogram of the neck:  ~90% left proximal ICA  50% left bulbar stenosis  Moderate stenosis at the takeoffs of the bilateral vertebral arteries. 4.1 cm ascending aortic aneurysm    Current Outpatient Medications:     HYDROcodone-acetaminophen (NORCO) 5-325 MG per tablet, Take 2 tablets by mouth at bedtime for 30 days. , Disp: 60 tablet, Rfl: 0    metoprolol tartrate (LOPRESSOR) 25 MG tablet, Take 0.5 tablets by mouth every morning, Disp: 45 tablet, Rfl: 3    Psyllium (METAMUCIL PO), Take by mouth daily as needed, Disp: , Rfl:     melatonin 5 MG TABS tablet, Take 5 mg by mouth nightly as needed, Disp: , Rfl:     amLODIPine (NORVASC) 5 MG tablet, Take 1 tablet by mouth in the morning and at bedtime, Disp: 180 tablet, Rfl: 1    apixaban (ELIQUIS) 5 MG TABS tablet, Take 1 tablet by mouth 2 times daily, Disp: 180 tablet, Rfl: 2    Probiotic Product (PROBIOTIC-10 PO), Take by mouth, Disp: , Rfl:     nystatin (MYCOSTATIN) 506390 UNIT/GM cream, Apply topically 2 times daily. , Disp: 30 g, Rfl: 1    Handicap Placard MISC, by Does not apply route Exp 7/13/2027, Disp: 1 each, Rfl: 0    Polyethylene Glycol 3350 (MIRALAX PO), Take by mouth as needed, Disp: , Rfl:     VITAMIN D PO, Take by mouth, Disp: , Rfl:     Multiple Vitamins-Minerals (PRESERVISION AREDS 2 PO), Take 1 tablet by mouth 2 times daily, Disp: , Rfl:     Allergies   Allergen Reactions    Nitrofuran Derivatives Nausea Only     Severe stomach cramps    Nsaids Nausea Only and Other (See Comments)     Pain in stomach       Past Medical History  Travis Frankel  has a past medical history of Atrial fibrillation ([address] Back pain, Hernia, Hx of blood clots, Hyperlipidemia, Hypertension, Osteoarthritis, Prostate cancer ([address] Prostate cancer ([address] Pulmonary emboli ([address] and Wears glasses. Social History  Travis Frankel  reports that he quit smoking about 57 years ago. His smoking use included cigarettes. He has a 10.00 pack-year smoking history. He has never used smokeless tobacco. He reports current alcohol use of about 1.0 standard drink per week. He reports that he does not use drugs. Family History  Travis Frankel family history includes Cancer in his brother, brother, and sister; Heart Disease in his father; Prostate Cancer in his brother, brother, brother, and brother; Stroke in his mother. There is no family history of bicuspid aortic valve, aneurysms, heart transplant, pacemakers, defibrillators, or sudden cardiac death.       Past Surgical History   Past Surgical History:   Procedure Laterality Date    APPENDECTOMY  01/1961    [facility]    BACK SURGERY  [id number]    X stop    CARPAL TUNNEL RELEASE Right     x2 on right    CATARACT REMOVAL Right 06/2001    Dr Petar Stevens Left 11/2014 Dr Ashley Jackman, 2009    Dr Briana Lawson  [address]? Dr Shayla Palma of date    HERNIA REPAIR Left 11/22/2017    ROBOT RECURRENT LEFT INGUINAL HERNIA REPAIR performed by Sandra Bolanos MD at [address] BILATERAL Bilateral 9/5/2017    LUMBAR FACET MBB L3-4, L4-5, L5-S1 BILATERAL performed by Chantal Scheuermann, MD at [address] Left 04/09/2018    HIP INJECTION     OTHER SURGICAL HISTORY  10/7/2015    XI ROBOTIC PROSTATECTOMY    OTHER SURGICAL HISTORY Bilateral 09/05/2017    lumbar facet block L3-S1    OTHER SURGICAL HISTORY Right 1980's    nerve release right lower arm    OTHER SURGICAL HISTORY  01/30/2018    Sacroiliac joint MBB    OTHER SURGICAL HISTORY Left 02/27/2018    Sacroiliac joint medial branch block     IL IMPLANT NEUROSTIM/ N/A 9/17/2018    THORACIC LAMINECTOMY PERMANENT SPINAL CORD STIMULATOR PADDLE PLUS BATTERY PLACEMENT performed by Emmanuel Martinez MD at [address] ARTHRGRPHY&/ANES/STEROID W/IMAGE Left 1/30/2018    LEFT SI MBB performed by Chantal Scheuermann, MD at [address] SI JOINT ARTHRGRPHY&/ANES/STEROID W/IMAGE Left 2/27/2018    LEFT SI MBB #2 performed by Chantal Scheuermann, MD at [address] OFFICE/OUTPT VISIT,PROCEDURE ONLY Left 4/9/2018    LEFT HIP STEROID INJECTION WITH SEDATION performed by Chantal Scheuermann, MD at [address] OFFICE/OUTPT [facility] N/A 8/21/2018    SPINAL CORD STIMULATOR IMPLANT TRIAL performed by Chantal Scheuermann, MD at [facility]  7-    Dr Geeta Cortes BIOPSY  8/21/2015    transrectal ultrasound with biopsy(+)    PROSTATE SURGERY  2003    TURP    PROSTATE SURGERY  2009    Biopsy -Hyperplasia    SHOULDER ARTHROPLASTY  2005    right       Subjective:     Review of Systems   Constitutional: Negative.     HENT: Negative. Respiratory: Negative. Gastrointestinal:  Negative for nausea and vomiting. Musculoskeletal: Negative. Skin: Negative. Neurological: Negative. Psychiatric/Behavioral: Negative. Objective:     /70   Pulse 63   Ht 5' 10\" (1.778 m)   Wt 146 lb 9.6 oz (66.5 kg)   BMI 21.03 kg/m²     Wt Readings from Last 3 Encounters:   10/31/22 146 lb 9.6 oz (66.5 kg)   10/03/22 143 lb 9.6 oz (65.1 kg)   08/24/22 146 lb (66.2 kg)     BP Readings from Last 3 Encounters:   10/31/22 121/70   10/03/22 130/70   08/24/22 110/62       Physical Exam  Constitutional:       Appearance: Normal appearance. He is normal weight. HENT:      Head: Normocephalic and atraumatic. Neck:      Vascular: No carotid bruit. Cardiovascular:      Rate and Rhythm: Rhythm irregular. Pulses: Normal pulses. Pulmonary:      Effort: Pulmonary effort is normal.      Breath sounds: Normal breath sounds. Abdominal:      General: Abdomen is flat. Bowel sounds are normal.      Palpations: Abdomen is soft. Musculoskeletal:         General: Normal range of motion. Cervical back: No tenderness. Lymphadenopathy:      Cervical: No cervical adenopathy. Skin:     General: Skin is warm and dry. Neurological:      Mental Status: He is alert and oriented to person, place, and time. Mental status is at baseline.    Psychiatric:         Behavior: Behavior normal.         Lab Results   Component Value Date/Time    WBC 10.0 07/19/2022 02:42 PM    RBC 4.50 07/19/2022 02:42 PM     08/26/2011 01:55 PM    HGB 14.5 07/19/2022 02:42 PM    HCT 43.8 07/19/2022 02:42 PM    MCV 97.2 07/19/2022 02:42 PM    MCH 32.1 07/19/2022 02:42 PM    MCHC 33.0 07/19/2022 02:42 PM    RDW 13.2 07/19/2022 02:42 PM     07/19/2022 02:42 PM    MPV 9.1 07/19/2022 02:42 PM       Lab Results   Component Value Date/Time     07/19/2022 02:42 PM    K 4.0 07/19/2022 02:42 PM     07/19/2022 02:42 PM    CO2 25 07/19/2022 02:42 PM    BUN 32 07/19/2022 02:42 PM    LABALBU 3.8 07/19/2022 02:42 PM    CREATININE 1.2 10/11/2022 09:55 AM    CREATININE 1.4 09/27/2022 01:28 PM    CALCIUM 9.2 05/14/2022 12:00 AM    LABGLOM 41 05/14/2022 12:00 AM    LABGLOM 63 02/08/2021 08:14 AM    GLUCOSE 124 07/19/2022 02:42 PM    GLUCOSE 89 05/14/2016 06:30 AM       Lab Results   Component Value Date/Time    ALKPHOS 55 07/19/2022 02:42 PM    ALT 27 07/19/2022 02:42 PM    AST 22 07/19/2022 02:42 PM    PROT 7.0 07/19/2022 02:42 PM    BILITOT 0.7 07/19/2022 02:42 PM    BILIDIR <0.2 09/21/2018 09:19 AM    LABALBU 3.8 07/19/2022 02:42 PM       Lab Results   Component Value Date/Time    MG 2.0 07/19/2022 02:42 PM       Lab Results   Component Value Date    INR 1.30 (H) 09/12/2018    INR 1.07 06/30/2018    INR 1.06 10/23/2015         No results found for: LABA1C    Lab Results   Component Value Date/Time    TRIG 55 05/14/2022 12:00 AM    HDL 67 05/14/2022 12:00 AM    LDLCALC 113 05/14/2022 12:00 AM       Lab Results   Component Value Date/Time    TSH 3.380 07/24/2018 10:55 AM         Testing Reviewed:        Assessment/Plan     Critical, asymptomatic left ICA stenosis, nearly 90%    Plan:  I had a long discussion about the carotid anatomy, findings on CT angiogram and duplex, the indications for carotid endarterectomy, and then a thorough discussion of all the risks/benefits/alternatives. Also discussed feability and appropriateness of medical management vs. CEA vs. SALUD. After this discussion, the patient wishes to proceed with Left carotid duplex. Obtain cardiac clearance. I had a discussion in the office with the patient about his diagnosis, the expected procedure involved, and all risks/benefits/alternatives and the possible complications. After this discussion, the patient understood all risks and wished to proceed. Return in about 3 weeks (around 11/21/2022) for Left carotid endarterectomy.       Electronically signed by Lisette Montano Laura Avitia MD   11/1/2022 at 12:57 AM EDT

## 2022-11-02 ENCOUNTER — TELEPHONE (OUTPATIENT)
Dept: FAMILY MEDICINE CLINIC | Age: 87
End: 2022-11-02

## 2022-11-02 NOTE — TELEPHONE ENCOUNTER
Patient lmom asking if he is cleared for surgery or if there is anything else he needs to do before surgery?

## 2022-11-02 NOTE — TELEPHONE ENCOUNTER
PA forms for percocet and morphine completed and faxed to [facility] at [phone] with confirmation. Marked urgent for 24 hour determination. Pt called in stating she had started the metamucil, 1 tab daily. Pt states that he has been having loose stools/diarrhea for over a week. He stated he stopped the metamucil 8 days ago and is still having diarrhea about 4x a day with a little cramping. Pt states he isn't sure what to do.

## 2022-11-02 NOTE — TELEPHONE ENCOUNTER
It is probably related to too much metamucil which now has to get out of his system. I just want him to start a bland diet and push fluids. Add a probiotic. I dont want to give him any meds for diarrhea, in fear it would constipate him again. Im hoping it will resolve within a week or so. If not, let me know.  thanks

## 2022-11-04 NOTE — TELEPHONE ENCOUNTER
LMOM for patient to call office back on Monday to discuss scheduling surgery as office phones are currently closed.   Want to schedule for 11/21 at 1130AM.

## 2022-11-07 ENCOUNTER — PREP FOR PROCEDURE (OUTPATIENT)
Dept: CARDIOTHORACIC SURGERY | Age: 87
End: 2022-11-07

## 2022-11-07 ENCOUNTER — TELEPHONE (OUTPATIENT)
Dept: CARDIOTHORACIC SURGERY | Age: 87
End: 2022-11-07

## 2022-11-07 DIAGNOSIS — Z01.818 PRE-OP TESTING: Primary | ICD-10-CM

## 2022-11-07 RX ORDER — SODIUM CHLORIDE 0.9 % (FLUSH) 0.9 %
5-40 SYRINGE (ML) INJECTION PRN
Status: CANCELLED | OUTPATIENT
Start: 2022-11-07

## 2022-11-07 RX ORDER — SODIUM CHLORIDE 0.9 % (FLUSH) 0.9 %
5-40 SYRINGE (ML) INJECTION EVERY 12 HOURS SCHEDULED
Status: CANCELLED | OUTPATIENT
Start: 2022-11-07

## 2022-11-07 RX ORDER — SODIUM CHLORIDE 9 MG/ML
INJECTION, SOLUTION INTRAVENOUS PRN
Status: CANCELLED | OUTPATIENT
Start: 2022-11-07

## 2022-11-07 NOTE — TELEPHONE ENCOUNTER
Lio Momin is scheduled for LEFT CAROTID ENDARTERECTOMY with DR Ryanne Cam on 11/21/22 at 1130AM. He agreed to date/time. Surgery instructions are as follows:  - Arrive to [facility] at 930AM  - NPO after midnight the night before surgery  - Take METOPROLOL the morning of surgery  - STOP taking ELIQUIS 4 days prior to surgery (11/17)  - Must have a  the day of surgery  - PAT visit scheduled for 11/16 at 1230PM    All instructions mailed to patient's home. He denied questions or concerns at this time. He voiced understanding. Primary insurance is Medicare. No prior authorization is necessary.

## 2022-11-09 NOTE — PROGRESS NOTES
PAT appointment reminder call  Arrival time and location given 11/16 at 12:30 in PAT  Bring drivers license and insurance  Bring list of medications with dosage and frequency  If possible bring caregiver for appointment  Appointment may last 2 hours

## 2022-11-16 ENCOUNTER — HOSPITAL ENCOUNTER (OUTPATIENT)
Dept: PREADMISSION TESTING | Age: 87
Discharge: HOME | End: 2022-11-20
Payer: MEDICARE

## 2022-11-16 ENCOUNTER — HOSPITAL ENCOUNTER (OUTPATIENT)
Dept: GENERAL RADIOLOGY | Age: 87
Discharge: HOME | End: 2022-11-16
Payer: MEDICARE

## 2022-11-16 VITALS
RESPIRATION RATE: 16 BRPM | TEMPERATURE: 97.4 F | HEART RATE: 78 BPM | SYSTOLIC BLOOD PRESSURE: 135 MMHG | BODY MASS INDEX: 21.22 KG/M2 | DIASTOLIC BLOOD PRESSURE: 68 MMHG | HEIGHT: 68 IN | WEIGHT: 139.99 LBS | OXYGEN SATURATION: 94 %

## 2022-11-16 DIAGNOSIS — Z01.818 PRE-OP TESTING: ICD-10-CM

## 2022-11-16 LAB
ABO CHECK: NORMAL
ANION GAP SERPL CALCULATED.3IONS-SCNC: 12 MEQ/L (ref 8–16)
AVERAGE GLUCOSE: 114 MG/DL (ref 70–126)
BACTERIA: ABNORMAL
BILIRUBIN URINE: NEGATIVE
BLOOD, URINE: NEGATIVE
BUN BLDV-MCNC: 30 MG/DL (ref 7–22)
CALCIUM SERPL-MCNC: 9.3 MG/DL (ref 8.5–10.5)
CASTS: ABNORMAL /LPF
CASTS: ABNORMAL /LPF
CHARACTER, URINE: CLEAR
CHLORIDE BLD-SCNC: 105 MEQ/L (ref 98–111)
CO2: 23 MEQ/L (ref 23–33)
COLOR: YELLOW
CREAT SERPL-MCNC: 1.4 MG/DL (ref 0.4–1.2)
CRYSTALS: ABNORMAL
EKG Q-T INTERVAL: 398 MS
EKG QRS DURATION: 90 MS
EKG QTC CALCULATION (BAZETT): 435 MS
EKG R AXIS: -27 DEGREES
EKG T AXIS: 51 DEGREES
EKG VENTRICULAR RATE: 72 BPM
EPITHELIAL CELLS, UA: ABNORMAL /HPF
ERYTHROCYTE [DISTWIDTH] IN BLOOD BY AUTOMATED COUNT: 13.2 % (ref 11.5–14.5)
ERYTHROCYTE [DISTWIDTH] IN BLOOD BY AUTOMATED COUNT: 47 FL (ref 35–45)
GFR SERPL CREATININE-BSD FRML MDRD: 48 ML/MIN/1.73M2
GLUCOSE BLD-MCNC: 94 MG/DL (ref 70–108)
GLUCOSE, URINE: NEGATIVE MG/DL
HBA1C MFR BLD: 5.8 % (ref 4.4–6.4)
HCT VFR BLD CALC: 44.4 % (ref 42–52)
HEMOGLOBIN: 14.7 GM/DL (ref 14–18)
INDIRECT COOMBS: NORMAL
INR BLD: 1.54 (ref 0.85–1.13)
KETONES, URINE: ABNORMAL
LEUKOCYTE EST, POC: NEGATIVE
MCH RBC QN AUTO: 32.2 PG (ref 26–33)
MCHC RBC AUTO-ENTMCNC: 33.1 GM/DL (ref 32.2–35.5)
MCV RBC AUTO: 97.2 FL (ref 80–94)
MISCELLANEOUS LAB TEST RESULT: ABNORMAL
MRSA NASAL SCREEN RT-PCR: NEGATIVE
NITRITE, URINE: NEGATIVE
PH UA: 5.5 (ref 5–9)
PLATELET # BLD: 176 THOU/MM3 (ref 130–400)
PMV BLD AUTO: 11.4 FL (ref 9.4–12.4)
POTASSIUM SERPL-SCNC: 4.1 MEQ/L (ref 3.5–5.2)
PROTEIN UA: 30 MG/DL
RBC # BLD: 4.57 MILL/MM3 (ref 4.7–6.1)
RBC URINE: ABNORMAL /HPF
RENAL EPITHELIAL, UA: ABNORMAL
RH FACTOR: NORMAL
SODIUM BLD-SCNC: 140 MEQ/L (ref 135–145)
SPECIFIC GRAVITY UA: 1.02 (ref 1–1.03)
STAPH AUREUS SCREEN RT-PCR: POSITIVE
UROBILINOGEN, URINE: 0.2 EU/DL (ref 0–1)
WBC # BLD: 7.8 THOU/MM3 (ref 4.8–10.8)
WBC UA: ABNORMAL /HPF
YEAST: ABNORMAL

## 2022-11-16 PROCEDURE — 81001 URINALYSIS AUTO W/SCOPE: CPT

## 2022-11-16 PROCEDURE — 86901 BLOOD TYPING SEROLOGIC RH(D): CPT

## 2022-11-16 PROCEDURE — 93010 ELECTROCARDIOGRAM REPORT: CPT | Performed by: INTERNAL MEDICINE

## 2022-11-16 PROCEDURE — 86885 COOMBS TEST INDIRECT QUAL: CPT

## 2022-11-16 PROCEDURE — 93005 ELECTROCARDIOGRAM TRACING: CPT

## 2022-11-16 PROCEDURE — 83036 HEMOGLOBIN GLYCOSYLATED A1C: CPT

## 2022-11-16 PROCEDURE — 86900 BLOOD TYPING SEROLOGIC ABO: CPT

## 2022-11-16 PROCEDURE — 85610 PROTHROMBIN TIME: CPT

## 2022-11-16 PROCEDURE — 87640 STAPH A DNA AMP PROBE: CPT

## 2022-11-16 PROCEDURE — 36415 COLL VENOUS BLD VENIPUNCTURE: CPT

## 2022-11-16 PROCEDURE — 85027 COMPLETE CBC AUTOMATED: CPT

## 2022-11-16 PROCEDURE — 87641 MR-STAPH DNA AMP PROBE: CPT

## 2022-11-16 PROCEDURE — 86922 COMPATIBILITY TEST ANTIGLOB: CPT

## 2022-11-16 PROCEDURE — 80048 BASIC METABOLIC PNL TOTAL CA: CPT

## 2022-11-16 PROCEDURE — 71046 X-RAY EXAM CHEST 2 VIEWS: CPT

## 2022-11-16 NOTE — PROGRESS NOTES
PAIN:  Urban Cough does not complain of any pain at this time. Pain scale and pain management discussed and understanding verbalized by patient.

## 2022-11-16 NOTE — PROGRESS NOTES
Start Clean Shower Instructions          __11/19/22____ (date)   FIRST SHOWER: Two days before surgery: Take a shower and wash your entire body, including your hair and scalp in the following manner:  Wash your hair using normal shampoo. Make sure you rinse the shampoo from your hair and body. Wash your face with your regular soap or cleanser. Use one of the sponges in the [facility] kit and apply 1/3 of the Chlorhexidine soap to the sponge, wash from your neck down. This is very important. Do not use the soap on your face or hair and avoid private areas. Rinse your body thoroughly. This is very important. Using a fresh, clean towel, dry your body. Dress in freshly washed clothes. Do not use lotions, powders, or creams after this shower. ___11/20/22_____ (date)   SECOND SHOWER: The day before surgery: Take a shower and wash your entire body, including your hair and scalp in the following manner:  Wash your hair using normal shampoo. Make sure you rinse the shampoo from your hair and body. Wash your face with your regular soap or cleanser. Use one of the sponges in the [facility] kit and apply 1/3 of the Chlorhexidine soap to the sponge, wash from your neck down. This is very important. Do not use the soap on your face or hair and avoid private areas. Rinse your body thoroughly. This is very important. Using a fresh, clean towel, dry your body. Dress in freshly washed clothes. Fresh clean sheets and pillow cases should be used after this shower. Do not use lotions, powders, or creams after this shower. __11/21/22______ (date)   THE FINAL SHOWER: The morning of surgery: Take a shower and wash your entire body, including your hair and scalp in the following manner:  Wash your hair using normal shampoo. Make sure you rinse the shampoo from your hair and body.  Wash your face with your regular soap or cleanser. Use one of the sponges in the [facility] kit and apply 1/3 of the Chlorhexidine soap to the sponge, wash from your neck down. This is very important. Do not use the soap on your face or hair and avoid private areas. Rinse your body thoroughly. This is very important. Using a fresh, clean towel, dry your body. Dress warmly with freshly washed clean clothes. Keeping warm before surgery decreases your risk of developing and infection.

## 2022-11-16 NOTE — PROGRESS NOTES
SURGERY PREPARATION CHECKLIST     NAME: _______Alton Barroso__________________     DATE OF SURGERY: _____11/21/22_______________    Enter Dates, Check (?) circles to indicate task is completed. Date MUPIROCIN NASAL OINTMENT BODY CLEANSING     DAY 1 __________11/16/22______   Morning    Evening          Day 2 __________11/17/22_____   Morning     Evening        Day 3 ________11/18/22________   Morning  Evening        Day 4 _______11/19/22________   Morning    Evening        Day 5 _______11/20/22_________   Morning  Evening        Day 6 ______11/21/22___________  (Day of Surgery)               PLEASE COMPLETE and BRING THIS CHECKLIST WITH YOU TO [address] to give to your nurse on the day of surgery. You will be notified if you need to use Mupirocin Nasal Ointment.

## 2022-11-16 NOTE — PROGRESS NOTES
Message sent to [facility] to verify when patient should hold his Eliquis prior to surgery. Dr. Ruben Mann stated hold 3 days in his cardiac clearance, however notes from [facility] stated hold 4 days. [facility] will verify with Dr. Patria Randle and let the patient know. Wheelchair/Stroller

## 2022-11-16 NOTE — PROGRESS NOTES
NPO after midnight  Bring insurance info and 's license  Wear comfortable clean clothing  Do not bring jewelry   Shower as instructed prior to surgery  Bring medications in original bottles  Follow all instructions given by your physician   needed at discharge  Routine preop instructions given for pain, hand hygiene, fall prevention, infection prevention, anesthesia, and coughing and deep breathing. Do not shave the surgical area! If needed, your nurse will use clippers to remove any excess hair at the surgical site when you come in for surgery. Do not use a razor on the site of your surgery for at least 2 days prior to surgery because shaving can leave tiny nicks in the skin which may allow germs to enter and cause infection. Information regarding hand hygiene, MRSA, general anesthesia, fall precautions, coughing and deep breathing, and blood transfusions reviewed and handouts given to patient. Questions answered.     Call [phone] for any questions  Report to SDS on 2nd floor  If you would become ill prior to surgery, please call the surgeon  May have only 1 visitor accompany you for surgery  Masks recommended but not required

## 2022-11-16 NOTE — PROGRESS NOTES
Drains  Pt was instructed and did a return demo on how to empty a PREETHI drain. Stress importance on how to measure and record it for the doctor to see how much drainage patient has from drain. Jack Talbert (PREETHI) Drain Care:  Keep PREETHI drain compressed   Empty 2 times a day and as needed  Record amount on wound drainage sheet and take to your follow up appointment  If your drain doesn't stay compressed notify your Physician     Reminder to Patient   Please bring all teaching sheets and discharge information with you if you return to the hospital or the physician's office/clinic for follow-up care. If you have any questions PLEASE CALL YOUR SURGEON.

## 2022-11-17 ENCOUNTER — TELEPHONE (OUTPATIENT)
Dept: CARDIOLOGY CLINIC | Age: 87
End: 2022-11-17

## 2022-11-17 ENCOUNTER — TELEPHONE (OUTPATIENT)
Dept: CARDIOTHORACIC SURGERY | Age: 87
End: 2022-11-17

## 2022-11-17 NOTE — TELEPHONE ENCOUNTER
Patient is scheduled for a Left CEA 11.21.2022  Per Bernadine Balling from [address]   Patient has a \"cold antibody problem\"  Do you want this cross matched-  so blood is readily available? Otherwise it could take up to 45 mins for a match. Please advise-     Will need to call Bernadine Balling [phone]    2. Positive for MSSA-             3. EKG shows AFIB,  when compared to previous,  AFIB replaced Aflutter. Per PAT HERIBERTO Garcia Feeling-  she wants the EKG sent to [facility] for review.  -  EKG sent

## 2022-11-17 NOTE — PROGRESS NOTES
Sent message to [facility] - notifying of positive MSSA result and urine showing trace ketones and protein of 30. Also, EKG showed AFIB. When compared to previous, AFib replaced Aflutter. [facility] responded - she is working on the issue about when to hold his Eliquis; Also notified Dr. Brooke Doll and Monica Zieglerw of abnormal testing.

## 2022-11-17 NOTE — TELEPHONE ENCOUNTER
Patient is scheduled for L CEA-  11.21.2022  Dr. Silvestre  can you please review the new EKG - (this was done after the clearance)  EKG done 11.16.2022

## 2022-11-17 NOTE — PROGRESS NOTES
Instructed patient to go ahead and take his Eliquis yet today and to HOLD starting tomorrow prior to surgery. Patient verbalized understanding.

## 2022-11-18 NOTE — TELEPHONE ENCOUNTER
I spoke with Patria Randle  He reviewed the nasal culture, the UA and the EKG-    EKG reviewed by Ruben Mann    Yes Dr. Patria Randle does want cross match. Geetha from the [facility] was notified.

## 2022-11-21 ENCOUNTER — ANESTHESIA EVENT (OUTPATIENT)
Dept: OPERATING ROOM | Age: 87
DRG: 038 | End: 2022-11-21
Payer: MEDICARE

## 2022-11-21 ENCOUNTER — HOSPITAL ENCOUNTER (INPATIENT)
Age: 87
LOS: 1 days | Discharge: HOME | DRG: 038 | End: 2022-11-22
Attending: THORACIC SURGERY (CARDIOTHORACIC VASCULAR SURGERY) | Admitting: THORACIC SURGERY (CARDIOTHORACIC VASCULAR SURGERY)
Payer: MEDICARE

## 2022-11-21 ENCOUNTER — ANESTHESIA (OUTPATIENT)
Dept: OPERATING ROOM | Age: 87
DRG: 038 | End: 2022-11-21
Payer: MEDICARE

## 2022-11-21 DIAGNOSIS — I48.0 PAF (PAROXYSMAL ATRIAL FIBRILLATION) (HCC): ICD-10-CM

## 2022-11-21 DIAGNOSIS — Z98.890 S/P CAROTID ENDARTERECTOMY: Primary | ICD-10-CM

## 2022-11-21 DIAGNOSIS — I65.22 STENOSIS OF LEFT CAROTID ARTERY: ICD-10-CM

## 2022-11-21 LAB
POC ACTIVATED CLOTTING TIME KAOLIN: 203 SECONDS (ref 1–150)
POC ACTIVATED CLOTTING TIME KAOLIN: 215 SECONDS (ref 1–150)
POC ACTIVATED CLOTTING TIME KAOLIN: 227 SECONDS (ref 1–150)
POC ACTIVATED CLOTTING TIME KAOLIN: 227 SECONDS (ref 1–150)
POC ACTIVATED CLOTTING TIME KAOLIN: 233 SECONDS (ref 1–150)

## 2022-11-21 PROCEDURE — 03CL0ZZ EXTIRPATION OF MATTER FROM LEFT INTERNAL CAROTID ARTERY, OPEN APPROACH: ICD-10-PCS | Performed by: THORACIC SURGERY (CARDIOTHORACIC VASCULAR SURGERY)

## 2022-11-21 PROCEDURE — 6360000002 HC RX W HCPCS: Performed by: ANESTHESIOLOGY

## 2022-11-21 PROCEDURE — 2500000003 HC RX 250 WO HCPCS: Performed by: REGISTERED NURSE

## 2022-11-21 PROCEDURE — 35301 RECHANNELING OF ARTERY: CPT | Performed by: THORACIC SURGERY (CARDIOTHORACIC VASCULAR SURGERY)

## 2022-11-21 PROCEDURE — 03UL0JZ SUPPLEMENT LEFT INTERNAL CAROTID ARTERY WITH SYNTHETIC SUBSTITUTE, OPEN APPROACH: ICD-10-PCS | Performed by: THORACIC SURGERY (CARDIOTHORACIC VASCULAR SURGERY)

## 2022-11-21 PROCEDURE — 6370000000 HC RX 637 (ALT 250 FOR IP): Performed by: THORACIC SURGERY (CARDIOTHORACIC VASCULAR SURGERY)

## 2022-11-21 PROCEDURE — 2580000003 HC RX 258: Performed by: THORACIC SURGERY (CARDIOTHORACIC VASCULAR SURGERY)

## 2022-11-21 PROCEDURE — 7100000001 HC PACU RECOVERY - ADDTL 15 MIN: Performed by: THORACIC SURGERY (CARDIOTHORACIC VASCULAR SURGERY)

## 2022-11-21 PROCEDURE — 6360000002 HC RX W HCPCS

## 2022-11-21 PROCEDURE — A4217 STERILE WATER/SALINE, 500 ML: HCPCS | Performed by: THORACIC SURGERY (CARDIOTHORACIC VASCULAR SURGERY)

## 2022-11-21 PROCEDURE — 7100000000 HC PACU RECOVERY - FIRST 15 MIN: Performed by: THORACIC SURGERY (CARDIOTHORACIC VASCULAR SURGERY)

## 2022-11-21 PROCEDURE — 2500000003 HC RX 250 WO HCPCS: Performed by: THORACIC SURGERY (CARDIOTHORACIC VASCULAR SURGERY)

## 2022-11-21 PROCEDURE — 6360000002 HC RX W HCPCS: Performed by: REGISTERED NURSE

## 2022-11-21 PROCEDURE — 6360000002 HC RX W HCPCS: Performed by: THORACIC SURGERY (CARDIOTHORACIC VASCULAR SURGERY)

## 2022-11-21 PROCEDURE — 2060000000 HC ICU INTERMEDIATE R&B

## 2022-11-21 PROCEDURE — 88304 TISSUE EXAM BY PATHOLOGIST: CPT

## 2022-11-21 PROCEDURE — 85347 COAGULATION TIME ACTIVATED: CPT

## 2022-11-21 PROCEDURE — 2709999900 HC NON-CHARGEABLE SUPPLY: Performed by: THORACIC SURGERY (CARDIOTHORACIC VASCULAR SURGERY)

## 2022-11-21 PROCEDURE — 3600000003 HC SURGERY LEVEL 3 BASE: Performed by: THORACIC SURGERY (CARDIOTHORACIC VASCULAR SURGERY)

## 2022-11-21 PROCEDURE — 3600000013 HC SURGERY LEVEL 3 ADDTL 15MIN: Performed by: THORACIC SURGERY (CARDIOTHORACIC VASCULAR SURGERY)

## 2022-11-21 PROCEDURE — C1768 GRAFT, VASCULAR: HCPCS | Performed by: THORACIC SURGERY (CARDIOTHORACIC VASCULAR SURGERY)

## 2022-11-21 PROCEDURE — 35301 RECHANNELING OF ARTERY: CPT | Performed by: PHYSICIAN ASSISTANT

## 2022-11-21 PROCEDURE — 3700000000 HC ANESTHESIA ATTENDED CARE: Performed by: THORACIC SURGERY (CARDIOTHORACIC VASCULAR SURGERY)

## 2022-11-21 PROCEDURE — 3700000001 HC ADD 15 MINUTES (ANESTHESIA): Performed by: THORACIC SURGERY (CARDIOTHORACIC VASCULAR SURGERY)

## 2022-11-21 DEVICE — XENOSURE BIOLOGIC PATCH, 0.8CM X 8CM, EIFU
Type: IMPLANTABLE DEVICE | Status: FUNCTIONAL
Brand: XENOSURE BIOLOGIC PATCH

## 2022-11-21 RX ORDER — SODIUM CHLORIDE 0.9 % (FLUSH) 0.9 %
5-40 SYRINGE (ML) INJECTION PRN
Status: DISCONTINUED | OUTPATIENT
Start: 2022-11-21 | End: 2022-11-22 | Stop reason: HOSPADM

## 2022-11-21 RX ORDER — FENTANYL CITRATE 50 UG/ML
INJECTION, SOLUTION INTRAMUSCULAR; INTRAVENOUS PRN
Status: DISCONTINUED | OUTPATIENT
Start: 2022-11-21 | End: 2022-11-21 | Stop reason: SDUPTHER

## 2022-11-21 RX ORDER — SODIUM CHLORIDE 9 MG/ML
INJECTION, SOLUTION INTRAVENOUS PRN
Status: DISCONTINUED | OUTPATIENT
Start: 2022-11-21 | End: 2022-11-21 | Stop reason: HOSPADM

## 2022-11-21 RX ORDER — PHENYLEPHRINE HYDROCHLORIDE 10 MG/ML
INJECTION INTRAVENOUS PRN
Status: DISCONTINUED | OUTPATIENT
Start: 2022-11-21 | End: 2022-11-21 | Stop reason: SDUPTHER

## 2022-11-21 RX ORDER — FENTANYL CITRATE 50 UG/ML
25 INJECTION, SOLUTION INTRAMUSCULAR; INTRAVENOUS
Status: COMPLETED | OUTPATIENT
Start: 2022-11-21 | End: 2022-11-21

## 2022-11-21 RX ORDER — SODIUM CHLORIDE 0.9 % (FLUSH) 0.9 %
5-40 SYRINGE (ML) INJECTION EVERY 12 HOURS SCHEDULED
Status: DISCONTINUED | OUTPATIENT
Start: 2022-11-21 | End: 2022-11-22 | Stop reason: HOSPADM

## 2022-11-21 RX ORDER — ONDANSETRON 2 MG/ML
4 INJECTION INTRAMUSCULAR; INTRAVENOUS EVERY 6 HOURS PRN
Status: DISCONTINUED | OUTPATIENT
Start: 2022-11-21 | End: 2022-11-22 | Stop reason: HOSPADM

## 2022-11-21 RX ORDER — DEXAMETHASONE SODIUM PHOSPHATE 10 MG/ML
INJECTION, EMULSION INTRAMUSCULAR; INTRAVENOUS PRN
Status: DISCONTINUED | OUTPATIENT
Start: 2022-11-21 | End: 2022-11-21 | Stop reason: SDUPTHER

## 2022-11-21 RX ORDER — HEPARIN SODIUM 1000 [USP'U]/ML
INJECTION, SOLUTION INTRAVENOUS; SUBCUTANEOUS PRN
Status: DISCONTINUED | OUTPATIENT
Start: 2022-11-21 | End: 2022-11-21 | Stop reason: SDUPTHER

## 2022-11-21 RX ORDER — ONDANSETRON 4 MG/1
4 TABLET, ORALLY DISINTEGRATING ORAL EVERY 8 HOURS PRN
Status: DISCONTINUED | OUTPATIENT
Start: 2022-11-21 | End: 2022-11-22 | Stop reason: HOSPADM

## 2022-11-21 RX ORDER — SODIUM CHLORIDE 0.9 % (FLUSH) 0.9 %
5-40 SYRINGE (ML) INJECTION EVERY 12 HOURS SCHEDULED
Status: DISCONTINUED | OUTPATIENT
Start: 2022-11-21 | End: 2022-11-21 | Stop reason: HOSPADM

## 2022-11-21 RX ORDER — LIDOCAINE HYDROCHLORIDE 10 MG/ML
INJECTION, SOLUTION EPIDURAL; INFILTRATION; INTRACAUDAL; PERINEURAL PRN
Status: DISCONTINUED | OUTPATIENT
Start: 2022-11-21 | End: 2022-11-21 | Stop reason: ALTCHOICE

## 2022-11-21 RX ORDER — LIDOCAINE HYDROCHLORIDE 20 MG/ML
INJECTION, SOLUTION EPIDURAL; INFILTRATION; INTRACAUDAL; PERINEURAL PRN
Status: DISCONTINUED | OUTPATIENT
Start: 2022-11-21 | End: 2022-11-21 | Stop reason: SDUPTHER

## 2022-11-21 RX ORDER — GLYCOPYRROLATE 0.2 MG/ML
INJECTION INTRAMUSCULAR; INTRAVENOUS PRN
Status: DISCONTINUED | OUTPATIENT
Start: 2022-11-21 | End: 2022-11-21 | Stop reason: SDUPTHER

## 2022-11-21 RX ORDER — SODIUM CHLORIDE 0.9 % (FLUSH) 0.9 %
5-40 SYRINGE (ML) INJECTION PRN
Status: DISCONTINUED | OUTPATIENT
Start: 2022-11-21 | End: 2022-11-21 | Stop reason: HOSPADM

## 2022-11-21 RX ORDER — ONDANSETRON 2 MG/ML
INJECTION INTRAMUSCULAR; INTRAVENOUS PRN
Status: DISCONTINUED | OUTPATIENT
Start: 2022-11-21 | End: 2022-11-21 | Stop reason: SDUPTHER

## 2022-11-21 RX ORDER — ROCURONIUM BROMIDE 10 MG/ML
INJECTION, SOLUTION INTRAVENOUS PRN
Status: DISCONTINUED | OUTPATIENT
Start: 2022-11-21 | End: 2022-11-21 | Stop reason: SDUPTHER

## 2022-11-21 RX ORDER — OXYCODONE HYDROCHLORIDE 5 MG/1
5 TABLET ORAL EVERY 4 HOURS PRN
Status: DISCONTINUED | OUTPATIENT
Start: 2022-11-21 | End: 2022-11-22 | Stop reason: HOSPADM

## 2022-11-21 RX ORDER — SODIUM CHLORIDE 9 MG/ML
INJECTION, SOLUTION INTRAVENOUS CONTINUOUS
Status: DISCONTINUED | OUTPATIENT
Start: 2022-11-21 | End: 2022-11-22 | Stop reason: HOSPADM

## 2022-11-21 RX ORDER — FENTANYL CITRATE 50 UG/ML
INJECTION, SOLUTION INTRAMUSCULAR; INTRAVENOUS
Status: COMPLETED
Start: 2022-11-21 | End: 2022-11-21

## 2022-11-21 RX ORDER — ASPIRIN 81 MG/1
81 TABLET ORAL DAILY
Status: DISCONTINUED | OUTPATIENT
Start: 2022-11-21 | End: 2022-11-22 | Stop reason: HOSPADM

## 2022-11-21 RX ORDER — OXYCODONE HYDROCHLORIDE 5 MG/1
10 TABLET ORAL EVERY 4 HOURS PRN
Status: DISCONTINUED | OUTPATIENT
Start: 2022-11-21 | End: 2022-11-22 | Stop reason: HOSPADM

## 2022-11-21 RX ORDER — HYDRALAZINE HYDROCHLORIDE 20 MG/ML
10 INJECTION INTRAMUSCULAR; INTRAVENOUS EVERY 6 HOURS PRN
Status: DISCONTINUED | OUTPATIENT
Start: 2022-11-21 | End: 2022-11-22 | Stop reason: HOSPADM

## 2022-11-21 RX ORDER — PROPOFOL 10 MG/ML
INJECTION, EMULSION INTRAVENOUS PRN
Status: DISCONTINUED | OUTPATIENT
Start: 2022-11-21 | End: 2022-11-21 | Stop reason: SDUPTHER

## 2022-11-21 RX ORDER — ACETAMINOPHEN 325 MG/1
650 TABLET ORAL EVERY 4 HOURS PRN
Status: DISCONTINUED | OUTPATIENT
Start: 2022-11-21 | End: 2022-11-22 | Stop reason: HOSPADM

## 2022-11-21 RX ORDER — EPHEDRINE SULFATE 50 MG/ML
INJECTION INTRAVENOUS PRN
Status: DISCONTINUED | OUTPATIENT
Start: 2022-11-21 | End: 2022-11-21 | Stop reason: SDUPTHER

## 2022-11-21 RX ORDER — AMLODIPINE BESYLATE 5 MG/1
5 TABLET ORAL 2 TIMES DAILY
Status: DISCONTINUED | OUTPATIENT
Start: 2022-11-21 | End: 2022-11-22 | Stop reason: HOSPADM

## 2022-11-21 RX ADMIN — OXYCODONE 5 MG: 5 TABLET ORAL at 18:43

## 2022-11-21 RX ADMIN — HEPARIN SODIUM 2000 UNITS: 1000 INJECTION, SOLUTION INTRAVENOUS; SUBCUTANEOUS at 12:59

## 2022-11-21 RX ADMIN — PHENYLEPHRINE HYDROCHLORIDE 200 MCG: 10 INJECTION INTRAVENOUS at 11:37

## 2022-11-21 RX ADMIN — PHENYLEPHRINE HYDROCHLORIDE 50 MCG: 10 INJECTION INTRAVENOUS at 11:27

## 2022-11-21 RX ADMIN — ROCURONIUM BROMIDE 10 MG: 10 INJECTION, SOLUTION INTRAVENOUS at 11:29

## 2022-11-21 RX ADMIN — GLYCOPYRROLATE 0.2 MG: 0.2 INJECTION INTRAMUSCULAR; INTRAVENOUS at 11:45

## 2022-11-21 RX ADMIN — SUGAMMADEX 200 MG: 100 INJECTION, SOLUTION INTRAVENOUS at 13:33

## 2022-11-21 RX ADMIN — FENTANYL CITRATE 25 MCG: 0.05 INJECTION, SOLUTION INTRAMUSCULAR; INTRAVENOUS at 14:22

## 2022-11-21 RX ADMIN — FENTANYL CITRATE 25 MCG: 0.05 INJECTION, SOLUTION INTRAMUSCULAR; INTRAVENOUS at 15:18

## 2022-11-21 RX ADMIN — PHENYLEPHRINE HYDROCHLORIDE 50 MCG: 10 INJECTION INTRAVENOUS at 12:46

## 2022-11-21 RX ADMIN — PHENYLEPHRINE HYDROCHLORIDE 100 MCG: 10 INJECTION INTRAVENOUS at 11:48

## 2022-11-21 RX ADMIN — PROPOFOL 50 MG: 10 INJECTION, EMULSION INTRAVENOUS at 10:59

## 2022-11-21 RX ADMIN — HEPARIN SODIUM 6500 UNITS: 1000 INJECTION, SOLUTION INTRAVENOUS; SUBCUTANEOUS at 12:04

## 2022-11-21 RX ADMIN — EPHEDRINE SULFATE 10 MG: 50 INJECTION, SOLUTION INTRAVENOUS at 11:48

## 2022-11-21 RX ADMIN — PHENYLEPHRINE HYDROCHLORIDE 50 MCG: 10 INJECTION INTRAVENOUS at 12:08

## 2022-11-21 RX ADMIN — PHENYLEPHRINE HYDROCHLORIDE 50 MCG: 10 INJECTION INTRAVENOUS at 12:36

## 2022-11-21 RX ADMIN — DEXAMETHASONE SODIUM PHOSPHATE 10 MG: 10 INJECTION, EMULSION INTRAMUSCULAR; INTRAVENOUS at 11:10

## 2022-11-21 RX ADMIN — SODIUM CHLORIDE: 9 INJECTION, SOLUTION INTRAVENOUS at 10:46

## 2022-11-21 RX ADMIN — EPHEDRINE SULFATE 10 MG: 50 INJECTION, SOLUTION INTRAVENOUS at 11:40

## 2022-11-21 RX ADMIN — FENTANYL CITRATE 50 MCG: 50 INJECTION, SOLUTION INTRAMUSCULAR; INTRAVENOUS at 13:28

## 2022-11-21 RX ADMIN — SODIUM CHLORIDE: 9 INJECTION, SOLUTION INTRAVENOUS at 23:31

## 2022-11-21 RX ADMIN — FENTANYL CITRATE 25 MCG: 50 INJECTION, SOLUTION INTRAMUSCULAR; INTRAVENOUS at 11:31

## 2022-11-21 RX ADMIN — SODIUM CHLORIDE: 9 INJECTION, SOLUTION INTRAVENOUS at 18:38

## 2022-11-21 RX ADMIN — CEFAZOLIN SODIUM 2000 MG: 10 INJECTION, POWDER, FOR SOLUTION INTRAVENOUS at 11:20

## 2022-11-21 RX ADMIN — FENTANYL CITRATE 25 MCG: 50 INJECTION, SOLUTION INTRAMUSCULAR; INTRAVENOUS at 13:21

## 2022-11-21 RX ADMIN — AMLODIPINE BESYLATE 5 MG: 5 TABLET ORAL at 20:43

## 2022-11-21 RX ADMIN — OXYCODONE 5 MG: 5 TABLET ORAL at 23:26

## 2022-11-21 RX ADMIN — LIDOCAINE HYDROCHLORIDE 60 MG: 20 INJECTION, SOLUTION EPIDURAL; INFILTRATION; INTRACAUDAL; PERINEURAL at 10:59

## 2022-11-21 RX ADMIN — ONDANSETRON 4 MG: 2 INJECTION INTRAMUSCULAR; INTRAVENOUS at 11:10

## 2022-11-21 RX ADMIN — ASPIRIN 81 MG: 81 TABLET, COATED ORAL at 20:43

## 2022-11-21 RX ADMIN — FENTANYL CITRATE 25 MCG: 0.05 INJECTION, SOLUTION INTRAMUSCULAR; INTRAVENOUS at 15:25

## 2022-11-21 RX ADMIN — FENTANYL CITRATE 25 MCG: 0.05 INJECTION, SOLUTION INTRAMUSCULAR; INTRAVENOUS at 14:30

## 2022-11-21 RX ADMIN — ROCURONIUM BROMIDE 40 MG: 10 INJECTION, SOLUTION INTRAVENOUS at 10:59

## 2022-11-21 RX ADMIN — HEPARIN SODIUM 2500 UNITS: 1000 INJECTION, SOLUTION INTRAVENOUS; SUBCUTANEOUS at 12:19

## 2022-11-21 ASSESSMENT — PAIN DESCRIPTION - FREQUENCY
FREQUENCY: CONTINUOUS

## 2022-11-21 ASSESSMENT — PAIN DESCRIPTION - ONSET
ONSET: ON-GOING

## 2022-11-21 ASSESSMENT — PAIN DESCRIPTION - PAIN TYPE
TYPE: CHRONIC PAIN

## 2022-11-21 ASSESSMENT — PAIN SCALES - GENERAL
PAINLEVEL_OUTOF10: 5
PAINLEVEL_OUTOF10: 6
PAINLEVEL_OUTOF10: 5
PAINLEVEL_OUTOF10: 6
PAINLEVEL_OUTOF10: 6
PAINLEVEL_OUTOF10: 3

## 2022-11-21 ASSESSMENT — PAIN - FUNCTIONAL ASSESSMENT
PAIN_FUNCTIONAL_ASSESSMENT: PREVENTS OR INTERFERES SOME ACTIVE ACTIVITIES AND ADLS
PAIN_FUNCTIONAL_ASSESSMENT: 0-10
PAIN_FUNCTIONAL_ASSESSMENT: PREVENTS OR INTERFERES SOME ACTIVE ACTIVITIES AND ADLS
PAIN_FUNCTIONAL_ASSESSMENT: PREVENTS OR INTERFERES SOME ACTIVE ACTIVITIES AND ADLS

## 2022-11-21 ASSESSMENT — PAIN DESCRIPTION - LOCATION
LOCATION: BACK
LOCATION: BACK
LOCATION: NECK
LOCATION: BACK;HIP

## 2022-11-21 ASSESSMENT — PAIN DESCRIPTION - DIRECTION
RADIATING_TOWARDS: LEFT LEG
RADIATING_TOWARDS: LEFT LEG

## 2022-11-21 ASSESSMENT — PAIN DESCRIPTION - ORIENTATION
ORIENTATION: LEFT
ORIENTATION: LEFT
ORIENTATION: MID
ORIENTATION: MID

## 2022-11-21 ASSESSMENT — PAIN DESCRIPTION - DESCRIPTORS
DESCRIPTORS: DISCOMFORT
DESCRIPTORS: DISCOMFORT
DESCRIPTORS: SORE
DESCRIPTORS: ACHING;NAGGING;THROBBING

## 2022-11-21 NOTE — PROGRESS NOTES
Pt admitted to  4K11 via cart/stretcher from PACU. Complaints: None. IV normal saline infusing into the forearm left, condition patent and no redness at a rate of 100 mls/ hour with about 500 mls in the bag still. IV site free of s/s of infection or infiltration. Vital signs obtained. Assessment and data collection initiated. Two nurse skin assessment performed by [facility] RN and Robbie [facility] RN. Oriented to room. Policies and procedures for 4K explained. All questions answered with no further questions at this time. Fall prevention and safety brochure discussed with patient. Bed alarm on. Call light in reach.

## 2022-11-21 NOTE — ANESTHESIA PROCEDURE NOTES
Arterial Line:    An arterial line was placed using surface landmarks, in the OR for the following indication(s): continuous blood pressure monitoring and blood sampling needed. A 20 gauge (size) (length), Arrow (type) catheter was placed, Seldinger technique used, into the right radial artery, secured by Tegaderm and tape and Biopatch. Anesthesia type: General    Events:  patient tolerated procedure well with no complications and EBL < 5mL. 11/21/2022 11:01 AM11/21/2022 11:05 AM  Anesthesiologist: Anjali Griffin DO  Resident/CRNA: SONIDO Marr - CRNA  Performed: Resident/CRNA   Preanesthetic Checklist  Completed: patient identified, IV checked, site marked, risks and benefits discussed, surgical/procedural consents, equipment checked, pre-op evaluation, timeout performed, anesthesia consent given, oxygen available, monitors applied/VS acknowledged, fire risk safety assessment completed and verbalized and blood product R/B/A discussed and consented

## 2022-11-21 NOTE — PROGRESS NOTES
Pt admitted to [facility] room 19 and oriented to unit. SCD sleeves applied. Nares swabbed. Pt verbalized permission for first name, last initial and physicians name on white board. SDS board and discharge criteria explained, pt and family verbalized understanding. Pt denies thoughts of harming self or others. Call light in reach. Family at the bedside.

## 2022-11-21 NOTE — PROGRESS NOTES
1347- pt to pacu    1359- pt denies pain, nausea. Cp, sob.    1420- pt reports pain in left side of neck. Medicated poer anesthesia. 0- Dr Xin Laguna at bedside. Foreskin placed back over glans     1440- pt resting in bed eyes closed. Resp easy, unlabored. Vss. Pt reports tolerable pain, drifts back to sleep quickly. 1515- pt reports increase in pain. Medicated per orders. 1532- pt resting in bed eyes closed. 1543- pt reports tolerable pain at this time. 1545- pt meets criteria for discharge from pacu, awaiting clean ip room. 1600- family updated on POC. 1610- room showing clean at this time, transport requested    1657- transport arrives to take pt to [facility], [address] called to update family.

## 2022-11-21 NOTE — ANESTHESIA PRE PROCEDURE
Department of Anesthesiology  Preprocedure Note       Name:  Carlyle Chiu   Age:  80 y.o.  :  1934                                          MRN:  [id number]         Date:  2022      Surgeon: Ashwin Gotti):  Shell Guadalupe MD    Procedure: Procedure(s):  LEFT CAROTID ENDARTERECTOMY    Medications prior to admission:   Prior to Admission medications    Medication Sig Start Date End Date Taking? Authorizing Provider   metoprolol tartrate (LOPRESSOR) 25 MG tablet Take 0.5 tablets by mouth every morning 10/3/22   Everett Delvalle MD   Psyllium (METAMUCIL PO) Take by mouth daily as needed    Historical Provider, MD   melatonin 5 MG TABS tablet Take 5 mg by mouth nightly as needed    Historical Provider, MD   amLODIPine (NORVASC) 5 MG tablet Take 1 tablet by mouth in the morning and at bedtime 22   Everett Delvalle MD   apixaban (ELIQUIS) 5 MG TABS tablet Take 1 tablet by mouth 2 times daily 22   Everett Delvalle MD   Probiotic Product (PROBIOTIC-10 PO) Take by mouth    Historical Provider, MD   nystatin (MYCOSTATIN) 037361 UNIT/GM cream Apply topically 2 times daily.  22   Cade Gillespie MD   Handicap Placard MISC by Does not apply route Exp 2027   Everett Delvalle MD   Polyethylene Glycol 3350 (MIRALAX PO) Take by mouth as needed    Historical Provider, MD   VITAMIN D PO Take by mouth    Historical Provider, MD   Multiple Vitamins-Minerals (PRESERVISION AREDS 2 PO) Take 1 tablet by mouth 2 times daily    Historical Provider, MD       Current medications:    Current Facility-Administered Medications   Medication Dose Route Frequency Provider Last Rate Last Admin    sodium chloride flush 0.9 % injection 5-40 mL  5-40 mL IntraVENous 2 times per day Shell Guadalupe MD        sodium chloride flush 0.9 % injection 5-40 mL  5-40 mL IntraVENous PRN Shell Guadalupe MD        0.9 % sodium chloride infusion   IntraVENous PRN Shell Guadalupe MD        ceFAZolin (ANCEF) 2000 mg in dextrose 5 % 50 mL IVPB  2,000 mg IntraVENous On Call to [facility] MD CHIN        sodium chloride flush 0.9 % injection 5-40 mL  5-40 mL IntraVENous 2 times per day Abel Boas, PA-C        sodium chloride flush 0.9 % injection 5-40 mL  5-40 mL IntraVENous PRN Abel Boas, PA-C        0.9 % sodium chloride infusion   IntraVENous PRN Abel Boas, PA-C        ceFAZolin (ANCEF) 2000 mg in dextrose 5 % 50 mL IVPB  2,000 mg IntraVENous [facility]           Allergies:     Allergies   Allergen Reactions    Nitrofuran Derivatives Nausea Only     Severe stomach cramps    Nsaids Nausea Only and Other (See Comments)     Pain in stomach, GI upset       Problem List:    Patient Active Problem List   Diagnosis Code    Prostate cancer C61    Generalized anxiety disorder F41.1    Essential hypertension I10    Hyperlipidemia with target LDL less than 130 E78.5    Back pain, chronic s/p surgery M54.9, G89.29    History of pulmonary embolus (PE) Z86.711    Spondylosis of lumbar region without myelopathy or radiculopathy M47.816    Persistent atrial fibrillation ([facility] [address]- newely DXed 10/12/17- CVR I48.19    Non-rheumatic mitral regurgitation- mod to severe I34.0    Moderate aortic regurgitation I35.1    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    Chronic pain disorder G89.4    Pneumothorax J93.9    Constipation K59.00    Ascending aortic aneurysm (HCC) 4.1 cm on CTA and 4.5 on echo I71.21    Nonexudative age-related macular degeneration H35.3190    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    Venous retinal branch occlusion K95.8198    PAF (paroxysmal atrial fibrillation) (HCC) I48.0    Moderate mitral regurgitation I34.0    Stenosis of left carotid artery I65.22       Past Medical History:        Diagnosis Date    Atrial fibrillation (HCC)     Back pain     Hernia     Hx of blood clots     lungs after prostate surgery    Hyperlipidemia     Hypertension     Osteoarthritis     low back, left leg, right shoulder    Prostate cancer ([address] 2015    Prostate cancer ([address] 01/01/2015    Pulmonary emboli ([address] 11/2015    after prostate surgery    Wears glasses        Past Surgical History:        Procedure Laterality Date    APPENDE[facility]  01/1961    [facility]    BACK SURGERY  9520,6768    X stop    CARPAL TUNNEL RELEASE Right     x2 on right    CATARACT REMOVAL Right 06/2001    Dr Aiden Bucther Left 11/2014    Dr Sandy Landers, 2009    Dr Katheryn Wolff  3915'X?     Dr Astrid Herrera of date    HERNIA REPAIR Left 11/22/2017    ROBOT RECURRENT LEFT INGUINAL HERNIA REPAIR performed by Sami Vieyra MD at [address] BILATERAL Bilateral 9/5/2017    LUMBAR FACET MBB L3-4, L4-5, L5-S1 BILATERAL performed by Jeni Rubinstein, MD at [place] [address] Left 04/09/2018    HIP INJECTION     OTHER SURGICAL HISTORY  10/7/2015    XI ROBOTIC PROSTATECTOMY    OTHER SURGICAL HISTORY Bilateral 09/05/2017    lumbar facet block L3-S1    OTHER SURGICAL HISTORY Right 1980's    nerve release right lower arm    OTHER SURGICAL HISTORY  01/30/2018    Sacroiliac joint MBB    OTHER SURGICAL HISTORY Left 02/27/2018    Sacroiliac joint medial branch block     SC IMPLANT NEUROSTIM/ N/A 9/17/2018    THORACIC LAMINECTOMY PERMANENT SPINAL CORD STIMULATOR PADDLE PLUS BATTERY PLACEMENT performed by Renetta Hitchcock MD at [place] [address] ARTHRGRPHY&/ANES/STEROID W/IMAGE Left 1/30/2018    LEFT SI MBB performed by Jeni Rubinstein, MD at [address] INJECT SI JOINT ARTHRGRPHY&/ANES/STEROID W/IMAGE Left 2/27/2018    LEFT SI MBB #2 performed by Jeni Rubinstein, MD at [address] OFFICE/OUTPT VISIT,PROCEDURE ONLY Left 4/9/2018    LEFT HIP STEROID INJECTION WITH SEDATION performed by Jeni Rubinstein, MD at [facility] OR    [place] OFFICE/OUTPT VISIT,PROCEDURE ONLY N/A 2018    SPINAL CORD STIMULATOR IMPLANT TRIAL performed by Joe Gamble MD at [facility]  2012    Dr J Luis Rivera BIOPSY  2015    transrectal ultrasound with biopsy(+)    PROSTATE SURGERY      TURP    PROSTATE SURGERY  2009    Biopsy -Hyperplasia    SHOULDER ARTHROPLASTY  2005    right       Social History:    Social History     Tobacco Use    Smoking status: Former     Types: Cigarettes     Quit date: 7/3/1965     Years since quittin.4    Smokeless tobacco: Never   Substance Use Topics    Alcohol use: Yes     Comment: SOCIALLY wine a few times a year                                 Counseling given: Not Answered      Vital Signs (Current):   Vitals:    22 0945   BP: 132/74   Pulse: 79   Resp: 18   Temp: 97.1 °F (36.2 °C)   TempSrc: Temporal   SpO2: 96%   Weight: 138 lb (62.6 kg)   Height: 5' 8\" (1.727 m)                                              BP Readings from Last 3 Encounters:   22 132/74   22 135/68   10/31/22 121/70       NPO Status:                                                                                 BMI:   Wt Readings from Last 3 Encounters:   22 138 lb (62.6 kg)   22 139 lb 15.9 oz (63.5 kg)   10/31/22 146 lb 9.6 oz (66.5 kg)     Body mass index is 20.98 kg/m².     CBC:   Lab Results   Component Value Date/Time    WBC 7.8 2022 12:19 PM    RBC 4.57 2022 12:19 PM     2011 01:55 PM    HGB 14.7 2022 12:19 PM    HCT 44.4 2022 12:19 PM    MCV 97.2 2022 12:19 PM    RDW 13.2 2022 02:42 PM     2022 12:19 PM       CMP:   Lab Results   Component Value Date/Time     2022 12:19 PM    K 4.1 2022 12:19 PM     2022 12:19 PM    CO2 23 2022 12:19 PM    BUN 30 2022 12:19 PM    CREATININE 1.4 2022 12:19 PM    AGRATIO 1.5 2016 06:30 AM LABGLOM 48 11/16/2022 12:21 PM    GLUCOSE 94 11/16/2022 12:19 PM    GLUCOSE 89 05/14/2016 06:30 AM    PROT 7.0 07/19/2022 02:42 PM    CALCIUM 9.3 11/16/2022 12:19 PM    BILITOT 0.7 07/19/2022 02:42 PM    ALKPHOS 55 07/19/2022 02:42 PM    AST 22 07/19/2022 02:42 PM    ALT 27 07/19/2022 02:42 PM       POC Tests: No results for input(s): POCGLU, POCNA, POCK, POCCL, POCBUN, POCHEMO, POCHCT in the last 72 hours. Coags:   Lab Results   Component Value Date/Time    INR 1.54 11/16/2022 12:19 PM    APTT 35.3 09/21/2018 09:19 AM       HCG (If Applicable): No results found for: PREGTESTUR, PREGSERUM, HCG, HCGQUANT     ABGs: No results found for: PHART, PO2ART, SOH7FJD, VTW1VPY, BEART, V7VEOWKF     Type & Screen (If Applicable):  Lab Results   Component Value Date    LABRH POS 11/16/2022       Drug/Infectious Status (If Applicable):  No results found for: HIV, HEPCAB    COVID-19 Screening (If Applicable): No results found for: COVID19        Anesthesia Evaluation  Patient summary reviewed and Nursing notes reviewed no history of anesthetic complications:   Airway: Mallampati: II          Dental:          Pulmonary: breath sounds clear to auscultation                             Cardiovascular:  Exercise tolerance: no interval change,   (+) hypertension:, dysrhythmias: atrial fibrillation,       ECG reviewed  Rhythm: regular  Rate: normal                    Neuro/Psych:   (+) psychiatric history:            GI/Hepatic/Renal:             Endo/Other:                     Abdominal:             Vascular:   + PVD, aortic or cerebral, . Other Findings:           Anesthesia Plan      general     ASA 3       Induction: intravenous. arterial line  MIPS: Postoperative opioids intended and Prophylactic antiemetics administered. Anesthetic plan and risks discussed with patient and child/children. Plan discussed with CRNA.                     Destiny Gamboa DO   11/21/2022

## 2022-11-21 NOTE — OP NOTE
Operative Note      Patient: Chaz Smith  YOB: 1934  MRN: [id number]    Date of Procedure: 11/21/2022    Pre-Op Diagnosis:     Critical Stenosis of left carotid artery [I65.22]  Atrial Fibrillation  HTN  Dizziness    Post-Op Diagnosis: Same       Procedure(s):  LEFT CAROTID ENDARTERECTOMY    Surgeon(s):  Junior Verma MD    Assistant:   Physician Assistant: Leonides Iyer PA-C    Anesthesia: General    Estimated Blood Loss (mL): less than 50     Complications: None    Specimens:   ID Type Source Tests Collected by Time Destination   A : left carotid artery plaque Tissue Carotid Arteries SURGICAL PATHOLOGY Junior Verma MD 11/21/2022 1237        Implants:  Implant Name Type Inv. Item Serial No.  Lot No. LRB No. Used Action   GRAFT VASC W0.8XL8CM THK0.35-0.75MM CAR PERICARD PROC BOV - [id number] Vascular grafts GRAFT VASC W0.8XL8CM THK0.35-0.75MM CAR PERICARD PROC BOV  [facility] [employer]-WD [id number] Left 1 Implanted   KIT SHUNT 8-14FR L15.2CM PVC CAR ART RADPQ LN SMOOTH BVL - [id number]  KIT SHUNT 8-14FR L15.2CM PVC CAR ART RADPQ LN SMOOTH BVL  [facility]- [id number] Left 1 Implanted         Drains:   Urinary Catheter 11/21/22 Delgado-Temperature (Active)       Findings:   Critical stenosis at left carotid bulb and proximal ICA with mixed plaque  Heparin 11,000 units  IVF: 1600 mL    Detailed Description of Procedure: The patient was seen in the holding area and identified. The informed consent was verified and the correct neck side was marked. All preoperative questions were answered and discussed in detail with patient and family. The patient was then taken to the operating room. The patient was placed supine on the operating table and general anesthesia and radial arterial line were placed. The patient's left neck and anterior chest wall was prepped and draped in the usual sterile fashion.   Time out was called and all preoperative workup and indications and patient specific parameters were checked and verified. Next, using a #15 blade, a standard anterior sternocleidomastoid incision was made and the incision was taken down through the subcutaneous tissue with cautery. A plane was created anterior and medial to the clavicular head of the sternocleidomastoid muscle. Retractors were placed. The carotid sheath was entered and the common carotid artery was skeletonized and a vessel loop placed. Then next, the dissection proceeded superiorly, with care being taken to identify and preserve the vagus nerve. The carotid bifurcation was seen and further carefully dissected with care being taken not to manipulate the carotid bulb area. The superior thyroid artery branch was ligated as it crossed the internal carotid. The external carotid artery was dissected and vessel loops placed. The common facial vein was identified and divided and oversewn with 3-0 silk suture and also tied and clipped. Then next, dissection was started on the internal carotid artery. The hypoglossal nerve was seen and safely preserved. The dissection on the internal carotid proceeded until the vessel was soft and free of plaque at the mid to distal internal carotid artery. Vessel loop was placed. Once done, the patient was systemically heparinized and the ACT's were checked every 30 minutes and kept over 200 seconds. Once two minutes had elapsed, the vessel loops on the internal carotid, common carotid and external/superior thyroid arteries were cinched down, and then an arteriotomy was made with a #11 blade at the distal common carotid artery. It was lengthened with a Mello scissor throught the critical stenotic plaque in the bulb and proximal ICA. This plaque was ulcerated and friable. A #10 Arygle shunt was placed at the distal end of the internal carotid artery and good back bleeding was noted.   The proximal end was placed into the common carotid artery after de-airing and then shunting was begun. A Ravindra clamp was placed on the distal ICA and the vessel loop removed. Then next, using a [place] elevator, the carotid plaque was first divided at the distal CCA and then the dissection proceeded proximally. An eversion endarterectomy was made at the external carotid artery and then good back bleeding was noted. The vessel loop on the external carotid was replaced. Next, the plaque dissection continued until it feathered out nicely at the mid to distal ICA. Next, any loose shelves or leafs of plaque were carefully and meticulously removed from the intimal surface until it was smooth. The distal intimal flap area was tacked down with 6-0 Prolene x 2 and tied on the outside. Next, a 0.8 x 8 cm bovine pericardial patch was brought to the field, and the external side was marked and patch angioplasty was then performed with 6-0 Prolene starting from the apex and sewed down on both sides. Prior to tying the last stitch, the shunt was clamped and removed, and a vascular clamp placed on the distal ICA. The artery was then de-aired fully and the final stitch tied down. Then the clamps were removed from the external carotid artery and common carotid artery and then finally the internal carotid artery. Bleeding points were noted from the apex on the medial side. This required some repair 7-0 Prolene sutures and pledgeted suture as well. Once done, doppler ultrasound was brought to the field, an excellent doppler signals were noted in the internal, external and common carotid arteries. The area was irrigated with normal saline, and the Alejandro-seal was placed. Once done, a #7 PREETHI drain was brought to the field, and the drain placed through a separate stab incision and then secured to the skin with 3-0 Nylon and then the incision was closed with 3-0 Vicryl and 4-0 Monocryl.        The patient was awakened from anesthesia, and the patient opened their eyes, moved all extremities to command and spontaneously, and was neurologically intact. The patient was then brought to the PACU in stable condition. Plan: To ICU step down postoperatively  Close monitoring with  neurovascular checks. Resume home medications.  Start 81 mg daily Aspirin  Electronically signed by Eloisa Robison MD on 11/21/2022 at 1:24 PM

## 2022-11-21 NOTE — H&P
CT/CV Surgery H&P    11/21/2022 8:15 AM  Surgeon:  Dr. Umang Verde    HPI:    Mr. Shemar Ramachandran is scheduled for Left carotid endarterectomy today. 80year old male with PMHx of paroxysmal atrial fibrillation on Eliquis, dizziness, scoliosis, chronic back pain and HTN who presents to clinic for evaluation of left sided critical carotid stenosis. He is accompanied by his son, who is a [employer] [facility] [employer] . He is a good historian. He states that he has been having chronic severe dizziness that is worse in the mornings when he arises, and better after 1-2 hours, and certainly by noon. He has no nausea/vomiting, syncope, history of TIA's or CVA's. He does have some chronic back pain and due to his back issues and unsteadiness, he is not walking well. Carotid Duplex:  Left ICA with >70% stenosis; left ICA velocities >300/92.4 cm/sec  Verterbrals antegrade and patent     CT angiogram of the neck:  ~90% left proximal ICA  50% left bulbar stenosis  Moderate stenosis at the takeoffs of the bilateral vertebral arteries. 4.1 cm ascending aortic aneurysm       Vital Signs: There were no vitals taken for this visit. No data recorded. Labs:   CBC: No results for input(s): WBC, HGB, HCT, MCV, PLT, APTT, PROTIME, INR in the last 72 hours. BMP: No results for input(s): NA, K, CL, CO2, PHOS, BUN, CREATININE, CA, MG, POCGLU in the last 72 hours. Last HgA1C:   Lab Results   Component Value Date    LABA1C 5.8 11/16/2022     Imaging:  Results for orders placed during the hospital encounter of 11/16/22    XR CHEST (2 VW)    Narrative  PROCEDURE: XR CHEST (2 VW)    CLINICAL INFORMATION: Preoperative evaluation, surgery type not specified by ordering physician's assistant. TECHNIQUE: PA and lateral chest radiographs. COMPARISON: AP chest radiograph 7/19/2022    FINDINGS: There is mild stable enlargement of the cardiac silhouette.  Atherosclerotic calcifications are present in a tortuous thoracic aorta. Calcified granulomas are stable. There are no lung consolidations. Degenerative, scoliotic and surgical changes  in the thoracolumbar spine are poorly visualized. The leads from a spinal stimulation device are in stable position. There is a partially visualized right shoulder prosthesis. Impression  No acute intrathoracic process. **This report has been created using voice recognition software. It may contain minor errors which are inherent in voice recognition technology. **    Final report electronically signed by Dr. Mireya Mark on 11/16/2022 4:37 PM    Home Meds:  Prior to Admission medications    Medication Sig Start Date End Date Taking? Authorizing Provider   metoprolol tartrate (LOPRESSOR) 25 MG tablet Take 0.5 tablets by mouth every morning 10/3/22   Alexandria Bumpers, MD   Psyllium (METAMUCIL PO) Take by mouth daily as needed    Historical Provider, MD   melatonin 5 MG TABS tablet Take 5 mg by mouth nightly as needed    Historical Provider, MD   amLODIPine (NORVASC) 5 MG tablet Take 1 tablet by mouth in the morning and at bedtime 8/29/22   Alexandria Bumpers, MD   apixaban (ELIQUIS) 5 MG TABS tablet Take 1 tablet by mouth 2 times daily 8/29/22   Alexandria Bumpers, MD   Probiotic Product (PROBIOTIC-10 PO) Take by mouth    Historical Provider, MD   nystatin (MYCOSTATIN) 143366 UNIT/GM cream Apply topically 2 times daily.  7/19/22   Shar Wilkins MD   Handicap Placard MISC by Does not apply route Exp 7/13/2027 7/13/22   Alexandria Bumpers, MD   Polyethylene Glycol 3350 (MIRALAX PO) Take by mouth as needed    Historical Provider, MD   VITAMIN D PO Take by mouth    Historical Provider, MD   Multiple Vitamins-Minerals (PRESERVISION AREDS 2 PO) Take 1 tablet by mouth 2 times daily    Historical Provider, MD     PastMedical History:  Petrona Scott  has a past medical history of Atrial fibrillation ([address] Back pain, Hernia, Hx of blood clots, Hyperlipidemia, Hypertension, Osteoarthritis, Prostate cancer [facility]), Prostate cancer [facility]), Pulmonary emboli ([facility] [address] and Wears glasses. Past Surgical History:  The patient  has a past surgical history that includes back surgery (9055,5350); Total shoulder arthroplasty (2005); Prostate surgery (2003); Prostate surgery (2009); Prostate Biopsy (7-); Cataract removal with implant (Left, 11/2014); Prostate Biopsy (8/21/2015); hernia repair (1995, 2009); other surgical history (10/7/2015); other surgical history (Bilateral, 09/05/2017); Nerve Block Lumb Facet Level 1 Bilateral (Bilateral, 9/5/2017); hernia repair (8809'J?); Cataract removal (Right, 06/2001); Appendectomy (01/1961); hernia repair (Left, 11/22/2017); other surgical history (Right, 1980's); other surgical history (01/30/2018); pr inject si joint arthrgrphy&/anes/steroid w/image (Left, 1/30/2018); other surgical history (Left, 02/27/2018); pr inject si joint arthrgrphy&/anes/steroid w/image (Left, 2/27/2018); Nerve Surgery (Left, 04/09/2018); pr office/outpt visit,procedure only (Left, 4/9/2018); pr office/outpt visit,procedure only (N/A, 8/21/2018); pr implant neurostim/ (N/A, 9/17/2018); eye surgery; and Carpal tunnel release (Right). Allergies: The patient is allergic to nitrofuran derivatives and nsaids. Family History: This patient's family history includes Cancer in his brother, brother, and sister; Heart Disease in his father; Prostate Cancer in his brother, brother, brother, and brother; Stroke in his mother. Social History:  Shelby Santoro  reports that he quit smoking about 57 years ago. His smoking use included cigarettes. He has a 10.00 pack-year smoking history. He has never used smokeless tobacco. He reports current alcohol use of about 1.0 standard drink per week. He reports that he does not use drugs. ROS:  Constitutional: Negative for activity change, chills, fatigue, fever and unexpected weight change.    HENT: Negative for congestion, facial swelling, sore throat, and changes in voice.    Eyes: Negative for photophobia, redness, itching and visual disturbance. Respiratory: Negative for apnea, choking, shortness of breath, wheezing and stridor. Cardiovascular: Negative for chest pain, palpitations and leg swelling. Gastrointestinal: Negative for abdominal distention, constipation, nausea and vomiting. Endocrine: Negative for cold intolerance, heat intolerance, polyphagia and polyuria. Musculoskeletal: Negative for arthralgias, gait problem, and myalgias. Skin: Negative for color change, rash and wound. Allergic/Immunologic: Negative for food allergies and immunocompromised state. Neurological: Negative for dizziness, tremors, speech difficulty, weakness, numbness and headaches. Hematological: Negative for adenopathy. Does not bruise/bleed easily. Psychiatric/Behavioral: Negative for agitation, confusion, and dysphoric mood. Physical Exam:   General appearance:  No apparent distress, appears stated age and cooperative. HEENT:  Normal cephalic, atraumatic without obvious deformity. Conjunctivae/corneas clear. Neck: Supple, with full range of motion. No jugular venous distention. Trachea midline. Respiratory:  Normal respiratory effort. Clear to auscultation, bilaterally without rales/wheezes/rhonchi  Cardiovascular:  Regular rate and rhythm with normal S1/S2 without murmurs, rubs or gallops. Abdomen: Soft, non-tender, non-distended with normal bowel sounds. Musculoskeletal:  No clubbing, cyanosis or edema bilaterally. Full range of motion without deformity. Skin: Skin color, texture, turgor normal.  No rashes or lesions. Neurologic:  Neurovascularly intact without any focal sensory/motor deficits. Psychiatric:  Alert and oriented, thought content appropriate, normal insight.   Capillary Refill: Brisk,< 3 seconds   Peripheral Pulses: +2 palpable, equal bilaterally      Assessment:      Patient Active Problem List   Diagnosis    Prostate cancer    Generalized anxiety disorder    Essential hypertension    Hyperlipidemia with target LDL less than 130    Back pain, chronic s/p surgery    History of pulmonary embolus (PE)    Spondylosis of lumbar region without myelopathy or radiculopathy    Persistent atrial fibrillation ([address]- newely DXed 10/12/17- CVR    Non-rheumatic mitral regurgitation- mod to severe    Moderate aortic regurgitation    Failed back syndrome of lumbar spine    Chronic pain disorder    Pneumothorax    Constipation    Ascending aortic aneurysm (HCC) 4.1 cm on CTA and 4.5 on echo    Nonexudative age-related macular degeneration    Secondary pigmentary retinal degeneration    Venous retinal branch occlusion    PAF (paroxysmal atrial fibrillation) (HCC)    Moderate mitral regurgitation   Critical left internal carotid artery stenosis. Severe dizziness, which is not due to the carotid disease. Plan: 11/21/22  Left Carotid endarterectomy. I had a discussion in the office with the patient about his diagnosis, the expected procedure involved, and all risks/benefits/alternatives and the possible complications. After this discussion, the patient understood all risks and wished to proceed.    The plan of care was discussed in detail with  Dr. Joao Maya    Electronically signed by Eloisa Robison MD on 11/21/2022 at 8:15 AM

## 2022-11-21 NOTE — ANESTHESIA POSTPROCEDURE EVALUATION
Department of Anesthesiology  Postprocedure Note    Patient: Julia Sampson  MRN: [id number]  YOB: 1934  Date of evaluation: 11/21/2022      Procedure Summary     Date: 11/21/22 Room / Location: [address] [place]    Anesthesia Start: 2766 Anesthesia Stop: 3480    Procedure: LEFT CAROTID ENDARTERECTOMY (Left) Diagnosis:       Stenosis of left carotid artery      (Stenosis of left carotid artery [I65.22])    Surgeons: Alessandra Schmitt MD Responsible Provider: Cinda Munguia DO    Anesthesia Type: general ASA Status: 3          Anesthesia Type: No value filed.     Kelsey Phase I: Kelsey Score: 9    Kelsey Phase II:        Anesthesia Post Evaluation    Patient location during evaluation: PACU  Patient participation: complete - patient participated  Level of consciousness: awake  Airway patency: patent  Nausea & Vomiting: no nausea  Complications: no  Cardiovascular status: hemodynamically stable  Respiratory status: acceptable  Hydration status: stable

## 2022-11-21 NOTE — PROGRESS NOTES
Patient arrived to OR with no personal clothing, hearing aids, dentures/partials, or glasses. Patient did wear personal wedding ring into OR.

## 2022-11-22 VITALS
HEIGHT: 68 IN | HEART RATE: 77 BPM | DIASTOLIC BLOOD PRESSURE: 63 MMHG | BODY MASS INDEX: 22.82 KG/M2 | RESPIRATION RATE: 18 BRPM | OXYGEN SATURATION: 91 % | WEIGHT: 150.57 LBS | TEMPERATURE: 98.3 F | SYSTOLIC BLOOD PRESSURE: 124 MMHG

## 2022-11-22 LAB
ANION GAP SERPL CALCULATED.3IONS-SCNC: 12 MEQ/L (ref 8–16)
BUN BLDV-MCNC: 31 MG/DL (ref 7–22)
CALCIUM SERPL-MCNC: 8 MG/DL (ref 8.5–10.5)
CHLORIDE BLD-SCNC: 104 MEQ/L (ref 98–111)
CO2: 20 MEQ/L (ref 23–33)
CREAT SERPL-MCNC: 1.3 MG/DL (ref 0.4–1.2)
ERYTHROCYTE [DISTWIDTH] IN BLOOD BY AUTOMATED COUNT: 13.3 % (ref 11.5–14.5)
ERYTHROCYTE [DISTWIDTH] IN BLOOD BY AUTOMATED COUNT: 49.1 FL (ref 35–45)
GFR SERPL CREATININE-BSD FRML MDRD: 53 ML/MIN/1.73M2
GLUCOSE BLD-MCNC: 152 MG/DL (ref 70–108)
HCT VFR BLD CALC: 36.3 % (ref 42–52)
HEMOGLOBIN: 12 GM/DL (ref 14–18)
MCH RBC QN AUTO: 33.1 PG (ref 26–33)
MCHC RBC AUTO-ENTMCNC: 33.1 GM/DL (ref 32.2–35.5)
MCV RBC AUTO: 100 FL (ref 80–94)
PLATELET # BLD: 153 THOU/MM3 (ref 130–400)
PMV BLD AUTO: 11.2 FL (ref 9.4–12.4)
POTASSIUM SERPL-SCNC: 4.9 MEQ/L (ref 3.5–5.2)
RBC # BLD: 3.63 MILL/MM3 (ref 4.7–6.1)
SODIUM BLD-SCNC: 136 MEQ/L (ref 135–145)
WBC # BLD: 15.1 THOU/MM3 (ref 4.8–10.8)

## 2022-11-22 PROCEDURE — 85027 COMPLETE CBC AUTOMATED: CPT

## 2022-11-22 PROCEDURE — 80048 BASIC METABOLIC PNL TOTAL CA: CPT

## 2022-11-22 PROCEDURE — 36415 COLL VENOUS BLD VENIPUNCTURE: CPT

## 2022-11-22 PROCEDURE — 6370000000 HC RX 637 (ALT 250 FOR IP): Performed by: THORACIC SURGERY (CARDIOTHORACIC VASCULAR SURGERY)

## 2022-11-22 PROCEDURE — APPSS60 APP SPLIT SHARED TIME 46-60 MINUTES: Performed by: PHYSICIAN ASSISTANT

## 2022-11-22 RX ORDER — ASPIRIN 81 MG/1
81 TABLET ORAL DAILY
Qty: 30 TABLET | Refills: 3 | Status: SHIPPED | OUTPATIENT
Start: 2022-11-22

## 2022-11-22 RX ORDER — OXYCODONE HYDROCHLORIDE 5 MG/1
5 TABLET ORAL EVERY 8 HOURS PRN
Qty: 15 TABLET | Refills: 0 | Status: SHIPPED
Start: 2022-11-22 | End: 2022-11-27

## 2022-11-22 RX ORDER — OXYCODONE HYDROCHLORIDE 5 MG/1
5 TABLET ORAL EVERY 8 HOURS PRN
Qty: 15 TABLET | Refills: 0 | Status: SHIPPED | OUTPATIENT
Start: 2022-11-22 | End: 2022-11-22 | Stop reason: SDUPTHER

## 2022-11-22 RX ADMIN — METOPROLOL TARTRATE 12.5 MG: 25 TABLET, FILM COATED ORAL at 10:40

## 2022-11-22 RX ADMIN — OXYCODONE 5 MG: 5 TABLET ORAL at 10:40

## 2022-11-22 RX ADMIN — AMLODIPINE BESYLATE 5 MG: 5 TABLET ORAL at 10:40

## 2022-11-22 RX ADMIN — ASPIRIN 81 MG: 81 TABLET, COATED ORAL at 10:40

## 2022-11-22 ASSESSMENT — PAIN DESCRIPTION - LOCATION
LOCATION: BACK
LOCATION: BACK

## 2022-11-22 ASSESSMENT — PAIN DESCRIPTION - DIRECTION: RADIATING_TOWARDS: LEFT LEG

## 2022-11-22 ASSESSMENT — PAIN SCALES - GENERAL
PAINLEVEL_OUTOF10: 3
PAINLEVEL_OUTOF10: 5
PAINLEVEL_OUTOF10: 2

## 2022-11-22 ASSESSMENT — PAIN DESCRIPTION - ORIENTATION
ORIENTATION: LOWER
ORIENTATION: LOWER

## 2022-11-22 ASSESSMENT — PAIN DESCRIPTION - FREQUENCY: FREQUENCY: CONTINUOUS

## 2022-11-22 ASSESSMENT — PAIN DESCRIPTION - PAIN TYPE: TYPE: CHRONIC PAIN

## 2022-11-22 ASSESSMENT — PAIN DESCRIPTION - ONSET: ONSET: ON-GOING

## 2022-11-22 ASSESSMENT — PAIN DESCRIPTION - DESCRIPTORS
DESCRIPTORS: ACHING;DISCOMFORT
DESCRIPTORS: ACHING

## 2022-11-22 ASSESSMENT — PAIN - FUNCTIONAL ASSESSMENT
PAIN_FUNCTIONAL_ASSESSMENT: ACTIVITIES ARE NOT PREVENTED
PAIN_FUNCTIONAL_ASSESSMENT: ACTIVITIES ARE NOT PREVENTED

## 2022-11-22 NOTE — PROGRESS NOTES
CLINICAL PHARMACY: DISCHARGE MED RECONCILIATION/REVIEW    [facility] ([facility]) Select Patient?: Yes  Total # of Interventions Recommended: 0     Total # Interventions Accepted: 0  Intervention Severity:   - Level 1 Intervention Present?: No   - Level 2 #: 0   - Level 3 #: 0   Time Spent (min): 15      Maple Hardy (Graciela) , PGY1 Pharmacy Resident 11/22/2022 10:29 AM

## 2022-11-22 NOTE — PROGRESS NOTES
Spoke with pt on phone. He would rather have these Rxs sent to the [place] so he can receive at no charge. Pt states he also has some at home. Tried reaching [employer] @[phone], no answer. Will try back, but [facility].     Melida Ford, Outpatient Pharmacy 11/22/2022,9:05 AM

## 2022-11-22 NOTE — CARE COORDINATION
Case Management Assessment  Initial Evaluation    Date/Time of Evaluation: 11/22/2022 11:29 AM  Assessment Completed by: Marlen Cox RN    If patient is discharged prior to next notation, then this note serves as note for discharge by case management. Patient Name: Taryn Starks                   YOB: 1934  Diagnosis: Stenosis of left carotid artery [I65.22]                   Date / Time: 11/21/2022  9:27 AM    Patient Admission Status: Inpatient     Current PCP: Murtaza Chung MD  PCP verified by CM? Yes    Chart Reviewed: Yes      Patient Orientation: Alert and Oriented    Patient Cognition: Alert  History Provided by: Patient    Hospitalization in the last 30 days (Readmission):  No    If yes, Readmission Assessment in  Navigator will be completed. Advance Directives:     Code Status: Full Code     Primary Decision Maker: [facility] - [phone]    Secondary Decision Maker: Betzy Boyce [facility] [phone]    Discharge Planning  Patient lives with: alone Type of Home: House   Primary Caregiver: Self  Patient Support Systems include: Family Members   Current Financial resources: Medicare  Current community resources:  (none)  Current services prior to admission: None   Type of Home Care services:  None    ADLS  Prior functional level: Independent in ADLs/IADLs  Current functional level: Independent in ADLs/IADLs      Family can provide assistance at DC: Yes  Would you like Case Management to discuss the discharge plan with any other family members/significant others, and if so, who?  No  Plans to Return to Present Housing: Yes  Other Identified Issues/Barriers to RETURNING to current housing: yes  Potential Assistance needed at discharge: N/A  Patient expects to discharge to: [address] [place] [address] for transportation at discharge: Family    Financial  Payor: MEDICARE / Plan: MEDICARE PART A AND B / Product Type: *No Product type* /     Does insurance require precert for SNF: No    Potential assistance Purchasing Medications: No  Meds-to-Beds request: No      [facility] TIMOTHY Jim Gregoryjannikia  [address] [place] [address],[facility] [address] [address]  Phone: [phone] Fax: [address] [facility] [address] [phone] Martinez Terrell [phone] [address] [place]  Phone: [phone] Fax: [phone]      Factors facilitating achievement of predicted outcomes: Family support, Motivated, Cooperative, Pleasant, and Sense of humor    Barriers to discharge: none    Additional Case Management Notes:   LCE POD 1    Procedure: see above    The Plan for Transition of Care is related to the following treatment goals of Stenosis of left carotid artery [I65.22]    Patient Goals/Plan/Treatment Preferences: denied needs as plans home alone independently as PTA when medically cleared  Transportation/Food Security/Housekeeping Addressed: No issues identified.      Jess Mckeon RN  Case Management Department

## 2022-11-22 NOTE — DISCHARGE INSTR - ACTIVITY
? Slowly increase your activity after you go home. Pace yourself and listen to your body. ? It will take 2 to 3 weeks to feel like you did before surgery in terms of energy and strength. ?        Do not use arms to get up from a seated position. Instead, rock in your chair to give yourself momentum to sit up.

## 2022-11-22 NOTE — PROGRESS NOTES
AVS reviewed with patient and his son, Josephine Alarcon. Questions answered. Medications clarified. Care of incisions reviewed.

## 2022-11-22 NOTE — DISCHARGE SUMMARY
CT/CV Surgery Discharge Summary     Pt Name: Elana Escobar  MRN: [id number]  YOB: 1934  Primary Care Physician: Jean-Pierre Sal MD    Admit date:  11/21/2022  9:27 AM     Discharge date:  11/22/22     Disposition: Home    Admitting Diagnosis: Critical Stenosis of left carotid artery [I65.22]  Atrial Fibrillation  HTN  Dizziness    Discharge Diagnosis: Critical Stenosis of left carotid artery [I65.22]  Atrial Fibrillation  HTN  Dizziness    Condition: Stable    Problem List:   Patient Active Problem List   Diagnosis Code    Prostate cancer C61    Generalized anxiety disorder F41.1    Essential hypertension I10    Hyperlipidemia with target LDL less than 130 E78.5    Back pain, chronic s/p surgery M54.9, G89.29    History of pulmonary embolus (PE) Z86.711    Spondylosis of lumbar region without myelopathy or radiculopathy M47.816    Persistent atrial fibrillation ([address]- newely DXed 10/12/17- CVR I48.19    Non-rheumatic mitral regurgitation- mod to severe I34.0    Moderate aortic regurgitation I35.1    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    Chronic pain disorder G89.4    Pneumothorax J93.9    Constipation K59.00    Ascending aortic aneurysm (HCC) 4.1 cm on CTA and 4.5 on echo I71.21    Nonexudative age-related macular degeneration H35.3190    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    Venous retinal branch occlusion V68.2844    PAF (paroxysmal atrial fibrillation) (HCC) I48.0    Moderate mitral regurgitation I34.0    Stenosis of left carotid artery I65.22    S/P carotid endarterectomy Z98.890       Procedures:  11/21/22  LEFT CAROTID ENDARTERECTOMY     Reason for Admission: \"80year old male with PMHx of paroxysmal atrial fibrillation on Eliquis, dizziness, scoliosis, chronic back pain and HTN who presents to clinic for evaluation of left sided critical carotid stenosis. He is accompanied by his son, who is a [employer] . He is a good historian. He states that he has been having chronic severe dizziness that is worse in the mornings when he arises, and better after 1-2 hours, and certainly by noon. He has no nausea/vomiting, syncope, history of TIA's or CVA's. He does have some chronic back pain and due to his back issues and unsteadiness, he is not walking well. Carotid Duplex:  Left ICA with >70% stenosis; left ICA velocities >300/92.4 cm/sec  Verterbrals antegrade and patent     CT angiogram of the neck:  ~90% left proximal ICA  50% left bulbar stenosis  Moderate stenosis at the takeoffs of the bilateral vertebral arteries. 4.1 cm ascending aortic aneurysm,\" per Dr. Unice Dakin H&P. Hospital Course: Following an uncomplicated Lt CEA with patch angioplasty, the patient had an unremarkable and progressive convalescence without adverse events. Pt's tongue is midline, smile is appropriate, eating ok and no neurological deficiency present. At time of discharge, Shelby Santoro was alert, tolerating a regular diet, ambulating on his own accord and had adequate analgesia on oral pain medications, and had no signs of any complications. DISCHARGE INSTRUCTIONS:    Discharge Vitals:  height is 5' 8\" (1.727 m) and weight is 150 lb 9.2 oz (68.3 kg). His oral temperature is 98 °F (36.7 °C). His blood pressure is 127/67 and his pulse is 73. His respiration is 22 and oxygen saturation is 94%. Discharge Medications:         Medication List        START taking these medications      aspirin 81 MG EC tablet  Take 1 tablet by mouth daily     oxyCODONE 5 MG immediate release tablet  Commonly known as: ROXICODONE  Take 1 tablet by mouth every 8 hours as needed for Pain for up to 5 days.             CONTINUE taking these medications      amLODIPine 5 MG tablet  Commonly known as: NORVASC  Take 1 tablet by mouth in the morning and at bedtime     apixaban 5 MG Tabs tablet  Commonly known as: ELIQUIS  Take 1 tablet by mouth 2 times daily  Start taking on: November 23, 2022     Handicap Placard Misc  by Does not apply route Exp 7/13/2027     melatonin 5 MG Tabs tablet     METAMUCIL PO     metoprolol tartrate 25 MG tablet  Commonly known as: LOPRESSOR  Take 0.5 tablets by mouth every morning     MIRALAX PO     nystatin 995221 UNIT/GM cream  Commonly known as: MYCOSTATIN  Apply topically 2 times daily. PRESERVISION AREDS 2 PO     PROBIOTIC-10 PO     VITAMIN D PO               Where to Get Your Medications        These medications were sent to [facility] , [address] [facility] [address] [facility]  [address]  [address], [facility] [place] [address]      Phone: [phone]   aspirin 81 MG EC tablet       Information about where to get these medications is not yet available    Ask your nurse or doctor about these medications  apixaban 5 MG Tabs tablet  oxyCODONE 5 MG immediate release tablet       EMERGENCIES   ? You should either call 911 or go to the Emergency Room to be evaluated if you need seen immediately   ? Sudden, severe shortness of breath go to the Emergency Room    If you have questions regarding these instructions, please call our office  [phone]. We have an answering service 24 hours a day to get your calls to the ON Call Physician, but we are often in surgery and it takes us awhile to get back to you.    ? BRING YOUR MEDICATION LIST and medications even over the counter medications to all your follow up apointments. ? Office Location:   [address], [facility], [place]ina [place]efer     Follow-up:    1. Follow up with Follow up with: YOSELYN Sheth in 2 weeks. 2. The pt was agreeable to discharge and future plan of care. All questions were thoroughly answered.        Deandra Guzman PA-C   Electronincally signed 11/22/2022 at 9:35 AM    CC: Susan Ozuna MD

## 2022-11-22 NOTE — DISCHARGE INSTRUCTIONS
Discharge Instructions Following Vascular Surgery for  [facility] Cardiothoracic and Vascular Surgery  Pt Name: Aishwarya Rodriguez  Medical Record Number: [id number]  Today's Date: 11/22/2022    ACTIVITY/EXERCISE   ? Slowly increase your activity after you go home. Pace yourself and listen to your body. ? It will take 2 to 3 weeks to feel like you did before surgery in terms of energy and strength. ?        Do not use arms to get up from a seated position. Instead, rock in your chair to give yourself momentum to sit up. APPETITE   ? Your appetite might be decreased at first.  It should slowly improve. ? Small, frequent meals may help. ? The dietitian will assist you with a low fat, low cholesterol, low salt diet. PAIN   ? You may have pain at the incision. Take your pain medications as ordered. INCISIONS  ? Warning signs of infection: redness, warmth to touch, green colored pus, tender to touch. Notify surgeon office right away if any warnings occur. ? Clean your incisions twice daily with soap. Ok to shower daily and do not bathe for the first month following procedure. SMOKING   If you smoke, STOP. Smoking will cause early graft closure, other blockages, new heart attacks, and possibly even death. CALL YOUR SURGEON IF YOU HAVE:   ? Fever over 101° F.   ? Persistent nausea or vomiting        ? Increasing shortness of breath   ? Pain unrelieved by prescribed medication    OFFICE VISIT   ? You should have a follow up appointment in the office in about 1-2 weeks after discharge from the hospital.   ?   You may call the office to make an appointment, if you don't have an appointment. ([phone]). ?   Bring any questions you have so they may be addressed. ? BRING YOUR MEDICATION LIST and medications even over the counter medications to all your follow up apointments. ?    Office Location:   [address] [facility], [facility] AM, [address] EMERGENCIES  ? You should either call 911 or go to the Emergency Room to be evaluated if you need seen immediately. ?         Sudden, severe shortness of breath go to the Emergency Room. If you have questions regarding these instructions, please call our office  [phone]. We have an answering service 24 hours a day to get your calls to the ON Call Physician, but we are often in surgery and it takes us awhile to get back to you.

## 2022-11-22 NOTE — CARE COORDINATION
11/22/22 1126   Service Assessment   Patient Orientation Alert and Oriented   Cognition Alert   History Provided By Patient   Primary [address] Family Members   Patient's Healthcare Decision Maker is: Legal Next of Michelle Chen   PCP Verified by CM Yes   Last Visit to PCP   (unknown)   Prior Functional Level Independent in ADLs/IADLs   Current Functional Level Independent in ADLs/IADLs   Can patient return to prior living arrangement Yes   Ability to make needs known: Good   Family able to assist with home care needs: Yes   Would you like for me to discuss the discharge plan with any other family members/significant others, and if so, who? No   Financial Resources [employer]   (none)   Social/Functional History   Lives With Spouse   Type of Home House   Bathroom Shower/Tub Tub/Shower unit   Bathroom Toilet Standard   Bathroom Accessibility Accessible   Receives Help From Cecil Rahman.   Homemaking Responsibilities Yes   Active  Yes   Mode of Transportation Car   Discharge Planning   Type of [address] Prior To Admission None   Potential Assistance Needed N/A   DME Ordered? No   Potential Assistance Purchasing Medications No   Type of Home Care Services None   Patient expects to be discharged to: House   Follow Up Appointment: Best Day/Time    (either)   Services At/After Discharge   Transition of Care Consult (CM Consult) Other  (none)   [address] Provided?  No   Confirm Follow Up Transport Family   Condition of Participation: Discharge Planning   The Plan for Transition of Care is related to the following treatment goals: LCE treatment   Freedom of Choice list was provided with basic dialogue that supports the patient's individualized plan of care/goals, treatment preferences, and shares the quality data associated with the providers?   No

## 2022-11-22 NOTE — PLAN OF CARE
Problem: Discharge Planning  Goal: Discharge to home or other facility with appropriate resources  Outcome: Progressing  Flowsheets (Taken 11/21/2022 2030)  Discharge to home or other facility with appropriate resources:   Identify barriers to discharge with patient and caregiver   Identify discharge learning needs (meds, wound care, etc)     Problem: Pain  Goal: Verbalizes/displays adequate comfort level or baseline comfort level  Outcome: Progressing  Flowsheets (Taken 11/21/2022 2315)  Verbalizes/displays adequate comfort level or baseline comfort level:   Encourage patient to monitor pain and request assistance   Assess pain using appropriate pain scale   Administer analgesics based on type and severity of pain and evaluate response   Implement non-pharmacological measures as appropriate and evaluate response   Consider cultural and social influences on pain and pain management     Problem: Safety - Adult  Goal: Free from fall injury  Outcome: Progressing  Flowsheets (Taken 11/22/2022 0207)  Free From Fall Injury: Instruct family/caregiver on patient safety     Problem: ABCDS Injury Assessment  Goal: Absence of physical injury  Outcome: Progressing  Flowsheets (Taken 11/22/2022 0207)  Absence of Physical Injury: Implement safety measures based on patient assessment     Problem: Skin/Tissue Integrity  Goal: Absence of new skin breakdown  Description: 1. Monitor for areas of redness and/or skin breakdown  2. Assess vascular access sites hourly  3. Every 4-6 hours minimum:  Change oxygen saturation probe site  4. Every 4-6 hours:  If on nasal continuous positive airway pressure, respiratory therapy assess nares and determine need for appliance change or resting period.   Outcome: Progressing     Problem: Genitourinary - Adult  Goal: Absence of urinary retention  Outcome: Progressing  Flowsheets (Taken 11/22/2022 0207)  Absence of urinary retention:   Assess patients ability to void and empty bladder   Monitor intake/output and perform bladder scan as needed  Goal: Urinary catheter remains patent  Outcome: Progressing  Flowsheets (Taken 11/22/2022 0207)  Urinary catheter remains patent: Assess patency of urinary catheter   Care plan reviewed with patient. Patient verbalized understanding of the plan of care and contributed to goal setting.

## 2022-11-23 ENCOUNTER — CARE COORDINATION (OUTPATIENT)
Dept: CASE MANAGEMENT | Age: 87
End: 2022-11-23

## 2022-11-23 DIAGNOSIS — Z98.890 S/P CAROTID ENDARTERECTOMY: Primary | ICD-10-CM

## 2022-11-23 PROCEDURE — 1111F DSCHRG MED/CURRENT MED MERGE: CPT | Performed by: FAMILY MEDICINE

## 2022-11-23 NOTE — CARE COORDINATION
[facility] Care Transitions Initial Follow Up Call    Call within 2 business days of discharge: Yes    Care Transition Nurse contacted the patient by telephone to perform post hospital discharge assessment. Verified name and  with patient as identifiers. Provided introduction to self, and explanation of the Care Transition Nurse role. Patient: Moustapha Barroso Patient : 1934   MRN: [id number]  Reason for Admission: S/P carotid endarterectomy  Discharge Date: 22 RARS: Readmission Risk Score: 17.2      Last Discharge  Street       Date Complaint Diagnosis Description Type Department Provider    22  S/P carotid endarterectomy . .. Admission (Discharged) Kenia Knox MD            Challenges to be reviewed by the provider   Additional needs identified to be addressed with provider: No  none               Method of communication with provider: none. Spoke with Allison Mason, said he feels pretty good. Denies dizziness, pain, fever. Using a cane to walk, just takes it slow. Reviewed medications/changes, taking as directed. Did have to take pain med during the night but none today. Appetite and fluid intake is good, wants to eat more but has a sore area on the left side of his tongue. Will keep f/u with PCP , since was not in hospital for an illness but a planned procedure. Will see surgeon . Family transport. Denies any needs. No other questions or concerns at this time. Will continue to follow. Care Transition Nurse reviewed discharge instructions, medical action plan, and red flags with patient who verbalized understanding. The patient was given an opportunity to ask questions and does not have any further questions or concerns at this time. Were discharge instructions available to patient? Yes. Reviewed appropriate site of care based on symptoms and resources available to patient including: PCP  Specialist  Urgent care clinics  When to call 911.  The patient agrees to contact the PCP office for questions related to their healthcare. Advance Care Planning:   Does patient have an Advance Directive: reviewed and current. Medication reconciliation was performed with patient, who verbalizes understanding of administration of home medications. Medications reviewed, 1111F entered: yes    Was patient discharged with a pulse oximeter? no    Non-face-to-face services provided:  Scheduled appointment with PCP-12/5  Scheduled appointment with Specialist-12/6 12/14  Obtained and reviewed discharge summary and/or continuity of care documents    Offered patient enrollment in the Remote Patient Monitoring (RPM) program for in-home monitoring: NA.    Care Transitions 24 Hour Call    Schedule Follow Up Appointment with PCP: Completed  Do you have a copy of your discharge instructions?: Yes  Do you have all of your prescriptions and are they filled?: No  Have you been contacted by a [facility] Pharmacist?: No  Have you scheduled your follow up appointment?: Yes  How are you going to get to your appointment?: Car - family or friend to transport  Patient DME: Straight cane, Wheelchair, Other, Shower chair  Other Patient DME: grab bars. Has a ramp in garage. Do you have support at home?: Alone  Do you feel like you have everything you need to keep you well at home?: Yes  Are you an active caregiver in your home?: No  Care Transitions Interventions     Transportation Support: Declined            Follow Up  Future Appointments   Date Time Provider Yonas Boyer   12/5/2022  1:30 PM Dara Bear MD [facility] [place]   12/6/2022 10:30 AM Taco Gonzalez SRPX CT/CV [facility] [place]   12/14/2022 10:30 AM SONIDO Cooley - CNP [address] [place] [facility] [place]   1/23/2023  1:30 PM MD Tad Casas [facility] Transition Nurse provided contact information. Plan for follow-up call in 5-7 days based on severity of symptoms and risk factors.   Plan for next call: symptom management-S/P carotid endarterectomy  self management-dizziness?, tongue better?     Ray Perez RN

## 2022-11-29 ENCOUNTER — HOSPITAL ENCOUNTER (OUTPATIENT)
Dept: CT IMAGING | Age: 87
Discharge: HOME | End: 2022-11-29
Payer: MEDICARE

## 2022-11-29 ENCOUNTER — OFFICE VISIT (OUTPATIENT)
Dept: FAMILY MEDICINE CLINIC | Age: 87
End: 2022-11-29
Payer: MEDICARE

## 2022-11-29 VITALS
HEART RATE: 78 BPM | OXYGEN SATURATION: 98 % | SYSTOLIC BLOOD PRESSURE: 122 MMHG | BODY MASS INDEX: 22.82 KG/M2 | HEIGHT: 68 IN | RESPIRATION RATE: 22 BRPM | WEIGHT: 150.57 LBS | DIASTOLIC BLOOD PRESSURE: 64 MMHG

## 2022-11-29 DIAGNOSIS — R29.810 FACIAL DROOP: ICD-10-CM

## 2022-11-29 DIAGNOSIS — Z98.890 S/P CAROTID ENDARTERECTOMY: ICD-10-CM

## 2022-11-29 DIAGNOSIS — R51.9 ACUTE NONINTRACTABLE HEADACHE, UNSPECIFIED HEADACHE TYPE: ICD-10-CM

## 2022-11-29 DIAGNOSIS — R29.810 FACIAL DROOP: Primary | ICD-10-CM

## 2022-11-29 PROCEDURE — G1010 CDSM STANSON: HCPCS

## 2022-11-29 PROCEDURE — 6360000004 HC RX CONTRAST MEDICATION: Performed by: NURSE PRACTITIONER

## 2022-11-29 PROCEDURE — 1123F ACP DISCUSS/DSCN MKR DOCD: CPT | Performed by: NURSE PRACTITIONER

## 2022-11-29 PROCEDURE — G8484 FLU IMMUNIZE NO ADMIN: HCPCS | Performed by: NURSE PRACTITIONER

## 2022-11-29 PROCEDURE — G8420 CALC BMI NORM PARAMETERS: HCPCS | Performed by: NURSE PRACTITIONER

## 2022-11-29 PROCEDURE — 1036F TOBACCO NON-USER: CPT | Performed by: NURSE PRACTITIONER

## 2022-11-29 PROCEDURE — G8427 DOCREV CUR MEDS BY ELIG CLIN: HCPCS | Performed by: NURSE PRACTITIONER

## 2022-11-29 PROCEDURE — 70498 CT ANGIOGRAPHY NECK: CPT

## 2022-11-29 PROCEDURE — 1111F DSCHRG MED/CURRENT MED MERGE: CPT | Performed by: NURSE PRACTITIONER

## 2022-11-29 PROCEDURE — 99213 OFFICE O/P EST LOW 20 MIN: CPT | Performed by: NURSE PRACTITIONER

## 2022-11-29 RX ADMIN — IOPAMIDOL 100 ML: 755 INJECTION, SOLUTION INTRAVENOUS at 13:15

## 2022-11-29 ASSESSMENT — ENCOUNTER SYMPTOMS
SORE THROAT: 0
BACK PAIN: 0
SINUS PRESSURE: 0
DIARRHEA: 0
VOMITING: 0
NAUSEA: 0
WHEEZING: 0
CONSTIPATION: 0
ABDOMINAL PAIN: 0
COUGH: 0
STRIDOR: 0
COLOR CHANGE: 0
ABDOMINAL DISTENTION: 0
SHORTNESS OF BREATH: 0
CHEST TIGHTNESS: 0

## 2022-11-29 NOTE — PROGRESS NOTES
Taniya Carlson (:  1934) is a 80 y.o. male,Established patient, here for evaluation of the following chief complaint(s): Other (Wanted incision checked )         ASSESSMENT/PLAN:  1. Facial droop  -     CTA HEAD W WO CONTRAST; Future  -     CT HEAD WO CONTRAST; Future  2. Acute nonintractable headache, unspecified headache type  -     CTA HEAD W WO CONTRAST; Future  -     CT HEAD WO CONTRAST; Future  3. S/P carotid endarterectomy  -     CTA HEAD W WO CONTRAST; Future  -     CT HEAD WO CONTRAST; Future    At this time I did go ahead and order a CTA of the head as well as a CT head without contrast.  He will go get this done today. We will call him with results and further plan of care. Testing scheduled for  at [facility]. No follow-ups on file. Subjective   SUBJECTIVE/OBJECTIVE:  HPI    This is a 80year old gentleman who recently underwent a carotid endarterectomy. Shortly after his procedure he did have trouble talking and family noticed some issues with his face. He does have some numbness on his left side of his face. He does have some facial droop. His tongue does deviate to the left side. He does have a headache. He presents today for evaluation of the symptoms. His carotid endart was on the left side as well. He does ambulate with a cane. No changes. He does have good handgrip bilaterally. Review of Systems   Constitutional:  Negative for activity change, appetite change, chills, diaphoresis, fatigue, fever and unexpected weight change. HENT:  Negative for congestion, ear pain, postnasal drip, sinus pressure and sore throat. Eyes:  Negative for visual disturbance. Respiratory:  Negative for cough, chest tightness, shortness of breath, wheezing and stridor. Cardiovascular:  Negative for chest pain, palpitations and leg swelling. Gastrointestinal:  Negative for abdominal distention, abdominal pain, constipation, diarrhea, nausea and vomiting. Endocrine: Negative for polydipsia, polyphagia and polyuria. Genitourinary:  Negative for decreased urine volume, difficulty urinating, dysuria, flank pain, frequency, hematuria and urgency. Musculoskeletal:  Negative for arthralgias, back pain, gait problem, joint swelling, myalgias and neck pain. Skin:  Negative for color change, pallor and rash. Neurological:  Positive for weakness and headaches. Negative for dizziness, syncope, light-headedness and numbness. Left facial droop. Tongue deviates to left side. Hard time talking. Hematological:  Negative for adenopathy. Psychiatric/Behavioral:  Negative for behavioral problems, self-injury and sleep disturbance. The patient is not nervous/anxious. Objective   Physical Exam  Vitals reviewed. Constitutional:       General: He is not in acute distress. Appearance: Normal appearance. He is well-developed. HENT:      Head: Normocephalic. Right Ear: External ear normal.      Left Ear: External ear normal.      Nose: Nose normal.      Right Sinus: No maxillary sinus tenderness. Left Sinus: No maxillary sinus tenderness. Mouth/Throat:      Pharynx: Uvula midline. Neck:      Trachea: Trachea normal.   Cardiovascular:      Rate and Rhythm: Normal rate and regular rhythm. Heart sounds: Normal heart sounds. No murmur heard. Pulmonary:      Effort: Pulmonary effort is normal. No respiratory distress. Breath sounds: Normal breath sounds. No decreased breath sounds, wheezing, rhonchi or rales. Chest:      Chest wall: No tenderness. Abdominal:      General: There is no distension. Palpations: Abdomen is soft. There is no mass. Tenderness: There is no abdominal tenderness. Musculoskeletal:         General: No tenderness or deformity. Normal range of motion. Cervical back: Normal range of motion and neck supple. Comments: Hand grasp equal and strong.    Lymphadenopathy:      Cervical: No cervical adenopathy. Skin:     General: Skin is warm and dry. Comments: Surgical wound left neck. No sign of infection. Neurological:      Mental Status: He is alert and oriented to person, place, and time. Motor: No abnormal muscle tone. Coordination: Coordination normal.      Gait: Gait normal.      Comments: Left facial droop present. Tongue deviation to left side. Psychiatric:         Mood and Affect: Mood normal.         Behavior: Behavior normal.         Thought Content: Thought content normal.         Judgment: Judgment normal.          On this date 11/29/2022 I have spent 25 minutes reviewing previous notes, test results and face to face with the patient discussing the diagnosis and importance of compliance with the treatment plan as well as documenting on the day of the visit. An electronic signature was used to authenticate this note.     --SONIDO Luis - CNP

## 2022-11-30 ENCOUNTER — CARE COORDINATION (OUTPATIENT)
Dept: CASE MANAGEMENT | Age: 87
End: 2022-11-30

## 2022-11-30 ENCOUNTER — TELEPHONE (OUTPATIENT)
Dept: CARDIOTHORACIC SURGERY | Age: 87
End: 2022-11-30

## 2022-11-30 DIAGNOSIS — G89.29 CHRONIC MIDLINE LOW BACK PAIN WITHOUT SCIATICA: Primary | ICD-10-CM

## 2022-11-30 DIAGNOSIS — M54.50 CHRONIC MIDLINE LOW BACK PAIN WITHOUT SCIATICA: Primary | ICD-10-CM

## 2022-11-30 RX ORDER — HYDROCODONE BITARTRATE AND ACETAMINOPHEN 5; 325 MG/1; MG/1
1 TABLET ORAL EVERY 4 HOURS PRN
Qty: 30 TABLET | Refills: 0 | Status: SHIPPED | OUTPATIENT
Start: 2022-11-30 | End: 2022-12-05

## 2022-11-30 NOTE — CARE COORDINATION
[facility] Care Transitions Follow Up Call    Care Transition Nurse contacted the patient by telephone to follow up after admission on 22. Verified name and  with patient as identifiers. Patient: Mallory Barroso  Patient : 1934   MRN: [id number]  Reason for Admission: s/p carotid endarterectomy  Discharge Date: 22 RARS: Readmission Risk Score: 17.2      Needs to be reviewed by the provider   Additional needs identified to be addressed with provider: No  none             Method of communication with provider: none. Spoke to Caleb for transitions f/u call. Pt went to same day appt yesterday for facial droop, left sided weakness, slurred speech. Pt had CT of head and neck, results were negative. Pt stated he is doing better, still has slight slur in speech but overall improved. Stated Dr looked at incision site yesterday, healing routinely. Pt is washing area with mild soap and patting dry bid. Stated glue is starting to flake off and area is slightly itchy. Appetite is OK, no difficulty with chewing or swallowing. Pt uses a cane for ambulation. Reviewed PCP appt on  and post op on 22. Encouraged call back with questions or concerns, call office after hours or go to ED for urgent needs. Addressed changes since last contact:   CT scan negative for stroke  Discussed follow-up appointments. Follow Up  Future Appointments   Date Time Provider Yonas Boyer   2022  1:30 PM Bibi Ferguson MD [facility] [place]   2022 10:30 AM Bravo Miramontes CT/CV P - [place]   2022 10:30 AM SONIDO Warren CNP P - [place]   2023  1:30 PM Augusto Ohara  [facility] Transition Nurse reviewed discharge instructions with patient and discussed any barriers to care and/or understanding of plan of care after discharge.  Discussed appropriate site of care based on symptoms and resources available to patient including: PCP  Specialist  When to call [phone]. The patient agrees to contact the PCP office for questions related to their healthcare. Patients top risk factors for readmission:  age 80, HTN, a fib, spondylosis lumbar region  Interventions to address risk factors: Obtained and reviewed discharge summary and/or continuity of care documents    Offered patient enrollment in the Remote Patient Monitoring (RPM) program for in-home monitoring: NA.     Care Transitions Subsequent and Final Call    Subsequent and Final Calls  Do you have any ongoing symptoms?: No  Have your medications changed?: No  Do you have any questions related to your medications?: No  Do you currently have any active services?: No  Do you have any needs or concerns that I can assist you with?: No  Identified Barriers: None  Care Transitions Interventions     Transportation Support: Declined   Other Interventions:             Care Transition Nurse provided contact information for future needs. Plan for follow-up call in 5-7 days based on severity of symptoms and risk factors. Plan for next call: symptom management-incision site left neck healing well?, left sided weakness, slurred speech improved?  Back pain (chronic)  follow-up appointment-PCP 12/5, post op 12/6 review    Ruth Armando RN

## 2022-11-30 NOTE — TELEPHONE ENCOUNTER
Alexandre Diamond states that he was seen by PCP yesterday for facial drooping and slurred speech following his CEA last Monday. A CT head, CTA head, and CTA neck was ordered and results came back normal.  Informed J Luis Porter that we will keep post-op appt on 12/6 as planned, but to update us of any changes that may occur. Patient asked that images be reviewed by our providers.

## 2022-11-30 NOTE — TELEPHONE ENCOUNTER
Patient's last appointment was : 11/29/2022  Patient's next appointment is : 12/5/2022  Last refilled:10/17/2022        Scripted needs faxed to [facility].

## 2022-12-02 NOTE — TELEPHONE ENCOUNTER
Per Shelley Villalobos, Dr. Yessica Gary reviewed images and they look ok. He would like to see patient in office on Monday to touch base with him. LMOM for patient. He currently has post-op visit with Marivel Ashford on 12/6/22.

## 2022-12-05 ENCOUNTER — OFFICE VISIT (OUTPATIENT)
Dept: FAMILY MEDICINE CLINIC | Age: 87
End: 2022-12-05
Payer: MEDICARE

## 2022-12-05 ENCOUNTER — OFFICE VISIT (OUTPATIENT)
Dept: CARDIOTHORACIC SURGERY | Age: 87
End: 2022-12-05

## 2022-12-05 VITALS
DIASTOLIC BLOOD PRESSURE: 64 MMHG | WEIGHT: 139 LBS | HEART RATE: 88 BPM | TEMPERATURE: 96.7 F | RESPIRATION RATE: 16 BRPM | OXYGEN SATURATION: 97 % | HEIGHT: 68 IN | BODY MASS INDEX: 21.07 KG/M2 | SYSTOLIC BLOOD PRESSURE: 124 MMHG

## 2022-12-05 VITALS
HEIGHT: 68 IN | BODY MASS INDEX: 22.73 KG/M2 | SYSTOLIC BLOOD PRESSURE: 124 MMHG | HEART RATE: 80 BPM | DIASTOLIC BLOOD PRESSURE: 81 MMHG | WEIGHT: 150 LBS

## 2022-12-05 DIAGNOSIS — G89.29 CHRONIC MIDLINE LOW BACK PAIN WITHOUT SCIATICA: Primary | ICD-10-CM

## 2022-12-05 DIAGNOSIS — I65.22 STENOSIS OF LEFT CAROTID ARTERY: Primary | ICD-10-CM

## 2022-12-05 DIAGNOSIS — Z98.890 S/P CAROTID ENDARTERECTOMY: ICD-10-CM

## 2022-12-05 DIAGNOSIS — I48.0 PAF (PAROXYSMAL ATRIAL FIBRILLATION) (HCC): ICD-10-CM

## 2022-12-05 DIAGNOSIS — M54.50 CHRONIC MIDLINE LOW BACK PAIN WITHOUT SCIATICA: Primary | ICD-10-CM

## 2022-12-05 DIAGNOSIS — G47.01 INSOMNIA DUE TO MEDICAL CONDITION: ICD-10-CM

## 2022-12-05 PROCEDURE — 1036F TOBACCO NON-USER: CPT | Performed by: FAMILY MEDICINE

## 2022-12-05 PROCEDURE — 99024 POSTOP FOLLOW-UP VISIT: CPT | Performed by: THORACIC SURGERY (CARDIOTHORACIC VASCULAR SURGERY)

## 2022-12-05 PROCEDURE — G8427 DOCREV CUR MEDS BY ELIG CLIN: HCPCS | Performed by: FAMILY MEDICINE

## 2022-12-05 PROCEDURE — G8484 FLU IMMUNIZE NO ADMIN: HCPCS | Performed by: FAMILY MEDICINE

## 2022-12-05 PROCEDURE — 99214 OFFICE O/P EST MOD 30 MIN: CPT | Performed by: FAMILY MEDICINE

## 2022-12-05 PROCEDURE — 1111F DSCHRG MED/CURRENT MED MERGE: CPT | Performed by: FAMILY MEDICINE

## 2022-12-05 PROCEDURE — 1123F ACP DISCUSS/DSCN MKR DOCD: CPT | Performed by: FAMILY MEDICINE

## 2022-12-05 PROCEDURE — G8420 CALC BMI NORM PARAMETERS: HCPCS | Performed by: FAMILY MEDICINE

## 2022-12-05 RX ORDER — HYDROCODONE BITARTRATE AND ACETAMINOPHEN 5; 325 MG/1; MG/1
1 TABLET ORAL 2 TIMES DAILY PRN
Qty: 60 TABLET | Refills: 0 | Status: SHIPPED
Start: 2022-12-05 | End: 2023-01-04

## 2022-12-05 ASSESSMENT — ENCOUNTER SYMPTOMS
WHEEZING: 0
SHORTNESS OF BREATH: 0
NAUSEA: 0
ABDOMINAL PAIN: 0
DIARRHEA: 0
COUGH: 0
SORE THROAT: 0
RHINORRHEA: 0
BACK PAIN: 1
CONSTIPATION: 0

## 2022-12-05 NOTE — PROGRESS NOTES
[address] [place] [address] DR. Lambert [address]  Dept: [address]: [address] [facility] Chio (:  1934) is a 80 y.o. male, here for evaluation of the following chief complaint(s):  Follow-up      ASSESSMENT/PLAN:  1. Chronic midline low back pain without sciatica  -     HYDROcodone-acetaminophen (NORCO) 5-325 MG per tablet; Take 1 tablet by mouth 2 times daily as needed for up to 30 days. , Disp-60 tablet, R-0Adjust Sig  2. PAF (paroxysmal atrial fibrillation) (HCC)  -     metoprolol tartrate (LOPRESSOR) 25 MG tablet; Take 0.5 tablets by mouth every morning, Disp-45 tablet, R-3Print  3. S/P carotid endarterectomy  4. Insomnia due to medical condition  Continue meds. Continue with 12.5 mg of metoprolol daily. Continue aspirin for status post carotid endarterectomy. Melatonin 10 mg for sleep. Follow-up in 3 months to recheck pain and chronic conditions or sooner if needed. Increased to 2 protein drinks daily to help with weight gain. Return for follow up. SUBJECTIVE/OBJECTIVE:  HPI  Presents for 3-month follow-up of chronic conditions. He was previously having issues with dizziness and did have a carotid Dopplers which showed blockage on the left. He is now status post carotid endarterectomy. Postop, he did develop numbness of his face and also slight facial droop on the left. He had CT scan of the brain which was negative for acute issues. He did follow-up with surgeon today who confirmed scans looked good however she he should take aspirin daily. Also takes Eliquis for his A. fib. Does still have chronic low back pain and has been taking 1-1/2 to 2 tablets of Norco daily which seems to help. He does state he has not been sleeping the best previously but started taking melatonin and thinks this helped somewhat. Denies any sadness or nervousness.   Denies any recent falls but does feel unsteady, especially in the morning. Mentions his appetite is okay but he has been trying to gain weight and so far has been unsuccessful. Does take 1 protein drink daily. Review of Systems   Constitutional:  Negative for chills, fatigue and fever. HENT:  Negative for congestion, rhinorrhea and sore throat. Respiratory:  Negative for cough, shortness of breath and wheezing. Cardiovascular:  Negative for chest pain and palpitations. Gastrointestinal:  Negative for abdominal pain, constipation, diarrhea and nausea. Genitourinary:  Negative for dysuria and hematuria. Musculoskeletal:  Positive for arthralgias and back pain. Negative for myalgias. Skin:  Positive for wound (left neck). Neurological:  Positive for dizziness and numbness (left side of face). Negative for headaches. Psychiatric/Behavioral:  Negative for sleep disturbance (melatonin helps). The patient is not nervous/anxious. Physical Exam  Vitals and nursing note reviewed. Constitutional:       Appearance: He is well-developed. HENT:      Head: Normocephalic and atraumatic. Eyes:      General: No scleral icterus. Right eye: No discharge. Left eye: No discharge. Conjunctiva/sclera: Conjunctivae normal.   Cardiovascular:      Rate and Rhythm: Normal rate and regular rhythm. Heart sounds: Normal heart sounds. Pulmonary:      Effort: Pulmonary effort is normal.      Breath sounds: Normal breath sounds. No wheezing. Skin:     General: Skin is warm and dry. Neurological:      Mental Status: He is alert and oriented to person, place, and time. Mental status is at baseline. Psychiatric:         Behavior: Behavior normal.         Thought Content: Thought content normal.         Judgment: Judgment normal.       Vitals:    12/05/22 1326   BP: 124/64   Pulse: 88   Resp: 16   Temp: (!) 96.7 °F (35.9 °C)   SpO2: 97%              An electronic signature was used to authenticate this note.     --Leticia Goldsmith, MD

## 2022-12-06 NOTE — PROGRESS NOTES
[address] [facility] SURGERY  [address]  Dept: [phone]  Dept Fax: [phone]: [phone]    Visit Date: 12/5/2022    Mr. Barroso is a 80 y.o.male  who presented for:  Chief Complaint   Patient presents with    Follow-up     F/u from CEA/ER visit  for poss CVA       HPI:   HPI     Mr. Melia Thakkar returns to clinic after his left carotid endarterectomy on 11/21/22. He was discharged on POD#1,and did well until 11/29, when he presented to his PCP with some left facial droop and some weakness and headache. No motor symptoms at that time or confusion. He was sent to the ED at [facility] and CT of head was negative for CVA. CT angiogram of neck revealed some post-CEA changes, but that the left ICA/CCA/ECA were widely patent. ? Reading of focal dissection by radiologist was not seen per my read, and it is post-op changes actually. Today, he states he is improving. No difficulty swallowing or changes in voice noted. Left incisional tenderness mild and skin numbness improving. No wound issues. Current Outpatient Medications:     aspirin 81 MG EC tablet, Take 1 tablet by mouth daily, Disp: 30 tablet, Rfl: 3    apixaban (ELIQUIS) 5 MG TABS tablet, Take 1 tablet by mouth 2 times daily, Disp: 180 tablet, Rfl: 2    Psyllium (METAMUCIL PO), Take by mouth daily as needed, Disp: , Rfl:     melatonin 5 MG TABS tablet, Take 5 mg by mouth nightly as needed, Disp: , Rfl:     amLODIPine (NORVASC) 5 MG tablet, Take 1 tablet by mouth in the morning and at bedtime, Disp: 180 tablet, Rfl: 1    Probiotic Product (PROBIOTIC-10 PO), Take by mouth daily, Disp: , Rfl:     nystatin (MYCOSTATIN) 032310 UNIT/GM cream, Apply topically 2 times daily.  (Patient not taking: Reported on 12/5/2022), Disp: 30 g, Rfl: 1    Handicap Placard MISC, by Does not apply route Exp 7/13/2027, Disp: 1 each, Rfl: 0    Polyethylene Glycol 3350 (MIRALAX PO), Take by mouth as needed, Disp: , Rfl:     VITAMIN D PO, Take by mouth daily, Disp: , Rfl:     Multiple Vitamins-Minerals (PRESERVISION AREDS 2 PO), Take 1 tablet by mouth 2 times daily, Disp: , Rfl:     HYDROcodone-acetaminophen (NORCO) 5-325 MG per tablet, Take 1 tablet by mouth 2 times daily as needed for up to 30 days. , Disp: 60 tablet, Rfl: 0    metoprolol tartrate (LOPRESSOR) 25 MG tablet, Take 0.5 tablets by mouth every morning, Disp: 45 tablet, Rfl: 3    Allergies   Allergen Reactions    Nitrofuran Derivatives Nausea Only     Severe stomach cramps    Nsaids Nausea Only and Other (See Comments)     Pain in stomach, GI upset       Past Medical History  Vanessa Mora  has a past medical history of Atrial fibrillation ([address] Back pain, Hernia, Hx of blood clots, Hyperlipidemia, Hypertension, Osteoarthritis, Prostate cancer ([address] Prostate cancer ([address] Pulmonary emboli ([facility] [address] and Wears glasses. Social History  Vanessa Mora  reports that he quit smoking about 57 years ago. His smoking use included cigarettes. He has never used smokeless tobacco. He reports current alcohol use. He reports that he does not use drugs. Family History  Vanessa Mora family history includes Cancer in his brother, brother, and sister; Heart Disease in his father; Prostate Cancer in his brother, brother, brother, and brother; Stroke in his mother. There is no family history of bicuspid aortic valve, aneurysms, heart transplant, pacemakers, defibrillators, or sudden cardiac death.       Past Surgical History   Past Surgical History:   Procedure Laterality Date    APPENDECTOMY  01/1961    [facility]    BACK SURGERY  3641,4934    X stop    CAROTID ENDARTERECTOMY Left 11/21/2022    LEFT CAROTID ENDARTERECTOMY performed by Manog Hanson MD at . [address] Right     x2 on right    CATARACT REMOVAL Right 06/2001    Dr Porsche Alejo Left 11/2014    Dr Jerardo Carlos [address] [place] [address], [facility]    Dr Inocente Badillo  [address]? Dr Hima Callejasoe of date    HERNIA REPAIR Left 11/22/2017    ROBOT RECURRENT LEFT INGUINAL HERNIA REPAIR performed by Mekhi Brito MD at [facility] BILATERAL Bilateral 9/5/2017    LUMBAR FACET MBB L3-4, L4-5, L5-S1 BILATERAL performed by Francine Stoddard MD at [place] [address] Left 04/09/2018    HIP INJECTION     OTHER SURGICAL HISTORY  10/7/2015    XI ROBOTIC PROSTATECTOMY    OTHER SURGICAL HISTORY Bilateral 09/05/2017    lumbar facet block L3-S1    OTHER SURGICAL HISTORY Right 1980's    nerve release right lower arm    OTHER SURGICAL HISTORY  01/30/2018    Sacroiliac joint MBB    OTHER SURGICAL HISTORY Left 02/27/2018    Sacroiliac joint medial branch block     SC IMPLANT NEUROSTIM/ N/A 9/17/2018    THORACIC LAMINECTOMY PERMANENT SPINAL CORD STIMULATOR PADDLE PLUS BATTERY PLACEMENT performed by Ladonna Freeman MD at [address] [facility] [address] ARTHRGRPHY&/ANES/STEROID W/IMAGE Left 1/30/2018    LEFT SI MBB performed by Francine Stoddard MD at [place] [address] SI JOINT ARTHRGRPHY&/ANES/STEROID W/IMAGE Left 2/27/2018    LEFT SI MBB #2 performed by Francine Stoddard MD at [facility] OFFICE/OUTPT VISIT,PROCEDURE ONLY Left 4/9/2018    LEFT HIP STEROID INJECTION WITH SEDATION performed by Francine Stoddard MD at [facility] OFFICE/OUTPT [address] [facility] N/A 8/21/2018    SPINAL CORD STIMULATOR IMPLANT TRIAL performed by Francine Stoddard MD at [address] [facility]  7-    Dr Pascual Gray BIOPSY  8/21/2015    transrectal ultrasound with biopsy(+)    PROSTATE SURGERY  2003    TURP    PROSTATE SURGERY  2009    Biopsy -Hyperplasia    SHOULDER ARTHROPLASTY  2005    right       Subjective:     Review of Systems  See HPI.     Objective:     /81   Pulse 80   Ht 5' 8\" left carotid artery      Improving postoperative patient. No postoperative CVA/motor changes  Minor incisional issues but wound is healing well. Plan:  F/U in 2 weeks. Continue ASA/Eliquis as ordered. Return in about 2 weeks (around 12/19/2022).       Electronically signed by Dan Cushing, MD   12/6/2022 at 7:01 AM EST

## 2022-12-07 ENCOUNTER — CARE COORDINATION (OUTPATIENT)
Dept: CASE MANAGEMENT | Age: 87
End: 2022-12-07

## 2022-12-07 ENCOUNTER — CARE COORDINATION (OUTPATIENT)
Dept: CARE COORDINATION | Age: 87
End: 2022-12-07

## 2022-12-07 NOTE — CARE COORDINATION
Remote Patient Monitoring Enrollment Note      Date/Time:  12/7/2022 3:04 PM    Offered patient enrollment in the [facility] Remote Patient Monitoring (RPM) program for in home monitoring for HTN. Patient accepted RPM services. Patient will be monitoring the following daily:  activity level  blood pressure reading  blood pressure heart rate  pulse ox reading  weight    CTN reviewed the information below with patient:    Emergency Contact (name and contact number): Edna Wei [phone]    [x] A member from the care coordination team will reach out to notify the patient once the RPM kit is ordered. [x] Once the kit is delivered, the [facility] team will contact the patient after UPS deliver to assist with set up. [x] Determined BP cuff size: regular (9.05\"-15.74\")      [x] Determined weight scale: regular (<330lbs)                                                 [x] Hours of ACM monitoring - Monday-Friday [id number]                         All questions about RPM program answered at this time.

## 2022-12-07 NOTE — CARE COORDINATION
Remote Patient Kit Ordering Note      Date/Time:  12/7/2022 3:20 PM      [x] CCSS confirmed patient shipping address  [x] Patient will receive package over the next 2-4 business days. Someone 21 years or older must be present to sign for UPS delivery. [x] Patient to contact virtual installation-specific phone number listed in the patient instructions. [x] If the patient does not contact HRS within 24 hours, an [employer] [facility] will call the patient directly: If the patient does not answer, HRS will follow up with the clinical team notifying them about the unsuccessful attempt to contact the patient. HRS will make three call attempts to the patient. [x] LPN will contact patient once equipment is active to welcome them to the program.                                                         [x] Hours of RPM monitoring - Monday-Friday [id number]                     All questions answered at this time. LPN made aware the RPM kit has been ordered. CCSS notified patient of RPM equipment order.

## 2022-12-07 NOTE — CARE COORDINATION
[facility] Care Transitions Follow Up Call    Care Transition Nurse contacted the patient by telephone to follow up after admission on 22. Verified name and  with patient as identifiers. Patient: Pearl Barroso  Patient : 1934   MRN: [id number]  Reason for Admission:  s/p carotid endarterectomy  Discharge Date: 22 RARS: Readmission Risk Score: 17.2      Needs to be reviewed by the provider   Additional needs identified to be addressed with provider: No  none             Method of communication with provider: none. Spoke with Hannah Lara, said he is doing ok, just slow. He saw surgeon Monday and said the scans were good. Left sided weakness is minimal, speech is fairly clear. Neck incision is healing, still a little tender. Continues to have back of head pain, taking Norco.  Appetite is slowly improving, drinking supplements, trying to gain some wt. Taking medications as directed. B&B normal.  Accepted RPM.  Denies any needs. No other questions or concerns at this time. Will continue to follow. Addressed changes since last contact:  none  Discussed follow-up appointments. If no appointment was previously scheduled, appointment scheduling offered: Yes. Is follow up appointment scheduled within 7 days of discharge? Yes. Follow Up  Future Appointments   Date Time Provider Yonas Boyer   2022 10:30 AM SONIDO Banegas - TOBY [facility] [place]   2022  8:30 AM MD Zaria Hickey CT/CV [facility] [place]   2023  1:30 PM Kirstie Foley MD N SRP[facility] Heart [facility] [place]   3/6/2023  8:15 AM Amarjit Jeffers MD [facility] Med CG [facility] [place]     [facility] follow up appointment(s): yoana    Care Transition Nurse reviewed medical action plan and red flags with patient and discussed any barriers to care and/or understanding of plan of care after discharge.  Discussed appropriate site of care based on symptoms and resources available to patient including: PCP  Specialist  Urgent care clinics  When to call 911. The patient agrees to contact the PCP office for questions related to their healthcare. Advance Care Planning:   reviewed and current. Patients top risk factors for readmission: lack of knowledge about disease, medical condition- s/p carotid endarterectomy, HTN, Afib, and polypharmacy  Interventions to address risk factors: Scheduled appointment with PCP-3/6/23 and Scheduled appointment with Specialist-12/14 12/19 1/23/23    Offered patient enrollment in the Remote Patient Monitoring (RPM) program for in-home monitoring: Yes, patient enrolled. Care Transitions Subsequent and Final Call    Subsequent and Final Calls  Do you have any ongoing symptoms?: No  Have your medications changed?: No  Do you have any questions related to your medications?: No  Do you currently have any active services?: No  Do you have any needs or concerns that I can assist you with?: No  Identified Barriers: Lack of Education  Care Transitions Interventions     Transportation Support: Declined   Other Interventions:             Care Transition Nurse provided contact information for future needs. Plan for follow-up call in 7-10 days based on severity of symptoms and risk factors.   Plan for next call: symptom management-incision site, weakness, pain managed?  follow-up appointment-review 12/14 f/u notes    Ray Perez RN

## 2022-12-08 ENCOUNTER — TELEPHONE (OUTPATIENT)
Dept: CARDIOTHORACIC SURGERY | Age: 87
End: 2022-12-08

## 2022-12-08 LAB — PSA, ULTRASENSITIVE: 0.1 NG/ML

## 2022-12-08 NOTE — TELEPHONE ENCOUNTER
Rakesh Bhatt called in, he states that he was washing his incision site this morning with a washcloth and noticed a clear drainage coming from it. Slightly red tinged. No pus. States that it is only slightly painful when touching it, but otherwise no pain. Continues to take his Norco.  S/p left CEA 11/21/2022. Please advise.

## 2022-12-08 NOTE — TELEPHONE ENCOUNTER
Patient left a message at 4:05 pm stating that the swelling, and drainage at the incision site is worse. Per SUJATHA Keller she can see patient in office on 12/09/2022 at 9:00 am.     Patient is aware, and is coming in.

## 2022-12-09 ENCOUNTER — OFFICE VISIT (OUTPATIENT)
Dept: CARDIOLOGY CLINIC | Age: 87
End: 2022-12-09

## 2022-12-09 VITALS
HEART RATE: 80 BPM | TEMPERATURE: 97.6 F | DIASTOLIC BLOOD PRESSURE: 72 MMHG | HEIGHT: 70 IN | BODY MASS INDEX: 20.33 KG/M2 | SYSTOLIC BLOOD PRESSURE: 120 MMHG | WEIGHT: 142 LBS

## 2022-12-09 DIAGNOSIS — Z98.890 S/P CAROTID ENDARTERECTOMY: Primary | ICD-10-CM

## 2022-12-09 DIAGNOSIS — I65.22 LEFT CAROTID STENOSIS: ICD-10-CM

## 2022-12-09 PROCEDURE — 99024 POSTOP FOLLOW-UP VISIT: CPT | Performed by: PHYSICIAN ASSISTANT

## 2022-12-09 RX ORDER — SULFAMETHOXAZOLE AND TRIMETHOPRIM 800; 160 MG/1; MG/1
1 TABLET ORAL 2 TIMES DAILY
Qty: 20 TABLET | Refills: 0 | Status: SHIPPED | OUTPATIENT
Start: 2022-12-09 | End: 2022-12-19

## 2022-12-09 RX ORDER — CEPHALEXIN 500 MG/1
500 CAPSULE ORAL 2 TIMES DAILY
Qty: 14 CAPSULE | Refills: 0 | Status: SHIPPED | OUTPATIENT
Start: 2022-12-09 | End: 2022-12-09 | Stop reason: ALTCHOICE

## 2022-12-09 NOTE — PROGRESS NOTES
CT/CV Surgery Follow Up Office Visit      Patient's Name/Date of Birth: Edna Peterson / 1934 (25 y.o.)      PCP: Lesley Chapman MD      Date: December 9, 2022    We had the pleasure of seeing Edna Peterson in the office today, as you know this is a very pleasant 80y.o. year old male who returns to the clinic today for incisions check. He had a left carotid endarterectomy on 11/21/22 by Dr. Patrice Nieves. Patient states that he has had yellowish/bloody drainage from his surgical incision over the past day. The pt denies chest pressure, SOB, fever, chills, N/V/D. PastMedical History:  Manuel Herrmann  has a past medical history of Atrial fibrillation ([address] Back pain, Hernia, Hx of blood clots, Hyperlipidemia, Hypertension, Osteoarthritis, Prostate cancer ([address] Prostate cancer ([address] Pulmonary emboli ([address] and Wears glasses. Past Surgical History:  The patient  has a past surgical history that includes back surgery (0899,8095); Total shoulder arthroplasty (2005); Prostate surgery (2003); Prostate surgery (2009); Prostate Biopsy (7-); Cataract removal with implant (Left, 11/2014); Prostate Biopsy (8/21/2015); hernia repair (1995, 2009); other surgical history (10/7/2015); other surgical history (Bilateral, 09/05/2017); Nerve Block Lumb Facet Level 1 Bilateral (Bilateral, 9/5/2017); hernia repair (1018'R?); Cataract removal (Right, 06/2001); Appendectomy (01/1961); hernia repair (Left, 11/22/2017); other surgical history (Right, 1980's); other surgical history (01/30/2018); pr inject si joint arthrgrphy&/anes/steroid w/image (Left, 1/30/2018); other surgical history (Left, 02/27/2018); pr inject si joint arthrgrphy&/anes/steroid w/image (Left, 2/27/2018); Nerve Surgery (Left, 04/09/2018); pr office/outpt visit,procedure only (Left, 4/9/2018); pr office/outpt visit,procedure only (N/A, 8/21/2018); pr implant neurostim/ (N/A, 9/17/2018); eye surgery;  Carpal tunnel release (Right); and Carotid endarterectomy (Left, 11/21/2022). Allergies: The patient is allergic to nitrofuran derivatives and nsaids. Medications:    Current Outpatient Medications:     HYDROcodone-acetaminophen (NORCO) 5-325 MG per tablet, Take 1 tablet by mouth 2 times daily as needed for up to 30 days. , Disp: 60 tablet, Rfl: 0    metoprolol tartrate (LOPRESSOR) 25 MG tablet, Take 0.5 tablets by mouth every morning, Disp: 45 tablet, Rfl: 3    aspirin 81 MG EC tablet, Take 1 tablet by mouth daily, Disp: 30 tablet, Rfl: 3    apixaban (ELIQUIS) 5 MG TABS tablet, Take 1 tablet by mouth 2 times daily, Disp: 180 tablet, Rfl: 2    Psyllium (METAMUCIL PO), Take by mouth daily as needed, Disp: , Rfl:     melatonin 5 MG TABS tablet, Take 5 mg by mouth nightly as needed, Disp: , Rfl:     amLODIPine (NORVASC) 5 MG tablet, Take 1 tablet by mouth in the morning and at bedtime, Disp: 180 tablet, Rfl: 1    Probiotic Product (PROBIOTIC-10 PO), Take by mouth daily, Disp: , Rfl:     nystatin (MYCOSTATIN) 737686 UNIT/GM cream, Apply topically 2 times daily. , Disp: 30 g, Rfl: 1    Handicap Placard MISC, by Does not apply route Exp 7/13/2027, Disp: 1 each, Rfl: 0    Polyethylene Glycol 3350 (MIRALAX PO), Take by mouth as needed, Disp: , Rfl:     VITAMIN D PO, Take by mouth daily, Disp: , Rfl:     Multiple Vitamins-Minerals (PRESERVISION AREDS 2 PO), Take 1 tablet by mouth 2 times daily, Disp: , Rfl:     Family History: This patient's family history includes Cancer in his brother, brother, and sister; Heart Disease in his father; Prostate Cancer in his brother, brother, brother, and brother; Stroke in his mother. Social History:  Travis Frankel  reports that he quit smoking about 57 years ago. His smoking use included cigarettes. He has never used smokeless tobacco. He reports current alcohol use. He reports that he does not use drugs.     Vital Signs:   Ht 5' 10\" (1.778 m)   Wt 142 lb (64.4 kg)   BMI 20.37 kg/m²     ROS:   Constitutional: Negative for activity change, chills, fatigue, fever and unexpected weight change. Respiratory: Negative for apnea, shortness of breath, wheezing and stridor. Cardiovascular: Negative for chest pain, palpitations and leg swelling. Gastrointestinal: Negative for hematochezia, melana, constipation, and N/V/D. Musculoskeletal: Negative for myalgias  Skin: As above   Neurological: Negative for dizziness or syncope. Physical Exam:  General appearance:  No acute distress, appears stated age and cooperative. Neck: No jugular venous distention. Trachea midline. Respiratory:  Normal respiratory effort. Clear to auscultation, bilaterally without Rales/Wheezes/Rhonch. Cardiovascular:  Regular rate and rhythm with normal S1/S2 without murmurs, rubs or gallops. Abdomen: Soft, non-tender, non-distended with normal bowel sounds. Ext: No clubbing, cyanosis or edema bilaterally. Full range of motion without deformity. Skin: Left surgical CEA incision with small amount of purulent/sanguineous drainage. Neurologic:  Neurovascularly intact without any focal sensory/motor deficits. Psychiatric:  Alert and oriented, thought content appropriate, normal insight.     Labs:    CBC:  Lab Results   Component Value Date/Time    WBC 15.1 11/22/2022 05:07 AM    HGB 12.0 11/22/2022 05:07 AM    HCT 36.3 11/22/2022 05:07 AM    .0 11/22/2022 05:07 AM     11/22/2022 05:07 AM    INR 1.54 11/16/2022 12:19 PM     BMP:   Lab Results   Component Value Date/Time     11/22/2022 05:07 AM    K 4.9 11/22/2022 05:07 AM     11/22/2022 05:07 AM    CO2 20 11/22/2022 05:07 AM    PHOS 3.2 05/14/2016 06:30 AM    BUN 31 11/22/2022 05:07 AM    CREATININE 1.3 11/22/2022 05:07 AM    MG 2.0 07/19/2022 02:42 PM         Active Problem List:  Patient Active Problem List   Diagnosis    Prostate cancer    Generalized anxiety disorder    Essential hypertension    Hyperlipidemia with target LDL less than 130    Back pain, chronic s/p surgery    History of pulmonary embolus (PE)    Spondylosis of lumbar region without myelopathy or radiculopathy    Persistent atrial fibrillation ([address]- newely DXed 10/12/17- CVR    Non-rheumatic mitral regurgitation- mod to severe    Moderate aortic regurgitation    Failed back syndrome of lumbar spine    Chronic pain disorder    Pneumothorax    Constipation    Ascending aortic aneurysm (HCC) 4.1 cm on CTA and 4.5 on echo    Nonexudative age-related macular degeneration    Secondary pigmentary retinal degeneration    Venous retinal branch occlusion    PAF (paroxysmal atrial fibrillation) (HCC)    Moderate mitral regurgitation    Stenosis of left carotid artery    S/P carotid endarterectomy       Assessment:  Surgical site skin infection      Plan 12/9/22:  1) Continue current medical therapy. 2) Start Bactrim DS  3) Follow up office visit 9:30 am 12/12/2022      Thank you for allowing us to be involved in the patient's care.     Electronically by SUJATHA Klein  on 12/9/2022 at 8:56 AM

## 2022-12-12 ENCOUNTER — OFFICE VISIT (OUTPATIENT)
Dept: CARDIOTHORACIC SURGERY | Age: 87
End: 2022-12-12

## 2022-12-12 ENCOUNTER — HOSPITAL ENCOUNTER (OUTPATIENT)
Dept: ULTRASOUND IMAGING | Age: 87
Discharge: HOME | End: 2022-12-12
Payer: MEDICARE

## 2022-12-12 VITALS
DIASTOLIC BLOOD PRESSURE: 74 MMHG | BODY MASS INDEX: 20.76 KG/M2 | WEIGHT: 145 LBS | HEIGHT: 70 IN | HEART RATE: 82 BPM | SYSTOLIC BLOOD PRESSURE: 125 MMHG | OXYGEN SATURATION: 97 % | TEMPERATURE: 97.2 F

## 2022-12-12 DIAGNOSIS — S11.90XA OPEN WOUND OF NECK, INITIAL ENCOUNTER: ICD-10-CM

## 2022-12-12 DIAGNOSIS — S11.90XA OPEN WOUND OF NECK, INITIAL ENCOUNTER: Primary | ICD-10-CM

## 2022-12-12 PROCEDURE — 99024 POSTOP FOLLOW-UP VISIT: CPT | Performed by: PHYSICIAN ASSISTANT

## 2022-12-12 PROCEDURE — 76536 US EXAM OF HEAD AND NECK: CPT

## 2022-12-12 RX ORDER — LEVOFLOXACIN 750 MG/1
750 TABLET ORAL DAILY
Qty: 7 TABLET | Refills: 0 | Status: SHIPPED | OUTPATIENT
Start: 2022-12-12 | End: 2022-12-19

## 2022-12-12 NOTE — PROGRESS NOTES
1590 [facility] SURGERY  [address]  Dept: [phone]  Dept Fax: [phone]: [phone]    Visit Date: 12/12/2022    Mr. Barroso is a 80 y.o.male  who presented for:  Chief Complaint   Patient presents with    Follow-up     S/p Left Carotid 11.21.2022 with Mirna Belling       HPI:   Mr. Marvin Nogueira returns to clinic after his left carotid endarterectomy on 11/21/22. He was discharged on POD#1,and did well until 11/29, when he presented to his PCP with some left facial droop and some weakness and headache. No motor symptoms at that time or confusion. He was sent to the ED at [facility] and CT of head was negative for CVA. CT angiogram of neck revealed some post-CEA changes, but that the left ICA/CCA/ECA were widely patent. ? Reading of focal dissection by radiologist was not seen per my read, and it is post-op changes actually. Today, he states he is improving. No difficulty swallowing or changes in voice noted. Left incisional tenderness mild and skin numbness improving, but has noticed mild drainage of clear fluid at inferior aspect of incision. No erythema or warmth to surrounding tissue. Current Outpatient Medications:     sulfamethoxazole-trimethoprim (BACTRIM DS;SEPTRA DS) 800-160 MG per tablet, Take 1 tablet by mouth 2 times daily for 10 days, Disp: 20 tablet, Rfl: 0    HYDROcodone-acetaminophen (NORCO) 5-325 MG per tablet, Take 1 tablet by mouth 2 times daily as needed for up to 30 days. , Disp: 60 tablet, Rfl: 0    metoprolol tartrate (LOPRESSOR) 25 MG tablet, Take 0.5 tablets by mouth every morning, Disp: 45 tablet, Rfl: 3    aspirin 81 MG EC tablet, Take 1 tablet by mouth daily, Disp: 30 tablet, Rfl: 3    apixaban (ELIQUIS) 5 MG TABS tablet, Take 1 tablet by mouth 2 times daily, Disp: 180 tablet, Rfl: 2    Psyllium (METAMUCIL PO), Take by mouth daily as needed, Disp: , Rfl: melatonin 5 MG TABS tablet, Take 5 mg by mouth nightly as needed, Disp: , Rfl:     amLODIPine (NORVASC) 5 MG tablet, Take 1 tablet by mouth in the morning and at bedtime, Disp: 180 tablet, Rfl: 1    Probiotic Product (PROBIOTIC-10 PO), Take by mouth daily, Disp: , Rfl:     nystatin (MYCOSTATIN) 850042 UNIT/GM cream, Apply topically 2 times daily. , Disp: 30 g, Rfl: 1    Polyethylene Glycol 3350 (MIRALAX PO), Take by mouth as needed, Disp: , Rfl:     VITAMIN D PO, Take by mouth daily, Disp: , Rfl:     Multiple Vitamins-Minerals (PRESERVISION AREDS 2 PO), Take 1 tablet by mouth 2 times daily, Disp: , Rfl:     Handicap Placard MISC, by Does not apply route Exp 7/13/2027, Disp: 1 each, Rfl: 0    Allergies   Allergen Reactions    Nitrofuran Derivatives Nausea Only     Severe stomach cramps    Nsaids Nausea Only and Other (See Comments)     Pain in stomach, GI upset       Past Medical History  Vanessa Mora  has a past medical history of Atrial fibrillation ([address] Back pain, Hernia, Hx of blood clots, Hyperlipidemia, Hypertension, Osteoarthritis, Prostate cancer ([address] Prostate cancer ([address] Pulmonary emboli ([address] and Wears glasses. Social History  Vanessa Mora  reports that he quit smoking about 57 years ago. His smoking use included cigarettes. He has never used smokeless tobacco. He reports current alcohol use. He reports that he does not use drugs. Family History  Vanessa Mora family history includes Cancer in his brother, brother, and sister; Heart Disease in his father; Prostate Cancer in his brother, brother, brother, and brother; Stroke in his mother. There is no family history of bicuspid aortic valve, aneurysms, heart transplant, pacemakers, defibrillators, or sudden cardiac death.       Past Surgical History   Past Surgical History:   Procedure Laterality Date    APPENDECTOMY  01/1961    [facility] SURGERY  3065,2040    X stop    CAROTID ENDARTERECTOMY Left 11/21/2022    LEFT CAROTID ENDARTERECTOMY performed by Eloisa Robison MD at [facility] 106 Right     x2 on right    CATARACT REMOVAL Right 06/2001    Dr Ginger Lee Left 11/2014    Dr Shawnee Jeter, 2009    Dr Antonio Jules  [address]?     Dr Astrid Herrera of date    HERNIA REPAIR Left 11/22/2017    ROBOT RECURRENT LEFT INGUINAL HERNIA REPAIR performed by Sami Vieyra MD at [address] 1 BILATERAL Bilateral 9/5/2017    LUMBAR FACET MBB L3-4, L4-5, L5-S1 BILATERAL performed by Jeni Rubinstein, MD at [facility] Left 04/09/2018    HIP INJECTION     OTHER SURGICAL HISTORY  10/7/2015    XI ROBOTIC PROSTATECTOMY    OTHER SURGICAL HISTORY Bilateral 09/05/2017    lumbar facet block L3-S1    OTHER SURGICAL HISTORY Right 1980's    nerve release right lower arm    OTHER SURGICAL HISTORY  01/30/2018    Sacroiliac joint MBB    OTHER SURGICAL HISTORY Left 02/27/2018    Sacroiliac joint medial branch block     [place] IMPLANT NEUROSTIM/ N/A 9/17/2018    THORACIC LAMINECTOMY PERMANENT SPINAL CORD STIMULATOR PADDLE PLUS BATTERY PLACEMENT performed by Renetta Hitchcock MD at [address] ARTHRGRPHY&/ANES/STEROID W/IMAGE Left 1/30/2018    LEFT SI MBB performed by Jeni Rubinstein, MD at [facility] 79 SI JOINT ARTHRGRPHY&/ANES/STEROID W/IMAGE Left 2/27/2018    LEFT SI MBB #2 performed by Jeni Rubinstein, MD at [facility] OFFICE/OUTPT VISIT,PROCEDURE ONLY Left 4/9/2018    LEFT HIP STEROID INJECTION WITH SEDATION performed by Jeni Rubinstein, MD at [facility] OFFICE/OUTPT VISIT,PROCEDURE ONLY N/A 8/21/2018    SPINAL CORD STIMULATOR IMPLANT TRIAL performed by Jeni Rubinstein, MD at [facility]  7-    Dr Amita Blanchard BIOPSY  8/21/2015    transrectal ultrasound with biopsy(+)    PROSTATE SURGERY  2003    TURP    PROSTATE SURGERY  2009    Biopsy -Hyperplasia    SHOULDER ARTHROPLASTY  2005    right       Subjective:     Review of Systems  See HPI. Objective:     /74   Pulse 82   Temp 97.2 °F (36.2 °C)   Ht 5' 10\" (1.778 m)   Wt 145 lb (65.8 kg)   SpO2 97%   BMI 20.81 kg/m²     Wt Readings from Last 3 Encounters:   12/12/22 145 lb (65.8 kg)   12/09/22 142 lb (64.4 kg)   12/05/22 139 lb (63 kg)     BP Readings from Last 3 Encounters:   12/12/22 125/74   12/09/22 120/72   12/05/22 124/64       Physical Exam  Neck incision is clean and approximated. Mild drainage from inferior aspect of incision that is clear without foul smell. Neuro grossly intact.     Lab Results   Component Value Date/Time    WBC 15.1 11/22/2022 05:07 AM    RBC 3.63 11/22/2022 05:07 AM     08/26/2011 01:55 PM    HGB 12.0 11/22/2022 05:07 AM    HCT 36.3 11/22/2022 05:07 AM    .0 11/22/2022 05:07 AM    MCH 33.1 11/22/2022 05:07 AM    MCHC 33.1 11/22/2022 05:07 AM    RDW 13.2 07/19/2022 02:42 PM     11/22/2022 05:07 AM    MPV 11.2 11/22/2022 05:07 AM       Lab Results   Component Value Date/Time     11/22/2022 05:07 AM    K 4.9 11/22/2022 05:07 AM     11/22/2022 05:07 AM    CO2 20 11/22/2022 05:07 AM    BUN 31 11/22/2022 05:07 AM    LABALBU 3.8 07/19/2022 02:42 PM    CREATININE 1.3 11/22/2022 05:07 AM    CALCIUM 8.0 11/22/2022 05:07 AM    LABGLOM 53 11/21/2022 06:09 PM    GLUCOSE 152 11/22/2022 05:07 AM    GLUCOSE 89 05/14/2016 06:30 AM       Lab Results   Component Value Date/Time    ALKPHOS 55 07/19/2022 02:42 PM    ALT 27 07/19/2022 02:42 PM    AST 22 07/19/2022 02:42 PM    PROT 7.0 07/19/2022 02:42 PM    BILITOT 0.7 07/19/2022 02:42 PM    BILIDIR <0.2 09/21/2018 09:19 AM    LABALBU 3.8 07/19/2022 02:42 PM       Lab Results   Component Value Date/Time    MG 2.0 07/19/2022 02:42 PM       Lab Results   Component Value Date    INR 1.54 (H) 11/16/2022    INR 1.30 (H) 09/12/2018    INR 1.07 06/30/2018         Lab Results   Component Value Date/Time    LABA1C 5.8 11/16/2022 12:19 PM       Lab Results   Component Value Date/Time    TRIG 55 05/14/2022 12:00 AM    HDL 67 05/14/2022 12:00 AM    LDLCALC 113 05/14/2022 12:00 AM       Lab Results   Component Value Date/Time    TSH 3.380 07/24/2018 10:55 AM           Assessment/Plan      S/P Lt CEA with mild clear drainage from inferior aspect of incision. No postoperative CVA/motor changes  Minor incisional issues but wound is healing well overall. Plan:  Carotid duplex of soft tissue to evaluate for possible seroma vs superficial suture reaction.   Call pt with results and f/u OV prn    Electronically signed by Deandra Guzman PA-C   12/12/2022 at 7:01 AM EST

## 2022-12-13 ENCOUNTER — CARE COORDINATION (OUTPATIENT)
Dept: CASE MANAGEMENT | Age: 87
End: 2022-12-13

## 2022-12-13 NOTE — TELEPHONE ENCOUNTER
Noted. To clarify patient will not be eligible for CC enrollment until the end of next week or the following week, correct?

## 2022-12-13 NOTE — CARE COORDINATION
[facility] Care Transitions Follow Up Call    Care Transition Nurse contacted the patient by telephone to follow up after admission on 22. Verified name and  with patient as identifiers. Patient: Jose Manuel Barroso  Patient : 1934   MRN: [id number]  Reason for Admission: s/p carotid endarterectomy  Discharge Date: 22 RARS: Readmission Risk Score: 17.2      Needs to be reviewed by the provider   Additional needs identified to be addressed with provider: No  none             Method of communication with provider: none. Spoke with Chuck Jansen, said he is doing ok. Saw surgeon last week d/t some drainage from neck incision, started Bactrim. Saw again yesterday, ordered US of neck, will see again next week. Said his HR was 100 the other morning but after taking medication, resolved in an hr.  He continues to monitor BP, HR. Has not received RPM equipment yet. Explained that next week will be our final call but will send to another nurse, (FATIMAH) and she will call him, voiced understanding. Appetite improving, drinking adequate fluids. B&B normal.  Denies any needs. No other questions or concerns at this time. Will continue to follow. Addressed changes since last contact:  medications-Bactrim for skin infection  Discussed follow-up appointments. Follow Up  Future Appointments   Date Time Provider Yonas Boyer   2022 10:30 AM SONIDO Florence - CNP [facility] [place]   2022  8:30 AM Shell Guadalupe MD [facility] CT/CV [facility] [place]   2023  1:30 PM Tessie Gannon MD N [facility] [place]   3/6/2023  8:15 AM Everett Delvalle MD [facility] Med CG [facility] [place]     Non-[facility] follow up appointment(s): na    Care Transition Nurse reviewed medical action plan and red flags with patient and discussed any barriers to care and/or understanding of plan of care after discharge.  Discussed appropriate site of care based on symptoms and resources available to patient including: PCP  Urgent care clinics  When to call 911. The patient agrees to contact the PCP office for questions related to their healthcare. Advance Care Planning:   reviewed and current. Patients top risk factors for readmission: lack of knowledge about disease, medical condition-s/p carotid endarterectomy, HTN, Afib, and polypharmacy  Interventions to address risk factors: Scheduled appointment with Specialist-12/14 12/19    Offered patient enrollment in the Remote Patient Monitoring (RPM) program for in-home monitoring: Yes, patient enrolled. Care Transitions Subsequent and Final Call    Subsequent and Final Calls  Do you have any ongoing symptoms?: No  Have your medications changed?: Yes  Patient Reports: started Bactrim  Do you have any questions related to your medications?: No  Do you currently have any active services?: No  Do you have any needs or concerns that I can assist you with?: No  Identified Barriers: Lack of Education  Care Transitions Interventions     Transportation Support: Declined   Other Interventions:             Care Transition Nurse provided contact information for future needs. Plan for follow-up call in 7-10 days based on severity of symptoms and risk factors. Plan for next call: symptom management-incision site, pain?  follow-up appointment-changes from f/u 12/14, 12/19? referral to ambulatory care manager-FATIMAH Anne  Received RPM equipment?   Final call    Félix Ellsworth RN

## 2022-12-15 ENCOUNTER — TELEPHONE (OUTPATIENT)
Dept: CARDIOTHORACIC SURGERY | Age: 87
End: 2022-12-15

## 2022-12-15 ENCOUNTER — CARE COORDINATION (OUTPATIENT)
Dept: CASE MANAGEMENT | Age: 87
End: 2022-12-15

## 2022-12-15 NOTE — CARE COORDINATION
Remote Patient Monitoring Note      Date/Time:  12/15/2022 9:12 AM    LPN reviewed patients reported daily Remote Patient Monitoring metrics. All reported metrics are within alert parameters. Plan/Follow Up:  Will continue to review, monitor and address alerts with follow up based on severity of symptoms and risk factors  Current Patient Metrics ---- Blood Pressure: 131/84, 77bpm Pulseox: 96%, 93bpm Survey: - Weight: 140.0lbs Note Created at: 12/15/2022 09:12 AM ET ---- Time-Spent: 3 minutes 0 seconds

## 2022-12-15 NOTE — TELEPHONE ENCOUNTER
Patient left a message at 11:38 voicing some concerns about side effects he is experiencing  from the antibiotic he was just started on 12/12/2022    Antibiotic LevoFLOXacin 750 mg 1 tablet daily for 7 days. States the following     +Nausea.  + Severe dizziness, that is worse at night, denies passing out.   +Diarrhea.  + tingling sensation in legs, more on the left side. + swelling in both feet, and ankles, that is all day, is having a hard time getting his shoes on.  + having issues sleeping.  + sob. Patient states his drinking plenty of water, and taking medication with food. Incision site on his neck isn't seeping as much, redness, and swelling is improving. Patient is concerned with the side effects from the antibiotic, he wants to know what he should do, continue to take medication until his appointment on 12/19/2022 and monitor symptoms, or stop the medication until his follow up on 12/19/2022, or should he be started on something else? Please advise thank you.

## 2022-12-16 ENCOUNTER — CARE COORDINATION (OUTPATIENT)
Dept: CARE COORDINATION | Age: 87
End: 2022-12-16

## 2022-12-16 NOTE — CARE COORDINATION
Remote Patient Monitoring Welcome Note  Date/Time:  2022 1:10 PM  Verified patients name and  as identifiers. Completed and confirmed the following:   Emergency Contact: [address] [phone]  [x] Patient received all RPM equipment (tablet, scale, blood pressure device and cuff, and pulse oximeter)  Cuff Size: Small  []  Regular   [x]  Large  []   Weight Scale: Regular   [x]  Bariatric  []               [x] Instructed patient keep box for use when returning equipment                                                          [x] Reviewed Patient Welcome Letter with patient                         [x] Reviewed expectations for patient and care team  [x] Reviewed RPM consent form         [x] Instructed patient to keep scale on flat surface                                                         [x] Instructed patient to keep tablet plugged in at all times                         [x] Instructed how to contact IT support (number listed on welcome letter)  [x] Provided Remote Patient Monitoring care  information               All questions answered at this time.     Current Patient Metrics ---- Blood Pressure: 120/77, 72bpm Pulseox: 95%, 78bpm Survey: C Weight: 141.0lbs Note Created at: 2022 01:10 PM ET ---- Time-Spent: 12 minutes 0 seconds

## 2022-12-17 LAB
AEROBIC CULTURE: ABNORMAL
ANAEROBIC CULTURE: ABNORMAL
GRAM STAIN RESULT: ABNORMAL
ORGANISM: ABNORMAL

## 2022-12-19 ENCOUNTER — OFFICE VISIT (OUTPATIENT)
Dept: CARDIOTHORACIC SURGERY | Age: 87
End: 2022-12-19

## 2022-12-19 ENCOUNTER — CARE COORDINATION (OUTPATIENT)
Dept: CARE COORDINATION | Age: 87
End: 2022-12-19

## 2022-12-19 VITALS
SYSTOLIC BLOOD PRESSURE: 111 MMHG | HEART RATE: 72 BPM | HEIGHT: 70 IN | DIASTOLIC BLOOD PRESSURE: 68 MMHG | WEIGHT: 145 LBS | BODY MASS INDEX: 20.76 KG/M2

## 2022-12-19 DIAGNOSIS — I65.22 STENOSIS OF LEFT CAROTID ARTERY: Primary | ICD-10-CM

## 2022-12-19 PROCEDURE — 99024 POSTOP FOLLOW-UP VISIT: CPT | Performed by: THORACIC SURGERY (CARDIOTHORACIC VASCULAR SURGERY)

## 2022-12-19 NOTE — PROGRESS NOTES
15[address] [facility] SURGERY  [address]  Dept: [phone]  Dept Fax: [phone]: [phone]    Visit Date: 12/19/2022    Mr. Barroso is a 80 y.o.male  who presented for:  Chief Complaint   Patient presents with    Follow-up     F/u from left CEA/postop infection       HPI:   HPI     Mr. Pat Lindsay returns to clinic today for 2nd clinic visit after his left CEA surgery on 11/21/22. He had difficulty with Levaquin for his cellulitis on his left inferior neck incision. He was switched to Bactrim DS and he responded very well. No redness, warmth or discharge noted. No fever or chills. He still has very mild numbness on left mandible area, but no difficulty swallowing or in speech or tongue movement. Current Outpatient Medications:     sulfamethoxazole-trimethoprim (BACTRIM DS;SEPTRA DS) 800-160 MG per tablet, Take 1 tablet by mouth 2 times daily for 10 days, Disp: 20 tablet, Rfl: 0    HYDROcodone-acetaminophen (NORCO) 5-325 MG per tablet, Take 1 tablet by mouth 2 times daily as needed for up to 30 days. , Disp: 60 tablet, Rfl: 0    metoprolol tartrate (LOPRESSOR) 25 MG tablet, Take 0.5 tablets by mouth every morning, Disp: 45 tablet, Rfl: 3    aspirin 81 MG EC tablet, Take 1 tablet by mouth daily, Disp: 30 tablet, Rfl: 3    apixaban (ELIQUIS) 5 MG TABS tablet, Take 1 tablet by mouth 2 times daily, Disp: 180 tablet, Rfl: 2    Psyllium (METAMUCIL PO), Take by mouth daily as needed, Disp: , Rfl:     melatonin 5 MG TABS tablet, Take 5 mg by mouth nightly as needed, Disp: , Rfl:     amLODIPine (NORVASC) 5 MG tablet, Take 1 tablet by mouth in the morning and at bedtime, Disp: 180 tablet, Rfl: 1    Probiotic Product (PROBIOTIC-10 PO), Take by mouth daily, Disp: , Rfl:     nystatin (MYCOSTATIN) 025468 UNIT/GM cream, Apply topically 2 times daily. , Disp: 30 g, Rfl: 1    Handicap Placard MISC, by Does not apply route Exp 7/13/2027, Disp: 1 each, Rfl: 0    Polyethylene Glycol 3350 (MIRALAX PO), Take by mouth as needed, Disp: , Rfl:     VITAMIN D PO, Take by mouth daily, Disp: , Rfl:     Multiple Vitamins-Minerals (PRESERVISION AREDS 2 PO), Take 1 tablet by mouth 2 times daily, Disp: , Rfl:     levoFLOXacin (LEVAQUIN) 750 MG tablet, Take 1 tablet by mouth daily for 7 days (Patient not taking: Reported on 12/19/2022), Disp: 7 tablet, Rfl: 0    Allergies   Allergen Reactions    Nitrofuran Derivatives Nausea Only     Severe stomach cramps    Nsaids Nausea Only and Other (See Comments)     Pain in stomach, GI upset       Past Medical History  Anup Silva  has a past medical history of Atrial fibrillation ([address] Back pain, Hernia, Hx of blood clots, Hyperlipidemia, Hypertension, Osteoarthritis, Prostate cancer ([address] Prostate cancer ([address] Pulmonary emboli ([address] and Wears glasses. Social History  Anup Silva  reports that he quit smoking about 57 years ago. His smoking use included cigarettes. He has never used smokeless tobacco. He reports current alcohol use. He reports that he does not use drugs. Family History  Anup Silva family history includes Cancer in his brother, brother, and sister; Heart Disease in his father; Prostate Cancer in his brother, brother, brother, and brother; Stroke in his mother. There is no family history of bicuspid aortic valve, aneurysms, heart transplant, pacemakers, defibrillators, or sudden cardiac death.       Past Surgical History   Past Surgical History:   Procedure Laterality Date    APPENDECTOMY  01/1961    [facility] SURGERY  6738,2966    X stop    CAROTID ENDARTERECTOMY Left 11/21/2022    LEFT CAROTID ENDARTERECTOMY performed by Samantha Marquez MD at [facility] Right     x2 on right    CATARACT REMOVAL Right 06/2001    Dr Sarkis Latham Left 11/2014    Dr Chencho Teran, 2009 Dr Good Abts  [address]?     Dr Rodriguez Reveal of date    HERNIA REPAIR Left 11/22/2017    ROBOT RECURRENT LEFT INGUINAL HERNIA REPAIR performed by Carroll Willoughby MD at [address] 1 BILATERAL Bilateral 9/5/2017    LUMBAR FACET MBB L3-4, L4-5, L5-S1 BILATERAL performed by Ritesh Moore MD at [address] [facility] [address] Left 04/09/2018    HIP INJECTION     OTHER SURGICAL HISTORY  10/7/2015    XI ROBOTIC PROSTATECTOMY    OTHER SURGICAL HISTORY Bilateral 09/05/2017    lumbar facet block L3-S1    OTHER SURGICAL HISTORY Right 1980's    nerve release right lower arm    OTHER SURGICAL HISTORY  01/30/2018    Sacroiliac joint MBB    OTHER SURGICAL HISTORY Left 02/27/2018    Sacroiliac joint medial branch block     [place] IMPLANT NEUROSTIM/ N/A 9/17/2018    THORACIC LAMINECTOMY PERMANENT SPINAL CORD STIMULATOR PADDLE PLUS BATTERY PLACEMENT performed by Solo Graff MD at [address] ARTHRGRPHY&/ANES/STEROID W/IMAGE Left 1/30/2018    LEFT SI MBB performed [facility] SI JOINT ARTHRGRPHY&/ANES/STEROID W/IMAGE Left 2/27/2018    LEFT SI MBB #2 performed by Ritesh Moore MD at [facility] OFFICE/OUTPT VISIT,PROCEDURE ONLY Left 4/9/2018    LEFT HIP STEROID INJECTION WITH SEDATION performed by Ritesh Moore MD at [facility] OFFICE/OUTPT [facility] N/A 8/21/2018    SPINAL CORD STIMULATOR IMPLANT TRIAL performed by Ritesh Moore MD at [facility]  7-    Dr Olivas Slight BIOPSY  8/21/2015    transrectal ultrasound with biopsy(+)    PROSTATE SURGERY  2003    TURP    PROSTATE SURGERY  2009    Biopsy -Hyperplasia    SHOULDER ARTHROPLASTY  2005    right       Subjective:     Review of Systems  See HPI    Objective:     /68   Pulse 72   Ht 5' 10\" (1.778 m)   Wt 145 lb (65.8 kg) Testing Reviewed:      I have individually reviewed the below cardiac tests      Assessment/Plan     1. Stenosis of left carotid artery      Doing well. Plan:  Finish course of Bactrim. F/U in clinic in 3 weeks. Will place Fluroquinolones on allergy list.    No follow-ups on file.       Electronically signed by Mc Rodríguez MD   12/19/2022 at 8:41 AM EST

## 2022-12-20 ENCOUNTER — CARE COORDINATION (OUTPATIENT)
Dept: CARE COORDINATION | Age: 87
End: 2022-12-20

## 2022-12-20 NOTE — CARE COORDINATION
Remote Patient Monitoring Note      Date/Time:  12/20/2022 8:55 AM    LPN reviewed patients reported daily Remote Patient Monitoring metrics. All reported metrics are within alert parameters. Plan/Follow Up:  Will continue to review, monitor and address alerts with follow up based on severity of symptoms and risk factors    Current Patient Metrics ---- Blood Pressure: 112/69, 86bpm Pulseox: 96%, 77bpm Survey: - Weight: 139.6lbs Note Created at: 12/20/2022 08:56 AM ET ---- Time-Spent: 2 minutes 0 seconds    Curtis Nava, [facility] Transition Nurse/ RPM  [phone]

## 2022-12-21 ENCOUNTER — CARE COORDINATION (OUTPATIENT)
Dept: CASE MANAGEMENT | Age: 87
End: 2022-12-21

## 2022-12-21 NOTE — CARE COORDINATION
Remote Patient Monitoring Note      Date/Time:  12/21/2022 10:50 AM    LPN reviewed patients reported daily Remote Patient Monitoring metrics. All reported metrics are within alert parameters. Plan/Follow Up:  Will continue to review, monitor and address alerts with follow up based on severity of symptoms and risk factors  Current Patient Metrics ---- Blood Pressure: 117/69, 71bpm Pulseox: 97%, 71bpm Survey: - Weight: 136.8lbs Note Created at: 12/21/2022 10:51 AM ET ---- Time-Spent: 3 minutes 0 seconds

## 2022-12-21 NOTE — CARE COORDINATION
[facility] Care Transitions Follow Up Call    Care Transition Nurse contacted the patient by telephone to follow up after admission on 22. Verified name and  with patient as identifiers. Patient: Nilay Barroso  Patient : 3/26/1934   MRN: [id number]  Reason for Admission: Cellulitis, S/P Left Carotid Endarterectomy   Discharge Date: 22 RARS: Readmission Risk Score: 17.2      Needs to be reviewed by the provider   Additional needs identified to be addressed with provider: No  none             Method of communication with provider: none. Afua Moise is doing well. States the infected surgical site is, \"Looking pretty good\". States that there was, \"a spot of serous blood from a scab that fell off\". Is not warm to touch, does not have a fever. Denies CP, SOB, edema, nausea. Has diarrhea  that is caused by the current antibiotic. Is staying hydrated and will finish up the antibiotic tomorrow. Is eating, \"decently\". Saw his PCP, no medication changes. Addressed changes since last contact:   see note  Discussed follow-up appointments. If no appointment was previously scheduled, appointment scheduling offered: prior call. Is follow up appointment scheduled within 7 days of discharge? Yes. Follow Up  Future Appointments   Date Time Provider Yonas Boyer   2023  9:45 AM Kathia Kaiser MD N SRPX CT/CV [facility] [place]   2023  1:30 PM Dorothy Cordova MD N SR[facility] Heart P [place]   2023  1:45 PM Re Monet APRN - CNP [facility] [place]   3/6/2023  8:15 AM Lupe Torres MD [facility] Transition Nurse reviewed medical action plan and red flags with patient and discussed any barriers to care and/or understanding of plan of care after discharge.  Discussed appropriate site of care based on symptoms and resources available to patient including: PCP  Specialist. The patient agrees to contact the PCP office for questions related to their healthcare. Advance Care Planning:   reviewed and current. Patients top risk factors for readmission: medical condition-Prostate Ca Hx., HTN  Interventions to address risk factors:  nurse assessment    Offered patient enrollment in the Remote Patient Monitoring (RPM) program for in-home monitoring: Yes, patient enrolled. Care Transition Nurse provided contact information for future needs. No further follow-up call indicated based on severity of symptoms and risk factors. Plan for next call: referral to ambulatory care manager-TJ Vora RN

## 2022-12-22 ENCOUNTER — CARE COORDINATION (OUTPATIENT)
Dept: CASE MANAGEMENT | Age: 87
End: 2022-12-22

## 2022-12-23 ENCOUNTER — CARE COORDINATION (OUTPATIENT)
Dept: CARE COORDINATION | Age: 87
End: 2022-12-23

## 2022-12-23 ENCOUNTER — CARE COORDINATION (OUTPATIENT)
Dept: CASE MANAGEMENT | Age: 87
End: 2022-12-23

## 2022-12-23 SDOH — ECONOMIC STABILITY: INCOME INSECURITY: HOW HARD IS IT FOR YOU TO PAY FOR THE VERY BASICS LIKE FOOD, HOUSING, MEDICAL CARE, AND HEATING?: NOT VERY HARD

## 2022-12-23 SDOH — SOCIAL STABILITY: SOCIAL NETWORK: ARE YOU MARRIED, WIDOWED, DIVORCED, SEPARATED, NEVER MARRIED, OR LIVING WITH A PARTNER?: WIDOWED

## 2022-12-23 SDOH — SOCIAL STABILITY: SOCIAL NETWORK
IN A TYPICAL WEEK, HOW MANY TIMES DO YOU TALK ON THE PHONE WITH FAMILY, FRIENDS, OR NEIGHBORS?: MORE THAN THREE TIMES A WEEK

## 2022-12-23 SDOH — ECONOMIC STABILITY: HOUSING INSECURITY: IN THE LAST 12 MONTHS, HOW MANY PLACES HAVE YOU LIVED?: 1

## 2022-12-23 SDOH — SOCIAL STABILITY: SOCIAL NETWORK: HOW OFTEN DO YOU GET TOGETHER WITH FRIENDS OR RELATIVES?: MORE THAN THREE TIMES A WEEK

## 2022-12-23 SDOH — SOCIAL STABILITY: SOCIAL NETWORK: HOW OFTEN DO YOU ATTENT MEETINGS OF THE CLUB OR ORGANIZATION YOU BELONG TO?: MORE THAN 4 TIMES PER YEAR

## 2022-12-23 SDOH — HEALTH STABILITY: PHYSICAL HEALTH: ON AVERAGE, HOW MANY MINUTES DO YOU ENGAGE IN EXERCISE AT THIS LEVEL?: 10 MIN

## 2022-12-23 SDOH — HEALTH STABILITY: MENTAL HEALTH: HOW OFTEN DO YOU HAVE A DRINK CONTAINING ALCOHOL?: NEVER

## 2022-12-23 SDOH — ECONOMIC STABILITY: FOOD INSECURITY: WITHIN THE PAST 12 MONTHS, THE FOOD YOU BOUGHT JUST DIDN'T LAST AND YOU DIDN'T HAVE MONEY TO GET MORE.: NEVER TRUE

## 2022-12-23 SDOH — HEALTH STABILITY: MENTAL HEALTH
STRESS IS WHEN SOMEONE FEELS TENSE, NERVOUS, ANXIOUS, OR CAN'T SLEEP AT NIGHT BECAUSE THEIR MIND IS TROUBLED. HOW STRESSED ARE YOU?: ONLY A LITTLE

## 2022-12-23 SDOH — HEALTH STABILITY: MENTAL HEALTH: HOW MANY STANDARD DRINKS CONTAINING ALCOHOL DO YOU HAVE ON A TYPICAL DAY?: PATIENT DOES NOT DRINK

## 2022-12-23 SDOH — SOCIAL STABILITY: SOCIAL NETWORK: HOW OFTEN DO YOU ATTEND CHURCH OR RELIGIOUS SERVICES?: MORE THAN 4 TIMES PER YEAR

## 2022-12-23 SDOH — HEALTH STABILITY: PHYSICAL HEALTH: ON AVERAGE, HOW MANY DAYS PER WEEK DO YOU ENGAGE IN MODERATE TO STRENUOUS EXERCISE (LIKE A BRISK WALK)?: 3 DAYS

## 2022-12-23 SDOH — SOCIAL STABILITY: SOCIAL NETWORK
DO YOU BELONG TO ANY CLUBS OR ORGANIZATIONS SUCH AS CHURCH GROUPS UNIONS, FRATERNAL OR ATHLETIC GROUPS, OR SCHOOL GROUPS?: YES

## 2022-12-23 SDOH — ECONOMIC STABILITY: FOOD INSECURITY: WITHIN THE PAST 12 MONTHS, YOU WORRIED THAT YOUR FOOD WOULD RUN OUT BEFORE YOU GOT MONEY TO BUY MORE.: NEVER TRUE

## 2022-12-23 SDOH — ECONOMIC STABILITY: INCOME INSECURITY: IN THE LAST 12 MONTHS, WAS THERE A TIME WHEN YOU WERE NOT ABLE TO PAY THE MORTGAGE OR RENT ON TIME?: NO

## 2022-12-23 NOTE — CARE COORDINATION
Ambulatory Care Coordination Note  12/23/2022    ACC: Subha Woods RN    Patient was called for Care Coordination enrollment s/p recent CTN referral for ongoing assistance with the management of his hypertension and healthcare needs. Patient remains enrolled in RPM program.  Care Coordination program was briefly reviewed and initial eval information obtained. Patient shared he continues to do well and his BP has remained stable. Patient denied any questions/concerns re: RPM program.  Patient reported recent neck infection continues to improve and swelling and \"scabbed\" area are much improved. [facility] educated patient on signs/symptoms to report as well as the importance of early symptom recognition and follow up. Patient verbalized understanding. Patient reported he continues to take medications as directed and he denied any current questions/concerns re: his medications. Med Rec was completed. Patient shared he is independent with his personal care and ADLs. Patient reported his children are near by and also assist him as needed. Patient stated daughter does his shopping and assists him with meal prep and med management and his son brings him to all appointments. Patient reported he continues to use his cane as needed and he denied any recent falls. Patient denied any ADL, DME, or transportation resource needs at this time  Patient was educated on the need to call with any changes and he verbalized understanding. Patient shared he lost weight recently d/t surgery and infection/cellulitis and is working to gain some weight \"back. \"  Patient was educated on the importance of good nutrition and he was encouraged to continue with nutritional supplements. Patient verbalized understanding. Patient is aware of upcoming appointment information. Patient denied any other questions, concerns, or needs and he was encouraged to call with any that may develop.   Patient verbalized agreement to [facility] following up with him again in the future. Actions:   - Reviewed Care Coordination program   - Completed initial eval/SDOH assessment   - Monitored for improvement of recent neck infection/cellulitis  - Educated on signs/symptoms to report   - Educated on the importance of early symptom recognition and follow up   - Monitored for medication questions/concerns   - Completed Med Rec   - Monitored for ADL, DME, and transportation needs   - Reviewed importance of good nutrition   - Reviewed use of nutritional supplements  - Reviewed upcoming appointment information   - Monitored for additional needs         Plan of Care:   - Continue with Care Coordination f/u calls for assistance with the management of his HTN and healthcare needs   - Educate on signs/symptoms to report   - Educate on the importance of early symptom recognition and follow up   - Monitor RPM assistance as needed  - Educate on the availability of same day appointments, urgent care/walk in clinic options, and after hours on call resources   - Amilcar Orlando information   - Patient is current with Medicare AW - Aug. 2022  - Monitor for additional needs   - Monitor for readiness to discharge from Care Coordination in the future    Offered patient enrollment in the Remote Patient Monitoring (RPM) program for in-home monitoring:  Patient previously enrolled in RPM .    Ambulatory Care Coordination Assessment    Care Coordination Protocol  Referral from Primary Care Provider: No  Week 1 - Initial Assessment     Do you have all of your prescriptions and are they filled?: No (Comment: Daughter assists as needed with med management - Dec. 2022)  Barriers to medication adherence: None  Are you able to afford your medications?: With Assistance  Medication Assistance Program: [employer]  How often do you have trouble taking your medications the way you have been told to take them?: I always take them as prescribed.      Do you have Home O2 Therapy?: No      Ability to seek help/take action for Emergent Urgent situations i.e. fire, crime, inclement weather or health crisis. : Independent  Ability to ambulate to restroom: Independent  Ability handle personal hygeine needs (bathing/dressing/grooming): Independent  Ability to manage Medications: Independent  Ability to prepare Food Preparation: Independent  Ability to maintain home (clean home, laundry): Independent  Ability to drive and/or has transportation: Dependent  Ability to do shopping: Dependent  Ability to manage finances: Independent  Is patient able to live independently?: Yes     Current Housing: Private Residence        Per the Fall Risk Screening, did the patient have 2 or more falls or 1 fall with injury in the past year?: No     Frequent urination at night?: No  Do you use rails/bars?: Yes  Do you have a non-slip tub mat?: Yes     Are you experiencing loss of meaning?: No  Are you experiencing loss of hope and peace?: No     Thinking about your patient's physical health needs, are there any symptoms or problems (risk indicators) you are unsure about that require further investigation?: No identified areas of uncertainly or problems already being investigated   Are the patients physical health problems impacting on their mental well-being?: No identified areas of concern   Are there any problems with your patients lifestyle behaviors (alcohol, drugs, diet, exercise) that are impacting on physical or mental well-being?: No identified areas of concern   Do you have any other concerns about your patients mental well-being?  How would you rate their severity and impact on the patient?: No identified areas of concern   How would you rate their home environment in terms of safety and stability (including domestic violence, insecure housing, neighbor harassment)?: Consistently safe, supportive, stable, no identified problems   How do daily activities impact on the patient's well-being? (include current or anticipated unemployment, work, caregiving, access to transportation or other): No identified problems or perceived positive benefits   How would you rate their social network (family, work, friends)?: Good participation with social networks   How would you rate their financial resources (including ability to afford all required medical care)?: Financially secure, resources adequate, no identified problems   How wells does the patient now understand their health and well-being (symptoms, signs or risk factors) and what they need to do to manage their health?: Reasonable to good understanding and already engages in managing health or is willing to undertake better management   How well do you think your patient can engage in healthcare discussions? (Barriers include language, deafness, aphasia, alcohol or drug problems, learning difficulties, concentration): Clear and open communication, no identified barriers   Do other services need to be involved to help this patient?: Other care/services not required at this time   Suggested Interventions and [employer] or Interventions: Follows with [facility] - Dec. 2022   Medication Assistance Program: Declined   Medi Set or Pill Pack: Completed   Pharmacist: Declined   Social Work: Declined   Transportation Services: Declined         Set up/Review an Education Plan, Set up/Review Goals              Prior to Admission medications    Medication Sig Start Date End Date Taking? Authorizing Provider   HYDROcodone-acetaminophen (NORCO) 5-325 MG per tablet Take 1 tablet by mouth 2 times daily as needed for up to 30 days.  12/5/22 1/4/23 Yes Lupe Torres MD   metoprolol tartrate (LOPRESSOR) 25 MG tablet Take 0.5 tablets by mouth every morning 12/5/22  Yes Lupe Torres MD   aspirin 81 MG EC tablet Take 1 tablet by mouth daily 11/22/22  Yes Montserrat Sy PA-C   apixaban (ELIQUIS) 5 MG TABS tablet Take 1 tablet by mouth 2 times daily 11/23/22  Yes Seth Newell PA-C   Psyllium (METAMUCIL PO) Take by mouth daily as needed   Yes Historical Provider, MD   melatonin 5 MG TABS tablet Take 5 mg by mouth nightly as needed   Yes Historical Provider, MD   amLODIPine (NORVASC) 5 MG tablet Take 1 tablet by mouth in the morning and at bedtime 8/29/22  Yes Chuy Mckinnon MD   Probiotic Product (PROBIOTIC-10 PO) Take by mouth daily   Yes Historical Provider, MD   nystatin (MYCOSTATIN) 985713 UNIT/GM cream Apply topically 2 times daily.  7/19/22  Yes Geovanna Sánchez MD   Polyethylene Glycol 3350 (MIRALAX PO) Take by mouth as needed   Yes Historical Provider, MD   VITAMIN D PO Take by mouth daily   Yes Historical Provider, MD   Multiple Vitamins-Minerals (PRESERVISION AREDS 2 PO) Take 1 tablet by mouth 2 times daily   Yes Historical Provider, MD   Handicap Placard MISC by Does not apply route Exp 7/13/2027 7/13/22   Chuy Mckinnon MD       Future Appointments   Date Time Provider Yonas Bagleyi   1/9/2023  9:45 AM Kristie Morrison MD N SRPX CT/CV P [place]   1/23/2023  1:30 PM Janene Haywood MD N [facility] Heart P [place]   1/25/2023  1:45 PM [facility] [place]   3/6/2023  8:15 AM Chuy Mckinnon MD [facility] [place]     ,   General Assessment    Do you have any symptoms that are causing concern?: Yes  Progression since Onset: Gradually Improving  Reported Symptoms: Other (Comment: neck cellulitis)     , and Care Coordination Episodes    Type: Amb Care Management  Episode: Complex Care  Noted: 12/22/2022  Comments: CTN Referral - RPM

## 2022-12-23 NOTE — CARE COORDINATION
Remote Patient Monitoring Note      Date/Time:  12/23/2022 12:31 PM    LPN reviewed patients reported daily Remote Patient Monitoring metrics. All reported metrics are within alert parameters. Plan/Follow Up:  Will continue to review, monitor and address alerts with follow up based on severity of symptoms and risk factors  Current Patient Metrics ---- Blood Pressure: 111/73, 75bpm Pulseox: 96%, 76bpm Survey: C Weight: 138.8lbs Note Created at: 12/23/2022 12:31 PM ET ---- Time-Spent: 3 minutes 0 seconds

## 2022-12-27 ENCOUNTER — CARE COORDINATION (OUTPATIENT)
Dept: CARE COORDINATION | Age: 87
End: 2022-12-27

## 2022-12-27 NOTE — CARE COORDINATION
Remote Patient Monitoring Note      Date/Time:  12/27/2022 1:59 PM    CCSS reviewed patients reported daily Remote Patient Monitoring metrics. All reported metrics are within alert parameters. Plan/Follow Up:  Will continue to review, monitor and address alerts with follow up based on severity of symptoms and risk factors  Current Patient Metrics ---- Blood Pressure: 113/68, 67bpm Pulseox: 97%, 78bpm Survey: - Weight: 141.6lbs Note Created at: 12/27/2022 01:59 PM ET ---- Time-Spent: 2 minutes 0 seconds

## 2022-12-27 NOTE — CARE COORDINATION
SONIDO Pacheco - CNP  You 7 minutes ago (8:00 AM)     I agree with the Care Coordinator's Plan of Care

## 2022-12-27 NOTE — CARE COORDINATION
Ambulatory Care Coordination Note  12/27/2022    ACC: Saundra Peña RN    Patient was called for continued Care Coordination follow up and education regarding the management of his HTN and healthcare needs. Patient shared he has been doing well and he denied any current complaints or concerns. Patient denied any c/o increased HR, SOB, headache, or chest pain being present. Patent's BP remains stable. HTN education was reviewed with patient. Patient was educated on signs/symptoms to report and the importance of early symptom recognition and follow up. Patient verbalized understanding. Patient reported he continues with intermittent LE swelling that remains at his baseline. Patient was encouraged to keep his legs elevated during times of rest and to call with any ongoing or increased swelling. Patient verbalized understanding. Patient denied any other questions, concerns, or needs and he was encouraged to call with any that may develop.           Actions:   - Reviewed signs/symptoms to report   - Educated on the importance of early symptom recognition and follow up   - Reviewed HTN education   - Encouraged patient to keep legs elevated during times of rest   - Monitored for additional needs          Plan of Care:   - Continue with Care Coordination f/u calls for assistance with the management of his HTN and healthcare needs   - Educate on signs/symptoms to report - Ongoing   - Educate on the importance of early symptom recognition and follow up - Ongoing  - Monitor RPM assistance as needed - Ongoing  - Educate on the availability of same day appointments, urgent care/walk in clinic options, and after hours on call resources   - Amilcar Orlando information   - Patient is current with Medicare AW - Aug. 2022  - Monitor for additional needs   - Monitor for readiness to discharge from Care Coordination in the future    Offered patient enrollment in the Remote Patient Monitoring (RPM) program for in-home monitoring: NA. Lab Results       None            Care Coordination Interventions    Referral from Primary Care Provider: No  Suggested Interventions and Community Resources  Medication Assistance Program: Declined (Comment: Denied additional assistance. [facility] clinic when possible - Dec. 2022)  Medi Set or Pill Pack: Completed (Comment: Dec. 2022)  Pharmacist: [facility] (Comment: Dec. 2022)  Social Work: Declined (Comment: Denied any additional resource needs - Dec. 2022)  Transportation Support: Declined  Other Services or Interventions: [facility] with [facility] - Dec. 2022          Goals Addressed                   This Visit's Progress       Care Coordination     Conditions and Symptoms   Improving     I will schedule office visits, as directed by my provider. I will keep my appointment or reschedule if I have to cancel. I will notify my provider of any barriers to my plan of care. I will notify my provider of any symptoms that indicate a worsening of my condition. Barriers: overwhelmed by complexity of regimen, stress, and lack of education  Plan for overcoming my barriers: Continue to provide education to patient and monitor for resource needs   Confidence: 8/10  Anticipated Goal Completion Date: 3/10/2023                  Prior to Admission medications    Medication Sig Start Date End Date Taking? Authorizing Provider   HYDROcodone-acetaminophen (NORCO) 5-325 MG per tablet Take 1 tablet by mouth 2 times daily as needed for up to 30 days.  12/5/22 1/4/23  Jean-Pierre Sal MD   metoprolol tartrate (LOPRESSOR) 25 MG tablet Take 0.5 tablets by mouth every morning 12/5/22   Jean-Pierre Sal MD   aspirin 81 MG EC tablet Take 1 tablet by mouth daily 11/22/22   Marco Mckinnon PA-C   apixaban (ELIQUIS) 5 MG TABS tablet Take 1 tablet by mouth 2 times daily 11/23/22   Marco Mckinnon PA-C   Psyllium (METAMUCIL PO) Take by mouth daily as needed    Historical Provider, MD   melatonin 5 MG TABS tablet Take 5 mg by mouth nightly as needed    Historical Provider, MD   amLODIPine (NORVASC) 5 MG tablet Take 1 tablet by mouth in the morning and at bedtime 8/29/22   Issa Wallace MD   Probiotic Product (PROBIOTIC-10 PO) Take by mouth daily    Historical Provider, MD   nystatin (MYCOSTATIN) [id number] UNIT/GM cream Apply topically 2 times daily.  7/19/22   Kandace Del Rio MD   Handicap Placard MISC by Does not apply route Exp 7/13/2027 7/13/22   Issa Wallace MD   Polyethylene Glycol 3350 (MIRALAX PO) Take by mouth as needed    Historical Provider, MD   VITAMIN D PO Take by mouth daily    Historical Provider, MD   Multiple Vitamins-Minerals (PRESERVISION AREDS 2 PO) Take 1 tablet by mouth 2 times daily    Historical Provider, MD       Future Appointments   Date Time Provider Yonas Boyer   1/9/2023  9:45 AM Haylee Lozada MD N SRPX CT/CV [address]   1/23/2023  1:30 PM Brandan Mcclelland MD N SRPX Heart [address]   1/25/2023  1:45 PM Jenn Christine APRN - CNP N [place] [address]   3/6/2023  8:15 AM Issa Wallace MD [address]   ,   General Assessment    Do you have any symptoms that are causing concern?: No     , and Care Coordination Episodes    Type: Amb Care Management  Episode: Complex Care  Noted: 12/22/2022  Comments: CTN Referral - RPM

## 2022-12-28 ENCOUNTER — CARE COORDINATION (OUTPATIENT)
Dept: CASE MANAGEMENT | Age: 87
End: 2022-12-28

## 2022-12-28 NOTE — CARE COORDINATION
Remote Patient Monitoring Note      Date/Time:  12/28/2022 3:40 PM    LPN reviewed patients reported daily Remote Patient Monitoring metrics. All reported metrics are within alert parameters. Plan/Follow Up:  Will continue to review, monitor and address alerts with follow up based on severity of symptoms and risk factors  Current Patient Metrics ---- Blood Pressure: -/-, -bpm Pulseox: -%, -bpm Survey: - Weight: 140.2lbs Note Created at: 12/28/2022 03:41 PM ET ---- Time-Spent: 3 minutes 0 seconds

## 2022-12-29 ENCOUNTER — CARE COORDINATION (OUTPATIENT)
Dept: CARE COORDINATION | Age: 87
End: 2022-12-29

## 2022-12-29 NOTE — CARE COORDINATION
Remote Patient Monitoring Note      Date/Time:  12/29/2022 12:03 PM    EMTP reviewed patients reported daily Remote Patient Monitoring metrics. All reported metrics are within alert parameters. Plan/Follow Up:  Will continue to review, monitor and address alerts with follow up based on severity of symptoms and risk factors  Current Patient Metrics ---- Blood Pressure: 116/80, 60bpm Pulseox: 95%, 77bpm Survey: - Weight: 141.8lbs Note Created at: 12/29/2022 12:03 PM ET ---- Time-Spent: 2 minutes 0 seconds

## 2022-12-30 ENCOUNTER — CARE COORDINATION (OUTPATIENT)
Dept: CASE MANAGEMENT | Age: 87
End: 2022-12-30

## 2022-12-30 NOTE — CARE COORDINATION
Remote Alert Monitoring Note      Date/Time:  2022 8:33 AM    LPN contacted patient by telephone regarding red alert received for weight increase (142.2). Verified patients name and  as identifiers. Background: HTN  Refer to 911 immediately if:  Patient unresponsive or unable to provide history  Change in cognition or sudden confusion  Patient unable to respond in complete sentences  Intense chest pain/tightness  Any concern for any clinical emergency  Red Alert: Provider response time of 1 hr required for any red alert requiring intervention  Yellow Alert: Provider response time of 3hr required for any escalated yellow alert    Weight Scale Triage  Was your weight obtained upon rising/waking today? yes   Was your weight obtained after voiding and/or use of the bathroom today? yes   Did you weigh yourself in the same amount of clothing today, compared to how you typically do? yes   Was the scale bumped or moved prior to today's weight? no   Is your scale on a flat/hard surface? yes   Did you obtain your weight with shoes on? no   If yes, is this something you normally do during your daily weights? no   Were you standing up straight on the scale today? yes   Were you leaning on anything while obtaining your weight today? no       Clinical Interventions: Reviewed and followed up on alerts and treatments-Called patient for weight gain of 5# in 7 days. Patient is on ABX and has had diarrhea and is just getting back to normal with his bowels patient says. Patient denies SOB , but has worsening ankle edema over past week. Voiding adequately and patient says has been eating a little more over the holiday season. Will escalate to PCP for weight gain of 5# over 7 days and ankle edema worsening. Instructed to stay hydrated due to diarrhea and elevate legs when able. Taking all medications as directed. Escalated alert to PCP-Dr. Chuy Mckinnon    Routed to BRADFORD Lucas    Plan/Follow Up:  Will continue to review, monitor and address alerts with follow up based on severity of symptoms and risk factors.     Current Patient Metrics ---- Blood Pressure: 120/75, 64bpm Pulseox: 97%, 73bpm Survey: - Weight: 142.2lbs Note Created at: 12/30/2022 10:17 AM ET ---- Time-Spent: 1 hours 30 minutes 0 seconds

## 2023-01-03 ENCOUNTER — CARE COORDINATION (OUTPATIENT)
Dept: CARE COORDINATION | Age: 88
End: 2023-01-03

## 2023-01-03 NOTE — CARE COORDINATION
Remote Patient Monitoring Note      Date/Time:  1/3/2023 12:47 PM    EMTP reviewed patients reported daily Remote Patient Monitoring metrics. All reported metrics are within alert parameters. Plan/Follow Up:  Will continue to review, monitor and address alerts with follow up based on severity of symptoms and risk factors--- Current Patient Metrics ---- Blood Pressure: 120/77, 56bpm Pulseox: 95%, 69bpm Survey: - Weight: 141.0lbs Note Created at: 01/03/2023 12:47 PM ET ---- Time-Spent: 2 minutes 0 seconds

## 2023-01-03 NOTE — CARE COORDINATION
Ambulatory Care Coordination Note  1/3/2023    ACC: Briana Joyner RN    Patient was called for continued Care Coordination follow up and education re: the management of his HTN and healthcare needs. Patient shared he continues to do well and he denied any current complaints or concerns. Recent GI symptoms have improved and resolved. Patient denied any ongoing c/o N/V or diarrhea being present. Patient continues to monitor BP as directed this also remains stable. Patient shared his dizziness and balance concerns remain at his baseline. Fall prevention and home safety education was reviewed with patient and he acknowledged understanding. Patient was also educated on signs/symptoms to report as well as the importance of early symptom recognition and follow up. ACM educated patient on the availability of same day appointments, urgent care/walk in clinic options and after hours on call resources. Patient verbalized understanding. Patient was educated that PCP has ordered for CMP to be completed and patient verbalized understanding and shared he will have labs done this week as ordered. Patient denied any other questions, concerns, or needs and he was encouraged to call with any that may develop.           Actions:   - Reviewed signs/symptoms to report   - Reviewed the importance of early symptom recognition and follow up   - Reviewed importance of ongoing home BP monitoring   - Educated on the availability of same day appointments, urgent care/walk in clinic options, and after hours on call resources   - Educated on need to have f/u CMP completed   - Monitored for additional needs          Plan of Care:   - Continue with Care Coordination f/u calls for assistance with the management of his HTN and healthcare needs   - Educate on signs/symptoms to report - Ongoing   - Educate on the importance of early symptom recognition and follow up - Ongoing  - Monitor RPM assistance as needed - Ongoing  - Educate on the availability of same day appointments, urgent care/walk in clinic options, and after hours on call resources - Completed   - Amilcar Orlando information   - Patient is current with Medicare AWV - Aug. 2022  - Monitor for additional needs   - Monitor for readiness to discharge from Care Coordination in the future    Offered patient enrollment in the Remote Patient Monitoring (RPM) program for in-home monitoring: Yes, patient enrolled. Lab Results       None            Care Coordination Interventions    Referral from Primary Care Provider: No  Suggested Interventions and Community Resources  Medication Assistance Program: Declined (Comment: Denied additional assistance. [facility] clinic when possible - Dec. 2022)  Medi Set or Pill Pack: Completed (Comment: Dec. 2022)  Pharmacist: Heather Ingram (Comment: Dec. 2022)  Social Work: Declined (Comment: Denied any additional resource needs - Dec. 2022)  Transportation Support: Declined  Other Services or Interventions: Follows with [facility] - Dec. 2022          Goals Addressed                   This Visit's Progress       Care Coordination     Conditions and Symptoms   Improving     I will schedule office visits, as directed by my provider. I will keep my appointment or reschedule if I have to cancel. I will notify my provider of any barriers to my plan of care. I will notify my provider of any symptoms that indicate a worsening of my condition. Barriers: overwhelmed by complexity of regimen, stress, and lack of education  Plan for overcoming my barriers: Continue to provide education to patient and monitor for resource needs   Confidence: 8/10  Anticipated Goal Completion Date: 3/10/2023                  Prior to Admission medications    Medication Sig Start Date End Date Taking? Authorizing Provider   HYDROcodone-acetaminophen (NORCO) 5-325 MG per tablet Take 1 tablet by mouth 2 times daily as needed for up to 30 days.  12/5/22 1/4/23 Shimon Moser MD   metoprolol tartrate (LOPRESSOR) 25 MG tablet Take 0.5 tablets by mouth every morning 12/5/22   Shimon Moser MD   aspirin 81 MG EC tablet Take 1 tablet by mouth daily 11/22/22   Noam Morrison PA-C   apixaban (ELIQUIS) 5 MG TABS tablet Take 1 tablet by mouth 2 times daily 11/23/22   Noam Morrison PA-C   Psyllium (METAMUCIL PO) Take by mouth daily as needed    Historical Provider, MD   melatonin 5 MG TABS tablet Take 5 mg by mouth nightly as needed    Historical Provider, MD   amLODIPine (NORVASC) 5 MG tablet Take 1 tablet by mouth in the morning and at bedtime 8/29/22   Shimon Moser MD   Probiotic Product (PROBIOTIC-10 PO) Take by mouth daily    Historical Provider, MD   nystatin (MYCOSTATIN) 777209 UNIT/GM cream Apply topically 2 times daily.  7/19/22   Karin White MD   Handicap Placard MISC by Does not apply route Exp 7/13/2027 7/13/22   Shimon Moser MD   Polyethylene Glycol 3350 (MIRALAX PO) Take by mouth as needed    Historical Provider, MD   VITAMIN D PO Take by mouth daily    Historical Provider, MD   Multiple Vitamins-Minerals (PRESERVISION AREDS 2 PO) Take 1 tablet by mouth 2 times daily    Historical Provider, MD       Future Appointments   Date Time Provider Yonas Boyer   1/9/2023  9:45 AM MD CHIN Co[place] [facility] [place]   1/23/2023  1:30 PM Hiro Hodgson MD N [facility] P [place]   1/25/2023  1:45 PM Gloria Smith APRN - CNP [place] [facility] [place]   3/6/2023  8:15 AM Shimon Moser MD [facility] [place]   ,   General Assessment    Do you have any symptoms that are causing concern?: Yes  Progression since Onset: Unchanged  Reported Symptoms: Other (Comment: dizziness)     , and Care Coordination Episodes    Type: Amb Care Management  Episode: Complex Care  Noted: 12/22/2022  Comments: CTN Referral - RPM

## 2023-01-04 ENCOUNTER — CARE COORDINATION (OUTPATIENT)
Dept: CARE COORDINATION | Age: 88
End: 2023-01-04

## 2023-01-04 DIAGNOSIS — G89.29 CHRONIC MIDLINE LOW BACK PAIN WITHOUT SCIATICA: ICD-10-CM

## 2023-01-04 DIAGNOSIS — M54.50 CHRONIC MIDLINE LOW BACK PAIN WITHOUT SCIATICA: ICD-10-CM

## 2023-01-04 RX ORDER — HYDROCODONE BITARTRATE AND ACETAMINOPHEN 5; 325 MG/1; MG/1
1 TABLET ORAL 2 TIMES DAILY PRN
Qty: 60 TABLET | Refills: 0 | Status: SHIPPED | OUTPATIENT
Start: 2023-01-04 | End: 2023-02-03

## 2023-01-04 NOTE — TELEPHONE ENCOUNTER
Pt called stating that he is needing a refill on his medication. Medication pending to [address] in [place]. Pt stated that he use to get 56 tablets so he could take 2x/day. Pt stated that his last refill he only had 30. Pt is asking if this was an error. Pt stated that he has had the same script for over 2 years.

## 2023-01-04 NOTE — CARE COORDINATION
Remote Patient Monitoring Note      Date/Time:  1/4/2023 1:21 PM    EMTP reviewed patients reported daily Remote Patient Monitoring metrics. All reported metrics are within alert parameters. Plan/Follow Up:  Will continue to review, monitor and address alerts with follow up based on severity of symptoms and risk factors-- Current Patient Metrics ---- Blood Pressure: 120/78, 73bpm Pulseox: 95%, 72bpm Survey: - Weight: 139.8lbs Note Created at: 01/04/2023 01:21 PM ET ---- Time-Spent: 2 minutes 0 seconds

## 2023-01-05 ENCOUNTER — CARE COORDINATION (OUTPATIENT)
Dept: CARE COORDINATION | Age: 88
End: 2023-01-05

## 2023-01-05 ENCOUNTER — NURSE ONLY (OUTPATIENT)
Dept: FAMILY MEDICINE CLINIC | Age: 88
End: 2023-01-05
Payer: MEDICARE

## 2023-01-05 DIAGNOSIS — R19.7 DIARRHEA, UNSPECIFIED TYPE: ICD-10-CM

## 2023-01-05 LAB
ALBUMIN SERPL-MCNC: 4.4 G/DL (ref 3.5–5.1)
ALP BLD-CCNC: 61 U/L (ref 38–126)
ALT SERPL-CCNC: 19 U/L (ref 11–66)
ANION GAP SERPL CALCULATED.3IONS-SCNC: 10 MEQ/L (ref 8–16)
AST SERPL-CCNC: 23 U/L (ref 5–40)
BILIRUB SERPL-MCNC: 0.6 MG/DL (ref 0.3–1.2)
BUN BLDV-MCNC: 24 MG/DL (ref 7–22)
CALCIUM SERPL-MCNC: 9.7 MG/DL (ref 8.5–10.5)
CHLORIDE BLD-SCNC: 104 MEQ/L (ref 98–111)
CO2: 26 MEQ/L (ref 23–33)
CREAT SERPL-MCNC: 1.1 MG/DL (ref 0.4–1.2)
GFR SERPL CREATININE-BSD FRML MDRD: > 60 ML/MIN/1.73M2
GLUCOSE BLD-MCNC: 97 MG/DL (ref 70–108)
POTASSIUM SERPL-SCNC: 4.4 MEQ/L (ref 3.5–5.2)
SODIUM BLD-SCNC: 140 MEQ/L (ref 135–145)
TOTAL PROTEIN: 6.9 G/DL (ref 6.1–8)

## 2023-01-05 PROCEDURE — 99999 PR OFFICE/OUTPT VISIT,PROCEDURE ONLY: CPT

## 2023-01-05 PROCEDURE — 36415 COLL VENOUS BLD VENIPUNCTURE: CPT

## 2023-01-05 ASSESSMENT — PATIENT HEALTH QUESTIONNAIRE - PHQ9
2. FEELING DOWN, DEPRESSED OR HOPELESS: 0
SUM OF ALL RESPONSES TO PHQ9 QUESTIONS 1 & 2: 0
SUM OF ALL RESPONSES TO PHQ QUESTIONS 1-9: 0
1. LITTLE INTEREST OR PLEASURE IN DOING THINGS: 0
SUM OF ALL RESPONSES TO PHQ QUESTIONS 1-9: 0

## 2023-01-05 NOTE — CARE COORDINATION
Remote Patient Monitoring Note      Date/Time:  1/5/2023 12:40 PM    EMTP reviewed patients reported daily Remote Patient Monitoring metrics. All reported metrics are within alert parameters. Plan/Follow Up:  Will continue to review, monitor and address alerts with follow up based on severity of symptoms and risk factors-- Current Patient Metrics ---- Blood Pressure: 130/83, 75bpm Pulseox: 97%, 71bpm Survey: C Weight: 139.2lbs Note Created at: 01/05/2023 12:40 PM ET ---- Time-Spent: 2 minutes 0 seconds

## 2023-01-06 ENCOUNTER — CARE COORDINATION (OUTPATIENT)
Dept: CARE COORDINATION | Age: 88
End: 2023-01-06

## 2023-01-06 NOTE — CARE COORDINATION
Remote Patient Monitoring Note      Date/Time:  1/6/2023 4:18 PM    CCSS reviewed patients reported daily Remote Patient Monitoring metrics. All reported metrics are within alert parameters. Plan/Follow Up:  Will continue to review, monitor and address alerts with follow up based on severity of symptoms and risk factors   Current Patient Metrics ---- Blood Pressure: 113/67, 68bpm Pulseox: 94%, 77bpm Survey: - Weight: 140.2lbs Note Created at: 01/06/2023 04:18 PM ET ---- Time-Spent: 2 minutes 0 seconds

## 2023-01-09 ENCOUNTER — OFFICE VISIT (OUTPATIENT)
Dept: CARDIOTHORACIC SURGERY | Age: 88
End: 2023-01-09

## 2023-01-09 ENCOUNTER — CARE COORDINATION (OUTPATIENT)
Dept: CARE COORDINATION | Age: 88
End: 2023-01-09

## 2023-01-09 VITALS
WEIGHT: 147 LBS | DIASTOLIC BLOOD PRESSURE: 76 MMHG | HEIGHT: 70 IN | HEART RATE: 69 BPM | SYSTOLIC BLOOD PRESSURE: 126 MMHG | BODY MASS INDEX: 21.05 KG/M2

## 2023-01-09 DIAGNOSIS — I65.22 STENOSIS OF LEFT CAROTID ARTERY: Primary | ICD-10-CM

## 2023-01-09 PROCEDURE — 99024 POSTOP FOLLOW-UP VISIT: CPT | Performed by: THORACIC SURGERY (CARDIOTHORACIC VASCULAR SURGERY)

## 2023-01-09 NOTE — PROGRESS NOTES
[facility] SURGERY  [address] [facility] [address]  Dept: [phone]  Dept Fax: [phone]: [phone]    Visit Date: 1/9/2023    Mr. Barroso is a 80 y.o.male  who presented for:  No chief complaint on file. HPI:   HPI   Mr. Sruthi Peterson returns to clinic for 3rd postoperative visit after his left CEA on 11/21/22. He states that the left neck incision is now completely healed and all superficial infection has resolved. He also states that the numbness of the jaw area has much improved, as has his swallowing. He is on ASA/Eliquis without difficulty. Current Outpatient Medications:     HYDROcodone-acetaminophen (NORCO) 5-325 MG per tablet, Take 1 tablet by mouth 2 times daily as needed for Pain for up to 30 days. , Disp: 60 tablet, Rfl: 0    metoprolol tartrate (LOPRESSOR) 25 MG tablet, Take 0.5 tablets by mouth every morning, Disp: 45 tablet, Rfl: 3    aspirin 81 MG EC tablet, Take 1 tablet by mouth daily, Disp: 30 tablet, Rfl: 3    apixaban (ELIQUIS) 5 MG TABS tablet, Take 1 tablet by mouth 2 times daily, Disp: 180 tablet, Rfl: 2    Psyllium (METAMUCIL PO), Take by mouth daily as needed, Disp: , Rfl:     melatonin 5 MG TABS tablet, Take 5 mg by mouth nightly as needed, Disp: , Rfl:     amLODIPine (NORVASC) 5 MG tablet, Take 1 tablet by mouth in the morning and at bedtime, Disp: 180 tablet, Rfl: 1    Probiotic Product (PROBIOTIC-10 PO), Take by mouth daily, Disp: , Rfl:     nystatin (MYCOSTATIN) 588706 UNIT/GM cream, Apply topically 2 times daily. , Disp: 30 g, Rfl: 1    Handicap Placard MISC, by Does not apply route Exp 7/13/2027, Disp: 1 each, Rfl: 0    Polyethylene Glycol 3350 (MIRALAX PO), Take by mouth as needed, Disp: , Rfl:     VITAMIN D PO, Take by mouth daily, Disp: , Rfl:     Multiple Vitamins-Minerals (PRESERVISION AREDS 2 PO), Take 1 tablet by mouth 2 times daily, Disp: , Rfl:     Allergies   Allergen Reactions    Levaquin [Levofloxacin] Shortness Of Breath, Diarrhea, Nausea And Vomiting, Swelling and Dizziness or Vertigo    Nitrofuran Derivatives Nausea Only     Severe stomach cramps    Nsaids Nausea Only and Other (See Comments)     Pain in stomach, GI upset       Past Medical History  Fabricio Hernández  has a past medical history of Atrial fibrillation ([address] Back pain, Hernia, Hx of blood clots, Hyperlipidemia, Hypertension, Osteoarthritis, Prostate cancer ([address] Prostate cancer ([address] Pulmonary emboli ([address] and Wears glasses. Social History  Fabricio Hernández  reports that he quit smoking about 57 years ago. His smoking use included cigarettes. He has never used smokeless tobacco. He reports current alcohol use. He reports that he does not use drugs. Family History  Fabricio Hernández family history includes Cancer in his brother, brother, and sister; Heart Disease in his father; Prostate Cancer in his brother, brother, brother, and brother; Stroke in his mother. There is no family history of bicuspid aortic valve, aneurysms, heart transplant, pacemakers, defibrillators, or sudden cardiac death. Past Surgical History   Past Surgical History:   Procedure Laterality Date    APPENDECTOMY  01/1961    [facility] SURGERY  [id number]    X stop    CAROTID ENDARTERECTOMY Left 11/21/2022    LEFT CAROTID ENDARTERECTOMY performed by Janice Boucher MD at [address] Right     x2 on right    CATARACT REMOVAL Right 06/2001    Dr Raffaele Verdin Left 11/2014    Dr Diego Garcia, 2009    Dr Millie Tariq  4435'[id number]?     Dr Emmanuel Molina of date    HERNIA REPAIR Left 11/22/2017    ROBOT RECURRENT LEFT INGUINAL HERNIA REPAIR performed by Jeremy Corcoran MD at [facility] BILATERAL Bilateral 9/5/2017    LUMBAR FACET MBB L3-4, L4-5, L5-S1 BILATERAL performed by Jaymes Seip MD Melissa at [facility] Left 04/09/2018    HIP INJECTION     OTHER SURGICAL HISTORY  10/7/2015    XI ROBOTIC PROSTATECTOMY    OTHER SURGICAL HISTORY Bilateral 09/05/2017    lumbar facet block L3-S1    OTHER SURGICAL HISTORY Right 1980's    nerve release right lower arm    OTHER SURGICAL HISTORY  01/30/2018    Sacroiliac joint MBB    OTHER SURGICAL HISTORY Left 02/27/2018    Sacroiliac joint medial branch block     [place] INJECT SI JOINT ARTHRGRPHY&/ANES/STEROID W/MARQUISE Left 1/30/2018    LEFT SI MBB performed by March Spatz, MD at [facility] INJECT SI JOINT ARTHRGRPHY&/ANES/STEROID W/MARQUISE Left 2/27/2018    LEFT SI MBB #2 performed by March Spatz, MD at [facility] INSJ/RPLCMT SPI NPGR DIR/INDUXIVE COUPLING N/A 9/17/2018    THORACIC LAMINECTOMY PERMANENT SPINAL CORD STIMULATOR PADDLE PLUS BATTERY PLACEMENT performed by Alia Carrington MD at [facility] OFFICE/OUTPT [facility] Left 4/9/2018    LEFT HIP STEROID INJECTION WITH SEDATION performed by March Spatz, MD at [facility] OFFICE/OUTPT [facility] N/A 8/21/2018    SPINAL CORD STIMULATOR IMPLANT TRIAL performed by March Spatz, MD at [facility]  7-    Dr Tita De La Rosa BIOPSY  8/21/2015    transrectal ultrasound with biopsy(+)    PROSTATE SURGERY  2003    TURP    PROSTATE SURGERY  2009    Biopsy -Hyperplasia    SHOULDER ARTHROPLASTY  2005    right       Subjective:     Review of Systems Negative except for HPI    Objective:     /76   Pulse 69   Ht 5' 10\" (1.778 m)   Wt 147 lb (66.7 kg)   BMI 21.09 kg/m²     Wt Readings from Last 3 Encounters:   01/09/23 147 lb (66.7 kg)   12/19/22 145 lb (65.8 kg)   12/12/22 145 lb (65.8 kg)     BP Readings from Last 3 Encounters:   01/09/23 126/76   12/19/22 111/68   12/12/22 125/74       Physical Exam  Left neck: incision healed; no hematoma, erythema, open areas, or drainage. Neuro: grossly intact. Lab Results   Component Value Date/Time    WBC 15.1 11/22/2022 05:07 AM    RBC 3.63 11/22/2022 05:07 AM     08/26/2011 01:55 PM    HGB 12.0 11/22/2022 05:07 AM    HCT 36.3 11/22/2022 05:07 AM    .0 11/22/2022 05:07 AM    MCH 33.1 11/22/2022 05:07 AM    MCHC 33.1 11/22/2022 05:07 AM    RDW 13.2 07/19/2022 02:42 PM     11/22/2022 05:07 AM    MPV 11.2 11/22/2022 05:07 AM       Lab Results   Component Value Date/Time     01/05/2023 08:06 AM    K 4.4 01/05/2023 08:06 AM     01/05/2023 08:06 AM    CO2 26 01/05/2023 08:06 AM    BUN 24 01/05/2023 08:06 AM    LABALBU 4.4 01/05/2023 08:06 AM    CREATININE 1.1 01/05/2023 08:06 AM    CALCIUM 9.7 01/05/2023 08:06 AM    LABGLOM >60 01/05/2023 08:06 AM    GLUCOSE 97 01/05/2023 08:06 AM    GLUCOSE 89 05/14/2016 06:30 AM       Lab Results   Component Value Date/Time    ALKPHOS 61 01/05/2023 08:06 AM    ALT 19 01/05/2023 08:06 AM    AST 23 01/05/2023 08:06 AM    PROT 6.9 01/05/2023 08:06 AM    BILITOT 0.6 01/05/2023 08:06 AM    BILIDIR <0.2 09/21/2018 09:19 AM    LABALBU 4.4 01/05/2023 08:06 AM       Lab Results   Component Value Date/Time    MG 2.0 07/19/2022 02:42 PM       Lab Results   Component Value Date    INR 1.54 (H) 11/16/2022    INR 1.30 (H) 09/12/2018    INR 1.07 06/30/2018         Lab Results   Component Value Date/Time    LABA1C 5.8 11/16/2022 12:19 PM       Lab Results   Component Value Date/Time    TRIG 55 05/14/2022 12:00 AM    HDL 67 05/14/2022 12:00 AM    LDLCALC 113 05/14/2022 12:00 AM       Lab Results   Component Value Date/Time    TSH 3.380 07/24/2018 10:55 AM         T  Assessment/Plan     1. Stenosis of left carotid artery      Orders Placed This Encounter   Procedures    VL DUP CAROTID LEFT     No orders of the defined types were placed in this encounter. Doing well    Plan:  F/U Carotid duplex in 4 weeks, followed by office visit.   Continue other treatments and medications. No follow-ups on file.       Electronically signed by Tiburcio Johnson MD   1/9/2023 at 9:58 AM EST

## 2023-01-09 NOTE — CARE COORDINATION
Remote Patient Monitoring Note      Date/Time:  1/9/2023 11:16 AM    EMTP reviewed patients reported daily Remote Patient Monitoring metrics. All reported metrics are within alert parameters. Plan/Follow Up:  Will continue to review, monitor and address alerts with follow up based on severity of symptoms and risk factors-- Current Patient Metrics ---- Blood Pressure: 127/78, 74bpm Pulseox: 96%, 72bpm Survey: - Weight: 139.0lbs Note Created at: 01/09/2023 11:16 AM ET ---- Time-Spent: 2 minutes 0 seconds

## 2023-01-10 ENCOUNTER — CARE COORDINATION (OUTPATIENT)
Dept: CARE COORDINATION | Age: 88
End: 2023-01-10

## 2023-01-10 NOTE — CARE COORDINATION
Remote Patient Monitoring Note      Date/Time:  1/10/2023 11:04 AM    EMTP reviewed patients reported daily Remote Patient Monitoring metrics. All reported metrics are within alert parameters. Plan/Follow Up:  Will continue to review, monitor and address alerts with follow up based on severity of symptoms and risk factors-- Current Patient Metrics ---- Blood Pressure: 120/75, 71bpm Pulseox: 95%, 73bpm Survey: - Weight: 138.8lbs Note Created at: 01/10/2023 11:05 AM ET ---- Time-Spent: 2 minutes 0 seconds

## 2023-01-11 ENCOUNTER — CARE COORDINATION (OUTPATIENT)
Dept: CARE COORDINATION | Age: 88
End: 2023-01-11

## 2023-01-11 NOTE — CARE COORDINATION
Remote Patient Monitoring Note      Date/Time:  1/11/2023 12:33 PM    EMTP reviewed patients reported daily Remote Patient Monitoring metrics. All reported metrics are within alert parameters. Plan/Follow Up:  Will continue to review, monitor and address alerts with follow up based on severity of symptoms and risk factors-- Current Patient Metrics ---- Blood Pressure: 140/89, 77bpm Pulseox: 96%, 76bpm Survey: - Weight: 137.6lbs Note Created at: 01/11/2023 12:33 PM ET ---- Time-Spent: 2 minutes 0 seconds

## 2023-01-12 ENCOUNTER — CARE COORDINATION (OUTPATIENT)
Dept: CARE COORDINATION | Age: 88
End: 2023-01-12

## 2023-01-12 NOTE — CARE COORDINATION
Remote Patient Monitoring Note      Date/Time:  1/12/2023 9:03 AM    EMTP reviewed patients reported daily Remote Patient Monitoring metrics. All reported metrics are within alert parameters. Plan/Follow Up:  Will continue to review, monitor and address alerts with follow up based on severity of symptoms and risk factors-- Current Patient Metrics ---- Blood Pressure: 125/82, 71bpm Pulseox: 96%, 83bpm Survey: - Weight: 137.0lbs Note Created at: 01/12/2023 09:03 AM ET ---- Time-Spent: 2 minutes 0 seconds

## 2023-01-13 ENCOUNTER — CARE COORDINATION (OUTPATIENT)
Dept: CARE COORDINATION | Age: 88
End: 2023-01-13

## 2023-01-13 NOTE — CARE COORDINATION
Attempted to reach patient for continued Care Coordination follow up and education. Patient was unavailable at the time of my call, and a generic voicemail message was left asking patient to return my call at [phone].

## 2023-01-13 NOTE — CARE COORDINATION
Remote Patient Monitoring Note      Date/Time:  1/13/2023 11:45 AM    EMTP reviewed patients reported daily Remote Patient Monitoring metrics. All reported metrics are within alert parameters. Plan/Follow Up:  Will continue to review, monitor and address alerts with follow up based on severity of symptoms and risk factors--- Current Patient Metrics ---- Blood Pressure: 112/75, 74bpm Pulseox: 96%, 78bpm Survey: - Weight: 137.4lbs Note Created at: 01/13/2023 11:45 AM ET ---- Time-Spent: 2 minutes 0 seconds

## 2023-01-16 ENCOUNTER — CARE COORDINATION (OUTPATIENT)
Dept: CARE COORDINATION | Age: 88
End: 2023-01-16

## 2023-01-16 NOTE — CARE COORDINATION
Remote Patient Monitoring Note      Date/Time:  1/16/2023 12:48 PM    EMTP reviewed patients reported daily Remote Patient Monitoring metrics. All reported metrics are within alert parameters. Plan/Follow Up:  Will continue to review, monitor and address alerts with follow up based on severity of symptoms and risk factors-- Current Patient Metrics ---- Blood Pressure: 125/73, 65bpm Pulseox: 96%, 69bpm Survey: - Weight: 139.8lbs Note Created at: 01/16/2023 12:48 PM ET ---- Time-Spent: 2 minutes 0 seconds

## 2023-01-17 ENCOUNTER — CARE COORDINATION (OUTPATIENT)
Dept: CASE MANAGEMENT | Age: 88
End: 2023-01-17

## 2023-01-17 NOTE — CARE COORDINATION
Remote Patient Monitoring Note      Date/Time:  1/17/2023 2:37 PM    LPN reviewed patients reported daily Remote Patient Monitoring metrics. All reported metrics are within alert parameters. Plan/Follow Up:  Will continue to review, monitor and address alerts with follow up based on severity of symptoms and risk factors  Current Patient Metrics ---- Blood Pressure: 132/78, 78bpm Pulseox: 95%, 76bpm Survey: - Weight: 139.0lbs Note Created at: 01/17/2023 02:37 PM ET ---- Time-Spent: 2 minutes 0 seconds

## 2023-01-18 ENCOUNTER — CARE COORDINATION (OUTPATIENT)
Dept: CARE COORDINATION | Age: 88
End: 2023-01-18

## 2023-01-18 ENCOUNTER — TELEPHONE (OUTPATIENT)
Dept: FAMILY MEDICINE CLINIC | Age: 88
End: 2023-01-18

## 2023-01-18 DIAGNOSIS — L29.9 PRURITUS: ICD-10-CM

## 2023-01-18 RX ORDER — CLOTRIMAZOLE AND BETAMETHASONE DIPROPIONATE 10; .64 MG/G; MG/G
CREAM TOPICAL
Qty: 45 G | Refills: 1 | Status: SHIPPED | OUTPATIENT
Start: 2023-01-18

## 2023-01-18 NOTE — CARE COORDINATION
Remote Patient Monitoring Note      Date/Time:  1/18/2023 2:53 PM    EMTP reviewed patients reported daily Remote Patient Monitoring metrics. All reported metrics are within alert parameters. Plan/Follow Up:  Will continue to review, monitor and address alerts with follow up based on severity of symptoms and risk factors--- Current Patient Metrics ---- Blood Pressure: 127/75, 67bpm Pulseox: 97%, 78bpm Survey: - Weight: 140.4lbs Note Created at: 01/18/2023 02:54 PM ET ---- Time-Spent: 2 minutes 0 seconds

## 2023-01-18 NOTE — CARE COORDINATION
Ambulatory Care Coordination Note  1/18/2023    ACC: Aicha Middleton RN    Patient was called for continued Care Coordination follow up and education re: the management of his healthcare needs. Patient shared he is doing well and he denied any current complaints or concerns. Patient stated he continues to monitor VS as directed but has had some trouble with his BP cuff uploading the last couple of days. RPM team was updated and patient was encouraged to reach out with any ongoing concerns or problems. Patient was able to f/u with [facility] recently and he is aware of upcoming appointments. ACM reviewed signs/symptoms to report with patient and patient was reminded of the importance of early symptom recognition and follow up as well as need to take medications as directed. Patient verbalized understanding. Patient shared he is in need of a refill of his ankle cream - see separate refill encounter. Patient shared he does have HC DPOA/LW and healthcare decision maker information was reviewed and verified during our call. ACP note completed. Patient denied any other questions, concerns, or needs and he was encouraged to call with any that may develop.           Actions:   - Reviewed signs/symptoms to report   - Reviewed the importance of early symptom recognition and follow up   - Monitored for questions re: recent appointments   - Reviewed importance of taking medications as directed   - Updated PCP of need for refill of ankle ointment   - Reviewed and verified Healthcare Decision Maker information   - Completed ACP note  - Monitored for additional needs          Plan of Care:   - Continue with Care Coordination f/u calls for assistance with the management of his HTN and healthcare needs   - Educate on signs/symptoms to report - Ongoing   - Educate on the importance of early symptom recognition and follow up - Ongoing  - Monitor RPM assistance as needed - Ongoing  - Educate on the availability of same day appointments, urgent care/walk in clinic options, and after hours on call resources - Completed   - Amilcar Orlando information - Completed  - Patient is current with Medicare AWV - Aug. 2022  - Monitor for additional needs   - Monitor for readiness to discharge from Care Coordination in the future    Offered patient enrollment in the Remote Patient Monitoring (RPM) program for in-home monitoring:  Patient previously enrolled . Lab Results       None            Care Coordination Interventions    Referral from Primary Care Provider: No  Suggested Interventions and Community Resources  Medication Assistance Program: Declined (Comment: Denied additional assistance. [facility] clinic when possible - Dec. 2022)  Medi Set or Pill Pack: Completed (Comment: Dec. 2022)  Pharmacist: Colleen Mcintosh (Comment: Dec. 2022)  Social Work: Declined (Comment: Denied any additional resource needs - Dec. 2022)  Transportation Support: Declined  Other Services or Interventions: [facility]s with [facility] - Dec. 2022          Goals Addressed                   This Visit's Progress       Care Coordination     Conditions and Symptoms   Improving     I will schedule office visits, as directed by my provider. I will keep my appointment or reschedule if I have to cancel. I will notify my provider of any barriers to my plan of care. I will notify my provider of any symptoms that indicate a worsening of my condition. Barriers: overwhelmed by complexity of regimen, stress, and lack of education  Plan for overcoming my barriers: Continue to provide education to patient and monitor for resource needs   Confidence: 8/10  Anticipated Goal Completion Date: 3/10/2023                  Prior to Admission medications    Medication Sig Start Date End Date Taking? Authorizing Provider   HYDROcodone-acetaminophen (NORCO) 5-325 MG per tablet Take 1 tablet by mouth 2 times daily as needed for Pain for up to 30 days.  1/4/23 2/3/23  Benjamin Mon MD   metoprolol tartrate (LOPRESSOR) 25 MG tablet Take 0.5 tablets by mouth every morning 12/5/22   Benjamin Mon MD   aspirin 81 MG EC tablet Take 1 tablet by mouth daily 11/22/22   Gogo Craven PA-C   apixaban (ELIQUIS) 5 MG TABS tablet Take 1 tablet by mouth 2 times daily 11/23/22   Gogo Craven PA-C   Psyllium (METAMUCIL PO) Take by mouth daily as needed    Historical Provider, MD   melatonin 5 MG TABS tablet Take 5 mg by mouth nightly as needed    Historical Provider, MD   amLODIPine (NORVASC) 5 MG tablet Take 1 tablet by mouth in the morning and at bedtime 8/29/22   Benjamin Mon MD   Probiotic Product (PROBIOTIC-10 PO) Take by mouth daily    Historical Provider, MD   nystatin (MYCOSTATIN) 634915 UNIT/GM cream Apply topically 2 times daily.  7/19/22   Danette Salamanca MD   Handicap Placard MISC by Does not apply route Exp 7/13/2027 7/13/22   Benjamin Mon MD   Polyethylene Glycol 3350 (MIRALAX PO) Take by mouth as needed    Historical Provider, MD   VITAMIN D PO Take by mouth daily    Historical Provider, MD   Multiple Vitamins-Minerals (PRESERVISION AREDS 2 PO) Take 1 tablet by mouth 2 times daily    Historical Provider, MD       Future Appointments   Date Time Provider Yonas Boyer   1/23/2023  1:30 PM Go Goldstein MD [facility] [address]   1/25/2023  1:45 PM SONIDO Vasquez - TOBY Patricia Uro [address]   1/31/2023  1:00 PM [facility] ULTRASOUND [facility] OP [facility] [employer] [facility]   2/6/2023 10:00 AM Jim Scanlon MD N [facility] [address]   3/6/2023  8:15 AM Benjamin Mon MD [facility] [address]   ,   General Assessment    Do you have any symptoms that are causing concern?: No     , and Care Coordination Episodes    Type: Amb Care Management  Episode: Complex Care  Noted: 12/22/2022  Comments: CTN Referral - RPM

## 2023-01-18 NOTE — TELEPHONE ENCOUNTER
Please see Care Coordination note from this date for additional information. Patient shared he is in need of a refill of his clotrimazole betamethasone ointment. Patient [facility]. Patient educated to f/u with pharmacy later today/tomorrow unless he hears otherwise. Thank you!

## 2023-01-18 NOTE — ACP (ADVANCE CARE PLANNING)
Advance Care Planning   Healthcare Decision Maker:    Primary Decision Maker: Walter Gonzalez - Child - [phone]    Secondary Decision Maker: Sheilla Dakins - Child - [phone]    Today we documented Decision Maker(s) consistent with ACP documents on file. Healthcare Decision Maker information reviewed and verified with patient.

## 2023-01-19 ENCOUNTER — CARE COORDINATION (OUTPATIENT)
Dept: CASE MANAGEMENT | Age: 88
End: 2023-01-19

## 2023-01-19 NOTE — CARE COORDINATION
Patient called and provided with RPM support team contact information and instructed to f/u with them for further assistance with BP cuff and updating concerns. Patient verbalized understanding and denied any other questions, concerns, or needs.

## 2023-01-19 NOTE — CARE COORDINATION
Remote Patient Monitoring Note      Date/Time:  1/19/2023 1:47 PM    LPN reviewed patients reported daily Remote Patient Monitoring metrics. All reported metrics are within alert parameters. Plan/Follow Up:  Will continue to review, monitor and address alerts with follow up based on severity of symptoms and risk factors  Current Patient Metrics ---- Blood Pressure: 129/71, 88bpm Pulseox: 94%, 93bpm Survey: - Weight: 138.2lbs Note Created at: 01/19/2023 01:48 PM ET ---- Time-Spent: 2 minutes 0 seconds

## 2023-01-20 ENCOUNTER — CARE COORDINATION (OUTPATIENT)
Dept: CARE COORDINATION | Age: 88
End: 2023-01-20

## 2023-01-20 NOTE — CARE COORDINATION
Remote Patient Monitoring Note      Date/Time:  1/20/2023 3:59 PM    EMTP reviewed patients reported daily Remote Patient Monitoring metrics. All reported metrics are within alert parameters. Plan/Follow Up:  Will continue to review, monitor and address alerts with follow up based on severity of symptoms and risk factors-- Current Patient Metrics ---- Blood Pressure: 120/68, 73bpm Pulseox: 94%, 84bpm Survey: - Weight: 138.2lbs Note Created at: 01/20/2023 03:59 PM ET ---- Time-Spent: 2 minutes 0 seconds

## 2023-01-23 ENCOUNTER — CARE COORDINATION (OUTPATIENT)
Dept: CARE COORDINATION | Age: 88
End: 2023-01-23

## 2023-01-23 ENCOUNTER — OFFICE VISIT (OUTPATIENT)
Dept: CARDIOLOGY CLINIC | Age: 88
End: 2023-01-23

## 2023-01-23 VITALS
HEIGHT: 71 IN | DIASTOLIC BLOOD PRESSURE: 74 MMHG | BODY MASS INDEX: 18.81 KG/M2 | HEART RATE: 77 BPM | SYSTOLIC BLOOD PRESSURE: 132 MMHG | WEIGHT: 134.4 LBS

## 2023-01-23 DIAGNOSIS — I10 ESSENTIAL HYPERTENSION: ICD-10-CM

## 2023-01-23 DIAGNOSIS — I48.19 PERSISTENT ATRIAL FIBRILLATION (HCC): Primary | ICD-10-CM

## 2023-01-23 DIAGNOSIS — I35.1 MODERATE AORTIC REGURGITATION: ICD-10-CM

## 2023-01-23 DIAGNOSIS — I71.21 ANEURYSM OF ASCENDING AORTA WITHOUT RUPTURE: ICD-10-CM

## 2023-01-23 DIAGNOSIS — E78.5 HYPERLIPIDEMIA WITH TARGET LDL LESS THAN 130: ICD-10-CM

## 2023-01-23 DIAGNOSIS — I34.0 MODERATE MITRAL REGURGITATION: ICD-10-CM

## 2023-01-23 NOTE — PROGRESS NOTES
Chief Complaint   Patient presents with    6 Month Follow-Up    Atrial Fibrillation         6 month follow up. EKG done 11-7-2022. Palpitations once in a blue moon    Denied cp,  dizziness, or edema    Dizziness on standing on walk and standing quick- better  Balance issue at am    Sob on exertion- stable    Walk 2 to 3 block METS> 4    Nonsmoker    FHX  Had pacer    Hx of PE in the lung post op and had been on OA for 6 month and off it over one back      Patient Active Problem List   Diagnosis    Prostate cancer    Generalized anxiety disorder    Essential hypertension    Hyperlipidemia with target LDL less than 130    Back pain, chronic s/p surgery    History of pulmonary embolus (PE)    Spondylosis of lumbar region without myelopathy or radiculopathy    Persistent atrial fibrillation ([address]- newely DXed 10/12/17- CVR    Non-rheumatic mitral regurgitation- mod to severe    Moderate aortic regurgitation    Failed back syndrome of lumbar spine    Chronic pain disorder    Pneumothorax    Constipation    Ascending aortic aneurysm (HCC) 4.1 cm on CTA and 4.5 on echo    Nonexudative age-related macular degeneration    Secondary pigmentary retinal degeneration    Venous retinal branch occlusion    PAF (paroxysmal atrial fibrillation) (HCC)    Moderate mitral regurgitation    Stenosis of left carotid artery    S/P carotid endarterectomy       Past Surgical History:   Procedure Laterality Date    APPENDECTOMY  01/1961    [facility]    BACK SURGERY  [id number]    X stop    CAROTID ENDARTERECTOMY Left 11/21/2022    LEFT [facility]OM[facility] Right     x2 on right    CATARACT REMOVAL Right 06/2001    Dr Gordon Krishnamurthy Left 11/2014    Dr Matt Austin, 2009    Dr Eleuterio Nieto  [address]?     Dr Collado  of date    HERNIA REPAIR Left 11/22/2017    ROBOT RECURRENT LEFT INGUINAL HERNIA REPAIR performed by Jeremy Corcoran MD at [address] 1 BILATERAL Bilateral 9/5/2017    LUMBAR FACET MBB L3-4, L4-5, L5-S1 BILATERAL performed by Abilio Mireles MD at [address] Left 04/09/2018    HIP INJECTION     OTHER SURGICAL HISTORY  10/7/2015    XI ROBOTIC PROSTATECTOMY    OTHER SURGICAL HISTORY Bilateral 09/05/2017    lumbar facet block L3-S1    OTHER SURGICAL HISTORY Right 1980's    nerve release right lower arm    OTHER SURGICAL HISTORY  01/30/2018    Sacroiliac joint MBB    OTHER SURGICAL HISTORY Left 02/27/2018    Sacroiliac joint medial branch block     HI INJECT SI JOINT ARTHRGRPHY&/ANES/STEROID W/MARQUISE Left 1/30/2018    LEFT SI MBB performed by Abilio Mireles MD at [facility] INJECT SI JOINT ARTHRGRPHY&/ANES/STEROID W/MARQUISE Left 2/27/2018    LEFT SI MBB #2 performed by Abilio Mireles MD at [facility] INSJ/RPLCMT SPI NPGR DIR/INDUXIVE COUPLING N/A 9/17/2018    THORACIC LAMINECTOMY PERMANENT SPINAL CORD STIMULATOR PADDLE PLUS BATTERY PLACEMENT performed by Arron Doherty MD at [address] [employer] OFFICE/OUTPT [address] Left 4/9/2018    LEFT HIP STEROID INJECTION WITH SEDATION performed by Abilio Mireles MD at [facility] OFFICE/OUTPT [address] [place] [address] N/A 8/21/2018    SPINAL CORD STIMULATOR IMPLANT TRIAL performed by Abilio Mireles MD at [facility]  7-    Dr Fabian Nelson BIOPSY  8/21/2015    transrectal ultrasound with biopsy(+)    PROSTATE SURGERY  2003    TURP    PROSTATE SURGERY  2009    Biopsy -Hyperplasia    SHOULDER ARTHROPLASTY  2005    right       Allergies   Allergen Reactions    Levaquin [Levofloxacin] Shortness Of Breath, Diarrhea, Nausea And Vomiting, Swelling and Dizziness or Vertigo    Nitrofuran Derivatives Nausea Only     Severe stomach cramps    Nsaids Nausea Only and Other (See Comments)     Pain in stomach, GI upset        Family History   Problem Relation Age of Onset    Heart Disease Father     Prostate Cancer Brother     Cancer Brother     Prostate Cancer Brother     Cancer Brother     Stroke Mother     Cancer Sister     Prostate Cancer Brother     Prostate Cancer Brother         Social History     Socioeconomic History    Marital status:      Spouse name: Jared    Number of children: 4    Years of education: Not on file    Highest education level: Not on file   Occupational History    Not on file   Tobacco Use    Smoking status: Former     Types: Cigarettes     Quit date: 7/3/1965     Years since quittin.5    Smokeless tobacco: Never   Vaping Use    Vaping Use: Never used   Substance and Sexual Activity    Alcohol use: Yes     Comment: SOCIALLY wine a few times a year     Drug use: No    Sexual activity: Not on file   Other Topics Concern    Not on file   Social History Narrative    Not on file     Social Determinants of Health     Financial Resource Strain: Low Risk     Difficulty of Paying Living Expenses: Not very hard   Food Insecurity: No Food Insecurity    Worried About Running Out of Food in the Last Year: Never true    Ran Out of Food in the Last Year: Never true   Transportation Needs: No Transportation Needs    Lack of Transportation (Medical): No    Lack of Transportation (Non-Medical): No   Physical Activity: Insufficiently Active    Days of Exercise per Week: 3 days    Minutes of Exercise per Session: 10 min   Stress: No Stress Concern Present    Feeling of Stress : Only a little   Social Connections:  Moderately Integrated    Frequency of Communication with Friends and Family: More than three times a week    Frequency of Social Gatherings with Friends and Family: More than three times a week    Attends Holiness Services: More than 4 times per year    Active Member of [employer] or Organizations: Yes    Attends Club or Organization Meetings: More than 4 times per year    Marital Status:    Intimate Partner Violence: Not on file   Housing Stability: Low Risk     Unable to Pay for Housing in the Last Year: No    Number of Places Lived in the Last Year: 1    Unstable Housing in the Last Year: No       Current Outpatient Medications   Medication Sig Dispense Refill    clotrimazole-betamethasone (LOTRISONE) 1-0.05 % cream Apply topically 2 times daily. PRN 45 g 1    HYDROcodone-acetaminophen (NORCO) 5-325 MG per tablet Take 1 tablet by mouth 2 times daily as needed for Pain for up to 30 days. 60 tablet 0    metoprolol tartrate (LOPRESSOR) 25 MG tablet Take 0.5 tablets by mouth every morning 45 tablet 3    aspirin 81 MG EC tablet Take 1 tablet by mouth daily 30 tablet 3    apixaban (ELIQUIS) 5 MG TABS tablet Take 1 tablet by mouth 2 times daily 180 tablet 2    Psyllium (METAMUCIL PO) Take by mouth daily as needed      melatonin 5 MG TABS tablet Take 5 mg by mouth nightly as needed      amLODIPine (NORVASC) 5 MG tablet Take 1 tablet by mouth in the morning and at bedtime 180 tablet 1    Probiotic Product (PROBIOTIC-10 PO) Take by mouth daily      nystatin (MYCOSTATIN) 088346 UNIT/GM cream Apply topically 2 times daily. 30 g 1    Handicap Placard MISC by Does not apply route Exp 7/13/2027 1 each 0    Polyethylene Glycol 3350 (MIRALAX PO) Take by mouth as needed      VITAMIN D PO Take by mouth daily      Multiple Vitamins-Minerals (PRESERVISION AREDS 2 PO) Take 1 tablet by mouth 2 times daily       No current facility-administered medications for this visit. Review of Systems -     General ROS: negative  Psychological ROS: negative  Hematological and Lymphatic ROS: No history of blood clots or bleeding disorder.    Respiratory ROS: no cough, shortness of breath, or wheezing  Cardiovascular ROS: no chest pain or dyspnea on exertion  Gastrointestinal ROS: negative  Genito-Urinary ROS: no dysuria, trouble voiding, or hematuria  Musculoskeletal ROS: negative  Neurological ROS: no TIA or stroke symptoms  Dermatological ROS: negative      Blood pressure 132/74, pulse 77, height 5' 10.5\" (1.791 m), weight 134 lb 6.4 oz (61 kg). Physical Examination:    General appearance - alert, well appearing, and in no distress  Mental status - alert, oriented to person, place, and time  Neck - supple, no significant adenopathy, no JVD, or carotid bruits  Chest - clear to auscultation, no wheezes, rales or rhonchi, symmetric air entry  Heart - normal rate, regular rhythm, normal S1, S2, , rubs, clicks or gallops but +ve murmurs  Abdomen - soft, nontender, nondistended, no masses or organomegaly  Neurological - alert, oriented, normal speech, no focal findings or movement disorder noted  Musculoskeletal - no joint tenderness, deformity or swelling  Extremities - peripheral pulses normal, no pedal edema, no clubbing or cyanosis  Skin - normal coloration and turgor, no rashes, no suspicious skin lesions noted    Lab  No results for input(s): CKTOTAL, CKMB, CKMBINDEX, TROPONINI in the last 72 hours.   CBC:   Lab Results   Component Value Date/Time    WBC 15.1 11/22/2022 05:07 AM    RBC 3.63 11/22/2022 05:07 AM     08/26/2011 01:55 PM    HGB 12.0 11/22/2022 05:07 AM    HCT 36.3 11/22/2022 05:07 AM    .0 11/22/2022 05:07 AM    MCH 33.1 11/22/2022 05:07 AM    MCHC 33.1 11/22/2022 05:07 AM    RDW 13.2 07/19/2022 02:42 PM     11/22/2022 05:07 AM    MPV 11.2 11/22/2022 05:07 AM     BMP:    Lab Results   Component Value Date/Time     01/05/2023 08:06 AM    K 4.4 01/05/2023 08:06 AM     01/05/2023 08:06 AM    CO2 26 01/05/2023 08:06 AM    BUN 24 01/05/2023 08:06 AM    LABALBU 4.4 01/05/2023 08:06 AM    CREATININE 1.1 01/05/2023 08:06 AM    CALCIUM 9.7 01/05/2023 08:06 AM    LABGLOM >60 01/05/2023 08:06 AM    GLUCOSE 97 01/05/2023 08:06 AM    GLUCOSE 89 05/14/2016 06:30 AM     Hepatic Function Panel:    Lab Results   Component Value Date/Time    ALKPHOS 61 01/05/2023 08:06 AM    ALT 19 01/05/2023 08:06 AM    AST 23 01/05/2023 08:06 AM    PROT 6.9 01/05/2023 08:06 AM    BILITOT 0.6 01/05/2023 08:06 AM    BILIDIR <0.2 09/21/2018 09:19 AM    LABALBU 4.4 01/05/2023 08:06 AM     Magnesium:    Lab Results   Component Value Date/Time    MG 2.0 07/19/2022 02:42 PM     Warfarin PT/INR:  No components found for: PTPATWAR, PTINRWAR  HgBA1c:    Lab Results   Component Value Date/Time    LABA1C 5.8 11/16/2022 12:19 PM     FLP:    Lab Results   Component Value Date/Time    TRIG 55 05/14/2022 12:00 AM    HDL 67 05/14/2022 12:00 AM    LDLCALC 113 05/14/2022 12:00 AM     TSH:    Lab Results   Component Value Date/Time    TSH 3.380 07/24/2018 10:55 AM       EKG 10/12/17  Atrial fibrillation  Septal infarct , age undetermined  Abnormal ECG  When compared with ECG of 06-NOV-2015 22:29,  Atrial fibrillation has replaced Sinus rhythm  Nonspecific T wave abnormality now evident in Inferior leads  Confirmed by Marleny Moncada MD, Kinza Paez ([id number]) on 10/12/2017 8:03:16 PM      Conclusions      Summary   Normal left ventricle size and systolic function. Ejection fraction was   estimated at 60 to 65 %. There were no regional left ventricular wall   motion abnormalities and wall thickness was within normal limits. The left atrium is Moderately dilated. Moderate to severly enlarged right atrium size. Moderate-to-severe mitral regurgitation with centrally directed jet. Moderate aortic regurgitation is noted. Signature      ----------------------------------------------------------------   Electronically signed by Bruno Figueredo MD (Interpreting   physician) on 10/26/2017 at 12:55 PM   ----------------------------------------------------------------    Nuc  Stress neg      CONCLUSION:  This is an complete atrial fibrillation with average  heart rate of 91 beats per minute ranging from 47 to 150 beats per  minute. No significant pause of more than 2.3 second noted.   Rare  ventricular ectopic beat total of 277, predominantly isolated, few  couplets, few ventricular bigeminy. No supraventricular tachycardia. No other form of arrhythmia noted. The AFib seems to be rate well  controlled. The patient at times  gets AFib with rapid ventricular  response. Average heart rate is 91 beats per minute, so the patient  may benefit from increasing the dose of AV jason blocking agent. Thank you. Clary Richards. Vernon Hart M.D.     D: 11/02/2017 18:04:27            Conclusions      Summary   Normal left ventricle size and systolic function. Ejection fraction was   estimated at 60 to 65 %. There were no regional left ventricular wall   motion abnormalities and wall thickness was within normal limits. The left atrium is Moderately dilated. Moderate to severly enlarged right atrium size. Moderate-to-severe mitral regurgitation with centrally directed jet. Moderate aortic regurgitation is noted. Signature      ----------------------------------------------------------------   Electronically signed by Karely Maddox MD (Interpreting   physician) on 10/26/2017 at 12:55 PM       Conclusions      Summary   Normal left ventricle size and systolic function. Ejection fraction was estimated at 65 %. There were no regional left ventricular wall motion abnormalities and wall   thickness was within normal limits. The left atrium is Moderately to severely dilated. MILD TO Moderately enlarged right atrium size. Moderate aortic regurgitation is noted. Mild to Moderate mitral regurgitation is present. Aortic aneurysm noted in the ascending aorta . Aortic aneurysm measures 4.5 CM . CONSIDER CTA FOR BETTER EVALUATION OF THE ASCENDING AORTIC ANEURYSM      Signature      ----------------------------------------------------------------   Electronically signed by Karely Maddox MD (Interpreting   physician) on 01/11/2019 at 09:20 PM       Conclusions      Summary   Normal left ventricle size and systolic function. Ejection fraction was estimated at 60 %. There were no regional left ventricular wall motion abnormalities and wall   thickness was within normal limits. The left atrium is Moderately dilated. Mild to Moderately enlarged right atrium size. Mild to Moderate mitral regurgitation is present. Mild-to-moderate aortic regurgitation is   Signature      ----------------------------------------------------------------   Electronically signed by Kary Fernandez MD (Interpreting   physician) on 01/15/2020 at 06:54 PM         Conclusions      Summary   Normal left ventricle size and systolic function. Ejection fraction was   estimated at 60 %. There were no regional left ventricular wall motion   abnormalities and wall thickness was within normal limits. Doppler parameters were consistent with abnormal left ventricular   relaxation (grade 1 diastolic dysfunction). The left atrium is Mildly dilated. Dilation of the aortic root with size of 4.1 cm  Mild AR and MILD MR      Signature      ----------------------------------------------------------------   Electronically signed by Kary Fernandez MD (Interpreting   physician) on 07/28/2021 at 04:45 PM   ----------------------------------------------------------------      CTA Jan 2019     The aorta is minimally dilated measuring 4.1 cm within the ascending aorta. It is ectatic. Descending aorta measures 3.2 cm     ekg 1/13/20  Atr flutter with CVR and variable av block    ekg 1/18/21  Sinus  Rhythm   WITHIN NORMAL LIMITS    ekg 1/18/22  Sinus  Rhythm  - occasional PAC     # PACs = 1.  WITHIN NORMAL LIMITS    Ekg 11/16/2022  Atrial fibrillation Moderate voltage criteria for LVH, may be normal variant ( R in aVL , [place] product ) Abnormal ECG When compared with ECG of 19-JUL-2022 14:04, Atrial fibrillation has replaced Atrial flutter Nonspecific T wave abnormality no longer evident in Inferior leads QT has lengthened          Assessment     Diagnosis Orders   1. Persistent atrial fibrillation ([address]- newely DXed 10/12/17- CVR        2. Essential hypertension        3. Hyperlipidemia with target LDL less than 130        4. Aneurysm of ascending aorta without rupture        5. Moderate aortic regurgitation        6. Moderate mitral regurgitation                  Plan     The current meds and labs reviewed    Continue the current treatment and with constant vigilance to changes in symptoms and also any potential side effects. Return for care or seek medical attention immediately if symptoms got worse and/or develop new symptoms. persistent  atr FIB-CVR./PAFlutter - permanent  CVR  chads vasc 3  Cont lopressor and OAc  Need OA  Holter 48 hrs-Average heart rate of 91 beats per minute ranging from 47 to 150 beats per  Minute. Cont   apixaban 5 po bid   The risk and benefit of OA well explained including ICH and pat agreed to be on OA  Stop asa 81    Sob on exertion and new atr fib with cvr  The  echo and lexiscan nuc result reviewed and d/w the pat  No more leovnox due to intolerance    Mod MR- and Mod AR  The F/u echo 2020- mild to Mod AR and MR  Stable     Ascending aortic aneurysm 4.1 CM by CTA jan 2019 and 4.1 cm by echo  Off  hctz 12.5 po qd  Cont  lopressor 25 po bid  Skip the dose for HR < 60 ( pat has bp ci=uff and can check BP and HR prior to lopressor)    Hypertension, on medical treatment. Seems to be under good control. Patient is compliant with medical treatment. Home  to 145 mmhg    CKD III and get better after Off  hctz 12.5 po qd  Hydration  BMP next visit    Hx of Dizziness  On standing at times -resolved after stop HCTZ and Hydration  Re-advised hydration    D/w the pat at length the plan of care     The Tessie Brooks is Doing well and stable    Lipid panel and liver function test before next appointment    Discussed use, benefit, and side effects of prescribed medications. All patient questions answered. Pt voiced understanding.  Instructed to continue current medications, diet and exercise. Continue risk factor modification and medical management. Patient agreed with treatment plan. Follow up as directed.       RTC in 6 months      Roe Tamayo [facility]

## 2023-01-23 NOTE — CARE COORDINATION
Remote Patient Monitoring Note      Date/Time:  1/23/2023 11:47 AM    EMTP reviewed patients reported daily Remote Patient Monitoring metrics.  All reported metrics are within alert parameters.    Plan/Follow Up: Will continue to review, monitor and address alerts with follow up based on severity of symptoms and risk factors--- Current Patient Metrics ---- Blood Pressure: 112/71, 65bpm Pulseox: 95%, 73bpm Survey: - Weight: 139.4lbs Note Created at: 01/23/2023 11:47 AM ET ---- Time-Spent: 2 minutes 0 seconds

## 2023-01-24 ENCOUNTER — CARE COORDINATION (OUTPATIENT)
Dept: CARE COORDINATION | Age: 88
End: 2023-01-24

## 2023-01-24 NOTE — CARE COORDINATION
Remote Patient Monitoring Note      Date/Time:  1/24/2023 9:28 AM    EMTP reviewed patients reported daily Remote Patient Monitoring metrics. All reported metrics are within alert parameters. Plan/Follow Up:  Will continue to review, monitor and address alerts with follow up based on severity of symptoms and risk factors-- Current Patient Metrics ---- Blood Pressure: 112/70, 71bpm Pulseox: 96%, 79bpm Survey: - Weight: 139.4lbs Note Created at: 01/24/2023 09:28 AM ET ---- Time-Spent: 2 minutes 0 seconds

## 2023-01-25 ENCOUNTER — SCHEDULED TELEPHONE ENCOUNTER (OUTPATIENT)
Dept: UROLOGY | Age: 88
End: 2023-01-25
Payer: MEDICARE

## 2023-01-25 ENCOUNTER — CARE COORDINATION (OUTPATIENT)
Dept: CARE COORDINATION | Age: 88
End: 2023-01-25

## 2023-01-25 DIAGNOSIS — C61 PROSTATE CANCER (HCC): Primary | ICD-10-CM

## 2023-01-25 PROCEDURE — 99442 PR PHYS/QHP TELEPHONE EVALUATION 11-20 MIN: CPT | Performed by: NURSE PRACTITIONER

## 2023-01-25 NOTE — CARE COORDINATION
Remote Patient Monitoring Note      Date/Time:  1/25/2023 1:17 PM    EMTP reviewed patients reported daily Remote Patient Monitoring metrics. All reported metrics are within alert parameters. Plan/Follow Up:  Will continue to review, monitor and address alerts with follow up based on severity of symptoms and risk factors-- Current Patient Metrics ---- Blood Pressure: 117/71, 73bpm Pulseox: 94%, 70bpm Survey: - Weight: 142.0lbs Note Created at: 01/25/2023 01:17 PM ET ---- Time-Spent: 2 minutes 0 seconds

## 2023-01-25 NOTE — PROGRESS NOTES
Tracy Tejada is a 80 y.o. male evaluated via telephone on 1/25/2023 for No chief complaint on file. .        Documentation:  I communicated with the patient and/or health care decision maker about prostate cancer. Details of this discussion including any medical advice provided:     Pt seen in follow up for prostate cancer. Pt underwent RALRP with L PLND 10/7/15 by Dr. Gia Forbes. Pathology with GS 4+3=7 prostate cancer. Tumor noted to extend to cauterized anterior capsular margin (right anterior, 10 mm from apex). Pt's PSA undetectable until 7/2021. Since that time it has been running 0.04-0.05. Most recent psa up to 0.1 as of 12/8/22. Has chronic back pain spanning 8-10 years time. No change or acute worsening. Had carotid artery surgery before thanksgiving. Some nocturia 3 x per night but feels increased oral fluid intake is likely contributing. Also feels his back pain likely contributes to why he wakes at night and urinates while he is awake. Discussed PSA results. PSA trending up. Repeat in 6 months with appt to review results. Total Time: minutes: 11-20 minutes    Elizabeth Barroso was evaluated through a synchronous (real-time) audio encounter. Patient identification was verified at the start of the visit. He (or guardian if applicable) is aware that this is a billable service, which includes applicable co-pays. This visit was conducted with the patient's (and/or legal guardian's) verbal consent. He has not had a related appointment within my department in the past 7 days or scheduled within the next 24 hours. The patient was located at Home: [address] [place] [address] Road [facility]. The provider was located at Home ([address]: [place].     Note: not billable if this call serves to triage the patient into an appointment for the relevant concern    SONIDO Selby - CNP

## 2023-01-26 ENCOUNTER — CARE COORDINATION (OUTPATIENT)
Dept: CARE COORDINATION | Age: 88
End: 2023-01-26

## 2023-01-27 ENCOUNTER — CARE COORDINATION (OUTPATIENT)
Dept: CARE COORDINATION | Age: 88
End: 2023-01-27

## 2023-01-27 NOTE — CARE COORDINATION
Remote Patient Monitoring Note      Date/Time:  1/27/2023 11:37 AM    EMTP reviewed patients reported daily Remote Patient Monitoring metrics. All reported metrics are within alert parameters. Plan/Follow Up:  Will continue to review, monitor and address alerts with follow up based on severity of symptoms and risk factors-- Current Patient Metrics ---- Blood Pressure: 126/71, 69bpm Pulseox: 97%, 73bpm Survey: - Weight: 139.6lbs Note Created at: 01/27/2023 11:37 AM ET ---- Time-Spent: 2 minutes 0 seconds

## 2023-01-30 ENCOUNTER — CARE COORDINATION (OUTPATIENT)
Dept: CARE COORDINATION | Age: 88
End: 2023-01-30

## 2023-01-30 NOTE — CARE COORDINATION
Remote Patient Monitoring Note      Date/Time:  1/30/2023 2:25 PM    EMTP reviewed patients reported daily Remote Patient Monitoring metrics. All reported metrics are within alert parameters. Plan/Follow Up:  Will continue to review, monitor and address alerts with follow up based on severity of symptoms and risk factors--- Current Patient Metrics ---- Blood Pressure: 110/69, 68bpm Pulseox: 95%, 74bpm Survey: - Weight: 141.8lbs Note Created at: 01/30/2023 02:26 PM ET ---- Time-Spent: 2 minutes 0 seconds

## 2023-01-31 ENCOUNTER — HOSPITAL ENCOUNTER (OUTPATIENT)
Dept: ULTRASOUND IMAGING | Age: 88
End: 2023-01-31
Payer: MEDICARE

## 2023-01-31 ENCOUNTER — CARE COORDINATION (OUTPATIENT)
Dept: CARE COORDINATION | Age: 88
End: 2023-01-31

## 2023-01-31 ENCOUNTER — HOSPITAL ENCOUNTER (OUTPATIENT)
Dept: ULTRASOUND IMAGING | Age: 88
Discharge: HOME | End: 2023-01-31
Payer: MEDICARE

## 2023-01-31 DIAGNOSIS — I65.22 STENOSIS OF LEFT CAROTID ARTERY: ICD-10-CM

## 2023-01-31 PROCEDURE — 93880 EXTRACRANIAL BILAT STUDY: CPT

## 2023-01-31 NOTE — CARE COORDINATION
Remote Patient Monitoring Note      Date/Time:  1/31/2023 2:21 PM    EMTP reviewed patients reported daily Remote Patient Monitoring metrics. All reported metrics are within alert parameters. Plan/Follow Up:  Will continue to review, monitor and address alerts with follow up based on severity of symptoms and risk factors-- Current Patient Metrics ---- Blood Pressure: 114/64, 70bpm Pulseox: 95%, 81bpm Survey: - Weight: 140.8lbs Note Created at: 01/31/2023 02:21 PM ET ---- Time-Spent: 2 minutes 0 seconds

## 2023-02-01 ENCOUNTER — CARE COORDINATION (OUTPATIENT)
Dept: CARE COORDINATION | Age: 88
End: 2023-02-01

## 2023-02-01 NOTE — CARE COORDINATION
Remote Patient Monitoring Note      Date/Time:  2/1/2023 1:58 PM    EMTP reviewed patients reported daily Remote Patient Monitoring metrics. All reported metrics are within alert parameters. Plan/Follow Up:  Will continue to review, monitor and address alerts with follow up based on severity of symptoms and risk factors---- Current Patient Metrics ---- Blood Pressure: 114/68, 69bpm Pulseox: 92%, 77bpm Survey: - Weight: 141.4lbs Note Created at: 02/01/2023 01:58 PM ET ---- Time-Spent: 2 minutes 0 seconds

## 2023-02-01 NOTE — CARE COORDINATION
Ambulatory Care Coordination Note  2/1/2023    ACC: Tina Villa RN    Patient was called for continued Care Coordination follow up and education re: the management of his hypertension and healthcare needs. Patient denied any recent c/o dizziness or concerns. Patient continues to use RPM as directed and BP readings have remained stable. Patient shared he underwent carotid US yesterday and has final [facility] f/[facility] with [facility] next [facility]. Patient denied any questions re: upcoming appointment. ACM reviewed signs/symptoms to report with patient as well as the importance of early symptom recognition and follow up. Patient verbalized understanding. Patient denied any other questions, concerns ,or needs and he was encouraged to call with any that may develop.            Actions:   - Reviewed signs/symptoms to report   - Reviewed importance of early symptom recognition and follow up   - Monitored for questions re: upcoming [facility] appointment   - Reviewed RPM readings   - F/u with RPM team re: question re: program - per RPM staff patient is able to just ignore survey alerts   - Monitored for additional needs          Plan of Care:   - Continue with Care Coordination f/u calls for assistance with the management of his HTN and healthcare needs   - Educate on signs/symptoms to report - Ongoing   - Educate on the importance of early symptom recognition and follow up - Ongoing  - Monitor RPM assistance as needed - Ongoing - If remains stable will consider graduation in the near future  - Educate on the availability of same day appointments, urgent care/walk in clinic options, and after hours on call resources - Completed   - Amilcar Orlando information - Completed  - Patient is current with Medicare AWV - Aug. 2022  - Monitor for additional needs   - Monitor for readiness to discharge from Care Coordination in the future    Offered patient enrollment in the Remote Patient Monitoring (RPM) program for in-home monitoring:  Patient current with RPM program  .    Lab Results       None            Care Coordination Interventions    Referral from Primary Care Provider: No  Suggested Interventions and Community Resources  Medication Assistance Program: Declined (Comment: Denied additional assistance. [address] clinic when possible - Dec. 2022)  Medi Set or Pill Pack: Completed (Comment: Dec. 2022)  Pharmacist: Ellen Paulson (Comment: Dec. 2022)  Social Work: Declined (Comment: Denied any additional resource needs - Dec. 2022)  Transportation Support: Declined  Other Services or Interventions: [facility] - Dec. 2022          Goals Addressed                   This Visit's Progress       Care Coordination     Conditions and Symptoms   Improving     I will schedule office visits, as directed by my provider. I will keep my appointment or reschedule if I have to cancel. I will notify my provider of any barriers to my plan of care. I will notify my provider of any symptoms that indicate a worsening of my condition. Barriers: overwhelmed by complexity of regimen, stress, and lack of education  Plan for overcoming my barriers: Continue to provide education to patient and monitor for resource needs   Confidence: 8/10  Anticipated Goal Completion Date: 3/10/2023                  Prior to Admission medications    Medication Sig Start Date End Date Taking? Authorizing Provider   clotrimazole-betamethasone (LOTRISONE) 1-0.05 % cream Apply topically 2 times daily. PRN 1/18/23   Frances Woo MD   HYDROcodone-acetaminophen (NORCO) 5-325 MG per tablet Take 1 tablet by mouth 2 times daily as needed for Pain for up to 30 days.  1/4/23 2/3/23  Frances Woo MD   metoprolol tartrate (LOPRESSOR) 25 MG tablet Take 0.5 tablets by mouth every morning 12/5/22   Frances Woo MD   aspirin 81 MG EC tablet Take 1 tablet by mouth daily 11/22/22   Yoel Marie PA-C   apixaban (ELIQUIS) 5 MG TABS tablet Take 1 tablet by mouth 2 times daily 11/23/22   Jacquchloé Sy PA-C   Psyllium (METAMUCIL PO) Take by mouth daily as needed    Historical Provider, MD   melatonin 5 MG TABS tablet Take 5 mg by mouth nightly as needed    Historical Provider, MD   amLODIPine (NORVASC) 5 MG tablet Take 1 tablet by mouth in the morning and at bedtime 8/29/22   Lupe Torres MD   Probiotic Product (PROBIOTIC-10 PO) Take by mouth daily    Historical Provider, MD   nystatin (MYCOSTATIN) 975326 UNIT/GM cream Apply topically 2 times daily.  7/19/22   Marion Buerger, MD   Handicap Placard MISC by Does not apply route Exp 7/13/2027 7/13/22   Lupe Torres MD   Polyethylene Glycol 3350 (MIRALAX PO) Take by mouth as needed    Historical Provider, MD   VITAMIN D PO Take by mouth daily    Historical Provider, MD   Multiple Vitamins-Minerals (PRESERVISION AREDS 2 PO) Take 1 tablet by mouth 2 times daily    Historical Provider, MD       Future Appointments   Date Time Provider Yonas Boyer   2/6/2023 10:00 AM Kathia Kaiser MD N SRPX CT/CV [facility] [place]   3/6/2023  8:15 AM Lupe Torres MD Fam Med CG [facility] [place]   7/24/2023  1:30 PM Dorothy Cordova MD N SRPX Heart [facility] [place]   8/1/2023  9:00 AM Re Monet, [address] [place] [address] Assessment    Do you have any symptoms that are causing concern?: No     , and Care Coordination Episodes    Type: Amb Care Management  Episode: Complex Care  Noted: 12/22/2022  Comments: CTN Referral - RPM

## 2023-02-02 ENCOUNTER — CARE COORDINATION (OUTPATIENT)
Dept: CARE COORDINATION | Age: 88
End: 2023-02-02

## 2023-02-02 NOTE — CARE COORDINATION
Remote Patient Monitoring Note      Date/Time:  2/2/2023 1:41 PM    CCSS reviewed patients reported daily Remote Patient Monitoring metrics. All reported metrics are within alert parameters. Plan/Follow Up:  Will continue to review, monitor and address alerts with follow up based on severity of symptoms and risk factors  Current Patient Metrics ---- Blood Pressure: 117/67, 68bpm Pulseox: 96%, 85bpm Survey: - Weight: 141.6lbs Note Created at: 02/02/2023 01:41 PM ET ---- Time-Spent: 2 minutes 0 seconds

## 2023-02-03 ENCOUNTER — CARE COORDINATION (OUTPATIENT)
Dept: CARE COORDINATION | Age: 88
End: 2023-02-03

## 2023-02-03 NOTE — CARE COORDINATION
Remote Patient Monitoring Note      Date/Time:  2/3/2023 12:36 PM    CCSS reviewed patients reported daily Remote Patient Monitoring metrics. All reported metrics are within alert parameters. Plan/Follow Up:  Will continue to review, monitor and address alerts with follow up based on severity of symptoms and risk factors  Current Patient Metrics ---- Blood Pressure: 121/71, 66bpm Pulseox: 96%, 74bpm Survey: - Weight: 141.6lbs Note Created at: 02/03/2023 12:36 PM ET ---- Time-Spent: 2 minutes 0 seconds

## 2023-02-06 ENCOUNTER — OFFICE VISIT (OUTPATIENT)
Dept: CARDIOTHORACIC SURGERY | Age: 88
End: 2023-02-06

## 2023-02-06 ENCOUNTER — CARE COORDINATION (OUTPATIENT)
Dept: CARE COORDINATION | Age: 88
End: 2023-02-06

## 2023-02-06 VITALS
HEART RATE: 57 BPM | HEIGHT: 71 IN | BODY MASS INDEX: 18.9 KG/M2 | DIASTOLIC BLOOD PRESSURE: 77 MMHG | SYSTOLIC BLOOD PRESSURE: 141 MMHG | WEIGHT: 135 LBS

## 2023-02-06 DIAGNOSIS — I65.22 STENOSIS OF LEFT CAROTID ARTERY: Primary | ICD-10-CM

## 2023-02-06 PROCEDURE — 99024 POSTOP FOLLOW-UP VISIT: CPT | Performed by: THORACIC SURGERY (CARDIOTHORACIC VASCULAR SURGERY)

## 2023-02-06 NOTE — CARE COORDINATION
Remote Patient Monitoring Note      Date/Time:  2/6/2023 3:01 PM    EMTP reviewed patients reported daily Remote Patient Monitoring metrics. All reported metrics are within alert parameters. Plan/Follow Up:  Will continue to review, monitor and address alerts with follow up based on severity of symptoms and risk factors--- Current Patient Metrics ---- Blood Pressure: 128/68, 74bpm Pulseox: 93%, 75bpm Survey: - Weight: 142.2lbs Note Created at: 02/06/2023 03:01 PM ET ---- Time-Spent: 2 minutes 0 seconds

## 2023-02-06 NOTE — PROGRESS NOTES
[facility]  [address] [facility] [address] [facility] [address]  Dept: [phone]  Dept Fax: [phone]: [phone]    Visit Date: 2/6/2023    Mr. Barroso is a 80 y.o.male  who presented for:  Chief Complaint   Patient presents with    Follow-up     Carotid duplex 1/31/23       HPI:   HPI   Mr. Dannielle Joseph returns to clinic after his postoperative carotid duplex:    1. RIGHT   ICA . Gordon Lasso Gordon Lasso Mild soft plaque, mild stenosis, less than 50%. ..    ECA. . Mild soft plaque, mild stenosis. CCA. Mild intimal thickening, no appreciable stenosis. ..   VERT Antegrade flow. ..       2. LEFT    ICA. .... Mild soft plaque and minimal intimal thickening, minimal stenosis, less than 50%. .. ECA. .. Unremarkable   CCA. .. Unremarkable. VERT. Antegrade flow. Patient reports no issues. Denies any symptoms. Tolerating Eliquis/ASA    Current Outpatient Medications:     clotrimazole-betamethasone (LOTRISONE) 1-0.05 % cream, Apply topically 2 times daily. PRN, Disp: 45 g, Rfl: 1    metoprolol tartrate (LOPRESSOR) 25 MG tablet, Take 0.5 tablets by mouth every morning, Disp: 45 tablet, Rfl: 3    aspirin 81 MG EC tablet, Take 1 tablet by mouth daily, Disp: 30 tablet, Rfl: 3    apixaban (ELIQUIS) 5 MG TABS tablet, Take 1 tablet by mouth 2 times daily, Disp: 180 tablet, Rfl: 2    Psyllium (METAMUCIL PO), Take by mouth daily as needed, Disp: , Rfl:     melatonin 5 MG TABS tablet, Take 5 mg by mouth nightly as needed, Disp: , Rfl:     amLODIPine (NORVASC) 5 MG tablet, Take 1 tablet by mouth in the morning and at bedtime, Disp: 180 tablet, Rfl: 1    Probiotic Product (PROBIOTIC-10 PO), Take by mouth daily, Disp: , Rfl:     nystatin (MYCOSTATIN) 396313 UNIT/GM cream, Apply topically 2 times daily. , Disp: 30 g, Rfl: 1    Handicap Placard MISC, by Does not apply route Exp 7/13/2027, Disp: 1 each, Rfl: 0    Polyethylene Glycol 3350 (MIRALAX PO), Take by mouth as needed, Disp: , Rfl:     VITAMIN D PO, Take by mouth daily, Disp: , Rfl:     Multiple Vitamins-Minerals (PRESERVISION AREDS 2 PO), Take 1 tablet by mouth 2 times daily, Disp: , Rfl:     Allergies   Allergen Reactions    Levaquin [Levofloxacin] Shortness Of Breath, Diarrhea, Nausea And Vomiting, Swelling and Dizziness or Vertigo    Nitrofuran Derivatives Nausea Only     Severe stomach cramps    Nsaids Nausea Only and Other (See Comments)     Pain in stomach, GI upset       Past Medical History  Carolyn Gordon  has a past medical history of Atrial fibrillation ([address] Back pain, Hernia, Hx of blood clots, Hyperlipidemia, Hypertension, Osteoarthritis, Prostate cancer ([address] Prostate cancer ([address] Pulmonary emboli ([facility] [address] and Wears glasses. Social History  Carolyn Gordon  reports that he quit smoking about 57 years ago. His smoking use included cigarettes. He has never used smokeless tobacco. He reports current alcohol use. He reports that he does not use drugs. Family History  Carolyn Gordon family history includes Cancer in his brother, brother, and sister; Heart Disease in his father; Prostate Cancer in his brother, brother, brother, and brother; Stroke in his mother. There is no family history of bicuspid aortic valve, aneurysms, heart transplant, pacemakers, defibrillators, or sudden cardiac death. Past Surgical History   Past Surgical History:   Procedure Laterality Date    APPENDECTOMY  01/1961    [facility]  [id number]    X stop    CAROTID ENDARTERECTOMY Left 11/21/2022    LEFT CAROTID ENDARTERECTOMY performed by Mendoza Velasco MD at [address] Right     x2 on right    CATARACT REMOVAL Right 06/2001    Dr Joe Horton Left 11/2014    Dr Ashley Rivas, 2009    Dr Kamari Kennedy  [address]?     Dr Ashok Shi of date    HERNIA REPAIR Left 11/22/2017    ROBOT RECURRENT LEFT INGUINAL HERNIA REPAIR performed by Milly Elizalde MD at [address] 1 BILATERAL Bilateral 9/5/2017    LUMBAR FACET MBB L3-4, L4-5, L5-S1 BILATERAL performed by Laron Smith MD at [facility] Left 04/09/2018    HIP INJECTION     OTHER SURGICAL HISTORY  10/7/2015    XI ROBOTIC PROSTATECTOMY    OTHER SURGICAL HISTORY Bilateral 09/05/2017    lumbar facet block L3-S1    OTHER SURGICAL HISTORY Right 1980's    nerve release right lower arm    OTHER SURGICAL HISTORY  01/30/2018    Sacroiliac joint MBB    OTHER SURGICAL HISTORY Left 02/27/2018    Sacroiliac joint medial branch block     CA INJECT SI JOINT ARTHRGRPHY&/ANES/STEROID W/MARQUISE Left 1/30/2018    LEFT SI MBB performed by Laron Smith MD at [facility] INJECT SI JOINT ARTHRGRPHY&/ANES/STEROID W/MARQUISE Left 2/27/2018    LEFT SI MBB #2 performed by Laron Smith MD at [facility] INSJ/RPLCMT SPI NPGR DIR/INDUXIVE COUPLING N/A 9/17/2018    THORACIC LAMINECTOMY PERMANENT SPINAL CORD STIMULATOR PADDLE PLUS BATTERY PLACEMENT performed by Sandra Hernandez MD at [address] [facility] OFFICE/OUTPT [facility] Left 4/9/2018    LEFT HIP STEROID INJECTION WITH SEDATION performed by Laron Smith MD at [facility] OFFICE/OUTPT [address] [place] [address] N/A 8/21/2018    SPINAL CORD STIMULATOR IMPLANT TRIAL performed by Laron Smith MD at [facility]  7-    Dr Austin Mckeon BIOPSY  8/21/2015    transrectal ultrasound with biopsy(+)    PROSTATE SURGERY  2003    TURP    PROSTATE SURGERY  2009    Biopsy -Hyperplasia    SHOULDER ARTHROPLASTY  2005    right       Subjective:     Review of Systems  Negative    Objective:     BP (!) 141/77 (Site: Left Upper Arm, Position: Sitting, Cuff Size: Medium Adult)   Pulse 57   Ht 5' 10.5\" (1.791 m)   Wt 135 lb (61.2 kg)   BMI 19.10 kg/m²     Wt Readings from Last 3 Encounters:   02/06/23 135 lb (61.2 kg)   01/23/23 134 lb 6.4 oz (61 kg)   01/09/23 147 lb (66.7 kg)     BP Readings from Last 3 Encounters:   02/06/23 (!) 141/77   01/23/23 132/74   01/09/23 126/76       Physical Exam  Left neck: healed incision  Neurol intact  Lab Results   Component Value Date/Time    WBC 15.1 11/22/2022 05:07 AM    RBC 3.63 11/22/2022 05:07 AM     08/26/2011 01:55 PM    HGB 12.0 11/22/2022 05:07 AM    HCT 36.3 11/22/2022 05:07 AM    .0 11/22/2022 05:07 AM    MCH 33.1 11/22/2022 05:07 AM    MCHC 33.1 11/22/2022 05:07 AM    RDW 13.2 07/19/2022 02:42 PM     11/22/2022 05:07 AM    MPV 11.2 11/22/2022 05:07 AM       Lab Results   Component Value Date/Time     01/05/2023 08:06 AM    K 4.4 01/05/2023 08:06 AM     01/05/2023 08:06 AM    CO2 26 01/05/2023 08:06 AM    BUN 24 01/05/2023 08:06 AM    LABALBU 4.4 01/05/2023 08:06 AM    CREATININE 1.1 01/05/2023 08:06 AM    CALCIUM 9.7 01/05/2023 08:06 AM    LABGLOM >60 01/05/2023 08:06 AM    GLUCOSE 97 01/05/2023 08:06 AM    GLUCOSE 89 05/14/2016 06:30 AM       Lab Results   Component Value Date/Time    ALKPHOS 61 01/05/2023 08:06 AM    ALT 19 01/05/2023 08:06 AM    AST 23 01/05/2023 08:06 AM    PROT 6.9 01/05/2023 08:06 AM    BILITOT 0.6 01/05/2023 08:06 AM    BILIDIR <0.2 09/21/2018 09:19 AM    LABALBU 4.4 01/05/2023 08:06 AM       Lab Results   Component Value Date/Time    MG 2.0 07/19/2022 02:42 PM       Lab Results   Component Value Date    INR 1.54 (H) 11/16/2022    INR 1.30 (H) 09/12/2018    INR 1.07 06/30/2018         Lab Results   Component Value Date/Time    LABA1C 5.8 11/16/2022 12:19 PM       Lab Results   Component Value Date/Time    TRIG 55 05/14/2022 12:00 AM    HDL 67 05/14/2022 12:00 AM    LDLCALC 113 05/14/2022 12:00 AM       Lab Results   Component Value Date/Time    TSH 3.380 07/24/2018 10:55 AM           Assessment/Plan     1. Stenosis of left carotid artery      F/U in 6 months with carotid duplex.   F/U with cardiology as scheduled. No follow-ups on file.       Electronically signed by Mendoza Velasco MD   2/6/2023 at 10:02 AM EST

## 2023-02-07 ENCOUNTER — CARE COORDINATION (OUTPATIENT)
Dept: CARE COORDINATION | Age: 88
End: 2023-02-07

## 2023-02-07 NOTE — CARE COORDINATION
Remote Patient Monitoring Note      Date/Time:  2/7/2023 10:37 AM    EMTP reviewed patients reported daily Remote Patient Monitoring metrics. All reported metrics are within alert parameters. Plan/Follow Up:  Will continue to review, monitor and address alerts with follow up based on severity of symptoms and risk factors-- Current Patient Metrics ---- Blood Pressure: 118/66, 66bpm Pulseox: 96%, 78bpm Survey: - Weight: 142.4lbs Note Created at: 02/07/2023 10:37 AM ET ---- Time-Spent: 2 minutes 0 seconds

## 2023-02-08 ENCOUNTER — CARE COORDINATION (OUTPATIENT)
Dept: CARE COORDINATION | Age: 88
End: 2023-02-08

## 2023-02-08 NOTE — CARE COORDINATION
Remote Patient Monitoring Note      Date/Time:  2/8/2023 2:52 PM    EMTP reviewed patients reported daily Remote Patient Monitoring metrics. All reported metrics are within alert parameters. Plan/Follow Up:  Will continue to review, monitor and address alerts with follow up based on severity of symptoms and risk factors--- Current Patient Metrics ---- Blood Pressure: 116/62, 71bpm Pulseox: 94%, 77bpm Survey: - Weight: 142.4lbs Note Created at: 02/08/2023 02:52 PM ET ---- Time-Spent: 2 minutes 0 seconds

## 2023-02-09 ENCOUNTER — CARE COORDINATION (OUTPATIENT)
Dept: CARE COORDINATION | Age: 88
End: 2023-02-09

## 2023-02-09 NOTE — CARE COORDINATION
Remote Patient Monitoring Note      Date/Time:  2/9/2023 11:47 AM    EMTP reviewed patients reported daily Remote Patient Monitoring metrics. All reported metrics are within alert parameters. Plan/Follow Up:  Will continue to review, monitor and address alerts with follow up based on severity of symptoms and risk factors---- Current Patient Metrics ---- Blood Pressure: 120/73, 71bpm Pulseox: 97%, 67bpm Survey: - Weight: 143.6lbs Note Created at: 02/09/2023 11:48 AM ET ---- Time-Spent: 2 minutes 0 seconds

## 2023-02-10 ENCOUNTER — CARE COORDINATION (OUTPATIENT)
Dept: CARE COORDINATION | Age: 88
End: 2023-02-10

## 2023-02-10 NOTE — CARE COORDINATION
Remote Patient Monitoring Note      Date/Time:  2/10/2023 9:38 AM    CCSS reviewed patients reported daily Remote Patient Monitoring metrics. All reported metrics are within alert parameters. Plan/Follow Up:  Will continue to review, monitor and address alerts with follow up based on severity of symptoms and risk factors  Current Patient Metrics ---- Blood Pressure: 99/57, 64bpm Pulseox: 94%, 77bpm Survey: - Weight: 143.2lbs Note Created at: 02/10/2023 09:38 AM ET ---- Time-Spent: 2 minutes 0 seconds

## 2023-02-13 ENCOUNTER — CARE COORDINATION (OUTPATIENT)
Dept: CASE MANAGEMENT | Age: 88
End: 2023-02-13

## 2023-02-13 NOTE — CARE COORDINATION
Remote Patient Monitoring Note      Date/Time:  2/13/2023 12:05 PM    LPN reviewed patients reported daily Remote Patient Monitoring metrics. All reported metrics are within alert parameters. Plan/Follow Up:  Will continue to review, monitor and address alerts with follow up based on severity of symptoms and risk factors    Current Patient Metrics ---- Blood Pressure: 120/68, 65bpm Pulseox: 94%, 72bpm Survey: - Weight: 143.6lbs Note Created at: 02/13/2023 12:06 PM ET ---- Time-Spent: 2 minutes 0 seconds

## 2023-02-14 ENCOUNTER — CARE COORDINATION (OUTPATIENT)
Dept: CARE COORDINATION | Age: 88
End: 2023-02-14

## 2023-02-14 NOTE — CARE COORDINATION
Remote Patient Monitoring Note      Date/Time:  2/14/2023 1:58 PM    EMTP reviewed patients reported daily Remote Patient Monitoring metrics. All reported metrics are within alert parameters. Plan/Follow Up:  Will continue to review, monitor and address alerts with follow up based on severity of symptoms and risk factors-- Current Patient Metrics ---- Blood Pressure: 114/65, 69bpm Pulseox: 96%, 74bpm Survey: - Weight: 142.6lbs Note Created at: 02/14/2023 01:58 PM ET

## 2023-02-15 ENCOUNTER — TELEPHONE (OUTPATIENT)
Dept: FAMILY MEDICINE CLINIC | Age: 88
End: 2023-02-15

## 2023-02-15 ENCOUNTER — CARE COORDINATION (OUTPATIENT)
Dept: CASE MANAGEMENT | Age: 88
End: 2023-02-15

## 2023-02-15 ENCOUNTER — CARE COORDINATION (OUTPATIENT)
Dept: CARE COORDINATION | Age: 88
End: 2023-02-15

## 2023-02-15 DIAGNOSIS — M54.50 CHRONIC MIDLINE LOW BACK PAIN WITHOUT SCIATICA: ICD-10-CM

## 2023-02-15 DIAGNOSIS — G89.29 CHRONIC MIDLINE LOW BACK PAIN WITHOUT SCIATICA: ICD-10-CM

## 2023-02-15 RX ORDER — HYDROCODONE BITARTRATE AND ACETAMINOPHEN 5; 325 MG/1; MG/1
1 TABLET ORAL 2 TIMES DAILY PRN
Qty: 60 TABLET | Refills: 0 | Status: SHIPPED | OUTPATIENT
Start: 2023-02-15 | End: 2023-03-17

## 2023-02-15 NOTE — CARE COORDINATION
Remote Patient Monitoring Note      Date/Time:  2/15/2023 2:39 PM    LPN reviewed patients reported daily Remote Patient Monitoring metrics. All reported metrics are within alert parameters. Plan/Follow Up: Will continue to review, monitor and address alerts with follow up based on severity of symptoms and risk factors.     Current Patient Metrics ---- Blood Pressure: 112/69, 67bpm Pulseox: 95%, 84bpm Survey: - Weight: 142.6lbs Note Created at: 02/15/2023 02:40 PM ET ---- Time-Spent: 2 minutes 0 seconds

## 2023-02-15 NOTE — CARE COORDINATION
Ambulatory Care Coordination Note  2/15/2023    Patient Current Location:  Home: [address] [address]     ACM contacted the patient by telephone. Verified name and  with patient as identifiers. Provided introduction to self, and explanation of the ACM role. Challenges to be reviewed by the provider   Additional needs identified to be addressed with provider: See Refill Encounter re: need for Norco refill to be sent to [address] Method of communication with provider: none. ACM: Briana Joyner RN    Patient was called for continued Care Coordination follow up and education re: the management of his healthcare needs. Patient shared he has been doing well and he denied any current complaints or concerns. Patient continues to monitor VS thru the RPM program and recent readings have been reviewed and were noted to be stable. ACM reviewed signs/symptoms patient should call and report and patient was educated on the importance of early symptom recognition and follow up. Patient verbalized understanding. ACM educated patient on the RPM program and need to start to transition to home BP monitoring. Patient was instructed to monitor VS with home/personal equipment and submit readings thru tablet as he has been doing in recent past so we can monitor for any changes and work to discharge from [facility] program.  Patient verbalized understanding. Patient denied any other questions, concerns, or needs and he was encouraged to call with any that may develop.           Actions:   - Reviewed signs/symptoms to report   - Reviewed importance of early symptom recognition and follow up   - Reviewed recent RPM readings and notes   - Educated on RPM program and need to transition to home monitoring   - Monitored for additional needs          Plan of Care:   - Continue with Care Coordination f/u calls for assistance with the management of his HTN and healthcare needs   - Educate on signs/symptoms to report - Ongoing   - Educate on the importance of early symptom recognition and follow up - Ongoing  - Monitor RPM assistance as needed - Ongoing - If remains stable will consider graduation in the near future  - Educate on the availability of same day appointments, urgent care/walk in clinic options, and after hours on call resources - Completed   - Amilcar Johanny information - Completed  - Patient is current with Medicare AWV - Aug. 2022  - Monitor for additional needs   - If patient remains stable will consider discharge from Care Coordination in the near future    Offered patient enrollment in the Remote Patient Monitoring (RPM) program for in-home monitoring: Yes, patient already enrolled and monitoring for readiness to discharge from program.    Lab Results       None            Care Coordination Interventions    Referral from Primary Care Provider: No  Suggested Interventions and Community Resources  Medication Assistance Program: Declined (Comment: Denied additional assistance. [facility] clinic when possible - Dec. 2022)  Medi Set or Pill Pack: Completed (Comment: Dec. 2022)  Pharmacist: Karon Garcia (Comment: Dec. 2022)  Social Work: Declined (Comment: Denied any additional resource needs - Dec. 2022)  Transportation Support: Declined  Other Services or Interventions: [facility] with [facility] - Dec. 2022          Goals Addressed                   This Visit's Progress       Care Coordination     Conditions and Symptoms   Improving     I will schedule office visits, as directed by my provider. I will keep my appointment or reschedule if I have to cancel. I will notify my provider of any barriers to my plan of care. I will notify my provider of any symptoms that indicate a worsening of my condition.     Barriers: overwhelmed by complexity of regimen, stress, and lack of education  Plan for overcoming my barriers: Continue to provide education to patient and monitor for resource needs   Confidence: 8/10  Anticipated Goal Completion Date: 3/10/2023                  Future Appointments   Date Time Provider Yonas Boyer   3/6/2023  8:15 AM Radha Conte MD Fam Med [facility]   7/24/2023  1:30 PM Cheryl Owusu MD N SRPX Heart [facility]   8/1/2023  9:00 AM German Montano APRN - CNP Poppy Ernandez Uro [facility]   8/1/2023 11:30 AM [facility] RM1 ULTRASOUND STR[facility] PUT OP STR Delories Mitul   8/7/2023 10:00 AM Bubba Perez MD N SRPX CT/CV [facility]   ,   General Assessment    Do you have any symptoms that are causing concern?: No     , and Care Coordination Episodes    Type: Amb Care Management  Episode: Complex Care  Noted: 12/22/2022  Comments: CTN Referral - RPM

## 2023-02-15 NOTE — TELEPHONE ENCOUNTER
Patient shared he is need of a refill or his Norco and it needs to be faxed to the [facility] pharmacy. Please order and fax if appropriate. Thank you!

## 2023-02-16 ENCOUNTER — CARE COORDINATION (OUTPATIENT)
Dept: CARE COORDINATION | Age: 88
End: 2023-02-16

## 2023-02-16 NOTE — CARE COORDINATION
Remote Patient Monitoring Note      Date/Time:  2/16/2023 2:27 PM    CCSS reviewed patients reported daily Remote Patient Monitoring metrics. All reported metrics are within alert parameters. Plan/Follow Up:  Will continue to review, monitor and address alerts with follow up based on severity of symptoms and risk factors Current Patient Metrics ---- Blood Pressure: 114/64, 68bpm Pulseox: 95%, 69bpm Survey: - Weight: 140.8lbs Note Created at: 02/16/2023 02:27 PM ET ---- Time-Spent: 2 minutes 0 seconds

## 2023-02-17 ENCOUNTER — CARE COORDINATION (OUTPATIENT)
Dept: CARE COORDINATION | Age: 88
End: 2023-02-17

## 2023-02-17 NOTE — CARE COORDINATION
Remote Patient Monitoring Note      Date/Time:  2/17/2023 9:40 AM    CCSS reviewed patients reported daily Remote Patient Monitoring metrics. All reported metrics are within alert parameters. Plan/Follow Up:  Will continue to review, monitor and address alerts with follow up based on severity of symptoms and risk factors  Current Patient Metrics ---- Blood Pressure: 115/66, 57bpm Pulseox: 95%, 76bpm Survey: - Weight: 140.6lbs Note Created at: 02/17/2023 09:40 AM ET ---- Time-Spent: 2 minutes 0 seconds

## 2023-02-20 ENCOUNTER — CARE COORDINATION (OUTPATIENT)
Dept: CARE COORDINATION | Age: 88
End: 2023-02-20

## 2023-02-20 NOTE — CARE COORDINATION
Remote Patient Monitoring Note      Date/Time:  2/20/2023 8:29 AM    CCSS reviewed patients reported daily Remote Patient Monitoring metrics. All reported metrics are within alert parameters. Plan/Follow Up:  Will continue to review, monitor and address alerts with follow up based on severity of symptoms and risk factors   Current Patient Metrics ---- Blood Pressure: 119/60, 61bpm Pulseox: 95%, 75bpm Survey: - Weight: 142.6lbs Note Created at: 02/20/2023 08:29 AM ET ---- Time-Spent: 2 minutes 0 seconds

## 2023-02-21 ENCOUNTER — CARE COORDINATION (OUTPATIENT)
Dept: CARE COORDINATION | Age: 88
End: 2023-02-21

## 2023-02-21 NOTE — CARE COORDINATION
Remote Patient Monitoring Note      Date/Time:  2/21/2023 7:59 AM    CCSS reviewed patients reported daily Remote Patient Monitoring metrics. All reported metrics are within alert parameters. Plan/Follow Up:  Will continue to review, monitor and address alerts with follow up based on severity of symptoms and risk factors   Current Patient Metrics ---- Blood Pressure: 120/65, 65bpm Pulseox: 95%, 74bpm Survey: - Weight: 141.4lbs Note Created at: 02/21/2023 07:59 AM ET ---- Time-Spent: 2 minutes 0 seconds

## 2023-02-22 ENCOUNTER — CARE COORDINATION (OUTPATIENT)
Dept: CARE COORDINATION | Age: 88
End: 2023-02-22

## 2023-02-22 NOTE — CARE COORDINATION
Remote Patient Monitoring Note      Date/Time:  2/22/2023 11:21 AM    CCSS reviewed patients reported daily Remote Patient Monitoring metrics. All reported metrics are within alert parameters. Plan/Follow Up:  Will continue to review, monitor and address alerts with follow up based on severity of symptoms and risk factors   Current Patient Metrics ---- Blood Pressure: 117/72, 59bpm Pulseox: 96%, 80bpm Survey: - Weight: 142.2lbs Note Created at: 02/22/2023 11:21 AM ET ---- Time-Spent: 2 minutes 0 seconds

## 2023-02-23 ENCOUNTER — CARE COORDINATION (OUTPATIENT)
Dept: CARE COORDINATION | Age: 88
End: 2023-02-23

## 2023-02-23 NOTE — CARE COORDINATION
Remote Patient Monitoring Note      Date/Time:  2/23/2023 8:38 AM    CCSS reviewed patients reported daily Remote Patient Monitoring metrics. All reported metrics are within alert parameters. Plan/Follow Up:  Will continue to review, monitor and address alerts with follow up based on severity of symptoms and risk factors  Current Patient Metrics ---- Blood Pressure: 116/67, 71bpm Pulseox: 98%, 76bpm Survey: - Weight: 142.8lbs Note Created at: 02/23/2023 08:38 AM ET ---- Time-Spent: 2 minutes 0 seconds

## 2023-02-24 ENCOUNTER — CARE COORDINATION (OUTPATIENT)
Dept: CARE COORDINATION | Age: 88
End: 2023-02-24

## 2023-02-24 NOTE — CARE COORDINATION
CCSS reviewed patients reported daily Remote Patient Monitoring metrics. All reported metrics are within alert parameters. Plan/Follow Up:  Will continue to review, monitor and address alerts with follow up based on severity of symptoms and risk factors      Current Patient Metrics ---- Blood Pressure: 119/64, 70bpm Pulseox: 93%, 71bpm Survey: - Weight: 143.2lbs Note Created at: 02/24/2023 09:56 AM CT ---- Time-Spent: 2 minutes 0 seconds    Ifrah Steele   Cell: [phone]

## 2023-02-27 ENCOUNTER — CARE COORDINATION (OUTPATIENT)
Dept: CARE COORDINATION | Age: 88
End: 2023-02-27

## 2023-02-27 NOTE — CARE COORDINATION
Remote Patient Monitoring Note      Date/Time:  2/27/2023 12:48 PM    EMTP reviewed patients reported daily Remote Patient Monitoring metrics. All reported metrics are within alert parameters. Plan/Follow Up:  Will continue to review, monitor and address alerts with follow up based on severity of symptoms and risk factors--- Current Patient Metrics ---- Blood Pressure: 124/73, 67bpm Pulseox: 95%, 84bpm Survey: - Weight: 142.8lbs Note Created at: 02/27/2023 12:48 PM ET ---- Time-Spent: 2 minutes 0 seconds

## 2023-02-27 NOTE — CARE COORDINATION
Ambulatory Care Coordination Note  2023    Patient Current Location:  Home: [address] [address] [facility]   [place] [address]     ACM contacted the patient by telephone. Verified name and  with patient as identifiers. Provided introduction to self, and explanation of the ACM role.     Challenges to be reviewed by the provider   Additional needs identified to be addressed with provider: Update    Plan to stop RPM as VS have been stable and patient has needed equipment at home to continue to monitor BP and keep BP log.          Method of communication with provider: chart routing.    ACM: Rocío Nguyen RN    Patient was called for continued Care Coordination follow up and education re: the management of his healthcare needs.  Patient shared he has been doing well and denied any current complaints or concerns.  Patient shared he has been monitoring BP on both RPM equipment and home BP cuff and they have been stable with home cuff reading \"slightly higher.\"  Patient denied any other concerns or complaints.  ACM reviewed signs/symptoms to report with patient as well as the importance of early symptom recognition and follow up.  Patient verbalized understanding.  Patient was reminded of the importance of keeping home BP log and need to call with any concerns.  Patient verbalized understanding.  Patient is agreeable to stopping RPM at this time as his VS have been stable and he is able to continue to monitor as directed.  RPM team updated.  Patient denied any other questions, concerns, or needs and he was encouraged to call with any that may develop.  Will continue to work to f/u with patient in the future.          Actions:   - Reviewed signs/symptoms to report   - Reviewed importance of early symptom recognition and follow up   - Reviewed recent RPM readings   - Updated RPM team of discontinuation of RPM   - Updated PCP   - Monitored for additional needs          Plan of Care:   - Continue with Care Coordination f/u  calls for assistance with the management of his HTN and healthcare needs   - Educate on signs/symptoms to report - Ongoing   - Educate on the importance of early symptom recognition and follow up - Ongoing  - Monitor RPM assistance as needed - Discharging Feb. 2023  - Educate on the availability of same day appointments, urgent care/walk in clinic options, and after hours on call resources - Completed   - Amilcar Orlando information - Completed  - Patient is current with Medicare AWV - Aug. 2022  - Monitor for additional needs   - If patient remains stable will consider discharge from Care Coordination in the near future    Offered patient enrollment in the Remote Patient Monitoring (RPM) program for in-home monitoring:  Plan to discharge from Care Coordination at this time . Lab Results       None            Care Coordination Interventions    Referral from Primary Care Provider: No  Suggested Interventions and Community Resources  Medication Assistance Program: Declined (Comment: Denied additional assistance. [address] clinic when possible - Dec. 2022)  Medi Set or Pill Pack: Completed (Comment: Dec. 2022)  Pharmacist: Tony Chaidez (Comment: Dec. 2022)  Social Work: Declined (Comment: Denied any additional resource needs - Dec. 2022)  Transportation Support: Declined  Other Services or Interventions: Follows with [facility] - Dec. 2022          Goals Addressed                   This Visit's Progress       Care Coordination     Conditions and Symptoms   Improving     I will schedule office visits, as directed by my provider. I will keep my appointment or reschedule if I have to cancel. I will notify my provider of any barriers to my plan of care. I will notify my provider of any symptoms that indicate a worsening of my condition.     Barriers: overwhelmed by complexity of regimen, stress, and lack of education  Plan for overcoming my barriers: Continue to provide education to patient and monitor for resource needs   Confidence: 8/10  Anticipated Goal Completion Date: 3/10/2023                  Future Appointments   Date Time Provider Yonas Boyer   3/6/2023  8:15 AM Juvenal Woo MD Fam Med [facility]   7/24/2023  1:30 PM Shikha Anaya MD N SRPX Heart [facility]   8/1/2023  9:00 AM Rhiannon Louis APRN - CNP Link Jimenez Uro [facility]   8/1/2023 11:30 AM STR [facility] RM1 ULTRASOUND STRZ PUT OP STR [place]   8/7/2023 10:00 AM Marisol Sharif MD N SRPX CT/CV [facility]   ,   General Assessment    Do you have any symptoms that are causing concern?: No     , and Care Coordination Episodes    Type: Amb Care Management  Episode: Complex Care  Noted: 12/22/2022  Comments: CTN Referral - RPM

## 2023-02-27 NOTE — CARE COORDINATION
EMTP placed call to patient to arrange RPM kit  through [facility]. Reviewed with patient how to pack equipment in original packing. Verified patient's availability to schedule UPS  time. UPS  time requested.  Anticipated  date range : Anytime

## 2023-03-06 ENCOUNTER — HOSPITAL ENCOUNTER (OUTPATIENT)
Dept: GENERAL RADIOLOGY | Age: 88
Discharge: HOME | End: 2023-03-06
Payer: MEDICARE

## 2023-03-06 ENCOUNTER — OFFICE VISIT (OUTPATIENT)
Dept: FAMILY MEDICINE CLINIC | Age: 88
End: 2023-03-06
Payer: MEDICARE

## 2023-03-06 ENCOUNTER — HOSPITAL ENCOUNTER (OUTPATIENT)
Age: 88
Discharge: HOME | End: 2023-03-06
Payer: MEDICARE

## 2023-03-06 VITALS
WEIGHT: 150.2 LBS | RESPIRATION RATE: 18 BRPM | SYSTOLIC BLOOD PRESSURE: 126 MMHG | OXYGEN SATURATION: 99 % | HEART RATE: 62 BPM | HEIGHT: 71 IN | DIASTOLIC BLOOD PRESSURE: 70 MMHG | BODY MASS INDEX: 21.03 KG/M2 | TEMPERATURE: 97 F

## 2023-03-06 DIAGNOSIS — M54.50 CHRONIC MIDLINE LOW BACK PAIN WITHOUT SCIATICA: ICD-10-CM

## 2023-03-06 DIAGNOSIS — G89.29 CHRONIC MIDLINE LOW BACK PAIN WITHOUT SCIATICA: ICD-10-CM

## 2023-03-06 DIAGNOSIS — C61 PROSTATE CANCER (HCC): ICD-10-CM

## 2023-03-06 DIAGNOSIS — G89.29 CHRONIC MIDLINE LOW BACK PAIN WITHOUT SCIATICA: Primary | ICD-10-CM

## 2023-03-06 DIAGNOSIS — M54.50 CHRONIC MIDLINE LOW BACK PAIN WITHOUT SCIATICA: Primary | ICD-10-CM

## 2023-03-06 DIAGNOSIS — I48.0 PAF (PAROXYSMAL ATRIAL FIBRILLATION) (HCC): ICD-10-CM

## 2023-03-06 DIAGNOSIS — M25.552 LEFT HIP PAIN: ICD-10-CM

## 2023-03-06 DIAGNOSIS — K59.03 DRUG-INDUCED CONSTIPATION: ICD-10-CM

## 2023-03-06 PROCEDURE — G8420 CALC BMI NORM PARAMETERS: HCPCS | Performed by: FAMILY MEDICINE

## 2023-03-06 PROCEDURE — G8427 DOCREV CUR MEDS BY ELIG CLIN: HCPCS | Performed by: FAMILY MEDICINE

## 2023-03-06 PROCEDURE — 1036F TOBACCO NON-USER: CPT | Performed by: FAMILY MEDICINE

## 2023-03-06 PROCEDURE — G8484 FLU IMMUNIZE NO ADMIN: HCPCS | Performed by: FAMILY MEDICINE

## 2023-03-06 PROCEDURE — 99214 OFFICE O/P EST MOD 30 MIN: CPT | Performed by: FAMILY MEDICINE

## 2023-03-06 PROCEDURE — 1123F ACP DISCUSS/DSCN MKR DOCD: CPT | Performed by: FAMILY MEDICINE

## 2023-03-06 PROCEDURE — 72100 X-RAY EXAM L-S SPINE 2/3 VWS: CPT

## 2023-03-06 PROCEDURE — 73502 X-RAY EXAM HIP UNI 2-3 VIEWS: CPT

## 2023-03-06 ASSESSMENT — ENCOUNTER SYMPTOMS
CONSTIPATION: 1
WHEEZING: 0
ABDOMINAL PAIN: 0
NAUSEA: 0
COUGH: 0
SHORTNESS OF BREATH: 0
SORE THROAT: 0
RHINORRHEA: 0
BACK PAIN: 1
DIARRHEA: 0

## 2023-03-06 NOTE — PROGRESS NOTES
[address] [place] [address] [place] [address]  Dept: [phone]  Loc: [address] [facility] [place] (:  3/26/1934) is a 80 y.o. male, here for evaluation of the following chief complaint(s):  3 Month Follow-Up (Labs completed- 23)      ASSESSMENT/PLAN:  1. Chronic midline low back pain without sciatica  -     XR HIP LEFT (2-3 VIEWS); Future  -     XR LUMBAR SPINE (2-3 VIEWS); Future  2. Left hip pain  -     XR HIP LEFT (2-3 VIEWS); Future  3. Prostate cancer ([address]  4. PAF (paroxysmal atrial fibrillation) ([facility] [address]  5. Drug-induced constipation    Will get xrays to eval chronic pain that seems to be worsening. Cont norco prn  Prostate ca- follows with urology. Slight increase in recent PSA, rechecking in 6 months  A. Fib- on eliquis and rate controlled  Miralax and increased fiber for constipation  Return in about 3 months (around 2023) for follow up. SUBJECTIVE/OBJECTIVE:  Patient presents for 3-month follow-up of chronic medical conditions including chronic low back and hip pain, history of prostate cancer, A-fib, drug-induced constipation. Patient states that lately he has been having worsening pain in his left hip. This keeps him up at night. He does take Norco which helps but he tries to use it minimally. He does not take any Norco during the day. He states the pain feels well localized in his left hip and then radiates down his left leg. He does also have low back pain. States symptoms are worse if he is standing for prolonged periods. Does get constipation from his Norco use but states MiraLAX has helped significantly. Has history of prostate cancer and last PSA went from 0.05 and increased to 0.1. He is following up with urology for this. He does complain of urinary frequency at night.   Does have A-fib but denies chest pain or shortness of breath and does take his Eliquis as prescribed. Review of Systems   Constitutional:  Positive for activity change, appetite change and fatigue. Negative for chills and fever. HENT:  Negative for congestion, rhinorrhea and sore throat. Respiratory:  Negative for cough, shortness of breath and wheezing. Cardiovascular:  Negative for chest pain and palpitations. Gastrointestinal:  Positive for constipation. Negative for abdominal pain, diarrhea and nausea. Genitourinary:  Negative for dysuria and hematuria. Musculoskeletal:  Positive for arthralgias, back pain and gait problem. Negative for myalgias. Neurological:  Negative for dizziness and headaches. Hematological:  Bruises/bleeds easily. Psychiatric/Behavioral:  Positive for sleep disturbance. Negative for decreased concentration and dysphoric mood. The patient is not nervous/anxious. Physical Exam  Vitals and nursing note reviewed. Constitutional:       Appearance: He is well-developed. He is ill-appearing (chronically). HENT:      Head: Normocephalic and atraumatic. Eyes:      General: No scleral icterus. Right eye: No discharge. Left eye: No discharge. Conjunctiva/sclera: Conjunctivae normal.   Cardiovascular:      Rate and Rhythm: Normal rate and regular rhythm. Heart sounds: Normal heart sounds. Pulmonary:      Effort: Pulmonary effort is normal.      Breath sounds: Normal breath sounds. No wheezing. Musculoskeletal:         General: Tenderness (left hip tender to palpation) present. Cervical back: Deformity present. Lumbar back: Tenderness and bony tenderness present. Skin:     General: Skin is warm and dry. Neurological:      Mental Status: He is alert and oriented to person, place, and time. Mental status is at baseline. Psychiatric:         Behavior: Behavior normal.         Thought Content:  Thought content normal.         Judgment: Judgment normal.       Vitals:    03/06/23 0805   BP: 126/70   Pulse: 62   Resp: 18   Temp: 97 °F (36.1 °C)   SpO2: 99%              An electronic signature was used to authenticate this note.     --Ernesto Olmedo MD

## 2023-03-08 ENCOUNTER — CARE COORDINATION (OUTPATIENT)
Dept: CARE COORDINATION | Age: 88
End: 2023-03-08

## 2023-03-08 NOTE — CARE COORDINATION
Ambulatory Care Coordination Note  3/8/2023    Patient Current Location:  Home: [address] [place] [address] Road [address]     ACM contacted the patient by telephone. Verified name and  with patient as identifiers. Provided introduction to self, and explanation of the ACM role. Challenges to be reviewed by the provider   Additional needs identified to be addressed with provider: No  none         Method of communication with provider: none. ACM: Gordy Leblanc RN    Patient was called for continued Care Coordination follow up and education re: the management of his healthcare needs. Patient shared he has been doing well and he denied any new concerns or questions. Patient reported he has returned RPM equipment as directed and he continues to monitor BP with home equipment as directed. Patient shared BP has remained stable and he denied any c/o worsening dizziness/lightheadedness being present. ACM reviewed importance of ongoing routine BP monitoring as well as what patient should report and when to follow up. Patient verbalized understanding. Patient denied any questions re: recent PCP visit and he is aware of x-ray results. Patient was encouraged to remain as active as he is able and he was encouraged to take rest breaks when needed. Patient acknowledged understanding. Patient denied any other questions, concerns, or needs and he was encouraged to call with any that may develop.           Actions:   - Reviewed signs/symptoms to report   - Reviewed importance of early symptom recognition and follow up   - Reviewed importance of ongoing home BP monitoring and what to report  - Verified RPM equipment has been sent back as directed   - Monitored for questions re: recent PCP visit   - Monitored for additional needs          Plan of Care:   - Continue with Care Coordination f/u calls for assistance with the management of his HTN and healthcare needs   - Educate on signs/symptoms to report - Ongoing   - Educate on the importance of early symptom recognition and follow up - Ongoing  - Monitor RPM assistance as needed - Discharging Feb. 2023  - Educate on the availability of same day appointments, urgent care/walk in clinic options, and after hours on call resources - Completed   - Amilcar vazquez - Completed  - Patient is current with Medicare AWV - Aug. 2022  - Monitor for additional needs   - If patient remains stable will consider discharge from Care Coordination in the near future    Offered patient enrollment in the Remote Patient Monitoring (RPM) program for in-home monitoring:  Patient recently discharged . Lab Results       None            Care Coordination Interventions    Referral from Primary Care Provider: No  Suggested Interventions and Community Resources  Medication Assistance Program: Declined (Comment: Denied additional assistance. [address] [facility] clinic when possible - Dec. 2022)  Medi Set or Pill Pack: Completed (Comment: Dec. 2022)  Pharmacist: Kathia Aguero (Comment: Dec. 2022)  Social Work: Declined (Comment: Denied any additional resource needs - Dec. 2022)  Transportation Support: Declined  Other Services or Interventions: Follows with [facility] - Dec. 2022          Goals Addressed                   This Visit's Progress       Care Coordination     Conditions and Symptoms   Improving     I will schedule office visits, as directed by my provider. I will keep my appointment or reschedule if I have to cancel. I will notify my provider of any barriers to my plan of care. I will notify my provider of any symptoms that indicate a worsening of my condition.     Barriers: overwhelmed by complexity of regimen, stress, and lack of education  Plan for overcoming my barriers: Continue to provide education to patient and monitor for resource needs   Confidence: 8/10  Anticipated Goal Completion Date: 3/10/2023                  Future Appointments   Date Time Provider Yonas Boyer   6/7/2023  8:15 AM Ishan Deng MD Fam Med [facility] [place]   7/24/2023  1:30 PM Jose Casas MD N SRPX [facility] [place]   8/1/2023  9:00 AM Christina Ramos APRN - CNP Jolie Body [facility] [place]   8/1/2023 11:30 AM STR [facility] RM1 ULTRASOUND STRZ PUT OP STR [place]   8/7/2023 10:00 AM Luis Matthews MD N SRPX CT/CV [facility] [place]   ,   COPD Assessment              Symptoms:          ,   General Assessment    Do you have any symptoms that are causing concern?: Yes  Progression since Onset: Unchanged, Intermittent - Waxing/Waning  Reported Symptoms: Pain     , and Care Coordination Episodes    Type: Amb Care Management  Episode: Complex Care  Noted: 12/22/2022  Comments: CTN Referral - RPM

## 2023-03-16 DIAGNOSIS — M54.50 CHRONIC MIDLINE LOW BACK PAIN WITHOUT SCIATICA: ICD-10-CM

## 2023-03-16 DIAGNOSIS — G89.29 CHRONIC MIDLINE LOW BACK PAIN WITHOUT SCIATICA: ICD-10-CM

## 2023-03-16 RX ORDER — HYDROCODONE BITARTRATE AND ACETAMINOPHEN 5; 325 MG/1; MG/1
1 TABLET ORAL 2 TIMES DAILY PRN
Qty: 60 TABLET | Refills: 0 | Status: SHIPPED | OUTPATIENT
Start: 2023-03-16 | End: 2023-04-15

## 2023-03-16 NOTE — TELEPHONE ENCOUNTER
Pt calling in stating that he is needing a refill of his Norco and is needing to have this faxed down to the [place].      PA initiated    Order pending

## 2023-03-22 ENCOUNTER — CARE COORDINATION (OUTPATIENT)
Dept: CARE COORDINATION | Age: 88
End: 2023-03-22

## 2023-03-22 NOTE — CARE COORDINATION
Ambulatory Care Coordination Note  3/22/2023    Patient Current Location:  Home: [address] [address]     ACM contacted the patient by telephone. Verified name and  with patient as identifiers. Provided introduction to self, and explanation of the ACM role. Challenges to be reviewed by the provider   Additional needs identified to be addressed with provider: No  none         Method of communication with provider: none. ACM: Tyesha Perry RN    Patient was called back after return for continued Care Coordination follow up and education re: the management of his healthcare needs. Patient shared he has been feeling well and he denied any current complaints or concerns. Patient stated he continues with some mild lightheadedness in the AM but this improves as he gets up and moving for the day. Patient stated symptoms are unchanged from his baseline. Patient reported he continues to monitor BP daily as directed and BP has remained stable. Recent BP readings as follows: 104/61 - 114/67 - 120/79 - 106/70. Importance of ongoing BP monitoring and when to follow up reviewed with patient. Patient verbalized understanding. Patient denied any questions re: his medications. Patient remains independent with ADLs and family continues to assist him as needed and provides transportation to out of town appointments. Patient denied any need for additional resources at this time and he was encouraged to call with any changes. Patient denied any other questions, concerns, or needs and he was encouraged to call with any that may develop. Patient has remained stable and he is aware of the resources available to him so will plan to discharge from Care Coordination at this time. Patient verbalized understanding and agreement to this plan.           Actions:   - Reviewed signs/symptoms to report   - Reviewed importance of early symptom recognition and follow up   - Reviewed recent home BP readings

## 2023-03-22 NOTE — CARE COORDINATION
Attempted to reach patient for continued Care Coordination follow up and education re: the management of his BP and healthcare needs. Patient was unavailable at the time of my call, and generic voicemail message was left asking patient to please return call to my direct number. Will continue to work to f/u with patient in the future.

## 2023-04-21 DIAGNOSIS — G89.29 CHRONIC MIDLINE LOW BACK PAIN WITHOUT SCIATICA: ICD-10-CM

## 2023-04-21 DIAGNOSIS — M54.50 CHRONIC MIDLINE LOW BACK PAIN WITHOUT SCIATICA: ICD-10-CM

## 2023-04-21 RX ORDER — HYDROCODONE BITARTRATE AND ACETAMINOPHEN 5; 325 MG/1; MG/1
1 TABLET ORAL 2 TIMES DAILY PRN
Qty: 60 TABLET | Refills: 0 | Status: SHIPPED | OUTPATIENT
Start: 2023-04-21 | End: 2023-05-21

## 2023-04-21 NOTE — TELEPHONE ENCOUNTER
Patient called needing a refill of his [place] sent to the [place]. Last appointment this department: 3/6/2023  Next appointment this department: 6/7/2023    RX pended to print. Then we will need to sign and fax to the [place].

## 2023-04-25 ENCOUNTER — OFFICE VISIT (OUTPATIENT)
Dept: FAMILY MEDICINE CLINIC | Age: 88
End: 2023-04-25

## 2023-04-25 VITALS
TEMPERATURE: 97.1 F | RESPIRATION RATE: 16 BRPM | SYSTOLIC BLOOD PRESSURE: 152 MMHG | HEART RATE: 76 BPM | WEIGHT: 150.8 LBS | DIASTOLIC BLOOD PRESSURE: 82 MMHG | BODY MASS INDEX: 21.11 KG/M2 | HEIGHT: 71 IN | OXYGEN SATURATION: 97 %

## 2023-04-25 DIAGNOSIS — R60.0 BILATERAL LEG EDEMA: ICD-10-CM

## 2023-04-25 DIAGNOSIS — I10 PRIMARY HYPERTENSION: Primary | ICD-10-CM

## 2023-04-25 DIAGNOSIS — S51.011A SKIN TEAR OF RIGHT ELBOW WITHOUT COMPLICATION, INITIAL ENCOUNTER: ICD-10-CM

## 2023-04-25 RX ORDER — FUROSEMIDE 20 MG/1
20 TABLET ORAL DAILY
Qty: 5 TABLET | Refills: 0 | Status: SHIPPED | OUTPATIENT
Start: 2023-04-25

## 2023-04-25 NOTE — PROGRESS NOTES
Raza Chávez (:  3/26/1934) is a 80 y.o. male,Established patient, here for evaluation of the following chief complaint(s):  Edema (Bilateral ankles, right worse than left, 2+ bilateral ankles, pt states ongoing at least 2 months, does have some discomfort in his left calf/knee area and radiates up to hip at times. Does try to elevate at night. Denies any drainage.) and Other (Right inner forearm is wrapped, tore on easy chair when going to sit down, noticed bleeding upon sitting, happened on Easter, sore does appear to be healing, bruising noted long inner forearm, denies c/o pain at this time. )         ASSESSMENT/PLAN:  1. Primary hypertension  2. Bilateral leg edema  3. Skin tear of right elbow without complication, initial encounter      lower sodium intake. Prepackaged foods and lunch meats tend to be high in sodium. Low dose lasix 20 mg once daily in the morning  Elevate legs when able  Monitor htn  Wound on RFA healing. No open skin. Return if symptoms worsen or fail to improve. Subjective   SUBJECTIVE/OBJECTIVE:  HPI      Bilateral leg swelling. Worse over last couple months. Helps when propped up chair. Eating more prepacaged foods since wife . Denies any sob or cp. Hx of htn which is a little high today. Taking meds as prescribed. Usually bp is good. May be elevated some due to excess sodium intake. Is on norvasc but leg swelling is new and has not changed norvasc in a long time. Wound right forearm. Done months ago. Getting better    Review of Systems   Constitutional:  Negative for activity change, appetite change, chills, diaphoresis, fatigue, fever and unexpected weight change. HENT:  Negative for congestion, ear pain, postnasal drip, sinus pressure and sore throat. Eyes:  Negative for visual disturbance. Respiratory:  Negative for cough, chest tightness, shortness of breath, wheezing and stridor.     Cardiovascular:  Positive for leg

## 2023-04-29 ASSESSMENT — ENCOUNTER SYMPTOMS
ABDOMINAL DISTENTION: 0
WHEEZING: 0
NAUSEA: 0
SINUS PRESSURE: 0
ABDOMINAL PAIN: 0
DIARRHEA: 0
CHEST TIGHTNESS: 0
VOMITING: 0
SORE THROAT: 0
CONSTIPATION: 0
SHORTNESS OF BREATH: 0
COLOR CHANGE: 0
STRIDOR: 0
COUGH: 0
BACK PAIN: 0

## 2023-05-25 DIAGNOSIS — G89.29 CHRONIC MIDLINE LOW BACK PAIN WITHOUT SCIATICA: ICD-10-CM

## 2023-05-25 DIAGNOSIS — M54.50 CHRONIC MIDLINE LOW BACK PAIN WITHOUT SCIATICA: ICD-10-CM

## 2023-05-25 RX ORDER — HYDROCODONE BITARTRATE AND ACETAMINOPHEN 5; 325 MG/1; MG/1
1 TABLET ORAL 2 TIMES DAILY PRN
Qty: 60 TABLET | Refills: 0 | Status: SHIPPED | OUTPATIENT
Start: 2023-05-25 | End: 2023-06-24

## 2023-05-25 NOTE — TELEPHONE ENCOUNTER
Last visit- 4/25/2023  Next visit- 6/12/2023    Requested Prescriptions     Pending Prescriptions Disp Refills    HYDROcodone-acetaminophen (NORCO) 5-325 MG per tablet 60 tablet 0     Sig: Take 1 tablet by mouth 2 times daily as needed for Pain for up to 30 days.        Sent to [facility]- [id number] [facility]

## 2023-06-15 ENCOUNTER — HOSPITAL ENCOUNTER (OUTPATIENT)
Age: 88
Discharge: HOME | End: 2023-06-15
Payer: MEDICARE

## 2023-06-15 DIAGNOSIS — E78.5 HYPERLIPIDEMIA WITH TARGET LDL LESS THAN 130: ICD-10-CM

## 2023-06-15 LAB
ALBUMIN SERPL BCG-MCNC: 4.4 G/DL (ref 3.5–5.1)
ALP SERPL-CCNC: 64 U/L (ref 38–126)
ALT SERPL W/O P-5'-P-CCNC: 15 U/L (ref 11–66)
ANION GAP SERPL CALC-SCNC: 8 MEQ/L (ref 8–16)
AST SERPL-CCNC: 19 U/L (ref 5–40)
BASOPHILS ABSOLUTE: 0.1 THOU/MM3 (ref 0–0.1)
BASOPHILS NFR BLD AUTO: 0.8 %
BILIRUB SERPL-MCNC: 0.5 MG/DL (ref 0.3–1.2)
BUN SERPL-MCNC: 29 MG/DL (ref 7–22)
CALCIUM SERPL-MCNC: 9.4 MG/DL (ref 8.5–10.5)
CHLORIDE SERPL-SCNC: 101 MEQ/L (ref 98–111)
CHOLEST SERPL-MCNC: 188 MG/DL (ref 100–199)
CO2 SERPL-SCNC: 29 MEQ/L (ref 23–33)
CREAT SERPL-MCNC: 1.3 MG/DL (ref 0.4–1.2)
DEPRECATED RDW RBC AUTO: 50.6 FL (ref 35–45)
EOSINOPHIL NFR BLD AUTO: 3.5 %
EOSINOPHILS ABSOLUTE: 0.3 THOU/MM3 (ref 0–0.4)
ERYTHROCYTE [DISTWIDTH] IN BLOOD BY AUTOMATED COUNT: 13.8 % (ref 11.5–14.5)
GFR SERPL CREATININE-BSD FRML MDRD: 52 ML/MIN/1.73M2
GLUCOSE SERPL-MCNC: 103 MG/DL (ref 70–108)
HCT VFR BLD AUTO: 46.6 % (ref 42–52)
HDLC SERPL-MCNC: 71 MG/DL
HGB BLD-MCNC: 14.5 GM/DL (ref 14–18)
IMM GRANULOCYTES # BLD AUTO: 0.03 THOU/MM3 (ref 0–0.07)
IMM GRANULOCYTES NFR BLD AUTO: 0.4 %
LDLC SERPL CALC-MCNC: 107 MG/DL
LYMPHOCYTES ABSOLUTE: 1.3 THOU/MM3 (ref 1–4.8)
LYMPHOCYTES NFR BLD AUTO: 15.2 %
MCH RBC QN AUTO: 31 PG (ref 26–33)
MCHC RBC AUTO-ENTMCNC: 31.1 GM/DL (ref 32.2–35.5)
MCV RBC AUTO: 99.8 FL (ref 80–94)
MONOCYTES ABSOLUTE: 0.7 THOU/MM3 (ref 0.4–1.3)
MONOCYTES NFR BLD AUTO: 7.8 %
NEUTROPHILS NFR BLD AUTO: 72.3 %
NRBC BLD AUTO-RTO: 0 /100 WBC
PLATELET # BLD AUTO: 176 THOU/MM3 (ref 130–400)
PMV BLD AUTO: 12.3 FL (ref 9.4–12.4)
POTASSIUM SERPL-SCNC: 4.5 MEQ/L (ref 3.5–5.2)
PROT SERPL-MCNC: 7.3 G/DL (ref 6.1–8)
RBC # BLD AUTO: 4.67 MILL/MM3 (ref 4.7–6.1)
SEGMENTED NEUTROPHILS ABSOLUTE COUNT: 6.1 THOU/MM3 (ref 1.8–7.7)
SODIUM SERPL-SCNC: 138 MEQ/L (ref 135–145)
TRIGL SERPL-MCNC: 52 MG/DL (ref 0–199)
WBC # BLD AUTO: 8.4 THOU/MM3 (ref 4.8–10.8)

## 2023-06-15 PROCEDURE — 80061 LIPID PANEL: CPT

## 2023-06-15 PROCEDURE — 80053 COMPREHEN METABOLIC PANEL: CPT

## 2023-06-15 PROCEDURE — 85025 COMPLETE CBC W/AUTO DIFF WBC: CPT

## 2023-06-15 PROCEDURE — 36415 COLL VENOUS BLD VENIPUNCTURE: CPT

## 2023-07-05 ENCOUNTER — TELEPHONE (OUTPATIENT)
Dept: FAMILY MEDICINE CLINIC | Age: 88
End: 2023-07-05

## 2023-07-05 NOTE — TELEPHONE ENCOUNTER
Patient reached out in regards to his referral to Dr. Sebastien Smith. He has not heard anything and it has been about 3 weeks. I reached out to their office and it went straight to . Left  a detailed VM about referral if they can reach out to him when they return to office. Since it went straight to  i'm not sure if they are open this week due to the holiday. Re-faxed referral as well to [phone] as this is the number they said on the VM. I informed patient what is going on. He understood. He also would like a copy of his labs mailed to him.  Mailed labs to patient per his request.

## 2023-07-07 DIAGNOSIS — M54.50 CHRONIC MIDLINE LOW BACK PAIN WITHOUT SCIATICA: ICD-10-CM

## 2023-07-07 DIAGNOSIS — G89.29 CHRONIC MIDLINE LOW BACK PAIN WITHOUT SCIATICA: ICD-10-CM

## 2023-07-07 RX ORDER — HYDROCODONE BITARTRATE AND ACETAMINOPHEN 5; 325 MG/1; MG/1
1 TABLET ORAL 2 TIMES DAILY PRN
Qty: 60 TABLET | Refills: 0 | Status: SHIPPED | OUTPATIENT
Start: 2023-07-07 | End: 2023-08-06

## 2023-07-07 NOTE — TELEPHONE ENCOUNTER
Patient needs a refill of his Peculiar sent to the [address] [place] [address]. Last appointment this department: 6/12/2023  Next appointment this department: 9/11/2023    RX pended to print. Will need signed then hard faxed to [address] [place] [address].

## 2023-07-07 NOTE — TELEPHONE ENCOUNTER
Spoke with Dr. Paresh Stevenson office. They had contacted patient yesterday with no return call. Called patient and informed him. Gave patient Dr. Paresh Stevenson phone number. He will give them a call to schedule.

## 2023-07-20 ENCOUNTER — TELEPHONE (OUTPATIENT)
Dept: FAMILY MEDICINE CLINIC | Age: 88
End: 2023-07-20

## 2023-07-20 NOTE — TELEPHONE ENCOUNTER
[facility] like [facility] to write script. Recommend he gets it from there. If he has issues, or wants me to write it, I have no problem, but he may need to use a different pharmacy?

## 2023-07-20 NOTE — TELEPHONE ENCOUNTER
Pts son Cathy Low called questioning the refill for the [place]. I saw we faxed it to the [address] [place] [address] on 7/7/23. I mentioned to pt that we did fax the prescription and confirmed fax number that was given to him. I told Cathy Low that I would call the [address] [place] [address] office and see if they received prescription. Spoke with pharmacist with the [address] [place] [address]. He states that they never received our fax for the [place] on 7/7/23. He states that he sent a request on 7/18/23 to [facility]ts [facility] to refill the [place], since he prescribed it last time. He also stated that all controlled substances need to be e-scribed. But he also stated that most of the time the [address] [place] [address] will not pay for this medication if it is prescribed by an outside physician, physician that is not in the [address] [place] [address] system. He suggested we can try to escrib medication but it might not be paid for by the [address] [place] [address]. Unsure if we want to try to send in medication.

## 2023-07-20 NOTE — TELEPHONE ENCOUNTER
Discussed with Keisha Spicer and he states that his [place] doctor was out of the office and that they are just waiting for her approval. He states that he will wait til next week to see if if gets approved with their office. Told pt to notify us if it doesn't. Pt expressed understanding.

## 2023-07-24 ENCOUNTER — OFFICE VISIT (OUTPATIENT)
Dept: CARDIOLOGY CLINIC | Age: 88
End: 2023-07-24
Payer: MEDICARE

## 2023-07-24 ENCOUNTER — HOSPITAL ENCOUNTER (OUTPATIENT)
Age: 88
Discharge: HOME | End: 2023-07-24
Payer: MEDICARE

## 2023-07-24 VITALS
BODY MASS INDEX: 20.72 KG/M2 | DIASTOLIC BLOOD PRESSURE: 61 MMHG | SYSTOLIC BLOOD PRESSURE: 119 MMHG | WEIGHT: 148 LBS | HEIGHT: 71 IN | HEART RATE: 69 BPM

## 2023-07-24 DIAGNOSIS — I34.0 MODERATE MITRAL REGURGITATION: ICD-10-CM

## 2023-07-24 DIAGNOSIS — I71.21 ANEURYSM OF ASCENDING AORTA WITHOUT RUPTURE (HCC): ICD-10-CM

## 2023-07-24 DIAGNOSIS — I35.1 MODERATE AORTIC REGURGITATION: ICD-10-CM

## 2023-07-24 DIAGNOSIS — C61 PROSTATE CANCER (HCC): ICD-10-CM

## 2023-07-24 DIAGNOSIS — I48.19 PERSISTENT ATRIAL FIBRILLATION (HCC): Primary | ICD-10-CM

## 2023-07-24 DIAGNOSIS — Z98.890 S/P CAROTID ENDARTERECTOMY: ICD-10-CM

## 2023-07-24 DIAGNOSIS — I10 ESSENTIAL HYPERTENSION: ICD-10-CM

## 2023-07-24 LAB — PSA SERPL-MCNC: 0.2 NG/ML (ref 0–1)

## 2023-07-24 PROCEDURE — 36415 COLL VENOUS BLD VENIPUNCTURE: CPT

## 2023-07-24 PROCEDURE — 84153 ASSAY OF PSA TOTAL: CPT

## 2023-07-24 PROCEDURE — 1123F ACP DISCUSS/DSCN MKR DOCD: CPT | Performed by: INTERNAL MEDICINE

## 2023-07-24 PROCEDURE — G8427 DOCREV CUR MEDS BY ELIG CLIN: HCPCS | Performed by: INTERNAL MEDICINE

## 2023-07-24 PROCEDURE — 99214 OFFICE O/P EST MOD 30 MIN: CPT | Performed by: INTERNAL MEDICINE

## 2023-07-24 PROCEDURE — G8420 CALC BMI NORM PARAMETERS: HCPCS | Performed by: INTERNAL MEDICINE

## 2023-07-24 PROCEDURE — 1036F TOBACCO NON-USER: CPT | Performed by: INTERNAL MEDICINE

## 2023-07-24 NOTE — PROGRESS NOTES
Chief Complaint   Patient presents with    6 Month Follow-Up           Patient here for a 6 month follow up    EKG done 11/16/2022    Palpitations occasional chronic     Denied cp,  or edema    Dizziness on standing on walk and standing quick- better  Balance issue at am    Sob on exertion- stable    Walk 2 to 3 block METS> 4 and walk with cane    Nonsmoker    FHX  Had pacer    Hx of PE in the lung post op and had been on OA for 6 month and off it over one back      Patient Active Problem List   Diagnosis    Prostate cancer    Generalized anxiety disorder    Essential hypertension    Back pain, chronic s/p surgery    History of pulmonary embolus (PE)    Spondylosis of lumbar region without myelopathy or radiculopathy    Persistent atrial fibrillation ([address])- newely DXed 10/12/17- CVR    Non-rheumatic mitral regurgitation- mod to severe    Moderate aortic regurgitation    Failed back syndrome of lumbar spine    Chronic pain disorder    Pneumothorax    Constipation    Ascending aortic aneurysm (HCC) 4.1 cm on CTA and 4.5 on echo    Nonexudative age-related macular degeneration    Secondary pigmentary retinal degeneration    Venous retinal branch occlusion    PAF (paroxysmal atrial fibrillation) (HCC)    Moderate mitral regurgitation    Stenosis of left carotid artery    S/P carotid endarterectomy       Past Surgical History:   Procedure Laterality Date    APPENDECTOMY  01/1961    [facility]    BACK SURGERY  [id number],3097    X stop    CAROTID ENDARTERECTOMY Left 11/21/2022    LEFT CAROTID ENDARTERECTOMY performed by Tori Arteaga MD at [address] Right     x2 on right    CATARACT REMOVAL Right 06/2001    Dr Moni Kuhn Left 11/2014    Dr Irina Núñez, 2009    Dr Hayley Oneill  [address]?     Dr Castillo Party of date    HERNIA REPAIR Left 11/22/2017    ROBOT RECURRENT LEFT INGUINAL HERNIA REPAIR performed by William Baugh

## 2023-08-01 ENCOUNTER — HOSPITAL ENCOUNTER (OUTPATIENT)
Dept: ULTRASOUND IMAGING | Age: 88
Discharge: HOME | End: 2023-08-01
Attending: THORACIC SURGERY (CARDIOTHORACIC VASCULAR SURGERY)
Payer: MEDICARE

## 2023-08-01 ENCOUNTER — OFFICE VISIT (OUTPATIENT)
Dept: UROLOGY | Age: 88
End: 2023-08-01
Payer: MEDICARE

## 2023-08-01 VITALS
WEIGHT: 145 LBS | BODY MASS INDEX: 20.3 KG/M2 | SYSTOLIC BLOOD PRESSURE: 118 MMHG | HEIGHT: 71 IN | DIASTOLIC BLOOD PRESSURE: 70 MMHG

## 2023-08-01 DIAGNOSIS — I65.22 STENOSIS OF LEFT CAROTID ARTERY: ICD-10-CM

## 2023-08-01 DIAGNOSIS — C61 PROSTATE CANCER (HCC): Primary | ICD-10-CM

## 2023-08-01 LAB
BILIRUBIN URINE: NEGATIVE
BLOOD URINE, POC: ABNORMAL
CHARACTER, URINE: CLEAR
COLOR, URINE: YELLOW
GLUCOSE URINE: NEGATIVE MG/DL
KETONES, URINE: NEGATIVE
LEUKOCYTE CLUMPS, URINE: ABNORMAL
NITRITE, URINE: NEGATIVE
PH, URINE: 5.5 (ref 5–9)
PROTEIN, URINE: 30 MG/DL
SPECIFIC GRAVITY, URINE: 1.02 (ref 1–1.03)
UROBILINOGEN, URINE: 1 EU/DL (ref 0–1)

## 2023-08-01 PROCEDURE — G8427 DOCREV CUR MEDS BY ELIG CLIN: HCPCS | Performed by: NURSE PRACTITIONER

## 2023-08-01 PROCEDURE — 99214 OFFICE O/P EST MOD 30 MIN: CPT | Performed by: NURSE PRACTITIONER

## 2023-08-01 PROCEDURE — G8420 CALC BMI NORM PARAMETERS: HCPCS | Performed by: NURSE PRACTITIONER

## 2023-08-01 PROCEDURE — 1123F ACP DISCUSS/DSCN MKR DOCD: CPT | Performed by: NURSE PRACTITIONER

## 2023-08-01 PROCEDURE — 81003 URINALYSIS AUTO W/O SCOPE: CPT | Performed by: NURSE PRACTITIONER

## 2023-08-01 PROCEDURE — 1036F TOBACCO NON-USER: CPT | Performed by: NURSE PRACTITIONER

## 2023-08-01 PROCEDURE — 93880 EXTRACRANIAL BILAT STUDY: CPT

## 2023-08-01 ASSESSMENT — ENCOUNTER SYMPTOMS
VOMITING: 0
BACK PAIN: 1
ABDOMINAL PAIN: 0
NAUSEA: 0

## 2023-08-02 ENCOUNTER — TELEPHONE (OUTPATIENT)
Dept: FAMILY MEDICINE CLINIC | Age: 88
End: 2023-08-02

## 2023-08-02 DIAGNOSIS — C61 PROSTATE CANCER (HCC): Primary | ICD-10-CM

## 2023-08-02 NOTE — TELEPHONE ENCOUNTER
Patient calling in requesting a referral be faxed to Dr. Jorge Bowman for a second opinion. He would to see him as he did his previous procedures.

## 2023-08-03 ENCOUNTER — TELEPHONE (OUTPATIENT)
Dept: UROLOGY | Age: 88
End: 2023-08-03

## 2023-08-03 NOTE — TELEPHONE ENCOUNTER
Patient scheduled for CT CHEST ABDOMEN PELVIS W  at [facility] on 10/23/23 ARRIVAL OF 7 AM FOR A 730 AM SCAN TIME WITH NPO 4 HOURS PRIOR.     Order mailed with instructions or given to the patient in the office         Patient scheduled for [address]  at [facility] MR on 10/27/23 ARRIVAL  AM FOR 10 AM INJECTION AND SCAN OF 1 PM .    Order mailed with instructions or given to the patient in the office

## 2023-08-16 DIAGNOSIS — I10 ESSENTIAL HYPERTENSION: ICD-10-CM

## 2023-08-16 RX ORDER — AMLODIPINE BESYLATE 5 MG/1
5 TABLET ORAL 2 TIMES DAILY
Qty: 180 TABLET | Refills: 1 | Status: SHIPPED | OUTPATIENT
Start: 2023-08-16 | End: 2023-08-17 | Stop reason: SDUPTHER

## 2023-08-16 NOTE — TELEPHONE ENCOUNTER
Not for epidural
Patient has LESI #2 on 2/27/18- has clearance from Dr Princess Gore to hold Eliquis for 3 days prior. Is this ok for procedure?  Please advise
Patients case canceled, left message for patient to call office for update.  Needs to be cleared for 5 days by cardiology
No

## 2023-08-17 DIAGNOSIS — G89.29 CHRONIC MIDLINE LOW BACK PAIN WITHOUT SCIATICA: ICD-10-CM

## 2023-08-17 DIAGNOSIS — M54.50 CHRONIC MIDLINE LOW BACK PAIN WITHOUT SCIATICA: ICD-10-CM

## 2023-08-17 DIAGNOSIS — I10 ESSENTIAL HYPERTENSION: ICD-10-CM

## 2023-08-17 RX ORDER — AMLODIPINE BESYLATE 5 MG/1
5 TABLET ORAL 2 TIMES DAILY
Qty: 180 TABLET | Refills: 1 | Status: SHIPPED | OUTPATIENT
Start: 2023-08-17

## 2023-08-17 RX ORDER — HYDROCODONE BITARTRATE AND ACETAMINOPHEN 5; 325 MG/1; MG/1
1 TABLET ORAL 2 TIMES DAILY PRN
Qty: 60 TABLET | Refills: 0 | Status: SHIPPED | OUTPATIENT
Start: 2023-08-17 | End: 2023-08-17 | Stop reason: SDUPTHER

## 2023-08-17 RX ORDER — HYDROCODONE BITARTRATE AND ACETAMINOPHEN 5; 325 MG/1; MG/1
1 TABLET ORAL 2 TIMES DAILY PRN
Qty: 60 TABLET | Refills: 0 | Status: SHIPPED | OUTPATIENT
Start: 2023-08-17 | End: 2023-09-16

## 2023-08-17 NOTE — TELEPHONE ENCOUNTER
Irene Sullivan states that these scripts need to be faxed to [facility]. Fax number [phone]. I selected them to be printed. Please advise.

## 2023-08-17 NOTE — TELEPHONE ENCOUNTER
Pt calling in stating that he is needing a refill on his medication and needs to be sent to [address] [place] [address]     Medication pending to pharmacy     6/12/2023 9/11/2023    Please advise

## 2023-08-18 ENCOUNTER — NURSE ONLY (OUTPATIENT)
Dept: LAB | Age: 88
End: 2023-08-18

## 2023-09-11 ENCOUNTER — OFFICE VISIT (OUTPATIENT)
Dept: FAMILY MEDICINE CLINIC | Age: 88
End: 2023-09-11

## 2023-09-11 VITALS
HEART RATE: 68 BPM | DIASTOLIC BLOOD PRESSURE: 76 MMHG | SYSTOLIC BLOOD PRESSURE: 122 MMHG | RESPIRATION RATE: 22 BRPM | HEIGHT: 71 IN | WEIGHT: 146 LBS | BODY MASS INDEX: 20.44 KG/M2

## 2023-09-11 DIAGNOSIS — I10 ESSENTIAL HYPERTENSION: ICD-10-CM

## 2023-09-11 DIAGNOSIS — M54.50 CHRONIC MIDLINE LOW BACK PAIN WITHOUT SCIATICA: ICD-10-CM

## 2023-09-11 DIAGNOSIS — Z00.00 MEDICARE ANNUAL WELLNESS VISIT, SUBSEQUENT: Primary | ICD-10-CM

## 2023-09-11 DIAGNOSIS — G89.29 CHRONIC MIDLINE LOW BACK PAIN WITHOUT SCIATICA: ICD-10-CM

## 2023-09-11 DIAGNOSIS — I48.0 PAF (PAROXYSMAL ATRIAL FIBRILLATION) (HCC): ICD-10-CM

## 2023-09-11 DIAGNOSIS — K59.03 DRUG-INDUCED CONSTIPATION: ICD-10-CM

## 2023-09-11 RX ORDER — HYDROCODONE BITARTRATE AND ACETAMINOPHEN 5; 325 MG/1; MG/1
1 TABLET ORAL 2 TIMES DAILY PRN
Qty: 60 TABLET | Refills: 0 | Status: SHIPPED | OUTPATIENT
Start: 2023-09-11 | End: 2023-10-11

## 2023-09-11 ASSESSMENT — PATIENT HEALTH QUESTIONNAIRE - PHQ9
SUM OF ALL RESPONSES TO PHQ QUESTIONS 1-9: 0
SUM OF ALL RESPONSES TO PHQ QUESTIONS 1-9: 0
2. FEELING DOWN, DEPRESSED OR HOPELESS: 0
SUM OF ALL RESPONSES TO PHQ QUESTIONS 1-9: 0
SUM OF ALL RESPONSES TO PHQ9 QUESTIONS 1 & 2: 0
1. LITTLE INTEREST OR PLEASURE IN DOING THINGS: 0
SUM OF ALL RESPONSES TO PHQ QUESTIONS 1-9: 0

## 2023-09-11 ASSESSMENT — LIFESTYLE VARIABLES
HOW MANY STANDARD DRINKS CONTAINING ALCOHOL DO YOU HAVE ON A TYPICAL DAY: PATIENT DOES NOT DRINK
HOW OFTEN DO YOU HAVE A DRINK CONTAINING ALCOHOL: NEVER

## 2023-09-11 NOTE — PROGRESS NOTES
Medicare Annual Wellness Visit    [address] is here for 3 Month Follow-Up (Has questions about labs, want tdap ) and Medicare AWV    Assessment & Plan   Medicare annual wellness visit, subsequent  Chronic midline low back pain without sciatica  -     HYDROcodone-acetaminophen (NORCO) 5-325 MG per tablet; Take 1 tablet by mouth 2 times daily as needed for Pain for up to 30 days. , Disp-60 tablet, R-0Print  Essential hypertension  PAF (paroxysmal atrial fibrillation) (HCC)  Drug-induced constipation    Chronic conditions conrolled  Last labs reviewed  Norco refilled  Follow up in 3 months or prn    Recommendations for Preventive Services Due: see orders and patient instructions/AVS.  Recommended screening schedule for the next 5-10 years is provided to the patient in written form: see Patient Instructions/AVS.     Return in about 3 months (around 12/11/2023) for  follow up. Subjective       Patient's complete Health Risk Assessment and screening values have been reviewed and are found in Flowsheets. The following problems were reviewed today and where indicated follow up appointments were made and/or referrals ordered. Positive Risk Factor Screenings with Interventions:                Opioid Risk: (Low risk score <55) Opioid risk score: 10    Patient is low risk for opioid use disorder or overdose. Last PDMP Remington Schulte as Reviewed:  Review User Review Instant Review Result   Pj Sharifa 7/7/2023 11:56 AM     Reviewed PDMP [1]     Last Controlled Substance Monitoring Documentation      [facility] Office Visit from 2/21/2019 in [address] [place] [address] The Prescription Monitoring Report for this patient was reviewed today.  filed at 02/21/2019 0330                 Dentist Screen:  Have you seen the dentist within the past year?: (!) No    Intervention:  See AVS for additional education material  See A/P for any pertinent orders     Vision Screen:  Do

## 2023-09-11 NOTE — PATIENT INSTRUCTIONS

## 2023-09-18 ENCOUNTER — OFFICE VISIT (OUTPATIENT)
Dept: CARDIOTHORACIC SURGERY | Age: 88
End: 2023-09-18
Payer: MEDICARE

## 2023-09-18 VITALS
OXYGEN SATURATION: 98 % | HEART RATE: 69 BPM | WEIGHT: 146 LBS | SYSTOLIC BLOOD PRESSURE: 124 MMHG | DIASTOLIC BLOOD PRESSURE: 78 MMHG | BODY MASS INDEX: 20.9 KG/M2 | HEIGHT: 70 IN

## 2023-09-18 DIAGNOSIS — I65.22 STENOSIS OF LEFT CAROTID ARTERY: Primary | ICD-10-CM

## 2023-09-18 PROCEDURE — 1123F ACP DISCUSS/DSCN MKR DOCD: CPT | Performed by: THORACIC SURGERY (CARDIOTHORACIC VASCULAR SURGERY)

## 2023-09-18 PROCEDURE — G8427 DOCREV CUR MEDS BY ELIG CLIN: HCPCS | Performed by: THORACIC SURGERY (CARDIOTHORACIC VASCULAR SURGERY)

## 2023-09-18 PROCEDURE — 99213 OFFICE O/P EST LOW 20 MIN: CPT | Performed by: THORACIC SURGERY (CARDIOTHORACIC VASCULAR SURGERY)

## 2023-09-18 PROCEDURE — G8420 CALC BMI NORM PARAMETERS: HCPCS | Performed by: THORACIC SURGERY (CARDIOTHORACIC VASCULAR SURGERY)

## 2023-09-18 PROCEDURE — 1036F TOBACCO NON-USER: CPT | Performed by: THORACIC SURGERY (CARDIOTHORACIC VASCULAR SURGERY)

## 2023-09-18 NOTE — PROGRESS NOTES
[facility] SURGERY  [address] [place] [address]  Dept: [phone]  Dept Fax: [phone]: [phone]    Visit Date: 9/18/2023    Mr. Barroso is a 80 y.o.male  who presented for:  Chief Complaint   Patient presents with    Follow-up     Carotid stenosis Left -      Results     Carotid duplex         HPI:   HPI     Patient returns for follow-up of his carotid occlusive disease. He is status post left carotid endarterectomy in November 2022. He remains on aspirin and Eliquis. He reports no issues at this time and denies any TIA or CVA or significant dizziness or motor or sensory changes. Repeat carotid duplex in August 2023 reveals less than 50% stenosis bilateral internal carotid arteries and the vertebral arteries are patent and antegrade. Current Outpatient Medications:     HYDROcodone-acetaminophen (NORCO) 5-325 MG per tablet, Take 1 tablet by mouth 2 times daily as needed for Pain for up to 30 days. , Disp: 60 tablet, Rfl: 0    amLODIPine (NORVASC) 5 MG tablet, Take 1 tablet by mouth in the morning and at bedtime, Disp: 180 tablet, Rfl: 1    ondansetron (ZOFRAN) 4 MG tablet, Take 1 tablet by mouth daily as needed for Nausea or Vomiting, Disp: 30 tablet, Rfl: 5    metoprolol tartrate (LOPRESSOR) 25 MG tablet, Take 0.5 tablets by mouth every morning, Disp: 45 tablet, Rfl: 3    aspirin 81 MG EC tablet, Take 1 tablet by mouth daily, Disp: 30 tablet, Rfl: 3    apixaban (ELIQUIS) 5 MG TABS tablet, Take 1 tablet by mouth 2 times daily, Disp: 180 tablet, Rfl: 2    melatonin 5 MG TABS tablet, Take 1 tablet by mouth nightly as needed, Disp: , Rfl:     Polyethylene Glycol 3350 (MIRALAX PO), Take by mouth as needed, Disp: , Rfl:     Multiple Vitamins-Minerals (PRESERVISION AREDS 2 PO), Take 1 tablet by mouth 2 times daily, Disp: , Rfl:     Handicap Placard MISC, by Does not apply route Exp 7/13/2027,

## 2023-10-16 ENCOUNTER — TELEPHONE (OUTPATIENT)
Dept: FAMILY MEDICINE CLINIC | Age: 88
End: 2023-10-16

## 2023-10-16 DIAGNOSIS — G89.29 CHRONIC MIDLINE LOW BACK PAIN WITHOUT SCIATICA: ICD-10-CM

## 2023-10-16 DIAGNOSIS — M54.50 CHRONIC MIDLINE LOW BACK PAIN WITHOUT SCIATICA: ICD-10-CM

## 2023-10-16 RX ORDER — HYDROCODONE BITARTRATE AND ACETAMINOPHEN 5; 325 MG/1; MG/1
1 TABLET ORAL 2 TIMES DAILY PRN
Qty: 60 TABLET | Refills: 0 | Status: SHIPPED | OUTPATIENT
Start: 2023-10-16 | End: 2023-11-15

## 2023-10-16 NOTE — TELEPHONE ENCOUNTER
Patient called and is requesting refill on his Hydrocodone to be sent to the [place].    Last appointment this department: 9/11/2023  Next appointment this department: 12/11/2023

## 2023-10-23 ENCOUNTER — HOSPITAL ENCOUNTER (OUTPATIENT)
Age: 88
Discharge: HOME | End: 2023-10-23
Payer: MEDICARE

## 2023-10-23 DIAGNOSIS — C61 PROSTATE CANCER (HCC): ICD-10-CM

## 2023-10-23 PROCEDURE — 84153 ASSAY OF PSA TOTAL: CPT

## 2023-10-23 PROCEDURE — 36415 COLL VENOUS BLD VENIPUNCTURE: CPT

## 2023-10-24 LAB — PSA SERPL-MCNC: 0.25 NG/ML (ref 0–1)

## 2023-10-30 ENCOUNTER — TELEPHONE (OUTPATIENT)
Dept: UROLOGY | Age: 88
End: 2023-10-30

## 2023-10-30 NOTE — TELEPHONE ENCOUNTER
Pt wants to follow up in the [facility] office with Dr. Blackman Angry notes to the office and they will reach out to schedule him an appointment

## 2023-11-21 DIAGNOSIS — M54.50 CHRONIC MIDLINE LOW BACK PAIN WITHOUT SCIATICA: ICD-10-CM

## 2023-11-21 DIAGNOSIS — G89.29 CHRONIC MIDLINE LOW BACK PAIN WITHOUT SCIATICA: ICD-10-CM

## 2023-11-21 NOTE — TELEPHONE ENCOUNTER
Pt states he needs a refill of his hydrocodone sent to the [address] [place] [address]. Pt aware you are out of office today and can wait til tomorrow. Wanted to make sure pt can continue pain medication. Please advise.

## 2023-11-22 RX ORDER — HYDROCODONE BITARTRATE AND ACETAMINOPHEN 5; 325 MG/1; MG/1
1 TABLET ORAL 2 TIMES DAILY PRN
Qty: 60 TABLET | Refills: 0 | Status: SHIPPED | OUTPATIENT
Start: 2023-11-22 | End: 2023-12-22

## 2023-12-11 ENCOUNTER — OFFICE VISIT (OUTPATIENT)
Dept: FAMILY MEDICINE CLINIC | Age: 88
End: 2023-12-11
Payer: MEDICARE

## 2023-12-11 VITALS
OXYGEN SATURATION: 96 % | TEMPERATURE: 97.8 F | HEART RATE: 71 BPM | DIASTOLIC BLOOD PRESSURE: 68 MMHG | WEIGHT: 149 LBS | HEIGHT: 70 IN | SYSTOLIC BLOOD PRESSURE: 120 MMHG | RESPIRATION RATE: 14 BRPM | BODY MASS INDEX: 21.33 KG/M2

## 2023-12-11 DIAGNOSIS — K08.89 PAIN OF TOOTH SOCKET: Primary | ICD-10-CM

## 2023-12-11 DIAGNOSIS — M54.50 CHRONIC MIDLINE LOW BACK PAIN WITHOUT SCIATICA: ICD-10-CM

## 2023-12-11 DIAGNOSIS — G89.29 CHRONIC MIDLINE LOW BACK PAIN WITHOUT SCIATICA: ICD-10-CM

## 2023-12-11 DIAGNOSIS — M65.342 TRIGGER RING FINGER OF LEFT HAND: ICD-10-CM

## 2023-12-11 PROCEDURE — 1123F ACP DISCUSS/DSCN MKR DOCD: CPT | Performed by: FAMILY MEDICINE

## 2023-12-11 PROCEDURE — G8484 FLU IMMUNIZE NO ADMIN: HCPCS | Performed by: FAMILY MEDICINE

## 2023-12-11 PROCEDURE — G8420 CALC BMI NORM PARAMETERS: HCPCS | Performed by: FAMILY MEDICINE

## 2023-12-11 PROCEDURE — 99214 OFFICE O/P EST MOD 30 MIN: CPT | Performed by: FAMILY MEDICINE

## 2023-12-11 PROCEDURE — G8427 DOCREV CUR MEDS BY ELIG CLIN: HCPCS | Performed by: FAMILY MEDICINE

## 2023-12-11 PROCEDURE — 1036F TOBACCO NON-USER: CPT | Performed by: FAMILY MEDICINE

## 2023-12-11 RX ORDER — CHLORHEXIDINE GLUCONATE ORAL RINSE 1.2 MG/ML
15 SOLUTION DENTAL 2 TIMES DAILY
Qty: 420 ML | Refills: 0 | Status: SHIPPED | OUTPATIENT
Start: 2023-12-11 | End: 2023-12-25

## 2023-12-11 ASSESSMENT — ENCOUNTER SYMPTOMS
CONSTIPATION: 1
RHINORRHEA: 0
SORE THROAT: 0
WHEEZING: 0
BACK PAIN: 1
NAUSEA: 0
COUGH: 0
SHORTNESS OF BREATH: 0
DIARRHEA: 0
ABDOMINAL PAIN: 0

## 2023-12-11 NOTE — PROGRESS NOTES
[facility] MEDICINE  [address] [facility] [address]  Dept: [phone]  Loc: [address] [facility] [address] Barroso (:  3/26/1934) is a 80 y.o. male, here for evaluation of the following chief complaint(s): Other (C/O blood in mouth x couple days. Unknown cause. States he can spit some out. No abdominal pain. Drinks ice water and helps.) and Trigger finger (C/O trigger finger on left ring finger x couple weeks. )      ASSESSMENT/PLAN:  1. Pain of tooth socket  -     chlorhexidine (PERIDEX) 0.12 % solution; Swish and spit 15 mLs 2 times daily for 14 days, Disp-420 mL, R-0Normal  2. Trigger ring finger of left hand  -     AFL - Charisse Rey MD, Orthopedic Surgery, [facility]  3. Chronic midline low back pain without sciatica  Peridex for dental pain/bleeding. If persists, follow up with denits  Refer to ortho for trigger finger  Cont norco for back pain    Return in about 3 months (around 3/11/2024) for follow up. SUBJECTIVE/OBJECTIVE:  Other  Associated symptoms include arthralgias. Pertinent negatives include no abdominal pain, chest pain, chills, congestion, coughing, fatigue, fever, headaches, myalgias, nausea or sore throat. Patient presents for 3-month follow-up. He states he still has chronic low back pain but the Norco does take the edge off. He mentions that he takes the Norco at night to help him sleep. Does sometimes have issues with constipation but lately has been okay with his bowels. Does complain of bleeding in his mouth and a socket of a tooth that was previously extracted. He states that it is bright red blood and it is not necessarily tender but it has been bleeding more lately. Denies any fever or chills. Next, he complains of trigger finger of his ring finger of left hand. States this has been worsening and does wake up with significant pain. It locks and makes it more difficult to move.   Denies any

## 2024-01-09 DIAGNOSIS — M54.50 CHRONIC MIDLINE LOW BACK PAIN WITHOUT SCIATICA: ICD-10-CM

## 2024-01-09 DIAGNOSIS — G89.29 CHRONIC MIDLINE LOW BACK PAIN WITHOUT SCIATICA: ICD-10-CM

## 2024-01-09 RX ORDER — HYDROCODONE BITARTRATE AND ACETAMINOPHEN 5; 325 MG/1; MG/1
1 TABLET ORAL 2 TIMES DAILY PRN
Qty: 60 TABLET | Refills: 0 | Status: SHIPPED | OUTPATIENT
Start: 2024-01-09 | End: 2024-02-08

## 2024-01-09 NOTE — TELEPHONE ENCOUNTER
[facility] called [facility]     Last appointment this department: 12/11/2023  Next appointment this department: 3/11/2024

## 2024-01-23 ENCOUNTER — HOSPITAL ENCOUNTER (OUTPATIENT)
Age: 89
Discharge: HOME | End: 2024-01-23
Payer: MEDICARE

## 2024-01-23 PROCEDURE — 36415 COLL VENOUS BLD VENIPUNCTURE: CPT

## 2024-01-23 PROCEDURE — 84153 ASSAY OF PSA TOTAL: CPT

## 2024-01-24 LAB — PSA SERPL-MCNC: 0.35 NG/ML (ref 0–1)

## 2024-02-05 ENCOUNTER — OFFICE VISIT (OUTPATIENT)
Dept: CARDIOLOGY CLINIC | Age: 89
End: 2024-02-05
Payer: MEDICARE

## 2024-02-05 VITALS
SYSTOLIC BLOOD PRESSURE: 148 MMHG | HEIGHT: 70 IN | HEART RATE: 86 BPM | DIASTOLIC BLOOD PRESSURE: 88 MMHG | BODY MASS INDEX: 21.64 KG/M2 | WEIGHT: 151.2 LBS

## 2024-02-05 DIAGNOSIS — I10 ESSENTIAL HYPERTENSION: ICD-10-CM

## 2024-02-05 DIAGNOSIS — Z98.890 S/P CAROTID ENDARTERECTOMY: ICD-10-CM

## 2024-02-05 DIAGNOSIS — I71.21 ANEURYSM OF ASCENDING AORTA WITHOUT RUPTURE (HCC): ICD-10-CM

## 2024-02-05 DIAGNOSIS — I48.19 PERSISTENT ATRIAL FIBRILLATION (HCC): Primary | ICD-10-CM

## 2024-02-05 PROCEDURE — 1036F TOBACCO NON-USER: CPT | Performed by: INTERNAL MEDICINE

## 2024-02-05 PROCEDURE — 99214 OFFICE O/P EST MOD 30 MIN: CPT | Performed by: INTERNAL MEDICINE

## 2024-02-05 PROCEDURE — G8420 CALC BMI NORM PARAMETERS: HCPCS | Performed by: INTERNAL MEDICINE

## 2024-02-05 PROCEDURE — 93000 ELECTROCARDIOGRAM COMPLETE: CPT | Performed by: INTERNAL MEDICINE

## 2024-02-05 PROCEDURE — G8484 FLU IMMUNIZE NO ADMIN: HCPCS | Performed by: INTERNAL MEDICINE

## 2024-02-05 PROCEDURE — G8427 DOCREV CUR MEDS BY ELIG CLIN: HCPCS | Performed by: INTERNAL MEDICINE

## 2024-02-05 PROCEDURE — 1123F ACP DISCUSS/DSCN MKR DOCD: CPT | Performed by: INTERNAL MEDICINE

## 2024-02-05 NOTE — PATIENT INSTRUCTIONS
Your Medical Assistant Today: Bennie  Your Nurse Today: Shira  Your Provider for Today: Dr. Andrews         You may receive a survey regarding the care you received during your visit.  Your input is valuable to us.  We encourage you to complete and return your survey.  We hope you will choose us in the future for your healthcare needs.

## 2024-02-05 NOTE — PROGRESS NOTES
F/u  
Rare  ventricular ectopic beat total of 277, predominantly isolated, few  couplets, few ventricular bigeminy.  No supraventricular tachycardia.   No other form of arrhythmia noted.  The AFib seems to be rate well  controlled.  The patient at times  gets AFib with rapid ventricular  response.  Average heart rate is 91 beats per minute, so the patient  may benefit from increasing the dose of AV jason blocking agent.     Thank you.           DAYANA DE LA CRUZ M.D.     D: 11/02/2017 18:04:27            Conclusions      Summary   Normal left ventricle size and systolic function. Ejection fraction was   estimated at 60 to 65 %. There were no regional left ventricular wall   motion abnormalities and wall thickness was within normal limits.   The left atrium is Moderately dilated.   Moderate to severly enlarged right atrium size.   Moderate-to-severe mitral regurgitation with centrally directed jet.   Moderate aortic regurgitation is noted.      Signature      ----------------------------------------------------------------   Electronically signed by Dayana De La Cruz MD (Interpreting   physician) on 10/26/2017 at 12:55 PM       Conclusions      Summary   Normal left ventricle size and systolic function.   Ejection fraction was estimated at 65 %.   There were no regional left ventricular wall motion abnormalities and wall   thickness was within normal limits.   The left atrium is Moderately to severely dilated.   MILD TO Moderately enlarged right atrium size.   Moderate aortic regurgitation is noted.   Mild to Moderate mitral regurgitation is present.   Aortic aneurysm noted in the ascending aorta .   Aortic aneurysm measures 4.5 CM .   CONSIDER CTA FOR BETTER EVALUATION OF THE ASCENDING AORTIC ANEURYSM      Signature      ----------------------------------------------------------------   Electronically signed by Dayana De La Cruz MD (Interpreting   physician) on 01/11/2019 at 09:20 PM       Conclusions      Summary   Normal left

## 2024-02-14 ENCOUNTER — TELEPHONE (OUTPATIENT)
Dept: FAMILY MEDICINE CLINIC | Age: 89
End: 2024-02-14

## 2024-02-14 DIAGNOSIS — M54.50 CHRONIC MIDLINE LOW BACK PAIN WITHOUT SCIATICA: Primary | ICD-10-CM

## 2024-02-14 DIAGNOSIS — G89.29 CHRONIC MIDLINE LOW BACK PAIN WITHOUT SCIATICA: Primary | ICD-10-CM

## 2024-02-14 RX ORDER — HYDROCODONE BITARTRATE AND ACETAMINOPHEN 5; 325 MG/1; MG/1
1 TABLET ORAL 2 TIMES DAILY PRN
Qty: 60 TABLET | Refills: 0 | Status: SHIPPED | OUTPATIENT
Start: 2024-02-14 | End: 2024-03-15

## 2024-02-14 NOTE — TELEPHONE ENCOUNTER
Patient calling in he is in need of his Norco to be filled.     Would need it to go to the [facility].       Patient's last appointment was : 12/11/2023  Patient's next appointment is : 3/11/2024  Last refilled:01/09/2024

## 2024-02-15 DIAGNOSIS — I10 ESSENTIAL HYPERTENSION: ICD-10-CM

## 2024-02-15 RX ORDER — AMLODIPINE BESYLATE 5 MG/1
5 TABLET ORAL 2 TIMES DAILY
Qty: 180 TABLET | Refills: 1 | Status: SHIPPED | OUTPATIENT
Start: 2024-02-15

## 2024-03-11 ENCOUNTER — OFFICE VISIT (OUTPATIENT)
Dept: FAMILY MEDICINE CLINIC | Age: 89
End: 2024-03-11
Payer: MEDICARE

## 2024-03-11 VITALS
WEIGHT: 151 LBS | OXYGEN SATURATION: 99 % | RESPIRATION RATE: 16 BRPM | DIASTOLIC BLOOD PRESSURE: 72 MMHG | BODY MASS INDEX: 21.67 KG/M2 | HEART RATE: 59 BPM | SYSTOLIC BLOOD PRESSURE: 118 MMHG

## 2024-03-11 DIAGNOSIS — M54.50 CHRONIC MIDLINE LOW BACK PAIN WITHOUT SCIATICA: ICD-10-CM

## 2024-03-11 DIAGNOSIS — I10 ESSENTIAL HYPERTENSION: Primary | ICD-10-CM

## 2024-03-11 DIAGNOSIS — Z13.220 SCREENING FOR LIPOID DISORDERS: ICD-10-CM

## 2024-03-11 DIAGNOSIS — G89.29 CHRONIC MIDLINE LOW BACK PAIN WITHOUT SCIATICA: ICD-10-CM

## 2024-03-11 DIAGNOSIS — C61 PROSTATE CANCER (HCC): ICD-10-CM

## 2024-03-11 DIAGNOSIS — M62.838 MUSCLE SPASM OF BOTH LOWER LEGS: ICD-10-CM

## 2024-03-11 DIAGNOSIS — K59.03 DRUG-INDUCED CONSTIPATION: ICD-10-CM

## 2024-03-11 PROCEDURE — G8420 CALC BMI NORM PARAMETERS: HCPCS | Performed by: FAMILY MEDICINE

## 2024-03-11 PROCEDURE — 1036F TOBACCO NON-USER: CPT | Performed by: FAMILY MEDICINE

## 2024-03-11 PROCEDURE — 1123F ACP DISCUSS/DSCN MKR DOCD: CPT | Performed by: FAMILY MEDICINE

## 2024-03-11 PROCEDURE — G8427 DOCREV CUR MEDS BY ELIG CLIN: HCPCS | Performed by: FAMILY MEDICINE

## 2024-03-11 PROCEDURE — 99214 OFFICE O/P EST MOD 30 MIN: CPT | Performed by: FAMILY MEDICINE

## 2024-03-11 PROCEDURE — G8484 FLU IMMUNIZE NO ADMIN: HCPCS | Performed by: FAMILY MEDICINE

## 2024-03-11 RX ORDER — TIZANIDINE 2 MG/1
2 TABLET ORAL NIGHTLY PRN
Qty: 30 TABLET | Refills: 1 | Status: SHIPPED | OUTPATIENT
Start: 2024-03-11

## 2024-03-11 SDOH — ECONOMIC STABILITY: INCOME INSECURITY: HOW HARD IS IT FOR YOU TO PAY FOR THE VERY BASICS LIKE FOOD, HOUSING, MEDICAL CARE, AND HEATING?: NOT VERY HARD

## 2024-03-11 SDOH — ECONOMIC STABILITY: FOOD INSECURITY: WITHIN THE PAST 12 MONTHS, THE FOOD YOU BOUGHT JUST DIDN'T LAST AND YOU DIDN'T HAVE MONEY TO GET MORE.: NEVER TRUE

## 2024-03-11 SDOH — ECONOMIC STABILITY: FOOD INSECURITY: WITHIN THE PAST 12 MONTHS, YOU WORRIED THAT YOUR FOOD WOULD RUN OUT BEFORE YOU GOT MONEY TO BUY MORE.: NEVER TRUE

## 2024-03-11 ASSESSMENT — PATIENT HEALTH QUESTIONNAIRE - PHQ9
2. FEELING DOWN, DEPRESSED OR HOPELESS: 0
SUM OF ALL RESPONSES TO PHQ QUESTIONS 1-9: 0
SUM OF ALL RESPONSES TO PHQ QUESTIONS 1-9: 0
SUM OF ALL RESPONSES TO PHQ9 QUESTIONS 1 & 2: 0
1. LITTLE INTEREST OR PLEASURE IN DOING THINGS: 0
SUM OF ALL RESPONSES TO PHQ QUESTIONS 1-9: 0
SUM OF ALL RESPONSES TO PHQ QUESTIONS 1-9: 0

## 2024-03-11 ASSESSMENT — ENCOUNTER SYMPTOMS
CONSTIPATION: 0
COUGH: 0
SORE THROAT: 0
WHEEZING: 0
DIARRHEA: 0
SHORTNESS OF BREATH: 0
NAUSEA: 0
ABDOMINAL PAIN: 0
RHINORRHEA: 0
BACK PAIN: 1

## 2024-03-11 NOTE — PROGRESS NOTES
[facility]  [address]   [facility] [address]  Dept: [phone]  Loc: [phone]     Alton Barroso (:  3/26/1934) is a 89 y.o. male, here for evaluation of the following chief complaint(s):  Back Pain (Chronic lower back pain follow up- been worse the last month or so, hard for him to fall asleep at night feels like leg tightness)      ASSESSMENT/PLAN:  1. Essential hypertension  -     CBC with Auto Differential; Future  -     Comprehensive Metabolic Panel; Future  2. Prostate cancer (HCC)  3. Chronic midline low back pain without sciatica  4. Drug-induced constipation  5. Muscle spasm of both lower legs  -     tiZANidine (ZANAFLEX) 2 MG tablet; Take 1 tablet by mouth nightly as needed (muscle spasm), Disp-30 tablet, R-1Normal  6. Screening for lipoid disorders  -     Lipid Panel; Future  Blood pressure controlled, continue current meds.  Labs ordered  Follows up with urology for prostate cancer  Continue [place] for his back pain  Add Zanaflex for his muscle spasms and check CMP to ensure no electrolyte disturbances  Fasting labs ordered    No follow-ups on file.    SUBJECTIVE/OBJECTIVE:  Back Pain  Pertinent negatives include no abdominal pain, chest pain, dysuria, fever or headaches.     Patient presents for 6-month follow-up of chronic medical conditions including hypertension, chronic low back pain, constipation, prostate cancer.  Blood pressure has been well-controlled.  He denies any lightheadedness or dizziness.  Has been feeling well for the most part.  Does have chronic low back pain but he takes Norco for this at night.  This does help take the edge off of the pain.  He does get constipation related to this but tries to take MiraLAX as needed and increase fluids.  Does mention that lately he has had spasms in his legs mainly at night.  States that he gets crampy pain and they feel jumpy.  During the day he does

## 2024-03-14 NOTE — PROGRESS NOTES
All discharge instructions given to patient and family with no further questions at this time. Patient discharged off unit via wheelchair. Chart contents placed in yellow bin. Return Visit   [ x ] Physician in 6 months   [  ] In person or video visit  [  ] In person only    [ x ] After visit summary   [ x ] Placed labs/imaging. Labs are to be drawn at 8A and fasting.      It was great seeing you today!    Today we discussed your osteopenia with low FRAX score:  -Continue 2000 units of vitamin d daily and 500 mg 2x/daily of calcium  -Continue to exercise with  and walking   -We discussed fall precautions  -We calculated your FRAX score based on [place] and  descent  BMI: 20.6  The ten year probability of fracture (%)  Major osteoporotic 4.5  Hip Fracture 0.5  -Repeat labs in 6 months   -Repeat XR DXA 11/2025    Take care!  -Dr. Conway

## 2024-03-20 ENCOUNTER — NURSE ONLY (OUTPATIENT)
Dept: FAMILY MEDICINE CLINIC | Age: 89
End: 2024-03-20
Payer: MEDICARE

## 2024-03-20 DIAGNOSIS — I10 ESSENTIAL HYPERTENSION: ICD-10-CM

## 2024-03-20 DIAGNOSIS — Z13.220 SCREENING FOR LIPOID DISORDERS: ICD-10-CM

## 2024-03-20 LAB
ALBUMIN SERPL BCG-MCNC: 4.3 G/DL (ref 3.5–5.1)
ALP SERPL-CCNC: 70 U/L (ref 38–126)
ALT SERPL W/O P-5'-P-CCNC: 16 U/L (ref 11–66)
ANION GAP SERPL CALC-SCNC: 14 MEQ/L (ref 8–16)
AST SERPL-CCNC: 19 U/L (ref 5–40)
BASOPHILS ABSOLUTE: 0.1 THOU/MM3 (ref 0–0.1)
BASOPHILS NFR BLD AUTO: 0.8 %
BILIRUB SERPL-MCNC: 0.6 MG/DL (ref 0.3–1.2)
BUN SERPL-MCNC: 26 MG/DL (ref 7–22)
CALCIUM SERPL-MCNC: 8.9 MG/DL (ref 8.5–10.5)
CHLORIDE SERPL-SCNC: 101 MEQ/L (ref 98–111)
CHOLEST SERPL-MCNC: 196 MG/DL (ref 100–199)
CO2 SERPL-SCNC: 24 MEQ/L (ref 23–33)
CREAT SERPL-MCNC: 1.2 MG/DL (ref 0.4–1.2)
DEPRECATED RDW RBC AUTO: 49 FL (ref 35–45)
EOSINOPHIL NFR BLD AUTO: 3.5 %
EOSINOPHILS ABSOLUTE: 0.4 THOU/MM3 (ref 0–0.4)
ERYTHROCYTE [DISTWIDTH] IN BLOOD BY AUTOMATED COUNT: 13.2 % (ref 11.5–14.5)
GFR SERPL CREATININE-BSD FRML MDRD: 57 ML/MIN/1.73M2
GLUCOSE SERPL-MCNC: 113 MG/DL (ref 70–108)
HCT VFR BLD AUTO: 47.2 % (ref 42–52)
HDLC SERPL-MCNC: 74 MG/DL
HGB BLD-MCNC: 15.5 GM/DL (ref 14–18)
IMM GRANULOCYTES # BLD AUTO: 0.04 THOU/MM3 (ref 0–0.07)
IMM GRANULOCYTES NFR BLD AUTO: 0.4 %
LDLC SERPL CALC-MCNC: 111 MG/DL
LYMPHOCYTES ABSOLUTE: 1.5 THOU/MM3 (ref 1–4.8)
LYMPHOCYTES NFR BLD AUTO: 14.9 %
MCH RBC QN AUTO: 32.9 PG (ref 26–33)
MCHC RBC AUTO-ENTMCNC: 32.8 GM/DL (ref 32.2–35.5)
MCV RBC AUTO: 100.2 FL (ref 80–94)
MONOCYTES ABSOLUTE: 0.8 THOU/MM3 (ref 0.4–1.3)
MONOCYTES NFR BLD AUTO: 7.3 %
NEUTROPHILS NFR BLD AUTO: 73.1 %
NRBC BLD AUTO-RTO: 0 /100 WBC
PLATELET # BLD AUTO: 166 THOU/MM3 (ref 130–400)
PMV BLD AUTO: 12.2 FL (ref 9.4–12.4)
POTASSIUM SERPL-SCNC: 4.2 MEQ/L (ref 3.5–5.2)
PROT SERPL-MCNC: 7.6 G/DL (ref 6.1–8)
RBC # BLD AUTO: 4.71 MILL/MM3 (ref 4.7–6.1)
SEGMENTED NEUTROPHILS ABSOLUTE COUNT: 7.5 THOU/MM3 (ref 1.8–7.7)
SODIUM SERPL-SCNC: 139 MEQ/L (ref 135–145)
TRIGL SERPL-MCNC: 57 MG/DL (ref 0–199)
WBC # BLD AUTO: 10.3 THOU/MM3 (ref 4.8–10.8)

## 2024-03-20 PROCEDURE — 36415 COLL VENOUS BLD VENIPUNCTURE: CPT | Performed by: FAMILY MEDICINE

## 2024-03-24 ENCOUNTER — HOSPITAL ENCOUNTER (EMERGENCY)
Age: 89
Discharge: HOME | End: 2024-03-24
Attending: FAMILY MEDICINE
Payer: MEDICARE

## 2024-03-24 VITALS
RESPIRATION RATE: 19 BRPM | OXYGEN SATURATION: 98 % | SYSTOLIC BLOOD PRESSURE: 149 MMHG | HEIGHT: 70 IN | HEART RATE: 86 BPM | TEMPERATURE: 98 F | WEIGHT: 150 LBS | DIASTOLIC BLOOD PRESSURE: 85 MMHG | BODY MASS INDEX: 21.47 KG/M2

## 2024-03-24 DIAGNOSIS — R04.0 EPISTAXIS: Primary | ICD-10-CM

## 2024-03-24 PROCEDURE — 6370000000 HC RX 637 (ALT 250 FOR IP): Performed by: FAMILY MEDICINE

## 2024-03-24 PROCEDURE — 99283 EMERGENCY DEPT VISIT LOW MDM: CPT

## 2024-03-24 PROCEDURE — 30903 CONTROL OF NOSEBLEED: CPT

## 2024-03-24 RX ORDER — OXYMETAZOLINE HYDROCHLORIDE 0.05 G/100ML
2 SPRAY NASAL ONCE
Status: COMPLETED | OUTPATIENT
Start: 2024-03-24 | End: 2024-03-24

## 2024-03-24 RX ORDER — PHENYLEPHRINE HYDROCHLORIDE 10 MG/ML
INJECTION INTRAVENOUS
Status: DISCONTINUED
Start: 2024-03-24 | End: 2024-03-25 | Stop reason: HOSPADM

## 2024-03-24 RX ORDER — AMOXICILLIN 500 MG/1
500 CAPSULE ORAL 2 TIMES DAILY
Qty: 14 CAPSULE | Refills: 0 | Status: SHIPPED | OUTPATIENT
Start: 2024-03-24 | End: 2024-03-31

## 2024-03-24 RX ADMIN — Medication 2 SPRAY: at 22:07

## 2024-03-25 ENCOUNTER — TELEPHONE (OUTPATIENT)
Dept: CARDIOLOGY CLINIC | Age: 89
End: 2024-03-25

## 2024-03-25 ENCOUNTER — TELEPHONE (OUTPATIENT)
Dept: ENT CLINIC | Age: 89
End: 2024-03-25

## 2024-03-25 ENCOUNTER — TELEPHONE (OUTPATIENT)
Dept: FAMILY MEDICINE CLINIC | Age: 89
End: 2024-03-25

## 2024-03-25 NOTE — TELEPHONE ENCOUNTER
Patient and his daughter called in stating patient had a nosebleed and ended up in [facility] ER last night. Nasal packing was placed. Patient is on Eliquis. Advised them to call prescriber of the Eliquis to see if he can hold it until appointment with our office. Informed them I would send message to our on call provider to let us know when we can get patient in for appointment. Once I hear back I will call them with appointment.  Patient verbalized understanding and thanked me. Please advise as to where we can get patient in for packing removal.

## 2024-03-25 NOTE — TELEPHONE ENCOUNTER
Patient was seen in the ED for epitaxis. He is wanting Dr. Meade's opinion on the antibiotic that the ED sent in. He has a hard time with Antibiotics sometimes and wants to make sure he needs to take this.     Patient also is needing the \"tampon\" removed from his nose tomorrow. I informed him that they wanted patient to see ENT for removal. Daughter was with patient and stated that the ER provider said they probably would not be able to get in with them to get removed and to check with us. Patient was given something from the ED to get it removed. I did schedule patient with Marcin tomorrow 3/26/2024. I recommended to still call ENT to see if  they are able to get him in at all. But if not we would see him tomorrow.

## 2024-03-25 NOTE — TELEPHONE ENCOUNTER
Pt went to [facility] ED yesterday for nosebleed. Nose was packed. Packing to be removed on Tues or Wed of this week. They were instructed to call Dr Andrews and ask if Eliquis could be help until the ENT removes the packing?  He did not take Eliquis yesterday or any so far today.

## 2024-03-25 NOTE — ED NOTES
Discharge teaching and instructions for condition explained to patient. AVS reviewed. Went over prescriptions with patient. Patient voiced understanding regarding prescriptions, follow up appointments and care of self at home. Pt discharged to home in stable condition with daughter. Taken by wheelchair to jill rom[facility]

## 2024-03-25 NOTE — ED PROVIDER NOTES
1. Epistaxis          DISPOSITION/PLAN   Home.   Amoxicillin as prescribed. Restart Eliquis in one day. Follow up with ENT in 2-3 days for nasal tampon removal and re evaluation.     PATIENT REFERRED TO:  Seymour Nieto MD  [address] [place] [address]  [phone]    Schedule an appointment as soon as possible for a visit in 2 days  for nasal tampon removal.      DISCHARGE MEDICATIONS:  New Prescriptions    AMOXICILLIN (AMOXIL) 500 MG CAPSULE    Take 1 capsule by mouth 2 times daily for 7 days       (Please note that portions of this note were completed with a voice recognition program.  Efforts were made to edit the dictations but occasionally words are mis-transcribed.)    MD Kavitha Christopher Edward R, MD  03/24/24 7853

## 2024-03-25 NOTE — TELEPHONE ENCOUNTER
I don't see why they gave amoxicillin.  Did he have sinus infection?  Does not need to take it just for nose bleed.  Ok to schedule here for removal if ent not available.

## 2024-03-25 NOTE — TELEPHONE ENCOUNTER
Spoke with patient's daughter. She stated they prescribed it as a preventative measure since he would have the \"tampon\" up his nose for 2-3 days to help prevent any infection. Do you still want him to hold the antibiotic? She stated patient did have a runny nose before the nose bleed and ears plugged up.

## 2024-03-25 NOTE — DISCHARGE INSTR - COC
Continuity of Care Form    Patient Name: Alton Barroso   :  3/26/1934  MRN:  [id number]    Admit date:  3/24/2024  Discharge date:  ***    Code Status Order: Prior   Advance Directives:     Admitting Physician:  No admitting provider for patient encounter.  PCP: Beckie Meade MD    Discharging Nurse: ***  Discharging Hospital Unit/Room#: E7/E7  Discharging Unit Phone Number: ***    Emergency Contact:   Extended Emergency Contact Information  Primary Emergency Contact: Cyrli Chio Tyson   [facility]  Home Phone: [phone]  Relation: Child  Secondary Emergency Contact: Rosy Tu   [facility]  Home Phone: [phone]  Relation: Child    Past Surgical History:  Past Surgical History:   Procedure Laterality Date    APPENDECTOMY  1961    [facility]    BACK SURGERY  ,    X stop    CAROTID ENDARTERECTOMY Left 2022    LEFT CAROTID ENDARTERECTOMY performed by Dennis Stewart MD at [facility] OR    CARPAL TUNNEL RELEASE Right     x2 on right    CATARACT REMOVAL Right 2001    Dr Larry Oh-    CATARACT REMOVAL WITH IMPLANT Left 2014    Dr Leyva    EYE SURGERY      HERNIA REPAIR  ,     Dr Oglesby    HERNIA REPAIR  's?    Dr Gaytan-unsure of date    HERNIA REPAIR Left 2017    ROBOT RECURRENT LEFT INGUINAL HERNIA REPAIR performed by Dakotah Vicente MD at [facility] OR    NERVE BLOCK LUMB FACET LEVEL 1 BILATERAL Bilateral 2017    LUMBAR FACET MBB L3-4, L4-5, L5-S1 BILATERAL performed by Alvin Torres MD at [facility] OR    NERVE SURGERY Left 2018    HIP INJECTION     OTHER SURGICAL HISTORY  10/7/2015    XI ROBOTIC PROSTATECTOMY    OTHER SURGICAL HISTORY Bilateral 2017    lumbar facet block L3-S1    OTHER SURGICAL HISTORY Right     nerve release right lower arm    OTHER SURGICAL HISTORY  2018    Sacroiliac joint MBB    OTHER SURGICAL HISTORY Left 2018    Sacroiliac joint medial branch block

## 2024-03-25 NOTE — ED NOTES
Presents from home with daughter at side.  C/o nose bleed ongoing for the last hour.  Pt says he got one last night as well around midnight but they were able to stop the bleeding.  He held his eliquis today.  Large clot noted in right nares

## 2024-03-25 NOTE — TELEPHONE ENCOUNTER
VO per Dr. Darryl morris to hold Eliquis until ENT removes packing.  Spoke with pt.  Pt voiced understanding.

## 2024-03-25 NOTE — ED NOTES
Pt continues to have an occasional drip from right nares slowly per pt. Clamp left in place. Rell Plummer MD updated.

## 2024-03-25 NOTE — ED NOTES
Large clot removed by patient blowing out of right nares.  Afrin instilled by Dr Plummer. Pt tolerated well.

## 2024-03-25 NOTE — ED NOTES
Nares packed with 5.5 cm rhino packing by Dr Plummer. Pt tolerated fairly well. Plan of care discussed with daughter

## 2024-03-27 ENCOUNTER — OFFICE VISIT (OUTPATIENT)
Dept: ENT CLINIC | Age: 89
End: 2024-03-27
Payer: MEDICARE

## 2024-03-27 VITALS
BODY MASS INDEX: 21.53 KG/M2 | OXYGEN SATURATION: 96 % | TEMPERATURE: 97.8 F | HEART RATE: 94 BPM | SYSTOLIC BLOOD PRESSURE: 116 MMHG | WEIGHT: 150.4 LBS | HEIGHT: 70 IN | RESPIRATION RATE: 16 BRPM | DIASTOLIC BLOOD PRESSURE: 64 MMHG

## 2024-03-27 DIAGNOSIS — M54.50 CHRONIC MIDLINE LOW BACK PAIN WITHOUT SCIATICA: ICD-10-CM

## 2024-03-27 DIAGNOSIS — G89.29 CHRONIC MIDLINE LOW BACK PAIN WITHOUT SCIATICA: ICD-10-CM

## 2024-03-27 DIAGNOSIS — T45.515A ADVERSE EFFECT OF ANTICOAGULANT, INITIAL ENCOUNTER: ICD-10-CM

## 2024-03-27 DIAGNOSIS — R04.0 EPISTAXIS: Primary | ICD-10-CM

## 2024-03-27 PROCEDURE — 30901 CONTROL OF NOSEBLEED: CPT | Performed by: PHYSICIAN ASSISTANT

## 2024-03-27 PROCEDURE — G8420 CALC BMI NORM PARAMETERS: HCPCS | Performed by: PHYSICIAN ASSISTANT

## 2024-03-27 PROCEDURE — G8484 FLU IMMUNIZE NO ADMIN: HCPCS | Performed by: PHYSICIAN ASSISTANT

## 2024-03-27 PROCEDURE — 1036F TOBACCO NON-USER: CPT | Performed by: PHYSICIAN ASSISTANT

## 2024-03-27 PROCEDURE — G8427 DOCREV CUR MEDS BY ELIG CLIN: HCPCS | Performed by: PHYSICIAN ASSISTANT

## 2024-03-27 PROCEDURE — 99204 OFFICE O/P NEW MOD 45 MIN: CPT | Performed by: PHYSICIAN ASSISTANT

## 2024-03-27 PROCEDURE — 1123F ACP DISCUSS/DSCN MKR DOCD: CPT | Performed by: PHYSICIAN ASSISTANT

## 2024-03-27 RX ORDER — HYDROCODONE BITARTRATE AND ACETAMINOPHEN 5; 325 MG/1; MG/1
1 TABLET ORAL 2 TIMES DAILY PRN
Qty: 60 TABLET | Refills: 0 | Status: SHIPPED | OUTPATIENT
Start: 2024-03-27 | End: 2024-04-26

## 2024-03-27 NOTE — TELEPHONE ENCOUNTER
Left detailed message informing him [facility] and to let office know if [facility] not [facility].

## 2024-03-27 NOTE — PATIENT INSTRUCTIONS
-Use Afrin (oxymetazoline)  2 sprays to the right nostril, three times per day for three days. You can use a dose tonight before bed, but tomorrow (3/28/24) is day one of three.  -Start nasal saline 2 sprays to each nostril at least 1-2x times daily. This can help moisturize the nose and hopefully prevent epistaxis  - You can restart your Eliquis tomorrow.    If you have a nosebleed at home, you should try the following:    Lightly blow your nose in attempts to dislodge old blood and blood clots  Spray 3 sprays of Afrin (oxymetazoline) to the bleeding nostril. If unsure which nostril is bleeding, spray both nostrils  Apply direct pressure to the nose, compressing both nostrils completely shut. You can use your fingers or nasal clamp  Hold pressure with your fingers/nasal clamp for at least 15 minutes. Do not release pressure to check if bleeding has resolved before that.  If bleeding has not resolved after this, you can repeat steps 1-4. You can do this up to 3 times consecutively, but if bleeding still persists you should consider being evaluated in the ER or ENT office.

## 2024-03-27 NOTE — TELEPHONE ENCOUNTER
[facility] and [facility] fax number is [phone]    3/11/2024  Visit date not found     Please advise.

## 2024-03-27 NOTE — PROGRESS NOTES
[facility] [place] [facility]  [facility]  [address]  [address]  [facility] [address]  Dept: [phone]  Dept Fax: [phone]  Loc: [phone]    Alton Barroso is a 89 y.o. male who was referred by No ref. provider found for:  Chief Complaint   Patient presents with    New Patient     Patient is here as a new patient to have a nasal packing removal . Patient states he states that he got a nosebleed on Saturday night and got a packing put in. Pt states that last night the packing/ plug fell out last night. Hasn't been taking blood thinners since Sunday.    .    HPI:     Alton Barroso presents today for ER follow-up regarding epistaxis.  Patient reportedly had a small nosebleed on 3/23/2024 which was managed at home with pressure, but the next day he developed a more significant nosebleed that was unable to be controlled.  He subsequently presented to ER for further care.  Hemostasis was achieved with neosynephrine, cautery and rhinorocket placement.  Patient reports he has been doing well since being discharged from the ER.  His Rhino Rocket actually fell out on its own overnight last night/early this morning.  He denies any recurrence of epistaxis since that time.  He denies a previous history of recurrent epistaxis. He is on Eliquis for Afib. He has been off of the Eliquis since 3/24/24. His cardiologist requested he restart the Eliquis after packing was removed.      Subjective:      REVIEW OF SYSTEMS:    Pertinent positives as noted in the HPI. All other systems reviewed and negative.    ALLERGIES:  Levaquin [levofloxacin], Nitrofuran derivatives, and Nsaids    Past Medical History:  Past Medical History:   Diagnosis Date    Atrial fibrillation (HCC)     Back pain     Hernia     Hx of blood clots     lungs after prostate surgery    Hyperlipidemia     Hypertension     Osteoarthritis     low back, left leg, right shoulder    Prostate cancer (HCC)

## 2024-04-30 DIAGNOSIS — M54.50 CHRONIC MIDLINE LOW BACK PAIN WITHOUT SCIATICA: ICD-10-CM

## 2024-04-30 DIAGNOSIS — G89.29 CHRONIC MIDLINE LOW BACK PAIN WITHOUT SCIATICA: ICD-10-CM

## 2024-04-30 RX ORDER — HYDROCODONE BITARTRATE AND ACETAMINOPHEN 5; 325 MG/1; MG/1
1 TABLET ORAL 2 TIMES DAILY PRN
Qty: 60 TABLET | Refills: 0 | Status: SHIPPED | OUTPATIENT
Start: 2024-04-30 | End: 2024-05-30

## 2024-04-30 NOTE — TELEPHONE ENCOUNTER
This medication refill is regarding a telephone request. Refill requested by patient.    Requested Prescriptions     Pending Prescriptions Disp Refills    HYDROcodone-acetaminophen (NORCO) 5-325 MG per tablet 60 tablet 0     Sig: Take 1 tablet by mouth 2 times daily as needed for Pain for up to 30 days.       Date of last visit: 3/11/2024   Date of next visit: None  Date of last refill: 3/27/2024 #60/0  [facility]     [facility]. Pended to print.

## 2024-06-06 DIAGNOSIS — G89.29 CHRONIC MIDLINE LOW BACK PAIN WITHOUT SCIATICA: ICD-10-CM

## 2024-06-06 DIAGNOSIS — M54.50 CHRONIC MIDLINE LOW BACK PAIN WITHOUT SCIATICA: ICD-10-CM

## 2024-06-06 RX ORDER — HYDROCODONE BITARTRATE AND ACETAMINOPHEN 5; 325 MG/1; MG/1
1 TABLET ORAL 2 TIMES DAILY PRN
Qty: 60 TABLET | Refills: 0 | Status: SHIPPED | OUTPATIENT
Start: 2024-06-06 | End: 2024-07-06

## 2024-06-06 NOTE — TELEPHONE ENCOUNTER
3/1/2023  Rasheed Dill  [address] [facility] [address]  Post-operative Pain Management Plan     Date of surgery: 3/1/2023   Surgeon: Dr. Natarajan     Instructions:  · To reduce severe pain during the first 3-5 days after surgery, continue Norco (hydrocodone/acetaminophen) 5/325mg, 1-2 tablets 6 hours as needed. Max 6 tablets per day. This medication may not eliminate pain, but should reduce pain to allow you to talk and take deep breaths comfortably. Plan to wean from this medication as your pain improves by decreasing the dose and/or frequency of administration.   · This is a narcotic and has addictive potential. It can cause side effects such as sleepiness, nausea, constipation, and itching.    · As pain improves, replace doses of Norco with extra strength Tylenol 500 mg, 1-2 tabs three times daily. Do not exceed 4,000 mg Tylenol per day from all sources.   · You may also use Ibuprofen 600mg three times daily (with food) for pain and swelling. This will help decrease your need for the narcotic.   · For constipation Take Colace 100 mg, 1 capsule by mouth 2 times daily as needed for constipation while taking narcotics. Take with 8 oz glass of water.   · For itching, nausea, or anxiety Take Vistaril 25 mg, 1 capsule by mouth 4 times daily as needed.  · Use ice to help dull the pain and reduce swelling. I recommend ice applied for 20 minutes duration, off for 60 minutes, and repeated thereafter as needed, likely 4x/day in the initial 48-72 hours following surgery.       [facility]  [address] [facility] [address]            Pt requesting refill on Norco and [facility]     3/11/2024  Visit date not found    Please advise.

## 2024-07-03 DIAGNOSIS — R11.0 NAUSEA: ICD-10-CM

## 2024-07-03 RX ORDER — ONDANSETRON 4 MG/1
4 TABLET, FILM COATED ORAL DAILY PRN
Qty: 30 TABLET | Refills: 5 | Status: SHIPPED | OUTPATIENT
Start: 2024-07-03

## 2024-07-03 RX ORDER — CHLORHEXIDINE GLUCONATE ORAL RINSE 1.2 MG/ML
15 SOLUTION DENTAL 2 TIMES DAILY
Qty: 420 ML | Refills: 0 | Status: SHIPPED | OUTPATIENT
Start: 2024-07-03 | End: 2024-07-17

## 2024-07-03 NOTE — TELEPHONE ENCOUNTER
Patient's last appointment was : 3/11/2024  Patient's next appointment is : Visit date not found  Last refilled:12/2023

## 2024-07-10 DIAGNOSIS — G89.29 CHRONIC MIDLINE LOW BACK PAIN WITHOUT SCIATICA: ICD-10-CM

## 2024-07-10 DIAGNOSIS — M54.50 CHRONIC MIDLINE LOW BACK PAIN WITHOUT SCIATICA: ICD-10-CM

## 2024-07-10 RX ORDER — HYDROCODONE BITARTRATE AND ACETAMINOPHEN 5; 325 MG/1; MG/1
1 TABLET ORAL 2 TIMES DAILY PRN
Qty: 60 TABLET | Refills: 0 | Status: SHIPPED | OUTPATIENT
Start: 2024-07-10 | End: 2024-08-09

## 2024-07-10 NOTE — TELEPHONE ENCOUNTER
Patient requesting script to [facility]. [facility]    3/11/2024  Visit date not found    Please advise.

## 2024-08-08 ENCOUNTER — OFFICE VISIT (OUTPATIENT)
Dept: CARDIOLOGY CLINIC | Age: 89
End: 2024-08-08
Payer: MEDICARE

## 2024-08-08 VITALS
BODY MASS INDEX: 21.24 KG/M2 | SYSTOLIC BLOOD PRESSURE: 141 MMHG | HEIGHT: 70 IN | HEART RATE: 78 BPM | WEIGHT: 148.4 LBS | DIASTOLIC BLOOD PRESSURE: 76 MMHG

## 2024-08-08 DIAGNOSIS — Z87.898 HX OF EPISTAXIS: ICD-10-CM

## 2024-08-08 DIAGNOSIS — I34.0 NON-RHEUMATIC MITRAL REGURGITATION: ICD-10-CM

## 2024-08-08 DIAGNOSIS — I35.1 MODERATE AORTIC REGURGITATION: ICD-10-CM

## 2024-08-08 DIAGNOSIS — I71.21 ANEURYSM OF ASCENDING AORTA WITHOUT RUPTURE (HCC): ICD-10-CM

## 2024-08-08 DIAGNOSIS — I34.0 MODERATE MITRAL REGURGITATION: ICD-10-CM

## 2024-08-08 DIAGNOSIS — I10 ESSENTIAL HYPERTENSION: ICD-10-CM

## 2024-08-08 DIAGNOSIS — I48.21 PERMANENT ATRIAL FIBRILLATION (HCC): Primary | ICD-10-CM

## 2024-08-08 PROCEDURE — 1123F ACP DISCUSS/DSCN MKR DOCD: CPT | Performed by: INTERNAL MEDICINE

## 2024-08-08 PROCEDURE — 1036F TOBACCO NON-USER: CPT | Performed by: INTERNAL MEDICINE

## 2024-08-08 PROCEDURE — G8427 DOCREV CUR MEDS BY ELIG CLIN: HCPCS | Performed by: INTERNAL MEDICINE

## 2024-08-08 PROCEDURE — G8420 CALC BMI NORM PARAMETERS: HCPCS | Performed by: INTERNAL MEDICINE

## 2024-08-08 PROCEDURE — 99214 OFFICE O/P EST MOD 30 MIN: CPT | Performed by: INTERNAL MEDICINE

## 2024-08-20 ENCOUNTER — HOSPITAL ENCOUNTER (OUTPATIENT)
Dept: ULTRASOUND IMAGING | Age: 89
Discharge: HOME | End: 2024-08-20
Attending: THORACIC SURGERY (CARDIOTHORACIC VASCULAR SURGERY)
Payer: MEDICARE

## 2024-08-20 DIAGNOSIS — I65.22 STENOSIS OF LEFT CAROTID ARTERY: ICD-10-CM

## 2024-08-20 PROCEDURE — 93880 EXTRACRANIAL BILAT STUDY: CPT

## 2024-08-27 ENCOUNTER — TELEPHONE (OUTPATIENT)
Dept: FAMILY MEDICINE CLINIC | Age: 89
End: 2024-08-27

## 2024-08-27 DIAGNOSIS — M54.50 CHRONIC MIDLINE LOW BACK PAIN WITHOUT SCIATICA: ICD-10-CM

## 2024-08-27 DIAGNOSIS — G89.29 CHRONIC MIDLINE LOW BACK PAIN WITHOUT SCIATICA: ICD-10-CM

## 2024-08-28 ENCOUNTER — TELEPHONE (OUTPATIENT)
Dept: FAMILY MEDICINE CLINIC | Age: 89
End: 2024-08-28

## 2024-08-28 ENCOUNTER — OFFICE VISIT (OUTPATIENT)
Dept: FAMILY MEDICINE CLINIC | Age: 89
End: 2024-08-28

## 2024-08-28 VITALS
WEIGHT: 150 LBS | RESPIRATION RATE: 16 BRPM | DIASTOLIC BLOOD PRESSURE: 70 MMHG | HEIGHT: 70 IN | OXYGEN SATURATION: 97 % | TEMPERATURE: 98.4 F | HEART RATE: 70 BPM | BODY MASS INDEX: 21.47 KG/M2 | SYSTOLIC BLOOD PRESSURE: 120 MMHG

## 2024-08-28 DIAGNOSIS — M54.50 CHRONIC MIDLINE LOW BACK PAIN WITHOUT SCIATICA: ICD-10-CM

## 2024-08-28 DIAGNOSIS — G89.29 CHRONIC MIDLINE LOW BACK PAIN WITHOUT SCIATICA: ICD-10-CM

## 2024-08-28 DIAGNOSIS — M81.0 AGE-RELATED OSTEOPOROSIS WITHOUT CURRENT PATHOLOGICAL FRACTURE: ICD-10-CM

## 2024-08-28 DIAGNOSIS — F11.20 OPIOID DEPENDENCE WITH CURRENT USE (HCC): Primary | ICD-10-CM

## 2024-08-28 DIAGNOSIS — R11.0 NAUSEA: ICD-10-CM

## 2024-08-28 RX ORDER — HYDROCODONE BITARTRATE AND ACETAMINOPHEN 5; 325 MG/1; MG/1
1 TABLET ORAL 2 TIMES DAILY PRN
Qty: 60 TABLET | Refills: 0 | Status: SHIPPED | OUTPATIENT
Start: 2024-08-28 | End: 2024-09-27

## 2024-08-28 RX ORDER — ONDANSETRON 4 MG/1
4 TABLET, FILM COATED ORAL EVERY 8 HOURS PRN
Qty: 90 TABLET | Refills: 3 | Status: SHIPPED | OUTPATIENT
Start: 2024-08-28

## 2024-08-28 ASSESSMENT — ENCOUNTER SYMPTOMS
WHEEZING: 0
NAUSEA: 1
SORE THROAT: 0
SHORTNESS OF BREATH: 0
ABDOMINAL PAIN: 0
CONSTIPATION: 1
RHINORRHEA: 0
BACK PAIN: 1
DIARRHEA: 0
COUGH: 0

## 2024-08-28 NOTE — PROGRESS NOTES
[facility]  [address]   [facility] [address]  Dept: [phone]  Loc: [phone]     Alton Barroso (:  3/26/1934) is a 89 y.o. male, here for evaluation of the following chief complaint(s):  ABDOMINAL ISSUES (At times he is having pain and tightness in his stomach, he was taking miralax and that his helping. He states the zofran does help but needs it more then Qday ) and cardiac questions       ASSESSMENT/PLAN:  1. Opioid dependence with current use (HCC)  2. Nausea  -     ondansetron (ZOFRAN) 4 MG tablet; Take 1 tablet by mouth every 8 hours as needed for Nausea or Vomiting, Disp-90 tablet, R-3Normal  3. Age-related osteoporosis without current pathological fracture  -     DEXA BONE DENSITY 2 SITES; Future  4. Chronic midline low back pain without sciatica    Hydrocodone given.  Patient trying to wean down.  Continue MiraLAX for constipation and Zofran as needed for nausea  Last DEXA scan in  showed osteoporosis and patient declined treatment at that time but agrees for recheck now.  Bone density scan ordered.  Return in about 6 weeks (around 10/9/2024) for AWV.    SUBJECTIVE/OBJECTIVE:  HPI  Patient presents with complaints of nausea.  He states that he has constipation secondary to his chronic opioid use.  To help with constipation, he takes MiraLAX which helps however it causes nausea.  He states that if he takes Zofran it relieves his nausea however he is only been taking 1/day and nausea usually last more than that.  He wonders if the Zofran could be taken more regularly.  Does think MiraLAX is a good medication for his constipation.  Next, patient states that his back pain seems to be slowly worsening.  He does have osteoporosis determined via bone density scan in 2016, however patient did not want treatment at that time.  He states that he wonders if his scoliosis is getting worse and does agree to look into

## 2024-08-28 NOTE — TELEPHONE ENCOUNTER
Patient calling in asking what he can take in place of Pepto, since he can't take it while using a blood thinner

## 2024-08-30 ENCOUNTER — TELEPHONE (OUTPATIENT)
Dept: CARDIOLOGY CLINIC | Age: 89
End: 2024-08-30

## 2024-09-06 ENCOUNTER — HOSPITAL ENCOUNTER (OUTPATIENT)
Dept: WOMENS IMAGING | Age: 89
Discharge: HOME | End: 2024-09-06
Attending: FAMILY MEDICINE
Payer: MEDICARE

## 2024-09-06 DIAGNOSIS — M81.0 AGE-RELATED OSTEOPOROSIS WITHOUT CURRENT PATHOLOGICAL FRACTURE: ICD-10-CM

## 2024-09-06 PROCEDURE — 77080 DXA BONE DENSITY AXIAL: CPT

## 2024-09-10 ENCOUNTER — OFFICE VISIT (OUTPATIENT)
Dept: CARDIOLOGY CLINIC | Age: 89
End: 2024-09-10
Payer: MEDICARE

## 2024-09-10 ENCOUNTER — TELEPHONE (OUTPATIENT)
Dept: CARDIOLOGY CLINIC | Age: 89
End: 2024-09-10

## 2024-09-10 VITALS
SYSTOLIC BLOOD PRESSURE: 118 MMHG | HEART RATE: 58 BPM | HEIGHT: 69 IN | BODY MASS INDEX: 21.74 KG/M2 | WEIGHT: 146.8 LBS | DIASTOLIC BLOOD PRESSURE: 72 MMHG

## 2024-09-10 DIAGNOSIS — I48.21 PERMANENT ATRIAL FIBRILLATION (HCC): Primary | ICD-10-CM

## 2024-09-10 PROCEDURE — G8420 CALC BMI NORM PARAMETERS: HCPCS | Performed by: INTERNAL MEDICINE

## 2024-09-10 PROCEDURE — 1036F TOBACCO NON-USER: CPT | Performed by: INTERNAL MEDICINE

## 2024-09-10 PROCEDURE — 99215 OFFICE O/P EST HI 40 MIN: CPT | Performed by: INTERNAL MEDICINE

## 2024-09-10 PROCEDURE — G8427 DOCREV CUR MEDS BY ELIG CLIN: HCPCS | Performed by: INTERNAL MEDICINE

## 2024-09-10 PROCEDURE — 1123F ACP DISCUSS/DSCN MKR DOCD: CPT | Performed by: INTERNAL MEDICINE

## 2024-09-12 ENCOUNTER — TELEPHONE (OUTPATIENT)
Dept: FAMILY MEDICINE CLINIC | Age: 89
End: 2024-09-12

## 2024-09-12 NOTE — TELEPHONE ENCOUNTER
Patient states he started with headache and sinus/cold symptoms. Patient wanted to schedule an appointment to be seen. He was advised to be evaluated through urgent care as we do not have any available appointments today. Patient was wondering what he could take OTC. Since patient is on BP medications advised him to take Coricidin OTC and some Flonase to help with his symptoms. Advised patient if symptoms worsen over weekend he should be evaluated through urgent care or call us on Monday.

## 2024-09-16 ENCOUNTER — TELEPHONE (OUTPATIENT)
Dept: CARDIOLOGY CLINIC | Age: 89
End: 2024-09-16

## 2024-09-16 DIAGNOSIS — I48.0 PAROXYSMAL A-FIB (HCC): ICD-10-CM

## 2024-09-16 DIAGNOSIS — I48.0 PAROXYSMAL ATRIAL FIBRILLATION (HCC): Primary | ICD-10-CM

## 2024-09-16 RX ORDER — CLOPIDOGREL BISULFATE 75 MG/1
75 TABLET ORAL DAILY
Qty: 90 TABLET | Refills: 1 | Status: SHIPPED | OUTPATIENT
Start: 2024-09-24

## 2024-09-16 RX ORDER — ASPIRIN 81 MG/1
81 TABLET ORAL DAILY
Qty: 90 TABLET | Refills: 1 | Status: SHIPPED | OUTPATIENT
Start: 2024-09-24

## 2024-09-25 ENCOUNTER — PREP FOR PROCEDURE (OUTPATIENT)
Dept: CARDIOLOGY | Age: 88
End: 2024-09-25

## 2024-09-25 ENCOUNTER — OFFICE VISIT (OUTPATIENT)
Dept: FAMILY MEDICINE CLINIC | Age: 89
End: 2024-09-25
Payer: MEDICARE

## 2024-09-25 VITALS
HEART RATE: 70 BPM | RESPIRATION RATE: 20 BRPM | OXYGEN SATURATION: 98 % | DIASTOLIC BLOOD PRESSURE: 78 MMHG | SYSTOLIC BLOOD PRESSURE: 124 MMHG | BODY MASS INDEX: 20.33 KG/M2 | HEIGHT: 70 IN | WEIGHT: 142 LBS

## 2024-09-25 DIAGNOSIS — M81.0 AGE-RELATED OSTEOPOROSIS WITHOUT CURRENT PATHOLOGICAL FRACTURE: Primary | ICD-10-CM

## 2024-09-25 PROCEDURE — 1123F ACP DISCUSS/DSCN MKR DOCD: CPT | Performed by: FAMILY MEDICINE

## 2024-09-25 PROCEDURE — G8420 CALC BMI NORM PARAMETERS: HCPCS | Performed by: FAMILY MEDICINE

## 2024-09-25 PROCEDURE — 99214 OFFICE O/P EST MOD 30 MIN: CPT | Performed by: FAMILY MEDICINE

## 2024-09-25 PROCEDURE — G8427 DOCREV CUR MEDS BY ELIG CLIN: HCPCS | Performed by: FAMILY MEDICINE

## 2024-09-25 PROCEDURE — 1036F TOBACCO NON-USER: CPT | Performed by: FAMILY MEDICINE

## 2024-09-25 RX ORDER — SODIUM CHLORIDE 0.9 % (FLUSH) 0.9 %
5-40 SYRINGE (ML) INJECTION PRN
Status: CANCELLED | OUTPATIENT
Start: 2024-09-25

## 2024-09-25 RX ORDER — SODIUM CHLORIDE 9 MG/ML
INJECTION, SOLUTION INTRAVENOUS PRN
Status: CANCELLED | OUTPATIENT
Start: 2024-09-25

## 2024-09-25 RX ORDER — SODIUM CHLORIDE 9 MG/ML
INJECTION, SOLUTION INTRAVENOUS CONTINUOUS
Status: CANCELLED | OUTPATIENT
Start: 2024-09-25

## 2024-09-25 RX ORDER — CHLORHEXIDINE GLUCONATE 40 MG/ML
SOLUTION TOPICAL ONCE
Status: CANCELLED | OUTPATIENT
Start: 2024-09-25 | End: 2024-09-25

## 2024-09-25 RX ORDER — SODIUM CHLORIDE 0.9 % (FLUSH) 0.9 %
5-40 SYRINGE (ML) INJECTION EVERY 12 HOURS SCHEDULED
Status: CANCELLED | OUTPATIENT
Start: 2024-09-25

## 2024-09-25 ASSESSMENT — ENCOUNTER SYMPTOMS
NAUSEA: 0
ABDOMINAL PAIN: 0
CONSTIPATION: 0
WHEEZING: 0
COUGH: 0
SORE THROAT: 0
RHINORRHEA: 0
SHORTNESS OF BREATH: 0
DIARRHEA: 0

## 2024-09-26 ENCOUNTER — HOSPITAL ENCOUNTER (INPATIENT)
Age: 89
LOS: 1 days | Discharge: HOME | End: 2024-09-27
Attending: INTERNAL MEDICINE | Admitting: INTERNAL MEDICINE
Payer: MEDICARE

## 2024-09-26 ENCOUNTER — APPOINTMENT (OUTPATIENT)
Age: 89
End: 2024-09-26
Attending: INTERNAL MEDICINE
Payer: MEDICARE

## 2024-09-26 DIAGNOSIS — I48.0 PAROXYSMAL A-FIB (HCC): ICD-10-CM

## 2024-09-26 DIAGNOSIS — I48.0 PAROXYSMAL ATRIAL FIBRILLATION (HCC): Primary | ICD-10-CM

## 2024-09-26 DIAGNOSIS — I48.0 PAF (PAROXYSMAL ATRIAL FIBRILLATION) (HCC): ICD-10-CM

## 2024-09-26 LAB
ABO: NORMAL
ACTIVATED CLOTTING TIME: 324 SECONDS (ref 1–150)
ALBUMIN SERPL BCG-MCNC: 4 G/DL (ref 3.5–5.1)
ALP SERPL-CCNC: 62 U/L (ref 38–126)
ALT SERPL W/O P-5'-P-CCNC: 15 U/L (ref 11–66)
ANION GAP SERPL CALC-SCNC: 14 MEQ/L (ref 8–16)
ANTIBODY SCREEN: NORMAL
APTT PPP: 35.4 SECONDS (ref 22–38)
AST SERPL-CCNC: 18 U/L (ref 5–40)
BILIRUB SERPL-MCNC: 0.7 MG/DL (ref 0.3–1.2)
BUN SERPL-MCNC: 26 MG/DL (ref 7–22)
CALCIUM SERPL-MCNC: 9.2 MG/DL (ref 8.5–10.5)
CHLORIDE SERPL-SCNC: 100 MEQ/L (ref 98–111)
CO2 SERPL-SCNC: 24 MEQ/L (ref 23–33)
CREAT SERPL-MCNC: 1.2 MG/DL (ref 0.4–1.2)
DEPRECATED RDW RBC AUTO: 48.7 FL (ref 35–45)
ECHO BSA: 1.75 M2
ECHO LV EF PHYSICIAN: 55 %
EKG ATRIAL RATE: 286 BPM
EKG P AXIS: -170 DEGREES
EKG Q-T INTERVAL: 376 MS
EKG QRS DURATION: 82 MS
EKG QTC CALCULATION (BAZETT): 439 MS
EKG R AXIS: 32 DEGREES
EKG T AXIS: -46 DEGREES
EKG VENTRICULAR RATE: 82 BPM
ERYTHROCYTE [DISTWIDTH] IN BLOOD BY AUTOMATED COUNT: 13.9 % (ref 11.5–14.5)
GFR SERPL CREATININE-BSD FRML MDRD: 57 ML/MIN/1.73M2
GLUCOSE SERPL-MCNC: 116 MG/DL (ref 70–108)
HCT VFR BLD AUTO: 45.1 % (ref 42–52)
HGB BLD-MCNC: 14.9 GM/DL (ref 14–18)
INR PPP: 1.07 (ref 0.85–1.13)
MCH RBC QN AUTO: 31.6 PG (ref 26–33)
MCHC RBC AUTO-ENTMCNC: 33 GM/DL (ref 32.2–35.5)
MCV RBC AUTO: 95.8 FL (ref 80–94)
NT-PROBNP SERPL IA-MCNC: 1787 PG/ML (ref 0–449)
PLATELET # BLD AUTO: 176 THOU/MM3 (ref 130–400)
PMV BLD AUTO: 10.9 FL (ref 9.4–12.4)
POTASSIUM SERPL-SCNC: 3.8 MEQ/L (ref 3.5–5.2)
PROT SERPL-MCNC: 7 G/DL (ref 6.1–8)
RBC # BLD AUTO: 4.71 MILL/MM3 (ref 4.7–6.1)
RH FACTOR: NORMAL
SODIUM SERPL-SCNC: 138 MEQ/L (ref 135–145)
WBC # BLD AUTO: 10.5 THOU/MM3 (ref 4.8–10.8)

## 2024-09-26 PROCEDURE — 86901 BLOOD TYPING SEROLOGIC RH(D): CPT

## 2024-09-26 PROCEDURE — 6370000000 HC RX 637 (ALT 250 FOR IP): Performed by: INTERNAL MEDICINE

## 2024-09-26 PROCEDURE — 86923 COMPATIBILITY TEST ELECTRIC: CPT

## 2024-09-26 PROCEDURE — 33340 PERQ CLSR TCAT L ATR APNDGE: CPT | Performed by: INTERNAL MEDICINE

## 2024-09-26 PROCEDURE — C1760 CLOSURE DEV, VASC: HCPCS | Performed by: INTERNAL MEDICINE

## 2024-09-26 PROCEDURE — 02L73DK OCCLUSION OF LEFT ATRIAL APPENDAGE WITH INTRALUMINAL DEVICE, PERCUTANEOUS APPROACH: ICD-10-PCS | Performed by: INTERNAL MEDICINE

## 2024-09-26 PROCEDURE — 86900 BLOOD TYPING SEROLOGIC ABO: CPT

## 2024-09-26 PROCEDURE — 85610 PROTHROMBIN TIME: CPT

## 2024-09-26 PROCEDURE — 93010 ELECTROCARDIOGRAM REPORT: CPT | Performed by: NUCLEAR MEDICINE

## 2024-09-26 PROCEDURE — C1889 IMPLANT/INSERT DEVICE, NOC: HCPCS | Performed by: INTERNAL MEDICINE

## 2024-09-26 PROCEDURE — 7100000011 HC PHASE II RECOVERY - ADDTL 15 MIN: Performed by: INTERNAL MEDICINE

## 2024-09-26 PROCEDURE — 6360000004 HC RX CONTRAST MEDICATION: Performed by: INTERNAL MEDICINE

## 2024-09-26 PROCEDURE — 6360000002 HC RX W HCPCS: Performed by: INTERNAL MEDICINE

## 2024-09-26 PROCEDURE — 83880 ASSAY OF NATRIURETIC PEPTIDE: CPT

## 2024-09-26 PROCEDURE — 93005 ELECTROCARDIOGRAM TRACING: CPT | Performed by: PHYSICIAN ASSISTANT

## 2024-09-26 PROCEDURE — 93308 TTE F-UP OR LMTD: CPT | Performed by: INTERNAL MEDICINE

## 2024-09-26 PROCEDURE — 99152 MOD SED SAME PHYS/QHP 5/>YRS: CPT | Performed by: INTERNAL MEDICINE

## 2024-09-26 PROCEDURE — 2720000010 HC SURG SUPPLY STERILE: Performed by: INTERNAL MEDICINE

## 2024-09-26 PROCEDURE — 85347 COAGULATION TIME ACTIVATED: CPT

## 2024-09-26 PROCEDURE — 85027 COMPLETE CBC AUTOMATED: CPT

## 2024-09-26 PROCEDURE — 93308 TTE F-UP OR LMTD: CPT

## 2024-09-26 PROCEDURE — 93325 DOPPLER ECHO COLOR FLOW MAPG: CPT | Performed by: INTERNAL MEDICINE

## 2024-09-26 PROCEDURE — 2580000003 HC RX 258: Performed by: PHYSICIAN ASSISTANT

## 2024-09-26 PROCEDURE — C1769 GUIDE WIRE: HCPCS | Performed by: INTERNAL MEDICINE

## 2024-09-26 PROCEDURE — C1894 INTRO/SHEATH, NON-LASER: HCPCS | Performed by: INTERNAL MEDICINE

## 2024-09-26 PROCEDURE — 7100000010 HC PHASE II RECOVERY - FIRST 15 MIN: Performed by: INTERNAL MEDICINE

## 2024-09-26 PROCEDURE — 85730 THROMBOPLASTIN TIME PARTIAL: CPT

## 2024-09-26 PROCEDURE — 80053 COMPREHEN METABOLIC PANEL: CPT

## 2024-09-26 PROCEDURE — 2500000003 HC RX 250 WO HCPCS: Performed by: INTERNAL MEDICINE

## 2024-09-26 PROCEDURE — 2580000003 HC RX 258: Performed by: INTERNAL MEDICINE

## 2024-09-26 PROCEDURE — 2140000000 HC CCU INTERMEDIATE R&B

## 2024-09-26 PROCEDURE — 36415 COLL VENOUS BLD VENIPUNCTURE: CPT

## 2024-09-26 PROCEDURE — 86850 RBC ANTIBODY SCREEN: CPT

## 2024-09-26 PROCEDURE — 99153 MOD SED SAME PHYS/QHP EA: CPT | Performed by: INTERNAL MEDICINE

## 2024-09-26 PROCEDURE — 6360000002 HC RX W HCPCS: Performed by: PHYSICIAN ASSISTANT

## 2024-09-26 PROCEDURE — 2709999900 HC NON-CHARGEABLE SUPPLY: Performed by: INTERNAL MEDICINE

## 2024-09-26 PROCEDURE — B24BZZ4 ULTRASONOGRAPHY OF HEART WITH AORTA, TRANSESOPHAGEAL: ICD-10-PCS | Performed by: INTERNAL MEDICINE

## 2024-09-26 RX ORDER — ASPIRIN 81 MG/1
81 TABLET ORAL DAILY
Status: DISCONTINUED | OUTPATIENT
Start: 2024-09-26 | End: 2024-09-26 | Stop reason: SDUPTHER

## 2024-09-26 RX ORDER — SODIUM CHLORIDE 0.9 % (FLUSH) 0.9 %
5-40 SYRINGE (ML) INJECTION PRN
Status: DISCONTINUED | OUTPATIENT
Start: 2024-09-26 | End: 2024-09-26 | Stop reason: SDUPTHER

## 2024-09-26 RX ORDER — ASPIRIN 81 MG/1
81 TABLET, CHEWABLE ORAL DAILY
Status: DISCONTINUED | OUTPATIENT
Start: 2024-09-27 | End: 2024-09-27 | Stop reason: HOSPADM

## 2024-09-26 RX ORDER — CHLORHEXIDINE GLUCONATE 40 MG/ML
SOLUTION TOPICAL ONCE
Status: DISCONTINUED | OUTPATIENT
Start: 2024-09-26 | End: 2024-09-27 | Stop reason: HOSPADM

## 2024-09-26 RX ORDER — SODIUM CHLORIDE 9 MG/ML
INJECTION, SOLUTION INTRAVENOUS PRN
Status: DISCONTINUED | OUTPATIENT
Start: 2024-09-26 | End: 2024-09-27 | Stop reason: HOSPADM

## 2024-09-26 RX ORDER — LANOLIN ALCOHOL/MO/W.PET/CERES
10 CREAM (GRAM) TOPICAL NIGHTLY PRN
Status: DISCONTINUED | OUTPATIENT
Start: 2024-09-26 | End: 2024-09-27 | Stop reason: HOSPADM

## 2024-09-26 RX ORDER — IOPAMIDOL 755 MG/ML
INJECTION, SOLUTION INTRAVASCULAR PRN
Status: DISCONTINUED | OUTPATIENT
Start: 2024-09-26 | End: 2024-09-26 | Stop reason: HOSPADM

## 2024-09-26 RX ORDER — PROTAMINE SULFATE 10 MG/ML
INJECTION, SOLUTION INTRAVENOUS PRN
Status: DISCONTINUED | OUTPATIENT
Start: 2024-09-26 | End: 2024-09-26 | Stop reason: HOSPADM

## 2024-09-26 RX ORDER — ASPIRIN 81 MG/1
325 TABLET, CHEWABLE ORAL ONCE
Status: DISCONTINUED | OUTPATIENT
Start: 2024-09-26 | End: 2024-09-26 | Stop reason: SDUPTHER

## 2024-09-26 RX ORDER — MIDAZOLAM HYDROCHLORIDE 1 MG/ML
INJECTION INTRAMUSCULAR; INTRAVENOUS PRN
Status: DISCONTINUED | OUTPATIENT
Start: 2024-09-26 | End: 2024-09-26 | Stop reason: HOSPADM

## 2024-09-26 RX ORDER — AMLODIPINE BESYLATE 5 MG/1
5 TABLET ORAL 2 TIMES DAILY
Status: DISCONTINUED | OUTPATIENT
Start: 2024-09-26 | End: 2024-09-27 | Stop reason: HOSPADM

## 2024-09-26 RX ORDER — SODIUM CHLORIDE 9 MG/ML
INJECTION, SOLUTION INTRAVENOUS CONTINUOUS
Status: DISCONTINUED | OUTPATIENT
Start: 2024-09-26 | End: 2024-09-27 | Stop reason: HOSPADM

## 2024-09-26 RX ORDER — METOPROLOL TARTRATE 25 MG/1
12.5 TABLET, FILM COATED ORAL EVERY MORNING
Status: DISCONTINUED | OUTPATIENT
Start: 2024-09-27 | End: 2024-09-27 | Stop reason: HOSPADM

## 2024-09-26 RX ORDER — LIDOCAINE HYDROCHLORIDE 10 MG/ML
INJECTION, SOLUTION INTRAVENOUS PRN
Status: DISCONTINUED | OUTPATIENT
Start: 2024-09-26 | End: 2024-09-26 | Stop reason: HOSPADM

## 2024-09-26 RX ORDER — FENTANYL CITRATE 50 UG/ML
INJECTION, SOLUTION INTRAMUSCULAR; INTRAVENOUS PRN
Status: DISCONTINUED | OUTPATIENT
Start: 2024-09-26 | End: 2024-09-26 | Stop reason: HOSPADM

## 2024-09-26 RX ORDER — OXYCODONE AND ACETAMINOPHEN 5; 325 MG/1; MG/1
1 TABLET ORAL EVERY 4 HOURS PRN
Status: DISCONTINUED | OUTPATIENT
Start: 2024-09-26 | End: 2024-09-27 | Stop reason: HOSPADM

## 2024-09-26 RX ORDER — SODIUM CHLORIDE 9 MG/ML
INJECTION, SOLUTION INTRAVENOUS CONTINUOUS
Status: DISCONTINUED | OUTPATIENT
Start: 2024-09-26 | End: 2024-09-26 | Stop reason: SDUPTHER

## 2024-09-26 RX ORDER — ONDANSETRON 4 MG/1
4 TABLET, FILM COATED ORAL EVERY 8 HOURS PRN
Status: DISCONTINUED | OUTPATIENT
Start: 2024-09-26 | End: 2024-09-27 | Stop reason: HOSPADM

## 2024-09-26 RX ORDER — SODIUM CHLORIDE 0.9 % (FLUSH) 0.9 %
5-40 SYRINGE (ML) INJECTION EVERY 12 HOURS SCHEDULED
Status: DISCONTINUED | OUTPATIENT
Start: 2024-09-26 | End: 2024-09-26 | Stop reason: SDUPTHER

## 2024-09-26 RX ORDER — SODIUM CHLORIDE 0.9 % (FLUSH) 0.9 %
5-40 SYRINGE (ML) INJECTION EVERY 12 HOURS SCHEDULED
Status: DISCONTINUED | OUTPATIENT
Start: 2024-09-26 | End: 2024-09-27 | Stop reason: HOSPADM

## 2024-09-26 RX ORDER — CLOPIDOGREL 300 MG/1
600 TABLET, FILM COATED ORAL ONCE
Status: COMPLETED | OUTPATIENT
Start: 2024-09-26 | End: 2024-09-26

## 2024-09-26 RX ORDER — CLOPIDOGREL BISULFATE 75 MG/1
75 TABLET ORAL DAILY
Status: DISCONTINUED | OUTPATIENT
Start: 2024-09-27 | End: 2024-09-27 | Stop reason: HOSPADM

## 2024-09-26 RX ORDER — ACETAMINOPHEN 325 MG/1
650 TABLET ORAL EVERY 4 HOURS PRN
Status: DISCONTINUED | OUTPATIENT
Start: 2024-09-26 | End: 2024-09-27 | Stop reason: HOSPADM

## 2024-09-26 RX ORDER — HEPARIN SODIUM 1000 [USP'U]/ML
INJECTION, SOLUTION INTRAVENOUS; SUBCUTANEOUS PRN
Status: DISCONTINUED | OUTPATIENT
Start: 2024-09-26 | End: 2024-09-26 | Stop reason: HOSPADM

## 2024-09-26 RX ORDER — HYDROCODONE BITARTRATE AND ACETAMINOPHEN 5; 325 MG/1; MG/1
1 TABLET ORAL 2 TIMES DAILY PRN
Status: DISCONTINUED | OUTPATIENT
Start: 2024-09-26 | End: 2024-09-27 | Stop reason: HOSPADM

## 2024-09-26 RX ORDER — ASPIRIN 325 MG
325 TABLET, DELAYED RELEASE (ENTERIC COATED) ORAL ONCE
Status: COMPLETED | OUTPATIENT
Start: 2024-09-26 | End: 2024-09-26

## 2024-09-26 RX ORDER — SODIUM CHLORIDE 0.9 % (FLUSH) 0.9 %
5-40 SYRINGE (ML) INJECTION PRN
Status: DISCONTINUED | OUTPATIENT
Start: 2024-09-26 | End: 2024-09-27 | Stop reason: HOSPADM

## 2024-09-26 RX ORDER — SODIUM CHLORIDE 9 MG/ML
INJECTION, SOLUTION INTRAVENOUS PRN
Status: DISCONTINUED | OUTPATIENT
Start: 2024-09-26 | End: 2024-09-26 | Stop reason: SDUPTHER

## 2024-09-26 RX ORDER — CLOPIDOGREL BISULFATE 75 MG/1
75 TABLET ORAL DAILY
Status: DISCONTINUED | OUTPATIENT
Start: 2024-09-26 | End: 2024-09-26 | Stop reason: SDUPTHER

## 2024-09-26 RX ADMIN — SODIUM CHLORIDE: 9 INJECTION, SOLUTION INTRAVENOUS at 10:00

## 2024-09-26 RX ADMIN — Medication 10.5 MG: at 21:31

## 2024-09-26 RX ADMIN — AMLODIPINE BESYLATE 5 MG: 5 TABLET ORAL at 21:31

## 2024-09-26 RX ADMIN — WATER 2000 MG: 1 INJECTION INTRAMUSCULAR; INTRAVENOUS; SUBCUTANEOUS at 11:43

## 2024-09-26 RX ADMIN — HYDROCODONE BITARTRATE AND ACETAMINOPHEN 1 TABLET: 5; 325 TABLET ORAL at 19:37

## 2024-09-26 RX ADMIN — CLOPIDOGREL BISULFATE 600 MG: 300 TABLET, FILM COATED ORAL at 16:40

## 2024-09-26 RX ADMIN — SODIUM CHLORIDE, PRESERVATIVE FREE 5 ML: 5 INJECTION INTRAVENOUS at 22:10

## 2024-09-26 RX ADMIN — ASPIRIN 325 MG: 325 TABLET, COATED ORAL at 16:40

## 2024-09-26 ASSESSMENT — PAIN - FUNCTIONAL ASSESSMENT: PAIN_FUNCTIONAL_ASSESSMENT: PREVENTS OR INTERFERES SOME ACTIVE ACTIVITIES AND ADLS

## 2024-09-26 ASSESSMENT — PAIN DESCRIPTION - DESCRIPTORS: DESCRIPTORS: ACHING

## 2024-09-26 ASSESSMENT — PAIN SCALES - GENERAL: PAINLEVEL_OUTOF10: 4

## 2024-09-26 ASSESSMENT — PAIN DESCRIPTION - LOCATION: LOCATION: BACK

## 2024-09-26 ASSESSMENT — PAIN DESCRIPTION - ORIENTATION: ORIENTATION: LEFT;LOWER

## 2024-09-26 NOTE — PROGRESS NOTES
Pt admitted to  2E09 ambulatory for Watchman implant.  Pt NPO. Patient accompanied by Cyril green and Shay.   Vital signs obtained.  Assessment and data collection initiated.   Oriented to room.   Policies and procedures for 2E explained   All questions answered with no further questions at this time.   Fall prevention and safety precautions discussed with patient.

## 2024-09-26 NOTE — PROGRESS NOTES
Structural Heart Progress Note                         Patient Identification:  Alton Barroso  : 3/26/1934  MRN: [id number]   Account: [id number]     Admit date: 2024    Attending provider: Warner Clark MD        Primary care provider: Beckie Meade MD     Admission Diagnoses:  Atrial Fibrillation not able to tolerate long term OAC    Procedural Diagnoses:   S/P WATCHMAN 2024    Chief complaint: \"Think I would feel better staying tonight - not sure I am ready to go home. Would feel better being watched by the nursing staff to make sure all is ok\".     Early Discharge Criteria: NOT MET  [x]Uncomplicated intubation/extubation or use of MAC during the case  [x]Ultrasound guided and uncomplicated vascular access and closure  [x]Uncomplicated septal puncture  [x]Hemodynamically stable without use of any pressor supporting agents after the procedure  [x]No pericardial effusion during case - small pericardial effusion - stable on repeat echo  []Has a willingness to go home with a caregiver   [x]Patient has transportation for the next morning structural heart follow up appointment or case of urgent return overnight  [x]No need for supplemental O2 or at patients normal baseline level of O2 use  [x]No need for a blood transfusion during stay  [x]Able to tolerate PO intake    [x]Able to ambulate at baseline 6 hours after the procedure  [x]Tolerating DAPT post procedure and has medications for home use  [x]No cardiac symptoms at the time of discharge    Pre-procedure Diagnosis: Paroxysmal Atrial Fibrillation    Procedures: Percutaneous Left Atrial Appendage Occlusion implant with the use of nursing anesthesia. A WATCHMAN FLX device was utilized.     Code Status:   FULL CODE    Examination:  Vitals:/68   Pulse 73   Temp 97.6 °F (36.4 °C) (Oral)   Resp 28   Ht 1.778 m (5' 10\")   Wt 62.1 kg (137 lb)   SpO2 95%   BMI 19.66 kg/m²        General Appearance: alert and oriented to person,

## 2024-09-26 NOTE — BRIEF OP NOTE
[facility]  Sedation/Analgesia Post Sedation Record    Pt Name: Alton Barroso  Account number: [id number]  MRN: [id number]  YOB: 1934  Procedure Performed By: Warner Clark MD MD FACC, [facility], RPVI  Primary Care Physician: Beckie Meade MD  Date: 9/26/2024    POST-PROCEDURE    Physicians/Assistants: Warner Clark MD, DENICE, [facility], RPVI    Procedure Performed: WATCHMAN    Sedation/Anesthesia: Versed/ Fentanyl and 2% xylocaine local anesthesia.      Estimated Blood Loss: < 50 ml.     Specimens Removed: None         Disposition of Specimen: N/A        Complications: No Immediate Complications.       Post-procedure Diagnosis/Findings:       WFLX 27 mm      Electronically signed by Warner Clark MD on 9/26/24 at 2:15 PM EDT   Interventional Cardiology

## 2024-09-26 NOTE — CARE COORDINATION
Case Management Assessment Initial Evaluation    Date/Time of Evaluation: 9/26/2024 1:23 PM  Assessment Completed by: Jennifer Jacobson RN    If patient is discharged prior to next notation, then this note serves as note for discharge by case management.    Patient Name: Alton Barroso                   YOB: 1934  Diagnosis: Paroxysmal A-fib (HCC) [I48.0]                   Date / Time: 9/26/2024  8:59 AM  Location: Cath Pool Room/     Patient Admission Status: Inpatient   Readmission Risk Low 0-14, Mod 15-19), High > 20: Readmission Risk Score: 12.3    Current PCP: Beckie Meade MD  Health Care Decision Makers:   Primary Decision Maker: Cyril Tyson - Child - [phone]    Secondary Decision Maker: Rosy Mae  Child - [phone]    Additional Case Management Notes: Here for elective procedure. Pt is scheduled for Watchman procedure today. If pt meets discharge criteria, will plan discharge later today.     Procedure:   9/26 Watchman procedure with Dr. Clark.     Patient Goals/Plan/Treatment Preferences: Spoke with pt and son. Pt lives alone in his condo. Family helps get groceries, to appts, etc. Denies any discharge needs. His daughter will be staying with him.        09/26/24 1320   Service Assessment   Patient Orientation Alert and Oriented   Cognition Alert   History Provided By Patient   Primary Caregiver Self   Accompanied By/Relationship son   Support Systems Children;Family Members   Patient's Healthcare Decision Maker is: Named in Scanned ACP Document   PCP Verified by CM Yes   Last Visit to PCP Within last 3 months   Prior Functional Level Independent in ADLs/IADLs   Current Functional Level Independent in ADLs/IADLs   Can patient return to prior living arrangement Yes   Ability to make needs known: Good   Family able to assist with home care needs: Yes   Would you like for me to discuss the discharge plan with any other family members/significant others, and if so,

## 2024-09-26 NOTE — H&P
[facility]  Sedation/Analgesia History & Physical    Pt Name: Alton Barroso  Account number: [id number]  MRN: [id number]  YOB: 1934  Provider Performing Procedure: Warner Clark MD MD  Referring Provider: Warner Clark MD   Primary Care Physician: Beckie Meade MD  Date: 9/26/2024    PRE-PROCEDURE    Code Status: FULL CODE  Brief History/Pre-Procedure Diagnosis:   Paroxysmal AF, [id number] = 4   Not a candidate for long term OAC    Consent: : I have discussed with the patient risks, benefits, and alternatives (and relevant risks, benefits, and side effects related to alternatives or not receiving care), and likelihood of the success.   The patient and/or representative appear to understand and agree to proceed.  The discussion encompasses risks, benefits, and side effects related to the alternatives and the risks related to not receiving the proposed care, treatment, and services.     The indication, risks and benefits of the procedure and possible therapeutic consequences and alternatives were discussed with the patient. The patient was given the opportunity to ask questions and to have them answered to his/her satisfaction. Risks of the procedure include but are not limited to the following: Bleeding, hematoma including retroperitoneal hemmorhage, infection, pain and discomfort, injury to the aorta and other blood vessels, rhythm disturbance, low blood pressure, myocardial infarction, stroke, kidney damage/failure, myocardial perforation, allergic reactions to sedatives/contrast material, loss of pulse/vascular compromise requiring surgery, aneurysm/pseudoaneurysm formation, possible loss of a limb/hand/leg, needing blood transfusion, requiring emergent open heart surgery or emergent coronary intervention, the need for intubation/respiratory support, the requirement for defibrillation/cardioversion, and death. Alternatives to and omission of the suggested procedure

## 2024-09-27 VITALS
HEIGHT: 70 IN | SYSTOLIC BLOOD PRESSURE: 120 MMHG | DIASTOLIC BLOOD PRESSURE: 74 MMHG | RESPIRATION RATE: 18 BRPM | HEART RATE: 71 BPM | WEIGHT: 136 LBS | BODY MASS INDEX: 19.47 KG/M2 | TEMPERATURE: 97.7 F | OXYGEN SATURATION: 98 %

## 2024-09-27 LAB — ECHO BSA: 1.75 M2

## 2024-09-27 PROCEDURE — 6370000000 HC RX 637 (ALT 250 FOR IP): Performed by: INTERNAL MEDICINE

## 2024-09-27 RX ORDER — ASPIRIN 81 MG/1
81 TABLET ORAL DAILY
Qty: 90 TABLET | Refills: 1 | Status: SHIPPED
Start: 2024-09-28

## 2024-09-27 RX ADMIN — ACETAMINOPHEN 650 MG: 325 TABLET ORAL at 02:31

## 2024-09-27 ASSESSMENT — PAIN - FUNCTIONAL ASSESSMENT: PAIN_FUNCTIONAL_ASSESSMENT: PREVENTS OR INTERFERES SOME ACTIVE ACTIVITIES AND ADLS

## 2024-09-27 ASSESSMENT — PAIN DESCRIPTION - LOCATION
LOCATION: HIP;BACK
LOCATION: BACK

## 2024-09-27 ASSESSMENT — PAIN DESCRIPTION - ORIENTATION
ORIENTATION: LEFT
ORIENTATION: LOWER

## 2024-09-27 ASSESSMENT — PAIN SCALES - GENERAL: PAINLEVEL_OUTOF10: 3

## 2024-09-27 ASSESSMENT — PAIN DESCRIPTION - DESCRIPTORS: DESCRIPTORS: ACHING

## 2024-09-27 NOTE — DISCHARGE INSTRUCTIONS
Post WATHCMAN Discharge Instructions  We are here for you! If you have any questions please call: Structural Heart Team [phone]    Do you have the help you need at home? It is our REQUIREMENT to have 24 hour care for at least the first 24 hours post WATCHMAN implant.                              ACTIVITY:  NO DRIVING for 48 hours following procedure.  You may ride in a car.   You may return to work after 5 days following the Watchman Procedure.  No flights of stairs or no heavy lifting for the seven days after the procedure. Then proceed to normal activities as prior to the procedure.   Do not lift more than five to ten pounds for the first week you are home.  (A gallon of milk is 7 lbs)  Get up and get dressed every day. Avoid wearing tight or restrictive clothing. Do not stay in bed. Set a daily routine. This is an important step in re-gaining your strength.  Walk as much as you can.  This will help facilitate the healing process.  Plan rest periods during the day.  If possible avoid strenuous or vigorous activities and exercise if possible (I.e. climbing stairs)  Avoid work that increases muscle tension.        (straining with bowel movements, moving furniture, etc.)  -  While coughing, sneezing or during bowel movement, support the puncture site by applying gentle compression with the palm of your hand    WOUND CARE:  You may shower 24 hours post procedure. Shower every day. No bathtubs, pools, or hot tubs for the first week you are home.   Cleanse wound with mild soap and water.  Reapply a clean bandaid on the puncture site daily times 5 days or until site is healed.  Keep wound dry.   A small amount of bloody or clear drainage is normal.   Watch for signs of infections: redness, incision hot to the touch, fever greater than 101 degrees, swelling at the groin or incision site, or discolored drainage from your incision.     NORMAL OBSERVATIONS:  - Slight bump or groin tenderness, which may last up to one

## 2024-09-27 NOTE — PROGRESS NOTES
PIV removed, no s/s bleeding noted. Discharge instructions and medications reviewed, questions asked and answered. Pt discharged to home accompanied by his daughter. NAD noted at time of d/c.

## 2024-09-27 NOTE — PROGRESS NOTES
CLINICAL PHARMACY: DISCHARGE MED RECONCILIATION/REVIEW    [facility] Patient?: Yes  Total # of Interventions Recommended: 0  Total # Interventions Accepted: 0  Intervention Severity:   - Level 1 Intervention Present?: No   - Level 2 #: 0   - Level 3 #: 0   Time Spent (min): 15

## 2024-09-27 NOTE — PLAN OF CARE
Problem: Discharge Planning  Goal: Discharge to home or other facility with appropriate resources  9/27/2024 0925 by Alex Moore RN  Outcome: Progressing  9/27/2024 0522 by Rhina Gamble RN  Outcome: Progressing  Flowsheets (Taken 9/27/2024 0522)  Discharge to home or other facility with appropriate resources:   Identify barriers to discharge with patient and caregiver   Identify discharge learning needs (meds, wound care, etc)     Problem: Pain  Goal: Verbalizes/displays adequate comfort level or baseline comfort level  9/27/2024 0925 by Alxe Moore, RN  Outcome: Progressing  9/27/2024 0522 by Rhina Gamble RN  Outcome: Progressing  Flowsheets (Taken 9/27/2024 0522)  Verbalizes/displays adequate comfort level or baseline comfort level:   Encourage patient to monitor pain and request assistance   Assess pain using appropriate pain scale

## 2024-09-27 NOTE — PLAN OF CARE
Problem: Discharge Planning  Goal: Discharge to home or other facility with appropriate resources  Outcome: Progressing  Flowsheets (Taken 9/27/2024 0522)  Discharge to home or other facility with appropriate resources:   Identify barriers to discharge with patient and caregiver   Identify discharge learning needs (meds, wound care, etc)     Problem: Pain  Goal: Verbalizes/displays adequate comfort level or baseline comfort level  Outcome: Progressing  Flowsheets (Taken 9/27/2024 0522)  Verbalizes/displays adequate comfort level or baseline comfort level:   Encourage patient to monitor pain and request assistance   Assess pain using appropriate pain scale

## 2024-09-27 NOTE — DISCHARGE SUMMARY
Structural Heart/Cardiology Discharge Summary       Name:  Alton Barroso  YOB: 1934  Medical Record Number:  [id number]    Date of Admission:  9/26/2024  Date of Discharge:  9/27/2024    Admitting physician: Warner Clark MD  Discharge to: Home  Condition at d/c: Stable    Hospital Problem List:  Patient Active Problem List   Diagnosis    Prostate cancer    Generalized anxiety disorder    Essential hypertension    Back pain, chronic s/p surgery    History of pulmonary embolus (PE)    Spondylosis of lumbar region without myelopathy or radiculopathy    Persistent atrial fibrillation (HCC)- newely DXed 10/12/17- CVR    Non-rheumatic mitral regurgitation- mod to severe    Moderate aortic regurgitation    Failed back syndrome of lumbar spine    Chronic pain disorder    Pneumothorax    Constipation    Ascending aortic aneurysm (HCC) 4.1 cm on CTA and 4.5 on echo    Nonexudative age-related macular degeneration    Secondary pigmentary retinal degeneration    Venous retinal branch occlusion    PAF (paroxysmal atrial fibrillation) (HCC)    Moderate mitral regurgitation    Stenosis of left carotid artery    S/P carotid endarterectomy    Permanent atrial fibrillation (HCC)    Hx of epistaxis    Opioid dependence with current use (HCC)    Paroxysmal A-fib (HCC)       Pre-procedure Diagnosis: Paroxysmal Atrial Fibrillation    Procedures: Watchman FLX on 9/26/2024    Hospital Course:   Alton Barroso is a 89 y.o. male admitted to [facility] on 9/26/2024 post successful percutaneous Left Atrial Appendage Occulusion. He tolerated the procedure well. He was kept overnight as he felt more comfortable being observed in the hospital setting OVN rather than going home with family.   He remained stable throughout the evening and overnight. Patient is doing well following implant. No respiratiory, cardiac or vascular access issues after the procedure. He is hemodyanmically    PRESERVISION AREDS 2 PO     tiZANidine 2 MG tablet  Commonly known as: ZANAFLEX  Take 1 tablet by mouth nightly as needed (muscle spasm)              Recommendations:  Non-valvular afibrillation s/p successful percutaneous left atrial appendage occlusion, [id number]: 4  Limited echo prior to discharge revealed no major issues as compared to prior CARLOS and demonstrated no significant pericardial effusion  DAPT will be ordered if not already utilizing. Plavix and Aspirin will need to be continued for at least 6 months post WATCHMAN implant.  CARLOS and Labs at 45 days and 12 months    Antibiotic prophylaxis (amoxicillin 2 g once 60 minutes prior to procedure) for 6 months for:        Dental procedures including cleanings      Gastrointestinal (GI) or genitourinary () procedures in patients with ongoing GI or  tract infection      Respiratory procedures involving incision or biopsy      Procedures on infected skin or muscle    Disposition: It is medically necessary that patients undergoing percutaneous left atrial appendage occlusion are admitted as an inpatient.  Patient will follow with Structural Heart Clinic in 1-2 weeks and will undergo CARLOS at 45 days following WATCHMAN implant.    Labs:   Will be ordered by the Structural Heart Coordinator as outpatient.     Patient Instructions:    Activity: Activity as tolerated. No driving until seen in the Structural Heart office.  Diet: Cardiac Diet     Follow-up visits:   Please report to the Structural Heart Center in one week at scheduled time for groin check and assessment.         Follow Up Plan  1. Follow up with Cardiology for 45 day post WATCHMAN CARLOS.   2. Follow up with primary care provider in 1 week  3. Continue Plavix and Aspirin.  4. CARLOS, CBC and BMP at 45 days following WATCHMAN implant  5. Pt is fully agreeable to discharge plans. All questions were thoroughly answered.     Electronically signed by SONIDO Perkins CNP on 9/27/2024 at 8:46 AM

## 2024-09-30 ENCOUNTER — CARE COORDINATION (OUTPATIENT)
Dept: CASE MANAGEMENT | Age: 89
End: 2024-09-30

## 2024-09-30 ENCOUNTER — TELEPHONE (OUTPATIENT)
Dept: FAMILY MEDICINE CLINIC | Age: 89
End: 2024-09-30

## 2024-09-30 ENCOUNTER — OFFICE VISIT (OUTPATIENT)
Age: 89
End: 2024-09-30
Payer: MEDICARE

## 2024-09-30 VITALS
WEIGHT: 144.2 LBS | SYSTOLIC BLOOD PRESSURE: 128 MMHG | BODY MASS INDEX: 20.64 KG/M2 | DIASTOLIC BLOOD PRESSURE: 68 MMHG | HEART RATE: 68 BPM | HEIGHT: 70 IN

## 2024-09-30 DIAGNOSIS — Z95.818 PRESENCE OF WATCHMAN LEFT ATRIAL APPENDAGE CLOSURE DEVICE: ICD-10-CM

## 2024-09-30 DIAGNOSIS — I48.0 PAROXYSMAL A-FIB (HCC): Primary | ICD-10-CM

## 2024-09-30 DIAGNOSIS — I65.22 STENOSIS OF LEFT CAROTID ARTERY: Primary | ICD-10-CM

## 2024-09-30 PROCEDURE — G8427 DOCREV CUR MEDS BY ELIG CLIN: HCPCS | Performed by: THORACIC SURGERY (CARDIOTHORACIC VASCULAR SURGERY)

## 2024-09-30 PROCEDURE — 99213 OFFICE O/P EST LOW 20 MIN: CPT | Performed by: THORACIC SURGERY (CARDIOTHORACIC VASCULAR SURGERY)

## 2024-09-30 PROCEDURE — 1111F DSCHRG MED/CURRENT MED MERGE: CPT | Performed by: THORACIC SURGERY (CARDIOTHORACIC VASCULAR SURGERY)

## 2024-09-30 PROCEDURE — 1123F ACP DISCUSS/DSCN MKR DOCD: CPT | Performed by: THORACIC SURGERY (CARDIOTHORACIC VASCULAR SURGERY)

## 2024-09-30 PROCEDURE — 1111F DSCHRG MED/CURRENT MED MERGE: CPT | Performed by: FAMILY MEDICINE

## 2024-09-30 PROCEDURE — G8420 CALC BMI NORM PARAMETERS: HCPCS | Performed by: THORACIC SURGERY (CARDIOTHORACIC VASCULAR SURGERY)

## 2024-09-30 PROCEDURE — 1036F TOBACCO NON-USER: CPT | Performed by: THORACIC SURGERY (CARDIOTHORACIC VASCULAR SURGERY)

## 2024-09-30 RX ORDER — ACETAMINOPHEN 160 MG
1 TABLET,DISINTEGRATING ORAL DAILY
COMMUNITY

## 2024-09-30 RX ORDER — BACILLUS COAGULANS/INULIN 1B-250 MG
1 CAPSULE ORAL DAILY
COMMUNITY

## 2024-09-30 NOTE — TELEPHONE ENCOUNTER
Care Transitions Initial Follow Up Call    Outreach made within 2 business days of discharge: Yes    Patient: Alton Barroso Patient : 3/26/1934   MRN: [id number]  Reason for Admission: AFIB  Discharge Date: 24       Spoke with: Patient    Discharge department/facility: [facility]    TCM Interactive Patient Contact:  Was patient able to fill all prescriptions: Yes  Was patient instructed to bring all medications to the follow-up visit: Yes  Is patient taking all medications as directed in the discharge summary? Yes  Does patient understand their discharge instructions: Yes  Does patient have questions or concerns that need addressed prior to 7-14 day follow up office visit: no    Additional needs identified to be addressed with provider  No needs identified             Scheduled appointment with PCP within 7-14 days    10/7/2024 JR MENDOSA      Follow Up  Future Appointments   Date Time Provider Department Center   10/17/2024  9:00 AM Beckie Meade MD Fam Med [facility] ECC DEP   2025  1:45 PM Dayana Andrews MD N [facility] - [place]   2025 10:00 AM STR [facility] ULTRASOUND STRZ PUT OP STR [place] [facility]   10/6/2025 10:00 AM [facility] [place]       Antonieta Sandra, [facility]

## 2024-09-30 NOTE — CARE COORDINATION
Care Transitions Note    Initial Call - Call within 2 business days of discharge: Yes    Patient Current Location:  Home: [address] [place] [address]   [facility] [id number]    Care Transition Nurse contacted the patient by telephone to perform post hospital discharge assessment, verified name and  as identifiers. Provided introduction to self, and explanation of the Care Transition Nurse role.     Patient: Alton Barroso    Patient : 3/26/1934   MRN: [id number]    Reason for Admission: a/p Watchman  Discharge Date: 24  RURS: Readmission Risk Score: 12.8      Last Discharge Facility       Date Complaint Diagnosis Description Type Department Provider    24  Paroxysmal atrial fibrillation (HCC) ... Admission (Discharged) [facility] 3B Warner Clark MD            Was this an external facility discharge? No    Additional needs identified to be addressed with provider   No needs identified             Method of communication with provider: none.    Patients top risk factors for readmission: lack of knowledge about disease and medical condition-HTN, Afib    Interventions to address risk factors:   Education: Watchman  Review of patient management of conditions/medications:      Care Summary Note:  Spoke with Abiodun, said he is feeling pretty good.  Denies fever, chills, chest pain, dyspnea, dizziness, n/v/d.  R groin site looks good, no s/sx of infection or pain.  Denies LE edema.  Monitors wt, stable.  Reviewed medications, taking as directed.  Appetite and fluid intake is good.  Saw vascular this morning for carotid stenosis.  Since was a planned procedure, declines to see PCP at this time.  Will follow with cardio.  CARLOS scheduled .  No other issues to report.  Denies any other needs.  No other questions or concerns at this time.  Will continue to follow, agreeable to calls.    Care Transition Nurse reviewed discharge instructions, medical action plan, and red flags with patient. The patient was given an

## 2024-09-30 NOTE — PROGRESS NOTES
[facility]  [facility] VASCULAR SURGERY  [address], [address]  [place] [address]  Dept: [phone]  Dept Fax: [phone]  Loc: [phone]    Visit Date: 9/30/2024    Mr. Barroso is a 89 y.o.male  who presented for:  Chief Complaint   Patient presents with    1 Year Follow Up       HPI:   HPI     Mr. Barroso presents to clinic for follow-up of his known left carotid disease.  He is status post left CEA in November 2022 for asymptomatic critical left internal carotid artery stenosis.  He has been followed serially since that time.  He has done very well and has continued to be asymptomatic.  He remains on aspirin and Plavix.  His Eliquis that he was taking for his atrial fibrillation has recently been stopped as he had a Watchman procedure done last week.    The previous carotid duplex study on August 2023 had revealed less than 50% stenosis bilateral internal carotid arteries with the vertebral arteries being patent and having antegrade flow bilaterally.    The repeat carotid duplex study on 8/20/2024 reveals no changes.  Again there is less than 50% stenosis in the bilateral internal carotid arteries and the bilateral vertebral arteries are patent and having antegrade flow.  He states that due to advancing age he is starting to use a walker or a cane but otherwise no other symptoms.    Current Outpatient Medications:     Cholecalciferol (VITAMIN D3) 50 MCG (2000 UT) CAPS, Take 1 capsule by mouth daily, Disp: , Rfl:     Bacillus Coagulans-Inulin (PROBIOTIC) 1-250 BILLION-MG CAPS, Take 1 Capful by mouth daily, Disp: , Rfl:     aspirin 81 MG EC tablet, Take 1 tablet by mouth daily, Disp: 90 tablet, Rfl: 1    clopidogrel (PLAVIX) 75 MG tablet, Take 1 tablet by mouth daily, Disp: 90 tablet, Rfl: 1    ondansetron (ZOFRAN) 4 MG tablet, Take 1 tablet by mouth every 8 hours as needed for Nausea or Vomiting, Disp: 90 tablet, Rfl: 3    amLODIPine

## 2024-09-30 NOTE — PATIENT INSTRUCTIONS
If you receive a survey asking about your care experience, please respond. Your answers will help ensure you receive high-quality care at this office. Thank you!    Your Medical Assistant today: Florina  Thank you for coming to our office! It was a pleasure to serve you.

## 2024-10-03 ENCOUNTER — LAB (OUTPATIENT)
Dept: LAB | Age: 89
End: 2024-10-03

## 2024-10-03 LAB — PSA SERPL-MCNC: 0.48 NG/ML (ref 0–1)

## 2024-10-07 ENCOUNTER — CARE COORDINATION (OUTPATIENT)
Dept: CASE MANAGEMENT | Age: 89
End: 2024-10-07

## 2024-10-07 ENCOUNTER — OFFICE VISIT (OUTPATIENT)
Dept: FAMILY MEDICINE CLINIC | Age: 89
End: 2024-10-07

## 2024-10-07 VITALS
RESPIRATION RATE: 16 BRPM | OXYGEN SATURATION: 98 % | HEART RATE: 88 BPM | WEIGHT: 146.25 LBS | SYSTOLIC BLOOD PRESSURE: 118 MMHG | HEIGHT: 70 IN | BODY MASS INDEX: 20.94 KG/M2 | TEMPERATURE: 97.8 F | DIASTOLIC BLOOD PRESSURE: 70 MMHG

## 2024-10-07 DIAGNOSIS — I48.0 PAF (PAROXYSMAL ATRIAL FIBRILLATION) (HCC): ICD-10-CM

## 2024-10-07 DIAGNOSIS — G89.29 CHRONIC MIDLINE LOW BACK PAIN WITHOUT SCIATICA: ICD-10-CM

## 2024-10-07 DIAGNOSIS — Z09 HOSPITAL DISCHARGE FOLLOW-UP: ICD-10-CM

## 2024-10-07 DIAGNOSIS — I10 ESSENTIAL HYPERTENSION: Primary | ICD-10-CM

## 2024-10-07 DIAGNOSIS — M54.50 CHRONIC MIDLINE LOW BACK PAIN WITHOUT SCIATICA: ICD-10-CM

## 2024-10-07 DIAGNOSIS — M81.0 AGE-RELATED OSTEOPOROSIS WITHOUT CURRENT PATHOLOGICAL FRACTURE: ICD-10-CM

## 2024-10-07 DIAGNOSIS — E78.5 HYPERLIPIDEMIA WITH TARGET LDL LESS THAN 130: ICD-10-CM

## 2024-10-07 RX ORDER — TIZANIDINE 2 MG/1
2 TABLET ORAL NIGHTLY
COMMUNITY

## 2024-10-07 RX ORDER — HYDROCODONE BITARTRATE AND ACETAMINOPHEN 5; 325 MG/1; MG/1
1 TABLET ORAL 2 TIMES DAILY
COMMUNITY

## 2024-10-07 ASSESSMENT — ENCOUNTER SYMPTOMS
WHEEZING: 0
COUGH: 0
SINUS PRESSURE: 0
ABDOMINAL DISTENTION: 0
STRIDOR: 0
COLOR CHANGE: 0
SORE THROAT: 0
NAUSEA: 0
ABDOMINAL PAIN: 0
VOMITING: 0
BACK PAIN: 1
CONSTIPATION: 0
CHEST TIGHTNESS: 0
SHORTNESS OF BREATH: 0
DIARRHEA: 0

## 2024-10-07 NOTE — PROGRESS NOTES
Post-Discharge Transitional Care Follow Up      Alton Barroso   YOB: 1934    Date of Office Visit:  10/7/2024  Date of Hospital Admission: 9/26/24  Date of Hospital Discharge: 9/27/24  Readmission Risk Score (high >=14%. Medium >=10%):Readmission Risk Score: 12.8      Care management risk score Rising risk (score 2-5) and Complex Care (Scores >=6): No Risk Score On File     Non face to face  following discharge, date last encounter closed (first attempt may have been earlier): 09/30/2024     Call initiated 2 business days of discharge: Yes     Essential hypertension      Stable,  cont meds  PAF (paroxysmal atrial fibrillation) (HCC)        HR stable, cont meds.   Off eliquis due to recent watchman placement.  Is on plavix.    Hyperlipidemia with target LDL less than 130       monitor  Chronic midline low back pain without sciatica       Cont norco.   Doing good.  Age-related osteoporosis without current pathological fracture        Consider fosamax.   Pt declined today and wants to think about it.  Hospital discharge follow-up  -     MD DISCHARGE MEDS RECONCILED W/ CURRENT OUTPATIENT MED LIST    Medical Decision Making: moderate complexity  Return in about 3 months (around 1/7/2025).           Subjective:   HPI    Hx of afib.    Had watchman placed on sept 26th.  Was kept over night.   Off eliquis and was placed on plavix.     Done by dr. Clark.    Was bleeding a lot and bruising a lot on eliquis.     On lopressor.  HR controlled.  Htn controlled on norvasc.    No cp or sob.     Following cardio.        Does have some lower back pain.   Has stimulator.   Does use norco occasionally but mostly at night.   Norco helping.         Most recent bone density showed osteoporosis.   Does take calcium and vit d.    Unsure if wants to take meds for it.         Inpatient course: Discharge summary reviewed- see chart.    Interval history/Current status: stable    Patient Active Problem List   Diagnosis

## 2024-10-07 NOTE — CARE COORDINATION
Care Transitions Note    Follow Up Call     Patient Current Location:  Home: [address]   [place] [address]    Care Transition Nurse contacted the patient by telephone. Verified name and  as identifiers.    Additional needs identified to be addressed with provider   No needs identified                 Method of communication with provider: none.    Care Summary Note:  Spoke with Abiodun, said he is feeling pretty good.  Denies fever, chills, chest pain, dyspnea, dizziness.  Groin site looks good, just bruised.  Denies wt gain or LE edema.  Saw PCP this morning, no changes.  Eating, drinking, sleeping good.  No issues with B&B.  No other issues to report.  Denies any other needs.  No other questions or concerns at this time.  Will continue to follow, agreeable to calls.    Plan of care updates since last contact:  No changes       Advance Care Planning:   Does patient have an Advance Directive: reviewed during previous call, see note. .    Medication Review:  Full medication reconciliation completed during previous call. and No changes since last call.     Remote Patient Monitoring:  Offered patient enrollment in the Remote Patient Monitoring (RPM) program for in-home monitoring: Yes, but did not enroll at this time: limited patient ability to navigate RPM/equipment.    Assessments:  Care Transitions Subsequent and Final Call    Subsequent and Final Calls  Do you have any ongoing symptoms?: No  Have your medications changed?: No  Do you have any questions related to your medications?: No  Do you currently have any active services?: No  Do you have any needs or concerns that I can assist you with?: No  Identified Barriers: Lack of Education  Care Transitions Interventions     Transportation Support: Declined   Other Interventions:              Follow Up Appointment:   Reviewed upcoming appointment(s). and WONG appointment attended as scheduled   Future Appointments         Provider Specialty Dept Phone

## 2024-10-09 RX ORDER — CLOPIDOGREL BISULFATE 75 MG/1
75 TABLET ORAL DAILY
Qty: 90 TABLET | Refills: 1 | Status: SHIPPED | OUTPATIENT
Start: 2024-10-09

## 2024-10-16 ENCOUNTER — CARE COORDINATION (OUTPATIENT)
Dept: CASE MANAGEMENT | Age: 89
End: 2024-10-16

## 2024-10-16 NOTE — CARE COORDINATION
filled?: Yes   Were you discharged with any Home Care or Post Acute Services or do you currently have any active services?: No         Patient DME: Straight cane, Wheelchair, Other, Shower chair   Other Patient DME: grab bars.  Has a ramp in garage.    Do you have any needs or concerns that I can assist you with?: No   Identified Barriers: Lack of Education             Follow Up Appointment:   Reviewed upcoming appointment(s).  Future Appointments         Provider Specialty Dept Phone    10/17/2024 9:00 AM Beckie Meade MD Family Medicine [phone]    2/13/2025 1:45 PM Dayana Andrews MD Cardiology [phone]    8/26/2025 10:00 AM (Arrive by 9:45 AM) [facility] ULTRASOUND Radiology [phone]    10/6/2025 10:00 AM Dennis Stewart MD Vascular Surgery [phone]            Care Transition Nurse provided contact information.  Plan for follow-up call in 6-10 days based on severity of symptoms and risk factors.  Plan for next call: symptom management-constipation, sob, chest pain, palps, swelling  self management-BP, wt.  follow-up appointment-PCP 10/17/24->review  medication management-any changes?      Perlita Au RN

## 2024-10-17 ENCOUNTER — OFFICE VISIT (OUTPATIENT)
Dept: FAMILY MEDICINE CLINIC | Age: 89
End: 2024-10-17
Payer: MEDICARE

## 2024-10-17 VITALS
WEIGHT: 144 LBS | HEART RATE: 65 BPM | OXYGEN SATURATION: 98 % | DIASTOLIC BLOOD PRESSURE: 70 MMHG | RESPIRATION RATE: 14 BRPM | HEIGHT: 66 IN | BODY MASS INDEX: 23.14 KG/M2 | SYSTOLIC BLOOD PRESSURE: 120 MMHG

## 2024-10-17 DIAGNOSIS — Z00.00 MEDICARE ANNUAL WELLNESS VISIT, SUBSEQUENT: Primary | ICD-10-CM

## 2024-10-17 PROCEDURE — G0439 PPPS, SUBSEQ VISIT: HCPCS | Performed by: FAMILY MEDICINE

## 2024-10-17 PROCEDURE — 1123F ACP DISCUSS/DSCN MKR DOCD: CPT | Performed by: FAMILY MEDICINE

## 2024-10-17 PROCEDURE — G8484 FLU IMMUNIZE NO ADMIN: HCPCS | Performed by: FAMILY MEDICINE

## 2024-10-17 ASSESSMENT — PATIENT HEALTH QUESTIONNAIRE - PHQ9
SUM OF ALL RESPONSES TO PHQ9 QUESTIONS 1 & 2: 0
2. FEELING DOWN, DEPRESSED OR HOPELESS: NOT AT ALL
SUM OF ALL RESPONSES TO PHQ QUESTIONS 1-9: 0
1. LITTLE INTEREST OR PLEASURE IN DOING THINGS: NOT AT ALL

## 2024-10-17 NOTE — PROGRESS NOTES
(NORVASC) 5 MG tablet Take 1 tablet by mouth in the morning and at bedtime Yes Beckie Meade MD   metoprolol tartrate (LOPRESSOR) 25 MG tablet Take 0.5 tablets by mouth every morning Yes Beckie Meade MD   melatonin 5 MG TABS tablet Take 2 tablets by mouth nightly As per patient his doctor increased it to 10mg, and this is what he is taking every night. Yes ProviderBrian MD   Polyethylene Glycol 3350 (MIRALAX PO) Take by mouth as needed Yes ProviderBrian MD   Multiple Vitamins-Minerals (PRESERVISION AREDS 2 PO) Take 1 tablet by mouth 2 times daily Yes ProviderBrian MD       CareTeam (Including outside providers/suppliers regularly involved in providing care):   Patient Care Team:  Beckie Meade MD as PCP - General (Family Medicine)  Beckie Meade MD as PCP - Empaneled Provider  Ruiz Pizano PSYD (Psychology)  Dayana Andrews MD as Cardiologist (Cardiology)  Karthikeyan Monte MD as Cardiologist (Cardiology)  Carroll Poe MD as Consulting Physician (Urology)  Johanny Sal, RN as Care Transitions Nurse      Reviewed and updated this visit:  Tobacco  Allergies  Meds  Med Hx  Surg Hx  Soc Hx  Fam Hx          Electronically signed by Beckie Meade MD on 10/17/2024 at 9:10 AM

## 2024-10-17 NOTE — PATIENT INSTRUCTIONS
illnesses that may run in your family, and various assessments and screenings as appropriate.    After reviewing your medical record and screening and assessments performed today your provider may have ordered immunizations, labs, imaging, and/or referrals for you.  A list of these orders (if applicable) as well as your Preventive Care list are included within your After Visit Summary for your review.    Other Preventive Recommendations:    A preventive eye exam performed by an eye specialist is recommended every 1-2 years to screen for glaucoma; cataracts, macular degeneration, and other eye disorders.  A preventive dental visit is recommended every 6 months.  Try to get at least 150 minutes of exercise per week or 10,000 steps per day on a pedometer .  Order or download the FREE \"Exercise & Physical Activity: Your Everyday Guide\" from [employer]. Call 1-158.345.1842 or search [employer] online.  You need 8622-9027 mg of calcium and 8899-3528 IU of vitamin D per day. It is possible to meet your calcium requirement with diet alone, but a vitamin D supplement is usually necessary to meet this goal.  When exposed to the sun, use a sunscreen that protects against both UVA and UVB radiation with an SPF of 30 or greater. Reapply every 2 to 3 hours or after sweating, drying off with a towel, or swimming.  Always wear a seat belt when traveling in a car. Always wear a helmet when riding a bicycle or motorcycle.

## 2024-10-18 DIAGNOSIS — M54.50 CHRONIC MIDLINE LOW BACK PAIN WITHOUT SCIATICA: Primary | ICD-10-CM

## 2024-10-18 DIAGNOSIS — G89.29 CHRONIC MIDLINE LOW BACK PAIN WITHOUT SCIATICA: Primary | ICD-10-CM

## 2024-10-18 NOTE — TELEPHONE ENCOUNTER
He was made aware that PCP is out today and is okay to wait til Monday when you're back in the office.

## 2024-10-18 NOTE — TELEPHONE ENCOUNTER
Patient forgot at his appointment this last week he needed [facility]     This medication refill is regarding a telephone request.  Refill requested by patient.    Requested Prescriptions     Pending Prescriptions Disp Refills    HYDROcodone-acetaminophen (NORCO) 5-325 MG per tablet       Sig: Take 1 tablet by mouth in the morning and at bedtime. As needed for pain Max Daily Amount: 2 tablets       Date of last visit: 10/17/2024  Date of next visit: 1/20/2025  Date of last refill: 10/7/2024  [facility]    Last Lipid Panel:    Lab Results   Component Value Date/Time    CHOL 196 03/20/2024 08:24 AM    TRIG 57 03/20/2024 08:24 AM    HDL 74 03/20/2024 08:24 AM     Last CMP:   Lab Results   Component Value Date     09/26/2024    K 3.8 09/26/2024     09/26/2024    CO2 24 09/26/2024    BUN 26 (H) 09/26/2024    CREATININE 1.2 09/26/2024    GLUCOSE 116 (H) 09/26/2024    CALCIUM 9.2 09/26/2024    BILITOT 0.7 09/26/2024    ALKPHOS 62 09/26/2024    AST 18 09/26/2024    ALT 15 09/26/2024    LABGLOM 57 (A) 09/26/2024    AGRATIO 1.5 05/14/2016       Last Thyroid:    Lab Results   Component Value Date    TSH 3.380 07/24/2018     Last Hemoglobin A1C:    Lab Results   Component Value Date/Time    LABA1C 5.8 11/16/2022 12:19 PM       Rx verified, ordered and set to EP.

## 2024-10-18 NOTE — TELEPHONE ENCOUNTER
If patient needs script before the weekend, maybe CNP could print and sign for me this time?  If not, I will do on Monday when back in office.  Thank you!!

## 2024-10-21 RX ORDER — HYDROCODONE BITARTRATE AND ACETAMINOPHEN 5; 325 MG/1; MG/1
1 TABLET ORAL 2 TIMES DAILY
Qty: 60 TABLET | Refills: 0 | Status: SHIPPED | OUTPATIENT
Start: 2024-10-21 | End: 2024-11-20

## 2024-10-22 ENCOUNTER — HOSPITAL ENCOUNTER (EMERGENCY)
Age: 89
Discharge: HOME | End: 2024-10-22
Attending: FAMILY MEDICINE
Payer: MEDICARE

## 2024-10-22 VITALS
HEIGHT: 70 IN | WEIGHT: 148 LBS | BODY MASS INDEX: 21.19 KG/M2 | TEMPERATURE: 97.6 F | RESPIRATION RATE: 16 BRPM | HEART RATE: 67 BPM | OXYGEN SATURATION: 99 % | DIASTOLIC BLOOD PRESSURE: 80 MMHG | SYSTOLIC BLOOD PRESSURE: 159 MMHG

## 2024-10-22 DIAGNOSIS — T81.49XA POSTOPERATIVE WOUND CELLULITIS: Primary | ICD-10-CM

## 2024-10-22 PROCEDURE — 99283 EMERGENCY DEPT VISIT LOW MDM: CPT

## 2024-10-22 RX ORDER — CEPHALEXIN 500 MG/1
500 CAPSULE ORAL 3 TIMES DAILY
Qty: 21 CAPSULE | Refills: 0 | Status: SHIPPED | OUTPATIENT
Start: 2024-10-22 | End: 2024-10-29

## 2024-10-22 ASSESSMENT — ENCOUNTER SYMPTOMS: BACK PAIN: 0

## 2024-10-22 ASSESSMENT — LIFESTYLE VARIABLES: HOW OFTEN DO YOU HAVE A DRINK CONTAINING ALCOHOL: NEVER

## 2024-10-22 ASSESSMENT — PAIN - FUNCTIONAL ASSESSMENT
PAIN_FUNCTIONAL_ASSESSMENT: NONE - DENIES PAIN
PAIN_FUNCTIONAL_ASSESSMENT: NONE - DENIES PAIN

## 2024-10-22 NOTE — DISCHARGE INSTRUCTIONS
Keflex as prescribed. Wash with soap and water and cover daily. If fever,worsening symptoms notify PCP or return to ED.

## 2024-10-22 NOTE — ED NOTES
Wound cleansed w/ wound wash. Dried thoroughly and covered w/ telfa and gauze. Pt alert and oriented. Respirations regular and easy. Prescriptions sent to pharmacy and pt instructed on. Discharge instructions reviewed. States understanding. Pt discharged in satisfactory condition.

## 2024-10-22 NOTE — ED PROVIDER NOTES
dictations but occasionally words are mis-transcribed.)    MD Kavitha Christopher Edward R, MD  10/22/24 1901

## 2024-10-22 NOTE — ED NOTES
Pt presents w/ c/o possible infected area to right mid to upper back. States that he had a scraping of a lesion 1 week ago. The site had been draining, but is currently dry. And the area around the scraping site is red.

## 2024-10-23 ENCOUNTER — CARE COORDINATION (OUTPATIENT)
Dept: CASE MANAGEMENT | Age: 89
End: 2024-10-23

## 2024-10-23 NOTE — CARE COORDINATION
Specialty Dept Phone    1/20/2025 10:15 AM Beckie Meade MD Family Medicine [phone]    2/13/2025 1:45 PM Dayana Andrews MD Cardiology [phone]    8/26/2025 10:00 AM (Arrive by 9:45 AM) [facility] ULTRASOUND Radiology [phone]    10/6/2025 10:00 AM Dennis Stewart MD Vascular Surgery [phone]            Patient has agreed to contact primary care provider and/or specialist for any further questions, concerns, or needs.    Perlita Au RN

## 2024-10-25 ENCOUNTER — OFFICE VISIT (OUTPATIENT)
Dept: FAMILY MEDICINE CLINIC | Age: 89
End: 2024-10-25

## 2024-10-25 VITALS
HEIGHT: 70 IN | DIASTOLIC BLOOD PRESSURE: 64 MMHG | OXYGEN SATURATION: 96 % | WEIGHT: 146 LBS | BODY MASS INDEX: 20.9 KG/M2 | RESPIRATION RATE: 22 BRPM | HEART RATE: 62 BPM | SYSTOLIC BLOOD PRESSURE: 110 MMHG

## 2024-10-25 DIAGNOSIS — T81.49XA POSTOPERATIVE WOUND CELLULITIS: Primary | ICD-10-CM

## 2024-10-25 ASSESSMENT — ENCOUNTER SYMPTOMS
NAUSEA: 0
SORE THROAT: 0
CONSTIPATION: 0
RHINORRHEA: 0
COUGH: 0
WHEEZING: 0
ABDOMINAL PAIN: 0
DIARRHEA: 0
SHORTNESS OF BREATH: 0

## 2024-10-25 NOTE — PROGRESS NOTES
[facility] PROFESSIONAL [facility]  [address]   [facility] [address]  Dept: [phone]  Loc: [phone]     Alton Barroso (:  3/26/1934) is a 89 y.o. male, here for evaluation of the following chief complaint(s):  Wound Check (Had something removed off his back; started to look infected went to [facility] )         Assessment & Plan  Postoperative wound cellulitis   Slight erythema and yellow drainage. Overall improved according to patient.  Continue Keflex.  Daily and prn dressing changes.  Dry dressing. Use triple antibiotic.   Clean with soap and water.  Monitor for fever, swelling, redness, worsening drainage.  Follow up scheduled next week for re-evaluation.       Return in about 1 week (around 2024) for Wound recheck.       Discussed use, benefit, and side effects of prescribed medications.  Barriers to medication compliance addressed.  All patient questions answered.  Pt voiced understanding.     Return in about 1 week (around 2024) for Wound recheck.    SUBJECTIVE/OBJECTIVE:  HPI    Follow up on recent post op wound cellulitis.  He had a skin lesion removed and biopsied about 2 weeks go via derm.  Concerns of redness and drainage a few days ago. Went to ER. Placed on keflex.  Keep wound covered.  Redness has started to improve. Some continued yellow drainage.  No fever.     Past Medical History:   Diagnosis Date    Atrial fibrillation (HCC)     Back pain     Hernia     Hx of blood clots     lungs after prostate surgery    Hyperlipidemia     Hypertension     Osteoarthritis     low back, left leg, right shoulder    Prostate cancer (HCC)     Prostate cancer (HCC) 2015    Pulmonary emboli (HCC) 2015    after prostate surgery    Wears glasses         Past Surgical History:   Procedure Laterality Date    APPENDECTOMY  1961    [facility]    BACK SURGERY  ,    X stop    CAROTID ENDARTERECTOMY Left 2022    LEFT

## 2024-10-31 ENCOUNTER — OFFICE VISIT (OUTPATIENT)
Dept: FAMILY MEDICINE CLINIC | Age: 89
End: 2024-10-31

## 2024-10-31 VITALS
WEIGHT: 146 LBS | HEART RATE: 68 BPM | DIASTOLIC BLOOD PRESSURE: 76 MMHG | HEIGHT: 66 IN | TEMPERATURE: 97.8 F | RESPIRATION RATE: 20 BRPM | BODY MASS INDEX: 23.46 KG/M2 | SYSTOLIC BLOOD PRESSURE: 124 MMHG | OXYGEN SATURATION: 96 %

## 2024-10-31 DIAGNOSIS — T81.49XA POSTOPERATIVE WOUND CELLULITIS: Primary | ICD-10-CM

## 2024-10-31 ASSESSMENT — ENCOUNTER SYMPTOMS
COUGH: 0
SORE THROAT: 0
ABDOMINAL PAIN: 0
CONSTIPATION: 0
DIARRHEA: 0
NAUSEA: 0
RHINORRHEA: 0
SHORTNESS OF BREATH: 0
WHEEZING: 0

## 2024-10-31 NOTE — PROGRESS NOTES
[facility] PROFESSIONAL [facility]  [address] PROGRESSIVE   [place] [address]  Dept: [phone]  Loc: [phone]     Alton Barroso (:  3/26/1934) is a 89 y.o. male, here for evaluation of the following chief complaint(s):  Wound Check (Recheck wound on back. Yesterday finished antibiotic)         Assessment & Plan  Postoperative wound cellulitis   Overall improved. Slight erythema, no swelling. Small amounts of yellow drainage, may represent epithelization? Sent for culture. If bacteria growth will continue or start new antibiotic.   Daily and prn dressing changes.  Dry dressing. Use triple antibiotic.   Clean with soap and water.  Monitor for fever, swelling, redness, worsening drainage.  Follow up scheduled next week for re-evaluation.     Return in about 1 week (around 2024) for Follow up.         SUBJECTIVE/OBJECTIVE:  HPI      Follow up on postoperative wound.  He had a skin lesion removed and biopsied about 3 weeks go via derm.  Concerns of redness and drainage last week and  went to ER. Placed on keflex.  Was in to see me last week. Continued with erythema and yellow drainage. Continued 7 days of keflex.   Kept wound covered.  Overall, states looking improved. Some redness still with thick yellow drainage noted on bandage. Small amounts. No fever.     Past Medical History:   Diagnosis Date    Atrial fibrillation (HCC)     Back pain     Hernia     Hx of blood clots     lungs after prostate surgery    Hyperlipidemia     Hypertension     Osteoarthritis     low back, left leg, right shoulder    Prostate cancer (HCC)     Prostate cancer (HCC) 2015    Pulmonary emboli (HCC) 2015    after prostate surgery    Wears glasses         Past Surgical History:   Procedure Laterality Date    APPENDECTOMY  1961    [facility]    BACK SURGERY  ,    X stop    CAROTID ENDARTERECTOMY Left 2022    LEFT CAROTID ENDARTERECTOMY performed

## 2024-11-02 LAB
BACTERIA SPEC AEROBE CULT: NORMAL
GRAM STN SPEC: NORMAL

## 2024-11-07 ENCOUNTER — LAB (OUTPATIENT)
Dept: LAB | Age: 89
End: 2024-11-07

## 2024-11-11 RX ORDER — SODIUM CHLORIDE 0.9 % (FLUSH) 0.9 %
5-40 SYRINGE (ML) INJECTION PRN
Status: DISCONTINUED | OUTPATIENT
Start: 2024-11-11 | End: 2024-11-13 | Stop reason: HOSPADM

## 2024-11-11 RX ORDER — SODIUM CHLORIDE 9 MG/ML
25 INJECTION, SOLUTION INTRAVENOUS PRN
Status: DISCONTINUED | OUTPATIENT
Start: 2024-11-11 | End: 2024-11-13 | Stop reason: HOSPADM

## 2024-11-11 RX ORDER — SODIUM CHLORIDE 0.9 % (FLUSH) 0.9 %
5-40 SYRINGE (ML) INJECTION EVERY 12 HOURS SCHEDULED
Status: DISCONTINUED | OUTPATIENT
Start: 2024-11-11 | End: 2024-11-13 | Stop reason: HOSPADM

## 2024-11-11 RX ORDER — SODIUM CHLORIDE 9 MG/ML
INJECTION, SOLUTION INTRAVENOUS CONTINUOUS
Status: DISCONTINUED | OUTPATIENT
Start: 2024-11-11 | End: 2024-11-13 | Stop reason: HOSPADM

## 2024-11-13 ENCOUNTER — ANESTHESIA (OUTPATIENT)
Dept: ENDOSCOPY | Age: 89
End: 2024-11-13
Payer: MEDICARE

## 2024-11-13 ENCOUNTER — APPOINTMENT (OUTPATIENT)
Age: 89
End: 2024-11-13
Attending: INTERNAL MEDICINE
Payer: MEDICARE

## 2024-11-13 ENCOUNTER — HOSPITAL ENCOUNTER (OUTPATIENT)
Age: 89
Setting detail: OUTPATIENT SURGERY
Discharge: HOME | End: 2024-11-13
Attending: INTERNAL MEDICINE | Admitting: INTERNAL MEDICINE
Payer: MEDICARE

## 2024-11-13 ENCOUNTER — ANESTHESIA EVENT (OUTPATIENT)
Dept: ENDOSCOPY | Age: 89
End: 2024-11-13
Payer: MEDICARE

## 2024-11-13 VITALS
OXYGEN SATURATION: 96 % | WEIGHT: 142.8 LBS | HEIGHT: 70 IN | DIASTOLIC BLOOD PRESSURE: 68 MMHG | SYSTOLIC BLOOD PRESSURE: 106 MMHG | RESPIRATION RATE: 14 BRPM | TEMPERATURE: 97.4 F | HEART RATE: 83 BPM | BODY MASS INDEX: 20.44 KG/M2

## 2024-11-13 DIAGNOSIS — I48.0 PAROXYSMAL ATRIAL FIBRILLATION (HCC): ICD-10-CM

## 2024-11-13 LAB
ECHO BSA: 1.79 M2
ECHO LV EJECTION FRACTION BIPLANE: 60 % (ref 55–100)

## 2024-11-13 PROCEDURE — 2580000003 HC RX 258: Performed by: NURSE ANESTHETIST, CERTIFIED REGISTERED

## 2024-11-13 PROCEDURE — 93312 ECHO TRANSESOPHAGEAL: CPT | Performed by: INTERNAL MEDICINE

## 2024-11-13 PROCEDURE — 3700000000 HC ANESTHESIA ATTENDED CARE: Performed by: INTERNAL MEDICINE

## 2024-11-13 PROCEDURE — 7100000010 HC PHASE II RECOVERY - FIRST 15 MIN: Performed by: INTERNAL MEDICINE

## 2024-11-13 PROCEDURE — 7100000011 HC PHASE II RECOVERY - ADDTL 15 MIN: Performed by: INTERNAL MEDICINE

## 2024-11-13 PROCEDURE — 6360000002 HC RX W HCPCS: Performed by: NURSE ANESTHETIST, CERTIFIED REGISTERED

## 2024-11-13 PROCEDURE — 2500000003 HC RX 250 WO HCPCS: Performed by: NURSE ANESTHETIST, CERTIFIED REGISTERED

## 2024-11-13 PROCEDURE — 93312 ECHO TRANSESOPHAGEAL: CPT

## 2024-11-13 PROCEDURE — 2580000003 HC RX 258: Performed by: INTERNAL MEDICINE

## 2024-11-13 PROCEDURE — 6370000000 HC RX 637 (ALT 250 FOR IP)

## 2024-11-13 PROCEDURE — 3700000001 HC ADD 15 MINUTES (ANESTHESIA): Performed by: INTERNAL MEDICINE

## 2024-11-13 RX ORDER — LIDOCAINE HYDROCHLORIDE 20 MG/ML
INJECTION, SOLUTION INFILTRATION; PERINEURAL
Status: DISCONTINUED | OUTPATIENT
Start: 2024-11-13 | End: 2024-11-13 | Stop reason: SDUPTHER

## 2024-11-13 RX ORDER — PROPOFOL 10 MG/ML
INJECTION, EMULSION INTRAVENOUS
Status: DISCONTINUED | OUTPATIENT
Start: 2024-11-13 | End: 2024-11-13 | Stop reason: SDUPTHER

## 2024-11-13 RX ORDER — SODIUM CHLORIDE 9 MG/ML
INJECTION, SOLUTION INTRAVENOUS
Status: DISCONTINUED | OUTPATIENT
Start: 2024-11-13 | End: 2024-11-13 | Stop reason: SDUPTHER

## 2024-11-13 RX ADMIN — PROPOFOL 30 MG: 10 INJECTION, EMULSION INTRAVENOUS at 12:29

## 2024-11-13 RX ADMIN — PHENYLEPHRINE HYDROCHLORIDE 100 MCG: 10 INJECTION INTRAVENOUS at 12:30

## 2024-11-13 RX ADMIN — PROPOFOL 30 MG: 10 INJECTION, EMULSION INTRAVENOUS at 12:20

## 2024-11-13 RX ADMIN — PROPOFOL 30 MG: 10 INJECTION, EMULSION INTRAVENOUS at 12:26

## 2024-11-13 RX ADMIN — PROPOFOL 50 MG: 10 INJECTION, EMULSION INTRAVENOUS at 12:14

## 2024-11-13 RX ADMIN — SODIUM CHLORIDE: 9 INJECTION, SOLUTION INTRAVENOUS at 12:12

## 2024-11-13 RX ADMIN — SODIUM CHLORIDE: 9 INJECTION, SOLUTION INTRAVENOUS at 11:43

## 2024-11-13 RX ADMIN — LIDOCAINE HYDROCHLORIDE 100 MG: 20 INJECTION, SOLUTION INFILTRATION; PERINEURAL at 12:14

## 2024-11-13 ASSESSMENT — PAIN - FUNCTIONAL ASSESSMENT
PAIN_FUNCTIONAL_ASSESSMENT: NONE - DENIES PAIN
PAIN_FUNCTIONAL_ASSESSMENT: 0-10

## 2024-11-13 NOTE — PROGRESS NOTES
Recovery mode, pt denies discomfort.   discussed findings with pt and responsible party. Discharge instructions provided and understanding verbalized.

## 2024-11-13 NOTE — ANESTHESIA PRE PROCEDURE
Department of Anesthesiology  Preprocedure Note       Name:  Alton Barroso   Age:  89 y.o.  :  3/26/1934                                          MRN:  [id number]         Date:  2024      Surgeon: Surgeon(s):  Dayana Andrews MD    Procedure: Procedure(s):  TRANSESOPHAGEAL ECHOCARDIOGRAM    Medications prior to admission:   Prior to Admission medications    Medication Sig Start Date End Date Taking? Authorizing Provider   clopidogrel (PLAVIX) 75 MG tablet Take 1 tablet by mouth daily 10/9/24  Yes Warner Clark MD   tiZANidine (ZANAFLEX) 2 MG tablet Take 1 tablet by mouth at bedtime PRN for muscle spasms   Yes ProviderBrian MD   Cholecalciferol (VITAMIN D3) 50 MCG ( UT) CAPS Take 1 capsule by mouth daily   Yes Brian Schwartz MD   Bacillus Coagulans-Inulin (PROBIOTIC) 1-250 BILLION-MG CAPS Take 1 Capful by mouth daily   Yes Brian Schwartz MD   aspirin 81 MG EC tablet Take 1 tablet by mouth daily 24  Yes Mounika Oleary APRN - CNP   ondansetron (ZOFRAN) 4 MG tablet Take 1 tablet by mouth every 8 hours as needed for Nausea or Vomiting 24  Yes Beckie Meade MD   amLODIPine (NORVASC) 5 MG tablet Take 1 tablet by mouth in the morning and at bedtime 2/15/24  Yes Beckie Meade MD   metoprolol tartrate (LOPRESSOR) 25 MG tablet Take 0.5 tablets by mouth every morning 22  Yes Beckie Meade MD   melatonin 5 MG TABS tablet Take 2 tablets by mouth nightly As per patient his doctor increased it to 10mg, and this is what he is taking every night.   Yes Brian Schwartz MD   Multiple Vitamins-Minerals (PRESERVISION AREDS 2 PO) Take 1 tablet by mouth 2 times daily   Yes Brian Schwartz MD   HYDROcodone-acetaminophen (NORCO) 5-325 MG per tablet Take 1 tablet by mouth in the morning and at bedtime for 30 days. As needed for pain Max Daily Amount: 2 tablets 10/21/24 11/20/24  Beckie Meade MD   Polyethylene Glycol 3350 (MIRALAX PO) Take by mouth as needed

## 2024-11-13 NOTE — PROGRESS NOTES
Alton admitted to Endo department and admitted to Endo room 9 for CARLOS with Dr. Andrews. Consent form signed and verified with pt. DNR form signed by pt as well.   Plan of care reviewed with patient.   Call light within reach.   Allergies reviewed with pt and son.  Bed in lowest position, locked, with one bed rail up.   Appropriate arm bands on patient.   Bathroom offered.   All questions and concerns of patient addressed    Name: Amararomeo   Relationship to patient:  son  Phone number:  [phone]

## 2024-11-13 NOTE — ANESTHESIA POSTPROCEDURE EVALUATION
Department of Anesthesiology  Postprocedure Note    Patient: Alton Barroso  MRN: [id number]  YOB: 1934  Date of evaluation: 11/13/2024    Procedure Summary       Date: 11/13/24 Room / Location: [place] [id number] / [facility]    Anesthesia Start: 1212 Anesthesia Stop: 1232    Procedure: TRANSESOPHAGEAL ECHOCARDIOGRAM Diagnosis:       Paroxysmal A-fib (HCC)      (Paroxysmal A-fib (HCC) [I48.0])    Surgeons: Dayana Andrews MD Responsible Provider: Dusty Rome DO    Anesthesia Type: MAC ASA Status: 3            Anesthesia Type: MAC    Kelsey Phase I: Kelsey Score: 10    Kelsey Phase II:      Anesthesia Post Evaluation    Patient location during evaluation: bedside  Patient participation: complete - patient cannot participate  Level of consciousness: awake  Pain score: 0  Airway patency: patent  Nausea & Vomiting: no vomiting and no nausea  Cardiovascular status: hemodynamically stable  Respiratory status: acceptable  Hydration status: stable  Pain management: adequate        No notable events documented.

## 2024-11-14 ENCOUNTER — TELEPHONE (OUTPATIENT)
Dept: CARDIOLOGY CLINIC | Age: 89
End: 2024-11-14

## 2024-11-14 NOTE — TELEPHONE ENCOUNTER
This patient has completed the 45 day post WATCHMAN CARLOS on 11/13. The patient will remain on DAPT per structural heart protocol. Dr. Clark do you agree?     Left Ventricle Normal left ventricular systolic function with a visually estimated EF of 60 - 65%. Left ventricle size is normal. Normal wall thickness. Normal wall motion. Indeterminate diastolic function.   Left Atrium Left atrium is moderately dilated. Device closure in the appendage that is completely occluded with a Watchman. No thrombus present on the ALMA ROSA device. No deb-device leak of ALMA ROSA device. Normal sized pulmonary veins.   Interatrial Septum No interatrial shunt visualized with color Doppler. Grade 0 Absence of bubbles. Agitated saline study was negative with and without provocation.   Right Ventricle Right ventricle size is normal. Normal systolic function.   Right Atrium Right atrium is mildly dilated.   Aortic Valve Valve structure is normal. Mild to moderate regurgitation. No stenosis.   Mitral Valve Valve structure is normal. Mild regurgitation. No stenosis noted.   Tricuspid Valve Valve structure is normal. Mild to moderate regurgitation. No stenosis noted.   Pulmonic Valve The pulmonic valve visualization is suboptimal but appears to be functioning normally. Physiologically normal regurgitation. No stenosis noted.   Pulmonary Artery Normal pulmonary arteries.   Aorta Normal sized aortic root and ascending aorta.   IVC/Hepatic Veins IVC diameter is less than or equal to 21 mm and decreases greater than 50% during inspiration; therefore the estimated right atrial pressure is normal (~3 mmHg). IVC size is normal.   Pericardium No pericardial effusion.

## 2024-11-14 NOTE — H&P
[facility]  Sedation/Analgesia History & Physical    Pt Name: Alton Barroso  MRN: [id number]  YOB: 1934  Provider Performing Procedure: Dayana Andrews MD  Primary Care Physician: Beckie Meade MD    PRE-PROCEDURE   DNR-CCA/DNR-CC []Yes [x]No  Brief History/Pre-Procedure Diagnosis:     45 days post watchman CARLOS  Pat verbalized understanding risk and benefit of CARLOS and wanted to proceed          MEDICAL HISTORY  []CAD/Valve  []Liver Disease  []Lung Disease []Diabetes  []Hypertension []Renal Disease  []Additional information:       has a past medical history of Atrial fibrillation (HCC), Back pain, Hernia, Hx of blood clots, Hypertension, Osteoarthritis, Prostate cancer (HCC), Prostate cancer (HCC), Pulmonary emboli (HCC), and Wears glasses.    SURGICAL HISTORY   has a past surgical history that includes back surgery (2009,2008); Total shoulder arthroplasty (2005); Prostate surgery (2003); Prostate surgery (2009); Prostate Biopsy (07/26/2012); Cataract removal with implant (Left, 11/2014); Prostate Biopsy (08/21/2015); hernia repair (1995, 2009); other surgical history (10/07/2015); other surgical history (Bilateral, 09/05/2017); Nerve Block Lumb Facet Level 1 Bilateral (Bilateral, 09/05/2017); hernia repair (1960's?); Cataract removal (Right, 06/2001); Appendectomy (01/1961); hernia repair (Left, 11/22/2017); other surgical history (Right, 1980's); other surgical history (01/30/2018); pr inject si joint arthrgrphy&/anes/steroid w/jenny (Left, 01/30/2018); other surgical history (Left, 02/27/2018); pr inject si joint arthrgrphy&/anes/steroid w/jenny (Left, 02/27/2018); Nerve Surgery (Left, 04/09/2018); pr office/outpt visit,procedure only (Left, 04/09/2018); pr office/outpt visit,procedure only (N/A, 08/21/2018); pr insj/rplcmt spinal npg/rcvr pocket crtj&connj (N/A, 09/17/2018); eye surgery; Carpal tunnel release (Right); Carotid endarterectomy (Left, 11/21/2022); Skin cancer

## 2024-11-15 DIAGNOSIS — M54.50 CHRONIC MIDLINE LOW BACK PAIN WITHOUT SCIATICA: ICD-10-CM

## 2024-11-15 DIAGNOSIS — G89.29 CHRONIC MIDLINE LOW BACK PAIN WITHOUT SCIATICA: ICD-10-CM

## 2024-11-18 RX ORDER — HYDROCODONE BITARTRATE AND ACETAMINOPHEN 5; 325 MG/1; MG/1
1 TABLET ORAL 2 TIMES DAILY
Qty: 60 TABLET | Refills: 0 | Status: SHIPPED | OUTPATIENT
Start: 2024-11-18 | End: 2024-12-18

## 2024-11-25 ENCOUNTER — LAB (OUTPATIENT)
Dept: LAB | Age: 88
End: 2024-11-25

## 2024-11-25 DIAGNOSIS — I48.0 PAROXYSMAL ATRIAL FIBRILLATION (HCC): ICD-10-CM

## 2024-11-25 LAB
ANION GAP SERPL CALC-SCNC: 15 MEQ/L (ref 8–16)
BUN SERPL-MCNC: 34 MG/DL (ref 7–22)
CALCIUM SERPL-MCNC: 9.3 MG/DL (ref 8.5–10.5)
CHLORIDE SERPL-SCNC: 104 MEQ/L (ref 98–111)
CO2 SERPL-SCNC: 22 MEQ/L (ref 23–33)
CREAT SERPL-MCNC: 1.1 MG/DL (ref 0.4–1.2)
DEPRECATED RDW RBC AUTO: 48 FL (ref 35–45)
ERYTHROCYTE [DISTWIDTH] IN BLOOD BY AUTOMATED COUNT: 13.5 % (ref 11.5–14.5)
GFR SERPL CREATININE-BSD FRML MDRD: 64 ML/MIN/1.73M2
GLUCOSE SERPL-MCNC: 155 MG/DL (ref 70–108)
HCT VFR BLD AUTO: 39.4 % (ref 42–52)
HGB BLD-MCNC: 12.6 GM/DL (ref 14–18)
MCH RBC QN AUTO: 30.8 PG (ref 26–33)
MCHC RBC AUTO-ENTMCNC: 32 GM/DL (ref 32.2–35.5)
MCV RBC AUTO: 96.3 FL (ref 80–94)
PLATELET # BLD AUTO: 173 THOU/MM3 (ref 130–400)
PMV BLD AUTO: 11.8 FL (ref 9.4–12.4)
POTASSIUM SERPL-SCNC: 4.3 MEQ/L (ref 3.5–5.2)
RBC # BLD AUTO: 4.09 MILL/MM3 (ref 4.7–6.1)
SODIUM SERPL-SCNC: 141 MEQ/L (ref 135–145)
WBC # BLD AUTO: 9.4 THOU/MM3 (ref 4.8–10.8)

## 2024-11-29 DIAGNOSIS — I48.0 PAF (PAROXYSMAL ATRIAL FIBRILLATION) (HCC): ICD-10-CM

## 2024-11-29 RX ORDER — METOPROLOL TARTRATE 25 MG/1
12.5 TABLET, FILM COATED ORAL EVERY MORNING
Qty: 45 TABLET | Refills: 3 | Status: SHIPPED | OUTPATIENT
Start: 2024-11-29

## 2024-11-29 NOTE — TELEPHONE ENCOUNTER
This medication refill is regarding a fax request.  Refill requested by  [facility] .    Requested Prescriptions     Pending Prescriptions Disp Refills    metoprolol tartrate (LOPRESSOR) 25 MG tablet 45 tablet 3     Sig: Take 0.5 tablets by mouth every morning       Date of last visit: 10/31/2024  Date of next visit: 1/20/2025  Date of last refill: 12/05/2022  Pharmacy Name: Has to be sent to [facility] and they will send it to the [facility]     Last Lipid Panel:    Lab Results   Component Value Date/Time    CHOL 196 03/20/2024 08:24 AM    TRIG 57 03/20/2024 08:24 AM    HDL 74 03/20/2024 08:24 AM     Last CMP:   Lab Results   Component Value Date     11/25/2024    K 4.3 11/25/2024     11/25/2024    CO2 22 (L) 11/25/2024    BUN 34 (H) 11/25/2024    CREATININE 1.1 11/25/2024    GLUCOSE 155 (H) 11/25/2024    CALCIUM 9.3 11/25/2024    BILITOT 0.7 09/26/2024    ALKPHOS 62 09/26/2024    AST 18 09/26/2024    ALT 15 09/26/2024    LABGLOM 64 11/25/2024    AGRATIO 1.5 05/14/2016       Last Thyroid:    Lab Results   Component Value Date    TSH 3.380 07/24/2018     Last Hemoglobin A1C:    Lab Results   Component Value Date/Time    LABA1C 5.8 11/16/2022 12:19 PM       Rx verified, ordered and set to EP.        normal gait and station , no tenderness or deformities present

## 2024-12-18 ENCOUNTER — TELEPHONE (OUTPATIENT)
Dept: FAMILY MEDICINE CLINIC | Age: 88
End: 2024-12-18

## 2024-12-18 DIAGNOSIS — E78.5 HYPERLIPIDEMIA WITH TARGET LDL LESS THAN 130: Primary | ICD-10-CM

## 2024-12-18 RX ORDER — ATORVASTATIN CALCIUM 80 MG/1
40 TABLET, FILM COATED ORAL DAILY
Qty: 45 TABLET | Refills: 3 | Status: SHIPPED | OUTPATIENT
Start: 2024-12-18

## 2024-12-18 NOTE — TELEPHONE ENCOUNTER
Requesting a refill of Atorvastatin Calcium 80 MG tab to take 1/2 tab nightly. I don't see it on the med list. [facility] [place]

## 2024-12-19 NOTE — TELEPHONE ENCOUNTER
[facility] stating [facility] need documentation as to why patient is taking this medication and to fax it to [phone]. Office notes and recent labs faxed.

## 2024-12-31 DIAGNOSIS — G89.29 CHRONIC MIDLINE LOW BACK PAIN WITHOUT SCIATICA: ICD-10-CM

## 2024-12-31 DIAGNOSIS — M54.50 CHRONIC MIDLINE LOW BACK PAIN WITHOUT SCIATICA: ICD-10-CM

## 2024-12-31 NOTE — TELEPHONE ENCOUNTER
This medication refill is regarding a telephone request.  Refill requested by patient.    Requested Prescriptions     Pending Prescriptions Disp Refills    HYDROcodone-acetaminophen (NORCO) 5-325 MG per tablet 60 tablet 0     Sig: Take 1 tablet by mouth in the morning and at bedtime for 30 days. As needed for pain Max Daily Amount: 2 tablets       Date of last visit: 10/31/2024  Date of next visit: 1/20/2025  Date of last refill: 11/18/2024  Pharmacy Name: [place] [facility]    Please print and fax script to [facility]i RE[facility]

## 2025-01-02 RX ORDER — HYDROCODONE BITARTRATE AND ACETAMINOPHEN 5; 325 MG/1; MG/1
1 TABLET ORAL 2 TIMES DAILY
Qty: 60 TABLET | Refills: 0 | Status: SHIPPED | OUTPATIENT
Start: 2025-01-02 | End: 2025-02-01

## 2025-01-22 ENCOUNTER — TELEPHONE (OUTPATIENT)
Dept: FAMILY MEDICINE CLINIC | Age: 89
End: 2025-01-22

## 2025-01-22 NOTE — TELEPHONE ENCOUNTER
Statin may help stable his aortic aneurysm.  And it decreased CV risk.  But he does not have to take it if he doesn't want to or if he has side effects

## 2025-01-22 NOTE — TELEPHONE ENCOUNTER
Abiodun called to say that he received a bottle of [facility]vastatin from the [facility] and he has never been on a statin before. I looked back through all of this his notes and labs and I can not find where he had ever been on it either. The note stated his daughter came in and asked for the refill and that the [facility] questioned why he is taking the med and that records were faxed but Abiodun is saying he keeps all his information and never has been told he needed this medication and wants to know why he should be taking it.

## 2025-01-27 ENCOUNTER — LAB (OUTPATIENT)
Dept: LAB | Age: 89
End: 2025-01-27

## 2025-01-28 LAB — PSA SERPL-MCNC: 0.68 NG/ML (ref 0–1)

## 2025-02-05 ENCOUNTER — OFFICE VISIT (OUTPATIENT)
Dept: FAMILY MEDICINE CLINIC | Age: 89
End: 2025-02-05

## 2025-02-05 VITALS
RESPIRATION RATE: 16 BRPM | WEIGHT: 142 LBS | BODY MASS INDEX: 20.33 KG/M2 | TEMPERATURE: 98.5 F | HEART RATE: 82 BPM | OXYGEN SATURATION: 97 % | HEIGHT: 70 IN | DIASTOLIC BLOOD PRESSURE: 60 MMHG | SYSTOLIC BLOOD PRESSURE: 100 MMHG

## 2025-02-05 DIAGNOSIS — M54.50 CHRONIC MIDLINE LOW BACK PAIN WITHOUT SCIATICA: ICD-10-CM

## 2025-02-05 DIAGNOSIS — I48.0 PAROXYSMAL ATRIAL FIBRILLATION (HCC): ICD-10-CM

## 2025-02-05 DIAGNOSIS — C61 PROSTATE CANCER (HCC): ICD-10-CM

## 2025-02-05 DIAGNOSIS — F11.20 OPIOID DEPENDENCE WITH CURRENT USE (HCC): ICD-10-CM

## 2025-02-05 DIAGNOSIS — E78.5 HYPERLIPIDEMIA WITH TARGET LDL LESS THAN 130: Primary | ICD-10-CM

## 2025-02-05 DIAGNOSIS — G89.29 CHRONIC MIDLINE LOW BACK PAIN WITHOUT SCIATICA: ICD-10-CM

## 2025-02-05 RX ORDER — HYDROCODONE BITARTRATE AND ACETAMINOPHEN 5; 325 MG/1; MG/1
1 TABLET ORAL 2 TIMES DAILY
Qty: 60 TABLET | Refills: 0 | Status: SHIPPED | OUTPATIENT
Start: 2025-02-05 | End: 2025-03-07

## 2025-02-05 SDOH — ECONOMIC STABILITY: FOOD INSECURITY: WITHIN THE PAST 12 MONTHS, YOU WORRIED THAT YOUR FOOD WOULD RUN OUT BEFORE YOU GOT MONEY TO BUY MORE.: NEVER TRUE

## 2025-02-05 SDOH — ECONOMIC STABILITY: FOOD INSECURITY: WITHIN THE PAST 12 MONTHS, THE FOOD YOU BOUGHT JUST DIDN'T LAST AND YOU DIDN'T HAVE MONEY TO GET MORE.: NEVER TRUE

## 2025-02-05 ASSESSMENT — PATIENT HEALTH QUESTIONNAIRE - PHQ9
SUM OF ALL RESPONSES TO PHQ QUESTIONS 1-9: 0
SUM OF ALL RESPONSES TO PHQ QUESTIONS 1-9: 0
SUM OF ALL RESPONSES TO PHQ9 QUESTIONS 1 & 2: 0
SUM OF ALL RESPONSES TO PHQ QUESTIONS 1-9: 0
SUM OF ALL RESPONSES TO PHQ QUESTIONS 1-9: 0
1. LITTLE INTEREST OR PLEASURE IN DOING THINGS: NOT AT ALL
2. FEELING DOWN, DEPRESSED OR HOPELESS: NOT AT ALL

## 2025-02-05 ASSESSMENT — ENCOUNTER SYMPTOMS
COUGH: 0
CONSTIPATION: 1
SHORTNESS OF BREATH: 0
BACK PAIN: 1
SORE THROAT: 0
RHINORRHEA: 0
DIARRHEA: 1
WHEEZING: 0
ABDOMINAL PAIN: 0
NAUSEA: 0

## 2025-02-05 NOTE — PROGRESS NOTES
Alton Barroso (:  3/26/1934) is a 89 y.o. male,  here for evaluation of the following chief complaint(s):  Pain (Back pain follow up - stated is some better - comes and goes /Question about stopping blood thinner at watchman /Refill hydrocodone /Check mouth were stitches were taken out )         Assessment & Plan  1. Gastroenteritis - improved  He reported experiencing significant diarrhea and nausea over the past few days, which has improved as of this morning. He was advised to use Imodium cautiously to avoid constipation. Ondansetron was used to manage nausea.    2. Anticoagulation therapy.   He was advised to continue Plavix as per the cardiologist's recommendation. He will confirm this plan with his cardiologist during the upcoming appointment next week. No longer on eliquis    3. Cholesterol management.  He was educated about the benefits and potential side effects of atorvastatin, including its role in reducing cardiovascular events and stabilizing plaque. He decided to discontinue atorvastatin for now but will discuss this further with his cardiologist next week.    4. Pain management.  A prescription refill for his pain medication was provided and [facility].    5. Lip lesion.  Upon examination, the lesion appears to be a benign scar or calcium deposit, likely resulting from a previous suture. It is not infected or inflamed. He was reassured that the lesion should resolve within a few months.    6.  Follows with urology for prostate cancer  Follow-up  The patient will follow up in 3 months or sooner if needed.    PROCEDURE  The patient underwent a Watchman procedure.    Results  Laboratory Studies  PSA on 2025 was 0.68. Prior to that, it was 0.48 and 0.54 before that.  1. Hyperlipidemia with target LDL less than 130  -     CBC with Auto Differential; Future  -     Comprehensive Metabolic Panel; Future  -     Lipid Panel; Future  2. Chronic midline low back pain without

## 2025-02-20 ENCOUNTER — TELEPHONE (OUTPATIENT)
Dept: FAMILY MEDICINE CLINIC | Age: 89
End: 2025-02-20

## 2025-02-20 NOTE — TELEPHONE ENCOUNTER
Patient called asking on advice for a cough he describes as heavy and wet he states he is bringing up phlegm that is for the most part clear but is somewhat blood tinged he denies any other s/s or fevers he stated this has been going on for over a week now. He is wondering if he needs an antibiotic or if he should still wait it out ?

## 2025-02-24 ENCOUNTER — OFFICE VISIT (OUTPATIENT)
Dept: FAMILY MEDICINE CLINIC | Age: 89
End: 2025-02-24
Payer: MEDICARE

## 2025-02-24 ENCOUNTER — HOSPITAL ENCOUNTER (OUTPATIENT)
Dept: GENERAL RADIOLOGY | Age: 89
Discharge: HOME | End: 2025-02-24
Payer: MEDICARE

## 2025-02-24 ENCOUNTER — HOSPITAL ENCOUNTER (OUTPATIENT)
Age: 89
Discharge: HOME | End: 2025-02-24
Payer: MEDICARE

## 2025-02-24 VITALS
HEIGHT: 70 IN | SYSTOLIC BLOOD PRESSURE: 134 MMHG | BODY MASS INDEX: 22.22 KG/M2 | OXYGEN SATURATION: 97 % | HEART RATE: 64 BPM | RESPIRATION RATE: 18 BRPM | WEIGHT: 155.2 LBS | TEMPERATURE: 99.1 F | DIASTOLIC BLOOD PRESSURE: 68 MMHG

## 2025-02-24 DIAGNOSIS — R60.0 BILATERAL LOWER EXTREMITY EDEMA: ICD-10-CM

## 2025-02-24 DIAGNOSIS — R06.02 SHORTNESS OF BREATH: ICD-10-CM

## 2025-02-24 DIAGNOSIS — R68.89 FLU-LIKE SYMPTOMS: ICD-10-CM

## 2025-02-24 DIAGNOSIS — R68.89 FLU-LIKE SYMPTOMS: Primary | ICD-10-CM

## 2025-02-24 DIAGNOSIS — J20.9 ACUTE BRONCHITIS, UNSPECIFIED ORGANISM: Primary | ICD-10-CM

## 2025-02-24 LAB
INFLUENZA VIRUS A RNA: NEGATIVE
INFLUENZA VIRUS B RNA: NEGATIVE

## 2025-02-24 PROCEDURE — G8420 CALC BMI NORM PARAMETERS: HCPCS | Performed by: FAMILY MEDICINE

## 2025-02-24 PROCEDURE — 1123F ACP DISCUSS/DSCN MKR DOCD: CPT | Performed by: FAMILY MEDICINE

## 2025-02-24 PROCEDURE — 87502 INFLUENZA DNA AMP PROBE: CPT | Performed by: FAMILY MEDICINE

## 2025-02-24 PROCEDURE — 99213 OFFICE O/P EST LOW 20 MIN: CPT | Performed by: FAMILY MEDICINE

## 2025-02-24 PROCEDURE — 1036F TOBACCO NON-USER: CPT | Performed by: FAMILY MEDICINE

## 2025-02-24 PROCEDURE — 1159F MED LIST DOCD IN RCRD: CPT | Performed by: FAMILY MEDICINE

## 2025-02-24 PROCEDURE — G8427 DOCREV CUR MEDS BY ELIG CLIN: HCPCS | Performed by: FAMILY MEDICINE

## 2025-02-24 PROCEDURE — 71046 X-RAY EXAM CHEST 2 VIEWS: CPT

## 2025-02-24 RX ORDER — FUROSEMIDE 40 MG/1
40 TABLET ORAL DAILY
Qty: 3 TABLET | Refills: 0 | Status: SHIPPED | OUTPATIENT
Start: 2025-02-24 | End: 2025-02-27

## 2025-02-24 RX ORDER — BENZONATATE 100 MG/1
100 CAPSULE ORAL 3 TIMES DAILY PRN
Qty: 30 CAPSULE | Refills: 0 | Status: SHIPPED | OUTPATIENT
Start: 2025-02-24 | End: 2025-03-06

## 2025-02-24 ASSESSMENT — ENCOUNTER SYMPTOMS
SORE THROAT: 0
BACK PAIN: 1
RHINORRHEA: 0
DIARRHEA: 0
ABDOMINAL PAIN: 0
COUGH: 1
NAUSEA: 0
WHEEZING: 0
CONSTIPATION: 0
SHORTNESS OF BREATH: 1

## 2025-02-24 NOTE — PROGRESS NOTES
Alton Barroso (:  3/26/1934) is a 89 y.o. male,  here for evaluation of the following chief complaint(s):  Cough (Cough with sinus congestion with blood with crusty eyes some drainage x 1 week) and Leg Swelling (Bilateral Legg swelling with tenderness x week has been wearing diabetic socks but can't get them on now elevation helps a little//Hasn't been eating much for 3 weeks)         Assessment & Plan  1. Bilateral lower extremity edema.  The patient's echocardiogram results from a few months prior were within normal limits, indicating that the edema is not likely due to heart failure. The current illness may have led to fluid overload, resulting in the observed edema. His weight gain is also likely attributable to fluid retention. Amlodipine can cause swelling, but he has been on it since . A prescription for Lasix 40 mg daily for 3 days has been provided to manage the excess fluid. He is advised to consume 1 to 2 bananas over the next few days to maintain potassium levels. He should elevate his feet and take the diuretic in the morning due to its potential to increase urination. Blood work should be deferred until approximately 1 week after completing the diuretic course to ensure renal function is not compromised.    2. Cough.  The influenza test returned negative results. A chest x-ray has been ordered to rule out pneumonia. A prescription for a cough suppressant capsule has been provided. If the chest x-ray reveals any abnormalities, an antibiotic will be prescribed.    Results  Laboratory Studies  Influenza test is negative.    Imaging  Echocardiogram done a few months ago showed good contractility and ejection fraction.  1. Flu-like symptoms  -     POCT Influenza A/B DNA (Alere i)  -     XR CHEST STANDARD (2 VW); Future  -     benzonatate (TESSALON) 100 MG capsule; Take 1 capsule by mouth 3 times daily as needed for Cough, Disp-30 capsule, R-0Normal  2. Bilateral lower extremity edema  -

## 2025-02-27 ENCOUNTER — TELEPHONE (OUTPATIENT)
Dept: FAMILY MEDICINE CLINIC | Age: 89
End: 2025-02-27

## 2025-02-27 DIAGNOSIS — R60.0 BILATERAL LOWER EXTREMITY EDEMA: ICD-10-CM

## 2025-02-27 RX ORDER — FUROSEMIDE 20 MG/1
20 TABLET ORAL DAILY
Qty: 30 TABLET | Refills: 1 | Status: SHIPPED | OUTPATIENT
Start: 2025-02-27

## 2025-02-27 RX ORDER — POTASSIUM CHLORIDE 750 MG/1
10 TABLET, EXTENDED RELEASE ORAL DAILY
Qty: 30 TABLET | Refills: 1 | Status: SHIPPED | OUTPATIENT
Start: 2025-02-27

## 2025-02-27 NOTE — TELEPHONE ENCOUNTER
Patient calling in he has some swelling in his bilat feet, ankle.  It is not causing any pain, but he cannot wear his shoes at this time.       He wanted to know if he should take the water pills longer, or if he needs to follow up next week for a re check

## 2025-02-27 NOTE — TELEPHONE ENCOUNTER
Sent in lower dose of Lasix that he can take every day.  Also will need to take daily potassium supplement.  Recheck in 2 weeks.  Please have him get his labs done a few days before his appointment.

## 2025-03-04 DIAGNOSIS — I10 ESSENTIAL HYPERTENSION: ICD-10-CM

## 2025-03-04 NOTE — TELEPHONE ENCOUNTER
Patient calling in requesting [facility]   Medication pended.     Patient's last appointment was : 2/24/2025  Patient's next appointment is : 3/12/2025  Last refilled: 02/15/2024

## 2025-03-05 RX ORDER — AMLODIPINE BESYLATE 5 MG/1
5 TABLET ORAL 2 TIMES DAILY
Qty: 180 TABLET | Refills: 1 | Status: SHIPPED | OUTPATIENT
Start: 2025-03-05

## 2025-03-10 ENCOUNTER — RESULTS FOLLOW-UP (OUTPATIENT)
Dept: FAMILY MEDICINE CLINIC | Age: 89
End: 2025-03-10

## 2025-03-10 ENCOUNTER — LAB (OUTPATIENT)
Dept: LAB | Age: 89
End: 2025-03-10

## 2025-03-10 DIAGNOSIS — E78.5 HYPERLIPIDEMIA WITH TARGET LDL LESS THAN 130: ICD-10-CM

## 2025-03-10 LAB
ALBUMIN SERPL BCG-MCNC: 3.8 G/DL (ref 3.4–4.9)
ALP SERPL-CCNC: 370 U/L (ref 40–129)
ALT SERPL W/O P-5'-P-CCNC: 334 U/L (ref 10–50)
ANION GAP SERPL CALC-SCNC: 11 MEQ/L (ref 8–16)
AST SERPL-CCNC: 175 U/L (ref 10–50)
BASOPHILS ABSOLUTE: 0.1 THOU/MM3 (ref 0–0.1)
BASOPHILS NFR BLD AUTO: 0.9 %
BILIRUB SERPL-MCNC: 0.9 MG/DL (ref 0.3–1.2)
BUN SERPL-MCNC: 32 MG/DL (ref 8–23)
CALCIUM SERPL-MCNC: 9.7 MG/DL (ref 8.2–9.6)
CHLORIDE SERPL-SCNC: 103 MEQ/L (ref 98–111)
CHOLEST SERPL-MCNC: 187 MG/DL (ref 100–199)
CO2 SERPL-SCNC: 26 MEQ/L (ref 22–29)
CREAT SERPL-MCNC: 1.2 MG/DL (ref 0.7–1.2)
DEPRECATED RDW RBC AUTO: 52.6 FL (ref 35–45)
EOSINOPHIL NFR BLD AUTO: 3.6 %
EOSINOPHILS ABSOLUTE: 0.5 THOU/MM3 (ref 0–0.4)
ERYTHROCYTE [DISTWIDTH] IN BLOOD BY AUTOMATED COUNT: 15.3 % (ref 11.5–14.5)
GFR SERPL CREATININE-BSD FRML MDRD: 57 ML/MIN/1.73M2
GLUCOSE SERPL-MCNC: 97 MG/DL (ref 74–109)
HCT VFR BLD AUTO: 38.7 % (ref 42–52)
HDLC SERPL-MCNC: 66 MG/DL
HGB BLD-MCNC: 12.4 GM/DL (ref 14–18)
IMM GRANULOCYTES # BLD AUTO: 0.13 THOU/MM3 (ref 0–0.07)
IMM GRANULOCYTES NFR BLD AUTO: 0.9 %
LDLC SERPL CALC-MCNC: 109 MG/DL
LYMPHOCYTES ABSOLUTE: 1.6 THOU/MM3 (ref 1–4.8)
LYMPHOCYTES NFR BLD AUTO: 11.1 %
MCH RBC QN AUTO: 30.2 PG (ref 26–33)
MCHC RBC AUTO-ENTMCNC: 32 GM/DL (ref 32.2–35.5)
MCV RBC AUTO: 94.4 FL (ref 80–94)
MONOCYTES ABSOLUTE: 1 THOU/MM3 (ref 0.4–1.3)
MONOCYTES NFR BLD AUTO: 7.1 %
NEUTROPHILS ABSOLUTE: 10.7 THOU/MM3 (ref 1.8–7.7)
NEUTROPHILS NFR BLD AUTO: 76.4 %
NRBC BLD AUTO-RTO: 0 /100 WBC
PLATELET # BLD AUTO: 310 THOU/MM3 (ref 130–400)
PMV BLD AUTO: 11.3 FL (ref 9.4–12.4)
POTASSIUM SERPL-SCNC: 4.3 MEQ/L (ref 3.5–5.2)
PROT SERPL-MCNC: 6.9 G/DL (ref 6.4–8.3)
RBC # BLD AUTO: 4.1 MILL/MM3 (ref 4.7–6.1)
SODIUM SERPL-SCNC: 140 MEQ/L (ref 135–145)
TRIGL SERPL-MCNC: 58 MG/DL (ref 0–199)
WBC # BLD AUTO: 14 THOU/MM3 (ref 4.8–10.8)

## 2025-03-12 ENCOUNTER — OFFICE VISIT (OUTPATIENT)
Dept: FAMILY MEDICINE CLINIC | Age: 89
End: 2025-03-12

## 2025-03-12 VITALS
BODY MASS INDEX: 21.07 KG/M2 | HEART RATE: 62 BPM | HEIGHT: 70 IN | WEIGHT: 147.2 LBS | DIASTOLIC BLOOD PRESSURE: 52 MMHG | OXYGEN SATURATION: 98 % | SYSTOLIC BLOOD PRESSURE: 110 MMHG | TEMPERATURE: 98.8 F | RESPIRATION RATE: 22 BRPM

## 2025-03-12 DIAGNOSIS — R74.8 ELEVATED LIVER ENZYMES: Primary | ICD-10-CM

## 2025-03-12 DIAGNOSIS — J18.9 COMMUNITY ACQUIRED PNEUMONIA, UNSPECIFIED LATERALITY: ICD-10-CM

## 2025-03-12 DIAGNOSIS — M79.89 SWELLING OF BOTH LOWER EXTREMITIES: ICD-10-CM

## 2025-03-12 ASSESSMENT — ENCOUNTER SYMPTOMS
RHINORRHEA: 0
DIARRHEA: 0
NAUSEA: 0
BACK PAIN: 1
SHORTNESS OF BREATH: 0
SORE THROAT: 0
COUGH: 1
WHEEZING: 0
CONSTIPATION: 0
ABDOMINAL PAIN: 0

## 2025-03-12 NOTE — PROGRESS NOTES
Alton Barroso (:  3/26/1934) is a 89 y.o. male,  here for evaluation of the following chief complaint(s):  Leg Swelling (\"Legs are better not perfect but better\")         Assessment & Plan  1. Leg swelling. - improved. Cont lasix   The leg swelling has shown improvement but remains present. The patient is currently taking Lasix to help with the swelling. He is advised to continue taking Lasix except for the day before and the day of the CAT scan due to potential kidney impact. He should also keep his legs elevated to help reduce swelling.    2. Elevated liver enzymes.  Recent blood work showed significantly elevated liver enzymes (alk phos 370, , ) compared to normal levels 5 months ago. This elevation, along with a slightly low albumin level, raises concerns about potential liver issues.  A CAT scan of the abdomen with contrast will be ordered to further investigate the liver. He is advised to drink plenty of fluids before the scan to protect kidney function.    3. Cough/PNA - improved augmentin compelted  The patient's cough has improved significantly after antibiotic treatment, suggesting it may have been related to a recent pneumonia infection. His lungs sound much better now. If the cough persists beyond a month, a repeat chest x-ray may be considered to rule out other issues.    4. Medication management.  A prescription for amlodipine, to be taken twice daily, has been provided for a duration of 6 months.    Follow-up  The patient is scheduled for a follow-up visit in May 2025.    Results  Laboratory Studies  Liver enzymes were elevated with alk phos at 370, AST at 175, and ALT at 334. Albumin was slightly low. White blood cell count was 14. Creatinine was 1.2.    Imaging  Chest x-ray showed probable pneumonia. Abdominal x-ray from  showed interposition of the liver and the right hemidiaphragm. CT scan of abdomen from 2018 showed normal liver.  1. Elevated liver enzymes  -

## 2025-03-17 ENCOUNTER — HOSPITAL ENCOUNTER (OUTPATIENT)
Dept: CT IMAGING | Age: 89
Discharge: HOME | End: 2025-03-17
Attending: FAMILY MEDICINE
Payer: MEDICARE

## 2025-03-17 ENCOUNTER — RESULTS FOLLOW-UP (OUTPATIENT)
Dept: CT IMAGING | Age: 89
End: 2025-03-17

## 2025-03-17 DIAGNOSIS — R74.8 ELEVATED LIVER ENZYMES: ICD-10-CM

## 2025-03-17 DIAGNOSIS — R74.8 ELEVATED LIVER ENZYMES: Primary | ICD-10-CM

## 2025-03-17 DIAGNOSIS — M79.89 SWELLING OF BOTH LOWER EXTREMITIES: ICD-10-CM

## 2025-03-17 PROCEDURE — 6360000004 HC RX CONTRAST MEDICATION: Performed by: FAMILY MEDICINE

## 2025-03-17 PROCEDURE — 74177 CT ABD & PELVIS W/CONTRAST: CPT

## 2025-03-17 RX ORDER — IOPAMIDOL 755 MG/ML
100 INJECTION, SOLUTION INTRAVASCULAR
Status: COMPLETED | OUTPATIENT
Start: 2025-03-17 | End: 2025-03-17

## 2025-03-17 RX ADMIN — IOPAMIDOL 100 ML: 755 INJECTION, SOLUTION INTRAVENOUS at 08:03

## 2025-03-24 ENCOUNTER — LAB (OUTPATIENT)
Dept: LAB | Age: 89
End: 2025-03-24

## 2025-03-24 ENCOUNTER — RESULTS FOLLOW-UP (OUTPATIENT)
Dept: LAB | Age: 89
End: 2025-03-24

## 2025-03-24 DIAGNOSIS — R74.8 ELEVATED LIVER ENZYMES: ICD-10-CM

## 2025-03-24 LAB
ALBUMIN SERPL BCG-MCNC: 4 G/DL (ref 3.4–4.9)
ALP SERPL-CCNC: 197 U/L (ref 40–129)
ALT SERPL W/O P-5'-P-CCNC: 38 U/L (ref 10–50)
ANION GAP SERPL CALC-SCNC: 12 MEQ/L (ref 8–16)
AST SERPL-CCNC: 23 U/L (ref 10–50)
BILIRUB SERPL-MCNC: 0.7 MG/DL (ref 0.3–1.2)
BUN SERPL-MCNC: 32 MG/DL (ref 8–23)
CALCIUM SERPL-MCNC: 9.3 MG/DL (ref 8.2–9.6)
CHLORIDE SERPL-SCNC: 103 MEQ/L (ref 98–111)
CO2 SERPL-SCNC: 24 MEQ/L (ref 22–29)
CREAT SERPL-MCNC: 1.2 MG/DL (ref 0.7–1.2)
GFR SERPL CREATININE-BSD FRML MDRD: 57 ML/MIN/1.73M2
GLUCOSE SERPL-MCNC: 90 MG/DL (ref 74–109)
POTASSIUM SERPL-SCNC: 4.2 MEQ/L (ref 3.5–5.2)
PROT SERPL-MCNC: 7.3 G/DL (ref 6.4–8.3)
SODIUM SERPL-SCNC: 139 MEQ/L (ref 135–145)

## 2025-03-26 ENCOUNTER — TELEPHONE (OUTPATIENT)
Dept: CARDIOLOGY CLINIC | Age: 89
End: 2025-03-26

## 2025-03-26 DIAGNOSIS — I48.0 PAROXYSMAL ATRIAL FIBRILLATION (HCC): Primary | ICD-10-CM

## 2025-03-26 DIAGNOSIS — Z95.818 PRESENCE OF WATCHMAN LEFT ATRIAL APPENDAGE CLOSURE DEVICE: ICD-10-CM

## 2025-03-26 NOTE — TELEPHONE ENCOUNTER
This patient has completed 6 months of DAPT post WATCHMAN. According to the Watchman pharmacological guidelines it is recommended to stop Plavix and continue Aspirin 81 mg daily. After reviewing the patient's chart there is no further indication to continue Plavix. Dr. Clark are you ok with stopping Plavix and continuing Aspirin 81 mg daily?

## 2025-03-28 PROBLEM — Z95.818 PRESENCE OF WATCHMAN LEFT ATRIAL APPENDAGE CLOSURE DEVICE: Status: ACTIVE | Noted: 2025-03-26

## 2025-03-28 PROBLEM — I48.0 PAROXYSMAL ATRIAL FIBRILLATION (HCC): Status: ACTIVE | Noted: 2025-03-26

## 2025-03-28 NOTE — TELEPHONE ENCOUNTER
Orders received from Dr. Clark to schedule the patient for a 1 year post Watchman follow up appointment with Mounika Oleary CNP along with a CARLOS, CBC and BMP prior.    CARLOS to be performed by their primary cardiologist. If Dr Clark is the primary cardiologist then with Dr Andrews. CARLOS to be prior to appointment with Mounika Oleary CNP.     Watchman implanted on 9/26/25. CARLOS needs to be (305 to 425 days)    Conchita, would you be willing to schedule this?         LVM with patient to discontinue his Plavix and continue on an 81 mg Aspirin. Informed him we will mail out a year follow ups

## 2025-03-28 NOTE — TELEPHONE ENCOUNTER
CARLOS scheduled 9/26/2025 at 2:00 pm, with Dr. Andrews - with anesthesia. Arrival time of 12:30 pm. Spoke to Mallory. Placed on provider's schedule. Order faxed.    Follow up scheduled 10/1/2025 at 2:00 pm, with Jorge

## 2025-04-02 DIAGNOSIS — G89.29 CHRONIC MIDLINE LOW BACK PAIN WITHOUT SCIATICA: Primary | ICD-10-CM

## 2025-04-02 DIAGNOSIS — M54.50 CHRONIC MIDLINE LOW BACK PAIN WITHOUT SCIATICA: Primary | ICD-10-CM

## 2025-04-02 RX ORDER — HYDROCODONE BITARTRATE AND ACETAMINOPHEN 5; 325 MG/1; MG/1
1 TABLET ORAL 2 TIMES DAILY
Qty: 60 TABLET | Refills: 0 | Status: SHIPPED | OUTPATIENT
Start: 2025-04-02 | End: 2025-05-02

## 2025-04-02 NOTE — TELEPHONE ENCOUNTER
Alton Barroso called requesting a refill on the following medications:  Requested Prescriptions     Pending Prescriptions Disp Refills    HYDROcodone-acetaminophen (NORCO) 5-325 MG per tablet 56 tablet 0     Sig: Take 1 tablet by mouth 2 times daily for 30 days. 1 tablet twice a day for pain as needed , max 2 daily Max Daily Amount: 2 tablets       Date of last visit: 3/12/2025  Date of next visit (if applicable):Visit date not found  Date of last refill: 02/25  [facility]       Thanks,  Brigida Napoles LPN

## 2025-04-03 ENCOUNTER — OFFICE VISIT (OUTPATIENT)
Dept: CARDIOLOGY CLINIC | Age: 89
End: 2025-04-03
Payer: MEDICARE

## 2025-04-03 VITALS
BODY MASS INDEX: 21.05 KG/M2 | HEART RATE: 82 BPM | HEIGHT: 70 IN | WEIGHT: 147 LBS | DIASTOLIC BLOOD PRESSURE: 78 MMHG | SYSTOLIC BLOOD PRESSURE: 131 MMHG

## 2025-04-03 DIAGNOSIS — R60.0 BILATERAL LEG EDEMA: ICD-10-CM

## 2025-04-03 DIAGNOSIS — I34.0 MODERATE MITRAL REGURGITATION: ICD-10-CM

## 2025-04-03 DIAGNOSIS — I71.21 ANEURYSM OF ASCENDING AORTA WITHOUT RUPTURE: ICD-10-CM

## 2025-04-03 DIAGNOSIS — I35.1 MODERATE AORTIC REGURGITATION: ICD-10-CM

## 2025-04-03 DIAGNOSIS — I48.19 PERSISTENT ATRIAL FIBRILLATION (HCC): Primary | ICD-10-CM

## 2025-04-03 DIAGNOSIS — Z95.818 PRESENCE OF WATCHMAN LEFT ATRIAL APPENDAGE CLOSURE DEVICE: ICD-10-CM

## 2025-04-03 DIAGNOSIS — I10 ESSENTIAL HYPERTENSION: ICD-10-CM

## 2025-04-03 PROCEDURE — 1123F ACP DISCUSS/DSCN MKR DOCD: CPT | Performed by: INTERNAL MEDICINE

## 2025-04-03 PROCEDURE — G8420 CALC BMI NORM PARAMETERS: HCPCS | Performed by: INTERNAL MEDICINE

## 2025-04-03 PROCEDURE — 99214 OFFICE O/P EST MOD 30 MIN: CPT | Performed by: INTERNAL MEDICINE

## 2025-04-03 PROCEDURE — 1159F MED LIST DOCD IN RCRD: CPT | Performed by: INTERNAL MEDICINE

## 2025-04-03 PROCEDURE — 1036F TOBACCO NON-USER: CPT | Performed by: INTERNAL MEDICINE

## 2025-04-03 PROCEDURE — 1160F RVW MEDS BY RX/DR IN RCRD: CPT | Performed by: INTERNAL MEDICINE

## 2025-04-03 PROCEDURE — G8427 DOCREV CUR MEDS BY ELIG CLIN: HCPCS | Performed by: INTERNAL MEDICINE

## 2025-04-03 NOTE — PROGRESS NOTES
Chief Complaint   Patient presents with    Follow-up     6 month follow up     Atrial Fibrillation           Patient here for a 6 month follow up for atr fib     Had watchman and off OAC    Hx of new leg edema and started on lasix 20 mg po qd and kcl 10 by pcp and now lresolved  Lost 8 lb after lasix 20    EKG done 9-    Denies CP, sob, dizziness, or lightheaded.    Palpitations occasional chronic     Previous Hx of Dizziness on standing on walk and standing quick- better  Balance issue at am    Sob on exertion- stable    Walk 2 to 3 block METS> 4 and walk with cane    Nonsmoker    FHX  Had pacer    Hx of PE in the lung post op and had been on OA for 6 month and off it over one back      Patient Active Problem List   Diagnosis    Prostate cancer    Generalized anxiety disorder    Essential hypertension    Back pain, chronic s/p surgery    History of pulmonary embolus (PE)    Spondylosis of lumbar region without myelopathy or radiculopathy    Persistent atrial fibrillation (HCC)- newely DXed 10/12/17- CVR    Non-rheumatic mitral regurgitation- mod to severe    Moderate aortic regurgitation    Failed back syndrome of lumbar spine    Chronic pain disorder    Pneumothorax    Constipation    Ascending aortic aneurysm (HCC) 4.1 cm on CTA and 4.5 on echo    Nonexudative age-related macular degeneration    Secondary pigmentary retinal degeneration    Venous retinal branch occlusion    PAF (paroxysmal atrial fibrillation) (HCC)    Moderate mitral regurgitation    Stenosis of left carotid artery    S/P carotid endarterectomy       Past Surgical History:   Procedure Laterality Date    APPENDECTOMY  01/1961    [facility]    BACK SURGERY  2009,2008    X stop    CAROTID ENDARTERECTOMY Left 11/21/2022    LEFT CAROTID ENDARTERECTOMY performed by Dennis Stewart MD at [facility] OR    CARPAL TUNNEL RELEASE Right     x2 on right    CATARACT REMOVAL Right 06/2001    Dr Larry Oh-    CATARACT REMOVAL WITH IMPLANT Left 11/2014

## 2025-05-05 ENCOUNTER — OFFICE VISIT (OUTPATIENT)
Dept: FAMILY MEDICINE CLINIC | Age: 89
End: 2025-05-05
Payer: MEDICARE

## 2025-05-05 VITALS
WEIGHT: 145 LBS | SYSTOLIC BLOOD PRESSURE: 122 MMHG | HEART RATE: 55 BPM | BODY MASS INDEX: 20.76 KG/M2 | OXYGEN SATURATION: 98 % | HEIGHT: 70 IN | RESPIRATION RATE: 16 BRPM | DIASTOLIC BLOOD PRESSURE: 78 MMHG

## 2025-05-05 DIAGNOSIS — K59.03 DRUG-INDUCED CONSTIPATION: ICD-10-CM

## 2025-05-05 DIAGNOSIS — M54.50 CHRONIC MIDLINE LOW BACK PAIN WITHOUT SCIATICA: Primary | ICD-10-CM

## 2025-05-05 DIAGNOSIS — I10 PRIMARY HYPERTENSION: ICD-10-CM

## 2025-05-05 DIAGNOSIS — G89.29 CHRONIC MIDLINE LOW BACK PAIN WITHOUT SCIATICA: Primary | ICD-10-CM

## 2025-05-05 DIAGNOSIS — R60.0 BILATERAL LOWER EXTREMITY EDEMA: ICD-10-CM

## 2025-05-05 PROCEDURE — G8420 CALC BMI NORM PARAMETERS: HCPCS | Performed by: FAMILY MEDICINE

## 2025-05-05 PROCEDURE — 1123F ACP DISCUSS/DSCN MKR DOCD: CPT | Performed by: FAMILY MEDICINE

## 2025-05-05 PROCEDURE — 1036F TOBACCO NON-USER: CPT | Performed by: FAMILY MEDICINE

## 2025-05-05 PROCEDURE — 1159F MED LIST DOCD IN RCRD: CPT | Performed by: FAMILY MEDICINE

## 2025-05-05 PROCEDURE — G8427 DOCREV CUR MEDS BY ELIG CLIN: HCPCS | Performed by: FAMILY MEDICINE

## 2025-05-05 PROCEDURE — 99214 OFFICE O/P EST MOD 30 MIN: CPT | Performed by: FAMILY MEDICINE

## 2025-05-05 RX ORDER — POTASSIUM CHLORIDE 750 MG/1
10 TABLET, EXTENDED RELEASE ORAL DAILY PRN
Qty: 30 TABLET | Refills: 1 | Status: SHIPPED | OUTPATIENT
Start: 2025-05-05

## 2025-05-05 RX ORDER — HYDROCODONE BITARTRATE AND ACETAMINOPHEN 5; 325 MG/1; MG/1
1 TABLET ORAL 2 TIMES DAILY
Qty: 60 TABLET | Refills: 0 | Status: SHIPPED | OUTPATIENT
Start: 2025-05-05 | End: 2025-06-04

## 2025-05-05 RX ORDER — FUROSEMIDE 20 MG/1
20 TABLET ORAL DAILY PRN
Qty: 30 TABLET | Refills: 1 | Status: SHIPPED | OUTPATIENT
Start: 2025-05-05 | End: 2025-05-05 | Stop reason: SDUPTHER

## 2025-05-05 RX ORDER — FUROSEMIDE 20 MG/1
20 TABLET ORAL DAILY PRN
Qty: 30 TABLET | Refills: 1 | Status: SHIPPED | OUTPATIENT
Start: 2025-05-05

## 2025-05-05 RX ORDER — POTASSIUM CHLORIDE 750 MG/1
10 TABLET, EXTENDED RELEASE ORAL DAILY PRN
Qty: 30 TABLET | Refills: 1 | Status: SHIPPED | OUTPATIENT
Start: 2025-05-05 | End: 2025-05-05 | Stop reason: SDUPTHER

## 2025-05-05 ASSESSMENT — ENCOUNTER SYMPTOMS
ABDOMINAL PAIN: 0
SHORTNESS OF BREATH: 0
RHINORRHEA: 0
NAUSEA: 0
CONSTIPATION: 1
WHEEZING: 0
SORE THROAT: 0
DIARRHEA: 0
BACK PAIN: 1
COUGH: 0

## 2025-05-05 NOTE — PROGRESS NOTES
Alton Barroso (:  3/26/1934) is a 89 y.o. male,  here for evaluation of the following chief complaint(s):  Medication Check (Discuss water pill continuing ) and Discuss Labs         Assessment & Plan  1. Lower extremity edema. Improved.  Cont compression.  Change lasix to prn only  - Renal function is within the upper limit of the normal range.  - Diuretic medication could potentially impact renal function if taken excessively.  - Edema appears to be a consequence of the previous pneumonia episode.  - Transition to an as-needed regimen for diuretic medication specifically for instances of swelling; discontinue potassium supplement unless taking the diuretic. New prescription for both diuretic and potassium supplement provided, with a quantity of 30 tablets each.    2. Back pain.  - Experiences intermittent back pain, particularly bothersome during the past week, disrupting sleep.  - Manages pain with hydrocodone, taking half a tablet initially and another half if waking up around 2:00 AM; does not exceed a total of 2 tablets per day.  - [facility]  - Prescription Drug Monitoring Program was reviewed.    3. Constipation.  - Bowel movements are regular, aided by the consumption of prune juice.  - Advised to continue drinking prune juice as it helps manage constipation caused by pain medication.    4. Health maintenance.  - Blood pressure readings are within the normal range today.  - Recent laboratory results indicate satisfactory kidney function, complete blood count, and cholesterol levels.  - Initial elevation in liver enzymes, subsequent tests showed a significant decrease.  - Scheduled to see Dr. Poe in 2025 for a PSA test.  - Advised to skip the upcoming health fair as recent labs were already done; labs will be repeated in 6 months to a year to monitor liver enzymes.    5. Pneumonia.  - Reports no residual cough from previous

## 2025-05-27 ENCOUNTER — LAB (OUTPATIENT)
Dept: LAB | Age: 89
End: 2025-05-27

## 2025-05-27 LAB — PSA SERPL-MCNC: 0.72 NG/ML (ref 0–1)

## 2025-06-12 DIAGNOSIS — M54.50 CHRONIC MIDLINE LOW BACK PAIN WITHOUT SCIATICA: ICD-10-CM

## 2025-06-12 DIAGNOSIS — G89.29 CHRONIC MIDLINE LOW BACK PAIN WITHOUT SCIATICA: ICD-10-CM

## 2025-06-12 RX ORDER — HYDROCODONE BITARTRATE AND ACETAMINOPHEN 5; 325 MG/1; MG/1
1 TABLET ORAL 2 TIMES DAILY
Qty: 60 TABLET | Refills: 0 | Status: SHIPPED | OUTPATIENT
Start: 2025-06-12 | End: 2025-07-12

## 2025-06-12 NOTE — TELEPHONE ENCOUNTER
Patient called requesting refill on Norco 5/325mg BID to be sent to [facility].  Last appointment this department: 5/5/2025  Next appointment this department: 8/14/2025

## 2025-07-21 DIAGNOSIS — G89.29 CHRONIC MIDLINE LOW BACK PAIN WITHOUT SCIATICA: ICD-10-CM

## 2025-07-21 DIAGNOSIS — M54.50 CHRONIC MIDLINE LOW BACK PAIN WITHOUT SCIATICA: ICD-10-CM

## 2025-07-21 RX ORDER — HYDROCODONE BITARTRATE AND ACETAMINOPHEN 5; 325 MG/1; MG/1
1 TABLET ORAL 2 TIMES DAILY
Qty: 60 TABLET | Refills: 0 | Status: SHIPPED | OUTPATIENT
Start: 2025-07-21 | End: 2025-08-20

## 2025-07-21 NOTE — TELEPHONE ENCOUNTER
This medication refill is regarding a telephone request.  Refill requested by patient.    Requested Prescriptions     Pending Prescriptions Disp Refills    HYDROcodone-acetaminophen (NORCO) 5-325 MG per tablet 60 tablet 0     Sig: Take 1 tablet by mouth 2 times daily for 30 days. 1 tablet twice a day for pain as needed , max 2 daily Max Daily Amount: 2 tablets       Date of last visit: 5/5/2025  Date of next visit: 8/14/2025  Date of last refill: 6/12/25  Pharmacy Name: [facility]-will need faxed

## 2025-08-09 ENCOUNTER — HOSPITAL ENCOUNTER (EMERGENCY)
Age: 89
Discharge: HOME | End: 2025-08-09
Attending: EMERGENCY MEDICINE
Payer: MEDICARE

## 2025-08-09 VITALS
RESPIRATION RATE: 16 BRPM | SYSTOLIC BLOOD PRESSURE: 149 MMHG | OXYGEN SATURATION: 95 % | BODY MASS INDEX: 20.86 KG/M2 | DIASTOLIC BLOOD PRESSURE: 76 MMHG | HEART RATE: 72 BPM | TEMPERATURE: 97.6 F | HEIGHT: 71 IN | WEIGHT: 149 LBS

## 2025-08-09 DIAGNOSIS — J01.00 ACUTE MAXILLARY SINUSITIS, RECURRENCE NOT SPECIFIED: Primary | ICD-10-CM

## 2025-08-09 PROCEDURE — 99283 EMERGENCY DEPT VISIT LOW MDM: CPT

## 2025-08-09 RX ORDER — AMOXICILLIN 500 MG/1
500 CAPSULE ORAL 3 TIMES DAILY
Qty: 30 CAPSULE | Refills: 0 | Status: SHIPPED | OUTPATIENT
Start: 2025-08-09 | End: 2025-08-19

## 2025-08-09 ASSESSMENT — ENCOUNTER SYMPTOMS
SHORTNESS OF BREATH: 0
SORE THROAT: 0

## 2025-08-14 ENCOUNTER — OFFICE VISIT (OUTPATIENT)
Dept: FAMILY MEDICINE CLINIC | Age: 89
End: 2025-08-14

## 2025-08-14 VITALS
HEART RATE: 65 BPM | OXYGEN SATURATION: 97 % | BODY MASS INDEX: 21.05 KG/M2 | WEIGHT: 147 LBS | HEIGHT: 70 IN | RESPIRATION RATE: 16 BRPM | SYSTOLIC BLOOD PRESSURE: 120 MMHG | TEMPERATURE: 98.7 F | DIASTOLIC BLOOD PRESSURE: 70 MMHG

## 2025-08-14 DIAGNOSIS — G89.29 CHRONIC MIDLINE LOW BACK PAIN WITHOUT SCIATICA: Primary | ICD-10-CM

## 2025-08-14 DIAGNOSIS — R60.0 BILATERAL LOWER EXTREMITY EDEMA: ICD-10-CM

## 2025-08-14 DIAGNOSIS — M54.50 CHRONIC MIDLINE LOW BACK PAIN WITHOUT SCIATICA: Primary | ICD-10-CM

## 2025-08-14 DIAGNOSIS — F11.20 OPIOID DEPENDENCE WITH CURRENT USE (HCC): ICD-10-CM

## 2025-08-14 DIAGNOSIS — I48.0 PAROXYSMAL ATRIAL FIBRILLATION (HCC): ICD-10-CM

## 2025-08-28 DIAGNOSIS — I48.0 PAF (PAROXYSMAL ATRIAL FIBRILLATION) (HCC): ICD-10-CM

## 2025-08-28 DIAGNOSIS — M54.50 CHRONIC MIDLINE LOW BACK PAIN WITHOUT SCIATICA: ICD-10-CM

## 2025-08-28 DIAGNOSIS — G89.29 CHRONIC MIDLINE LOW BACK PAIN WITHOUT SCIATICA: ICD-10-CM

## 2025-08-28 RX ORDER — METOPROLOL TARTRATE 25 MG/1
12.5 TABLET, FILM COATED ORAL EVERY MORNING
Qty: 45 TABLET | Refills: 3 | Status: SHIPPED | OUTPATIENT
Start: 2025-08-28

## 2025-08-28 RX ORDER — HYDROCODONE BITARTRATE AND ACETAMINOPHEN 5; 325 MG/1; MG/1
1 TABLET ORAL 2 TIMES DAILY
Qty: 60 TABLET | Refills: 0 | Status: SHIPPED | OUTPATIENT
Start: 2025-08-28 | End: 2025-09-27

## (undated) DEVICE — TOWEL,OR,DSP,ST,BLUE,DLX,4/PK,20PK/CS: Brand: MEDLINE

## (undated) DEVICE — SHEET, T, LAPAROTOMY, STERILE: Brand: MEDLINE

## (undated) DEVICE — GAUZE,SPONGE,4"X4",12PLY,STERILE,LF,2'S: Brand: MEDLINE

## (undated) DEVICE — TIP COVER ACCESSORY

## (undated) DEVICE — BANDAGE ADH W1XL3IN NAT FAB WVN FLX DURABLE N ADH PD SEAL

## (undated) DEVICE — Z DISCONTINUED BY MEDLINE USE 2711682 TRAY SKIN PREP DRY W/ PREM GLV

## (undated) DEVICE — REMOTE CONTROL KIT: Brand: FREELINK™

## (undated) DEVICE — CABLE SURG L60CM EXTN NEUROMODULATION PRECIS SPECTR

## (undated) DEVICE — SUTURE PERMAHAND SZ 3-0 L30IN NONABSORBABLE BLK SH L26MM K832H

## (undated) DEVICE — SYRINGE MED 10ML LUERLOCK TIP W/O SFTY DISP

## (undated) DEVICE — VESSEL LOOPS X-RAY DETECTABLE: Brand: DEROYAL

## (undated) DEVICE — GLOVE SURG SZ 65 THK91MIL LTX FREE SYN POLYISOPRENE

## (undated) DEVICE — [HIGH FLOW INSUFFLATOR,  DO NOT USE IF PACKAGE IS DAMAGED,  KEEP DRY,  KEEP AWAY FROM SUNLIGHT,  PROTECT FROM HEAT AND RADIOACTIVE SOURCES.]: Brand: PNEUMOSURE

## (undated) DEVICE — CHLORAPREP 26ML CLEAR

## (undated) DEVICE — Device

## (undated) DEVICE — SYRINGE IRRIG 60ML SFT PLIABLE BLB EZ TO GRP 1 HND USE W/

## (undated) DEVICE — HYPODERMIC SAFETY NEEDLE: Brand: MAGELLAN

## (undated) DEVICE — CARBIDE MATCH HEAD

## (undated) DEVICE — INTENDED FOR TISSUE SEPARATION, AND OTHER PROCEDURES THAT REQUIRE A SHARP SURGICAL BLADE TO PUNCTURE OR CUT.: Brand: BARD-PARKER ® CARBON RIB-BACK BLADES

## (undated) DEVICE — GLOVE ORANGE PI 7   MSG9070

## (undated) DEVICE — GLOVE ORANGE PI 7 1/2   MSG9075

## (undated) DEVICE — GOWN,SIRUS,NONRNF,SETINSLV,XL,20/CS: Brand: MEDLINE

## (undated) DEVICE — ROYAL SILK SURGICAL GOWN, XXL: Brand: CONVERTORS

## (undated) DEVICE — REDUCER: Brand: ENDOWRIST

## (undated) DEVICE — 3M™ IOBAN™ 2 ANTIMICROBIAL INCISE DRAPE 6650EZ: Brand: IOBAN™ 2

## (undated) DEVICE — CONTAINER,SPECIMEN,PNEU TUBE,4OZ,OR STRL: Brand: MEDLINE

## (undated) DEVICE — WRENCH SURG L76CM SPNL CRD HEX STIM SYS SURG EQUIP PRECIS

## (undated) DEVICE — APPLIER CLP L9.375IN APER 2.1MM CLS L3.8MM 20 SM TI CLP

## (undated) DEVICE — WAX SURG 2.5GM HEMSTAT BNE BEESWAX PARAFFIN ISO PALMITATE

## (undated) DEVICE — DRESSING TRNSPAR W5XL4.5IN FLM SHT SEMIPERMEABLE WIND

## (undated) DEVICE — INSUFFLATION NEEDLE TO ESTABLISH PNEUMOPERITONEUM.: Brand: INSUFFLATION NEEDLE

## (undated) DEVICE — SHEET,DRAPE,3/4,53X77,STERILE: Brand: MEDLINE

## (undated) DEVICE — NEEDLE SYR 18GA L1.5IN RED PLAS HUB S STL BLNT FILL W/O

## (undated) DEVICE — APPLIER LIG CLP M L11IN TI STR RNG HNDL FOR 20 CLP DISP

## (undated) DEVICE — TROCAR: Brand: KII FIOS FIRST ENTRY

## (undated) DEVICE — O.R. CABLE 1X16, 61CM AND EXTENSION

## (undated) DEVICE — LEFT ATRIAL APPENDAGE CLOSURE DEVICE WITH DELIVERY SYSTEM
Type: IMPLANTABLE DEVICE | Status: NON-FUNCTIONAL
Brand: WATCHMAN FLX™ PRO
Removed: 2024-09-26

## (undated) DEVICE — GLOVE ORANGE PI 8 1/2   MSG9085

## (undated) DEVICE — SUTURE PERMA-HAND SZ 2-0 L30IN NONABSORBABLE BLK L26MM SH K833H

## (undated) DEVICE — BIPOLAR SEALER 23-112-1 AQM 6.0: Brand: AQUAMANTYS ®

## (undated) DEVICE — 35CM LONG TUNNELING TOOL

## (undated) DEVICE — CHECK-FLO PERFORMER INTRODUCER: Brand: PERFORMER

## (undated) DEVICE — BLADE CLIPPER GEN PURP NS

## (undated) DEVICE — YANKAUER,BULB TIP,W/O VENT,RIGID,STERILE: Brand: MEDLINE

## (undated) DEVICE — KIT CHARGING CHRG BASE STN PWR SUPL CHRG BELT PRECIS SPECTR

## (undated) DEVICE — SEAL

## (undated) DEVICE — PACK PROCEDURE SURG SET UP SRMC

## (undated) DEVICE — COLUMN DRAPE

## (undated) DEVICE — C-ARMOR C-ARM EQUIPMENT COVERS CLEAR STERILE UNIVERSAL FIT 12 PER CASE: Brand: C-ARMOR

## (undated) DEVICE — OIL CARTRIDGE: Brand: CORE, MAESTRO

## (undated) DEVICE — SYRINGE MED 30ML STD CLR PLAS LUERLOCK TIP N CTRL DISP

## (undated) DEVICE — 3 ML SYRINGE LUER-LOCK TIP: Brand: MONOJECT

## (undated) DEVICE — NEEDLE SPNL 22GA L3.5IN BLK HUB S STL REG WALL FIT STYL W/

## (undated) DEVICE — SOLUTION IV 1000ML 0.9% SOD CHL PH 5 INJ USP VIAFLX PLAS

## (undated) DEVICE — PREP SOL PVP IODINE 4%  4 OZ/BTL

## (undated) DEVICE — 3M™ BAIR HUGGER® MULTI ACCESS BLANKET, PEDIATRIC, FULL BODY, 10 PER CASE 31000: Brand: BAIR HUGGER™

## (undated) DEVICE — SOLUTION ANTIFOG VIS SYS CLEARIFY LAPSCP

## (undated) DEVICE — SYSTEM CLOSURE 6-12 FR VEN VASC VASCADE MVP

## (undated) DEVICE — 4F (1.0MM ID) X 9CM STIFF4F (1.0 MICRO-STICK®INTRODUCER SE WITH NITINOL GUIDEWIREWITH NITIN WITH RADIOPAQUE TIPWITH RADIOPAQ: Brand: MICRO-STICK SETMICRO-STICK SET

## (undated) DEVICE — TOWEL,OR,DSP,ST,BLUE,STD,4/PK,20PK/CS: Brand: MEDLINE

## (undated) DEVICE — PACK-MAJOR

## (undated) DEVICE — MEDI-VAC NON-CONDUCTIVE SUCTION TUBING 6MM X 6.1M (20 FT.) L: Brand: CARDINAL HEALTH

## (undated) DEVICE — PAD,NON-ADHERENT,3X8,STERILE,LF,1/PK: Brand: MEDLINE

## (undated) DEVICE — ELEVATOR NEUROSTIMULATOR SPNL PASS FOR SPNL CRD STIM SYS

## (undated) DEVICE — APPLICATOR MEDICATED 26 CC SOLUTION CLR STRL CHLORAPREP

## (undated) DEVICE — BOOT,SUTURE,STANDARD,YELLOW-IN-BLUE: Brand: MEDLINE

## (undated) DEVICE — SUTURE VCRL SZ 0 L45CM ABSRB VLT OS-6 L36.4MM 1/2 CIR REV J711T

## (undated) DEVICE — GAUZE,SPONGE,4"X4",16PLY,STRL,LF,10/TRAY: Brand: MEDLINE

## (undated) DEVICE — OFF - ST. RITAS VASC: Brand: MEDLINE INDUSTRIES, INC.

## (undated) DEVICE — HEAD POSITIONER, SURGICAL: Brand: DEROYAL

## (undated) DEVICE — 1010 S-DRAPE TOWEL DRAPE 10/BX: Brand: STERI-DRAPE™

## (undated) DEVICE — BINDER ABD SM MED 30 IN - 45 IN HT 12 IN

## (undated) DEVICE — ACCESS SHEATH WITH DILATOR: Brand: WATCHMAN TRUSTEER™ ACCESS SYSTEM

## (undated) DEVICE — ACCESS SHEATH WITH DILATOR: Brand: WATCHMAN FXD CURVE™ ACCESS SYSTEM

## (undated) DEVICE — GOWN,SIRUS,NON REINFRCD,LARGE,SET IN SL: Brand: MEDLINE

## (undated) DEVICE — SUTURE NRLN SZ 4-0 L18IN NONABSORBABLE BLK L17MM RB-1 1/2 C554D

## (undated) DEVICE — TUBING FLTR PLUME AWAY EVAC W/ SUCT DEV DISP PUREVIEW

## (undated) DEVICE — ELECTRO LUBE IS A SINGLE PATIENT USE DEVICE THAT IS INTENDED TO BE USED ON ELECTROSURGICAL ELECTRODES TO REDUCE STICKING.: Brand: KEY SURGICAL ELECTRO LUBE

## (undated) DEVICE — CANNULA SEAL

## (undated) DEVICE — 6 ML SYRINGE LUER-LOCK TIP: Brand: MONOJECT

## (undated) DEVICE — CATHETER VASC DIAG PGTL W/ SIDE H PERIPH W/OUT HYDRPHLC

## (undated) DEVICE — GAUZE,SPONGE,8"X4",12PLY,XRAY,STRL,LF: Brand: MEDLINE

## (undated) DEVICE — DRESSING FOAM W6XL6.75IN SHAL GRANULATING WND SKIN TEAR

## (undated) DEVICE — STRIP SKIN CLSR W0.25XL4IN WHT SPUNBOUND FBR NYL HI ADH

## (undated) DEVICE — SPONGE LAP W18XL18IN WHT COT 4 PLY FLD STRUNG RADPQ DISP ST

## (undated) DEVICE — PACK,UNIVERSAL,NO GOWNS: Brand: MEDLINE

## (undated) DEVICE — BASIC SINGLE BASIN BTC-LF: Brand: MEDLINE INDUSTRIES, INC.

## (undated) DEVICE — HI-TORQUE SUPRA CORE .035 PERIPHERAL GUIDE WIRE .035 X 190 CM: Brand: HI-TORQUE SUPRA CORE

## (undated) DEVICE — 3M™ STERI-STRIP™ COMPOUND BENZOIN TINCTURE 40 BAGS/CARTON 4 CARTONS/CASE C1544: Brand: 3M™ STERI-STRIP™

## (undated) DEVICE — MEDI-VAC YANKAUER SUCTION HANDLE W/BULBOUS TIP: Brand: CARDINAL HEALTH

## (undated) DEVICE — GENERAL LAPAROSCOPY PACK-LF: Brand: MEDLINE INDUSTRIES, INC.

## (undated) DEVICE — SPONGE GZ W4XL4IN COT 12 PLY TYP VII WVN C FLD DSGN

## (undated) DEVICE — AGENT HEMSTAT W2XL4IN OXIDIZED REGENERATED CELOS ABSRB SFT

## (undated) DEVICE — CODMAN® SURGICAL PATTIES 1/2" X 1/2" (1.27CM X 1.27CM): Brand: CODMAN®

## (undated) DEVICE — PACK,SET UP,NO DRAPES: Brand: MEDLINE

## (undated) DEVICE — 1 X VERSACROSS CONNECT TRANSSEPTAL DILATOR (INCLUDING 1 X J-TIP MECHANICAL GUIDEWIRE); 1 X VERSACROSS RF WIRE (INCLUDING 1 X CONNECTOR CABLE (SINGLE USE)); 1 X DISPERSIVE ELECTRODE: Brand: VERSACROSS CONNECT LAAC ACCESS SOLUTION

## (undated) DEVICE — Z DISCONTINUED USE 2653965 CANNULA VAC DIA7MM CRV SEMI RIG ROUNDED TIP DISP BERK

## (undated) DEVICE — ADHESIVE SKIN CLSR 0.7ML TOP DERMBND ADV

## (undated) DEVICE — TUBING, SUCTION, 1/4" X 20', STRAIGHT: Brand: MEDLINE INDUSTRIES, INC.

## (undated) DEVICE — DRESSING TRNSPAR W4XL10IN FLM MIC POR SURESITE 123

## (undated) DEVICE — NEEDLE SPNL L3.5IN DIA25GA QNCKE TYP BVL SPINOCAN

## (undated) DEVICE — BLADELESS OBTURATOR: Brand: WECK VISTA

## (undated) DEVICE — SURE SET SINGLE BASIN-LF: Brand: MEDLINE INDUSTRIES, INC.

## (undated) DEVICE — DIFFUSER: Brand: CORE, MAESTRO

## (undated) DEVICE — PINNACLE INTRODUCER SHEATH: Brand: PINNACLE

## (undated) DEVICE — PEN: MARKING STD 100/CS: Brand: MEDICAL ACTION INDUSTRIES

## (undated) DEVICE — 3M™ STERI-DRAPE™ INSTRUMENT POUCH 1018: Brand: STERI-DRAPE™

## (undated) DEVICE — COVER ARMBRD W13XL28.5IN IMPERV BLU FOR OP RM

## (undated) DEVICE — PENCIL SMK EVAC ALL IN 1 DSGN ENH VISIBILITY IMPROVED AIR

## (undated) DEVICE — GLOVE ORANGE PI 8   MSG9080

## (undated) DEVICE — DRAPE,INSTRUMENT,MAGNETIC,10X16: Brand: MEDLINE

## (undated) DEVICE — TOTAL TRAY, DB, 100% SILI FOLEY, 16FR 10: Brand: MEDLINE

## (undated) DEVICE — DRAPE C ARM W36XL30IN RECTANG BND BG AND TAPE

## (undated) DEVICE — CONMED DISPOSABLE BIPOLAR CABLE, 10' (3.05M): Brand: CONMED

## (undated) DEVICE — 3M™ WARMING BLANKET, UPPER BODY, 10 PER CASE, 42268: Brand: BAIR HUGGER™

## (undated) DEVICE — SUTURE PROL SZ 6-0 L24IN NONABSORBABLE BLU L9.3MM BV-1 3/8 8805H

## (undated) DEVICE — SUTURE VCRL + SZ 2-0 L27IN ABSRB WHT SH 1/2 CIR TAPERCUT VCP417H

## (undated) DEVICE — PATIENT RETURN ELECTRODE, SINGLE-USE, CONTACT QUALITY MONITORING, ADULT, WITH 9FT CORD, FOR PATIENTS WEIGING OVER 33LBS. (15KG): Brand: MEGADYNE

## (undated) DEVICE — SUTURE VCRL SZ 3-0 L18IN ABSRB VLT L26MM SH 1/2 CIR J774D

## (undated) DEVICE — CODMAN® SURGICAL PATTIES 1/2" X1 1/2" (1.27CM X 3.81CM): Brand: CODMAN®

## (undated) DEVICE — POSITIONER HD W8XH4XL8.5IN RASPBERRY FOAM SLT

## (undated) DEVICE — CAROTID ARTERY SHUNT KIT,RADIOPAQUE LINE, STRAIGHT
Type: IMPLANTABLE DEVICE | Status: NON-FUNCTIONAL
Brand: ARGYLE
Removed: 2022-11-21

## (undated) DEVICE — SUTURE V-LOC 180 SZ 3-0 L9IN ABSRB GRN L26MM V-20 1/2 CIR VLOCL0644

## (undated) DEVICE — SUTURE PROL SZ 7-0 L24IN NONABSORBABLE BLU L9.3MM CC 3/8 8704H

## (undated) DEVICE — SUTURE ABSRB L45CM L36MM SZ 2-0 OS-6 VLT POLYGLACTIN 910 J710T

## (undated) DEVICE — SOLUTION IV IRRIG WATER 1000ML POUR BRL 2F7114